# Patient Record
Sex: FEMALE | Race: WHITE | NOT HISPANIC OR LATINO | Employment: OTHER | URBAN - METROPOLITAN AREA
[De-identification: names, ages, dates, MRNs, and addresses within clinical notes are randomized per-mention and may not be internally consistent; named-entity substitution may affect disease eponyms.]

---

## 2017-07-17 ENCOUNTER — OFFICE VISIT (OUTPATIENT)
Dept: FAMILY MEDICINE CLINIC | Facility: CLINIC | Age: 82
End: 2017-07-17

## 2017-07-17 ENCOUNTER — LAB (OUTPATIENT)
Dept: LAB | Facility: HOSPITAL | Age: 82
End: 2017-07-17

## 2017-07-17 VITALS
HEIGHT: 66 IN | BODY MASS INDEX: 24.27 KG/M2 | DIASTOLIC BLOOD PRESSURE: 74 MMHG | WEIGHT: 151 LBS | HEART RATE: 84 BPM | OXYGEN SATURATION: 98 % | SYSTOLIC BLOOD PRESSURE: 130 MMHG

## 2017-07-17 DIAGNOSIS — I10 ESSENTIAL HYPERTENSION: Primary | ICD-10-CM

## 2017-07-17 DIAGNOSIS — L71.9 ROSACEA: ICD-10-CM

## 2017-07-17 DIAGNOSIS — R23.3 EASY BRUISING: ICD-10-CM

## 2017-07-17 DIAGNOSIS — N81.11 MIDLINE CYSTOCELE: ICD-10-CM

## 2017-07-17 DIAGNOSIS — K21.9 GASTROESOPHAGEAL REFLUX DISEASE WITHOUT ESOPHAGITIS: ICD-10-CM

## 2017-07-17 DIAGNOSIS — D49.9 NEOPLASM: ICD-10-CM

## 2017-07-17 DIAGNOSIS — R05.3 CHRONIC COUGH: ICD-10-CM

## 2017-07-17 DIAGNOSIS — R23.3 BRUISES EASILY: ICD-10-CM

## 2017-07-17 DIAGNOSIS — I51.7 CARDIOMEGALY: ICD-10-CM

## 2017-07-17 DIAGNOSIS — M15.9 PRIMARY OSTEOARTHRITIS INVOLVING MULTIPLE JOINTS: ICD-10-CM

## 2017-07-17 DIAGNOSIS — I10 ESSENTIAL HYPERTENSION: ICD-10-CM

## 2017-07-17 DIAGNOSIS — Z01.818 PRE-OP EVALUATION: ICD-10-CM

## 2017-07-17 DIAGNOSIS — I45.0 RIGHT FASCICULAR BLOCK: ICD-10-CM

## 2017-07-17 DIAGNOSIS — J45.20 MILD INTERMITTENT ASTHMA WITHOUT COMPLICATION: ICD-10-CM

## 2017-07-17 DIAGNOSIS — J30.9 ATOPIC RHINITIS: ICD-10-CM

## 2017-07-17 LAB
ANION GAP SERPL CALCULATED.3IONS-SCNC: 8 MMOL/L (ref 3–11)
APTT PPP: 28.2 SECONDS (ref 24–31)
BILIRUB UR QL STRIP: NEGATIVE
BUN BLD-MCNC: 14 MG/DL (ref 9–23)
BUN/CREAT SERPL: 23.3 (ref 7–25)
CALCIUM SPEC-SCNC: 10.1 MG/DL (ref 8.7–10.4)
CHLORIDE SERPL-SCNC: 103 MMOL/L (ref 99–109)
CLARITY UR: CLEAR
CO2 SERPL-SCNC: 29 MMOL/L (ref 20–31)
COLOR UR: YELLOW
CREAT BLD-MCNC: 0.6 MG/DL (ref 0.6–1.3)
DEPRECATED RDW RBC AUTO: 49.2 FL (ref 37–54)
ERYTHROCYTE [DISTWIDTH] IN BLOOD BY AUTOMATED COUNT: 13.7 % (ref 11.3–14.5)
GFR SERPL CREATININE-BSD FRML MDRD: 96 ML/MIN/1.73
GLUCOSE BLD-MCNC: 95 MG/DL (ref 70–100)
GLUCOSE UR STRIP-MCNC: NEGATIVE MG/DL
HCT VFR BLD AUTO: 43.3 % (ref 34.5–44)
HGB BLD-MCNC: 13.5 G/DL (ref 11.5–15.5)
HGB UR QL STRIP.AUTO: ABNORMAL
INR PPP: 0.92
KETONES UR QL STRIP: NEGATIVE
LEUKOCYTE ESTERASE UR QL STRIP.AUTO: ABNORMAL
MCH RBC QN AUTO: 30.5 PG (ref 27–31)
MCHC RBC AUTO-ENTMCNC: 31.2 G/DL (ref 32–36)
MCV RBC AUTO: 97.7 FL (ref 80–99)
NITRITE UR QL STRIP: NEGATIVE
PH UR STRIP.AUTO: 6 [PH] (ref 5–8)
PLATELET # BLD AUTO: 263 10*3/MM3 (ref 150–450)
PMV BLD AUTO: 8.4 FL (ref 6–12)
POTASSIUM BLD-SCNC: 4 MMOL/L (ref 3.5–5.5)
PROT UR QL STRIP: NEGATIVE
PROTHROMBIN TIME: 10 SECONDS (ref 9.6–11.5)
RBC # BLD AUTO: 4.43 10*6/MM3 (ref 3.89–5.14)
SODIUM BLD-SCNC: 140 MMOL/L (ref 132–146)
SP GR UR STRIP: 1.01 (ref 1–1.03)
UROBILINOGEN UR QL STRIP: ABNORMAL
WBC NRBC COR # BLD: 7.05 10*3/MM3 (ref 3.5–10.8)

## 2017-07-17 PROCEDURE — 85027 COMPLETE CBC AUTOMATED: CPT | Performed by: INTERNAL MEDICINE

## 2017-07-17 PROCEDURE — 36415 COLL VENOUS BLD VENIPUNCTURE: CPT

## 2017-07-17 PROCEDURE — 99214 OFFICE O/P EST MOD 30 MIN: CPT | Performed by: INTERNAL MEDICINE

## 2017-07-17 PROCEDURE — 85730 THROMBOPLASTIN TIME PARTIAL: CPT | Performed by: INTERNAL MEDICINE

## 2017-07-17 PROCEDURE — 81003 URINALYSIS AUTO W/O SCOPE: CPT | Performed by: INTERNAL MEDICINE

## 2017-07-17 PROCEDURE — 85610 PROTHROMBIN TIME: CPT | Performed by: INTERNAL MEDICINE

## 2017-07-17 PROCEDURE — 80048 BASIC METABOLIC PNL TOTAL CA: CPT | Performed by: INTERNAL MEDICINE

## 2017-07-17 RX ORDER — AZELASTINE HCL 205.5 UG/1
SPRAY NASAL
COMMUNITY
Start: 2017-07-13 | End: 2017-08-11 | Stop reason: SDUPTHER

## 2017-07-17 RX ORDER — ESTRADIOL 2 MG/1
RING VAGINAL
COMMUNITY
Start: 2017-06-15 | End: 2019-07-08

## 2017-07-17 NOTE — PROGRESS NOTES
Chief Complaint   Patient presents with   • Pre-op Exam     RIGHT HIP REPLACEMENT      Subjective   Lashanda Qureshi is a 81 y.o. female    History of Present Illness     The patient is struggled with osteoarthritis for many years.  She has been unable to walk because of pain in her right hip.  She is currently walking with a cane.  She is seeing Dr. Chakraborty and they've both determined that she needs total hip replacement.  She is same me preoperatively.  The patient has no cardiac symptoms.  She has a history of asthma with no recent decompensation.  She has had general surgery in the past with no difficulty with general anesthesia and she has no family members with problems with general anesthesia    The following portions of the patient's history were reviewed and updated as appropriate: allergies, current medications, past social history and problem list    Allergies   Allergen Reactions   • Erythromycin       Past Medical History:   Diagnosis Date   • Abnormal CT scan, lung    • Mycobacterium gordonae infection     Hypertension  History of colon cancer status post resection, radiation and chemotherapy  Radiation proctitis  Insomnia  Degenerative disc disease of lumbar spine  Vitamin D deficiency  History of shingles with ongoing postherpetic neuropathy  Large cystocele with pessary placement  Macular degeneration    Past Surgical History:   Procedure Laterality Date   • BLADDER SURGERY     • BRONCHOSCOPY  01/27/2016   • CHOLECYSTECTOMY     • COLON SURGERY     • DILATION AND CURETTAGE, DIAGNOSTIC / THERAPEUTIC     • TOTAL HIP ARTHROPLASTY      Left hip    COURTNEY/BSO and bladder tack  Social History     Social History   • Marital status:      Spouse name: N/A   • Number of children: N/A   • Years of education: N/A     Occupational History   • Not on file.     Social History Main Topics   • Smoking status: Former Smoker   • Smokeless tobacco: Never Used      Comment: Quit 41 years ago   • Alcohol use No   •  Drug use: No   • Sexual activity: Not on file      Comment:      Other Topics Concern   • Not on file     Social History Narrative      Current Outpatient Prescriptions on File Prior to Visit   Medication Sig Dispense Refill   • Cholecalciferol (VITAMIN D3) 2000 UNITS tablet Take  by mouth. Take 2 tablets daily.     • Cyanocobalamin (VITAMIN B12 PO) Take  by mouth. Take 1 tablet daily as directed.     • latanoprost (XALATAN) 0.005 % ophthalmic solution Apply  to eye. Instill 1 drop into affected eye(s) once daily as directed.     • losartan (COZAAR) 100 MG tablet Take 1 tablet by mouth daily.     • Magnesium 400 MG capsule Take  by mouth. Take 1 capsule daily.     • fluticasone (FLONASE) 50 MCG/ACT nasal spray 2 sprays into each nostril daily. Administer 2 sprays in each nostril for each dose.     • magnesium oxide (MAGOX) 400 (241.3 MG) MG tablet tablet Take 400 mg by mouth daily.     • montelukast (SINGULAIR) 10 MG tablet Take 1 tablet by mouth every night. 30 tablet 11   • [DISCONTINUED] estradiol (ESTRACE) 0.1 MG/GM vaginal cream Insert  into the vagina daily.       Current Facility-Administered Medications on File Prior to Visit   Medication Dose Route Frequency Provider Last Rate Last Dose   • albuterol (PROVENTIL) nebulizer solution 2.5 mg  2.5 mg Nebulization Q6H PRN CHARLES Perez            Review of Systems   Constitutional: Negative for appetite change and fatigue.   HENT: Negative for ear pain and sore throat.    Eyes: Negative for itching and visual disturbance.   Respiratory: Negative for cough and shortness of breath.    Cardiovascular: Negative for chest pain and palpitations.   Gastrointestinal: Negative for abdominal pain and nausea.   Endocrine: Negative for cold intolerance and heat intolerance.   Genitourinary: Negative for dysuria and hematuria.   Musculoskeletal: Negative for arthralgias and back pain.   Skin: Negative for rash and wound.   Allergic/Immunologic: Negative for  environmental allergies and food allergies.   Neurological: Negative for dizziness and headaches.   Hematological: Negative for adenopathy. Does not bruise/bleed easily.   Psychiatric/Behavioral: Negative for sleep disturbance. The patient is not nervous/anxious.        Objective     Vitals:    07/17/17 1107   BP: 130/74   Pulse: 84   SpO2: 98%       Physical Exam   Constitutional: She is oriented to person, place, and time. She appears well-developed and well-nourished.   HENT:   Head: Normocephalic and atraumatic.   Right Ear: External ear normal.   Left Ear: External ear normal.   Nose: Nose normal.   Mouth/Throat: Oropharynx is clear and moist.   Eyes: Conjunctivae and EOM are normal. Pupils are equal, round, and reactive to light.   Neck: Normal range of motion. Neck supple.   Cardiovascular: Normal rate, regular rhythm and normal heart sounds.    Pulmonary/Chest: Effort normal and breath sounds normal.   Abdominal: Soft. Bowel sounds are normal.   Musculoskeletal: Normal range of motion.   Neurological: She is alert and oriented to person, place, and time. She has normal reflexes.   Skin: Skin is warm and dry.   Psychiatric: She has a normal mood and affect. Her behavior is normal. Judgment and thought content normal.   Nursing note and vitals reviewed.      Assessment/Plan   Problem List Items Addressed This Visit        Cardiovascular and Mediastinum    Hypertension - Primary    Relevant Orders    Basic metabolic panel    ECG 12 Lead    RESOLVED: Right fascicular block       Respiratory    Chronic cough    Asthma    Atopic rhinitis       Digestive    Gastroesophageal reflux disease       Musculoskeletal and Integument    Rosacea    Osteoarthritis    Bruises easily       Genitourinary    Cystocele    Relevant Medications    ESTRING 2 MG vaginal ring      Other Visit Diagnoses     Pre-op evaluation        Relevant Orders    Basic metabolic panel    CBC No Differential    Protime-INR    Urinalysis (clean catch)     ECG 12 Lead    Easy bruising        Relevant Orders    APTT    Cardiomegaly         Relevant Orders    Protime-INR    APTT    Neoplasm         Relevant Orders    APTT        This patient is a satisfactory candidate for total hip arthroplasty.  Her preoperative lab work has been sent and is currently pending.      ECG 12 Lead  Date/Time: 7/17/2017 12:49 PM  Performed by: MIGUEL RAMOS  Authorized by: MIGUEL RAMOS   Comparison: compared with previous ECG   Similar to previous ECG  Rhythm: sinus rhythm  Rate: normal  Conduction: conduction normal  ST Segments: ST segments normal  T Waves: T waves normal  QRS axis: normal  Other: no other findings  Clinical impression: normal ECG

## 2017-07-18 ENCOUNTER — TELEPHONE (OUTPATIENT)
Dept: FAMILY MEDICINE CLINIC | Facility: CLINIC | Age: 82
End: 2017-07-18

## 2017-07-18 DIAGNOSIS — Z01.818 PRE-OP EXAM: Primary | ICD-10-CM

## 2017-07-18 NOTE — TELEPHONE ENCOUNTER
NEED ALL LABS DONE YESTERDAY TO BE FAXED [facility] PRE ADMISSION [phone]. HAS APPT SET UP AT PRE ADMISSION THE 20TH OF THIS WEEK. LET HER KNOW IF WE CAN'T FAX IN TIME THEN SHE WILL CHANGE THE APPT. [phone]

## 2017-07-18 NOTE — TELEPHONE ENCOUNTER
Pt needs a chest xray ordered for her surgery.  Please place order and call pt when she needs to go get this done ASAP.    Please check preop form to make sure she isn't missing anything else.

## 2017-07-19 ENCOUNTER — HOSPITAL ENCOUNTER (OUTPATIENT)
Dept: GENERAL RADIOLOGY | Facility: HOSPITAL | Age: 82
Discharge: HOME | End: 2017-07-19
Admitting: INTERNAL MEDICINE

## 2017-07-19 DIAGNOSIS — Z01.818 PRE-OP EXAM: ICD-10-CM

## 2017-07-19 PROCEDURE — 71020 HC CHEST PA AND LATERAL: CPT

## 2017-07-19 NOTE — TELEPHONE ENCOUNTER
YOU WILL NEED TO PLACE THE ORDER FOR THE CHEST XRAY TO [facility] AS AN EXTERNAL

## 2017-07-19 NOTE — TELEPHONE ENCOUNTER
SPOKE WITH PATIENT AND NOTIFIED HER THAT THE ORDER WAS PLACED THIS MORNING.  SHE ASKED IF SHE COULD GO TO [facility] LIKE SHE USED TO WHEN OFFICE WAS AT PRIOR LOCATION. TOLD HER YES AND THAT THE ORDER WAS IN HER CHART SO THEY COULD JUST PULL IT UP.

## 2017-07-31 ENCOUNTER — TELEPHONE (OUTPATIENT)
Dept: FAMILY MEDICINE CLINIC | Facility: CLINIC | Age: 82
End: 2017-07-31

## 2017-08-11 RX ORDER — MONTELUKAST SODIUM 10 MG/1
10 TABLET ORAL NIGHTLY
Qty: 30 TABLET | Refills: 11 | Status: SHIPPED | OUTPATIENT
Start: 2017-08-11 | End: 2019-05-14 | Stop reason: SDUPTHER

## 2017-08-11 RX ORDER — AZELASTINE HCL 205.5 UG/1
2 SPRAY NASAL 2 TIMES DAILY
Qty: 1 EACH | Refills: 3 | Status: SHIPPED | OUTPATIENT
Start: 2017-08-11 | End: 2018-11-12 | Stop reason: SDUPTHER

## 2017-08-11 NOTE — TELEPHONE ENCOUNTER
NOT SURE IF SHOULD HAVE AZELASTINE OR SINGULAIR CALLED IN. JUST HAD HIP SURGERY AND HAVING A HARD TIME BREATHING.  PLEASE CALL HER AS SHE HAS QUESTIONS. [phone]

## 2017-10-26 ENCOUNTER — LAB (OUTPATIENT)
Dept: LAB | Facility: HOSPITAL | Age: 82
End: 2017-10-26

## 2017-10-26 ENCOUNTER — OFFICE VISIT (OUTPATIENT)
Dept: FAMILY MEDICINE CLINIC | Facility: CLINIC | Age: 82
End: 2017-10-26

## 2017-10-26 VITALS
HEART RATE: 70 BPM | DIASTOLIC BLOOD PRESSURE: 90 MMHG | OXYGEN SATURATION: 93 % | HEIGHT: 66 IN | BODY MASS INDEX: 23.46 KG/M2 | SYSTOLIC BLOOD PRESSURE: 144 MMHG | WEIGHT: 146 LBS | TEMPERATURE: 97.2 F

## 2017-10-26 DIAGNOSIS — I10 ESSENTIAL HYPERTENSION: ICD-10-CM

## 2017-10-26 DIAGNOSIS — M15.9 PRIMARY OSTEOARTHRITIS INVOLVING MULTIPLE JOINTS: ICD-10-CM

## 2017-10-26 DIAGNOSIS — R23.3 BRUISES EASILY: ICD-10-CM

## 2017-10-26 DIAGNOSIS — J45.20 MILD INTERMITTENT ASTHMA WITHOUT COMPLICATION: ICD-10-CM

## 2017-10-26 DIAGNOSIS — L71.9 ROSACEA: ICD-10-CM

## 2017-10-26 DIAGNOSIS — Z00.00 ENCOUNTER FOR MEDICARE ANNUAL WELLNESS EXAM: Primary | ICD-10-CM

## 2017-10-26 DIAGNOSIS — L68.0 HIRSUTISM: ICD-10-CM

## 2017-10-26 DIAGNOSIS — R53.83 FATIGUE, UNSPECIFIED TYPE: ICD-10-CM

## 2017-10-26 DIAGNOSIS — Z00.00 ENCOUNTER FOR MEDICARE ANNUAL WELLNESS EXAM: ICD-10-CM

## 2017-10-26 DIAGNOSIS — E55.9 VITAMIN D DEFICIENCY: ICD-10-CM

## 2017-10-26 LAB
25(OH)D3 SERPL-MCNC: 35.9 NG/ML
ALBUMIN SERPL-MCNC: 4.4 G/DL (ref 3.2–4.8)
ALBUMIN/GLOB SERPL: 1.5 G/DL (ref 1.5–2.5)
ALP SERPL-CCNC: 86 U/L (ref 25–100)
ALT SERPL W P-5'-P-CCNC: 15 U/L (ref 7–40)
ANION GAP SERPL CALCULATED.3IONS-SCNC: 10 MMOL/L (ref 3–11)
ARTICHOKE IGE QN: 84 MG/DL (ref 0–130)
AST SERPL-CCNC: 23 U/L (ref 0–33)
BASOPHILS # BLD AUTO: 0.03 10*3/MM3 (ref 0–0.2)
BASOPHILS NFR BLD AUTO: 0.4 % (ref 0–1)
BILIRUB SERPL-MCNC: 0.6 MG/DL (ref 0.3–1.2)
BUN BLD-MCNC: 15 MG/DL (ref 9–23)
BUN/CREAT SERPL: 25 (ref 7–25)
CALCIUM SPEC-SCNC: 9.6 MG/DL (ref 8.7–10.4)
CHLORIDE SERPL-SCNC: 100 MMOL/L (ref 99–109)
CHOLEST SERPL-MCNC: 199 MG/DL (ref 0–200)
CO2 SERPL-SCNC: 28 MMOL/L (ref 20–31)
CREAT BLD-MCNC: 0.6 MG/DL (ref 0.6–1.3)
DEPRECATED RDW RBC AUTO: 46.7 FL (ref 37–54)
EOSINOPHIL # BLD AUTO: 0.1 10*3/MM3 (ref 0–0.3)
EOSINOPHIL NFR BLD AUTO: 1.3 % (ref 0–3)
ERYTHROCYTE [DISTWIDTH] IN BLOOD BY AUTOMATED COUNT: 13.6 % (ref 11.3–14.5)
GFR SERPL CREATININE-BSD FRML MDRD: 96 ML/MIN/1.73
GLOBULIN UR ELPH-MCNC: 2.9 GM/DL
GLUCOSE BLD-MCNC: 92 MG/DL (ref 70–100)
HCT VFR BLD AUTO: 43.1 % (ref 34.5–44)
HDLC SERPL-MCNC: 104 MG/DL (ref 40–60)
HGB BLD-MCNC: 13.6 G/DL (ref 11.5–15.5)
IMM GRANULOCYTES # BLD: 0.02 10*3/MM3 (ref 0–0.03)
IMM GRANULOCYTES NFR BLD: 0.3 % (ref 0–0.6)
LYMPHOCYTES # BLD AUTO: 1.46 10*3/MM3 (ref 0.6–4.8)
LYMPHOCYTES NFR BLD AUTO: 19.1 % (ref 24–44)
MCH RBC QN AUTO: 29.4 PG (ref 27–31)
MCHC RBC AUTO-ENTMCNC: 31.6 G/DL (ref 32–36)
MCV RBC AUTO: 93.3 FL (ref 80–99)
MONOCYTES # BLD AUTO: 0.39 10*3/MM3 (ref 0–1)
MONOCYTES NFR BLD AUTO: 5.1 % (ref 0–12)
NEUTROPHILS # BLD AUTO: 5.63 10*3/MM3 (ref 1.5–8.3)
NEUTROPHILS NFR BLD AUTO: 73.8 % (ref 41–71)
PLATELET # BLD AUTO: 274 10*3/MM3 (ref 150–450)
PMV BLD AUTO: 8.7 FL (ref 6–12)
POTASSIUM BLD-SCNC: 3.8 MMOL/L (ref 3.5–5.5)
PROLACTIN SERPL-MCNC: 6.2 NG/ML
PROT SERPL-MCNC: 7.3 G/DL (ref 5.7–8.2)
RBC # BLD AUTO: 4.62 10*6/MM3 (ref 3.89–5.14)
SODIUM BLD-SCNC: 138 MMOL/L (ref 132–146)
TRIGL SERPL-MCNC: 75 MG/DL (ref 0–150)
TSH SERPL DL<=0.05 MIU/L-ACNC: 2.27 MIU/ML (ref 0.35–5.35)
VIT B12 BLD-MCNC: 1174 PG/ML (ref 211–911)
WBC NRBC COR # BLD: 7.63 10*3/MM3 (ref 3.5–10.8)

## 2017-10-26 PROCEDURE — G0439 PPPS, SUBSEQ VISIT: HCPCS | Performed by: NURSE PRACTITIONER

## 2017-10-26 PROCEDURE — 82306 VITAMIN D 25 HYDROXY: CPT | Performed by: NURSE PRACTITIONER

## 2017-10-26 PROCEDURE — 80053 COMPREHEN METABOLIC PANEL: CPT | Performed by: NURSE PRACTITIONER

## 2017-10-26 PROCEDURE — 84443 ASSAY THYROID STIM HORMONE: CPT | Performed by: NURSE PRACTITIONER

## 2017-10-26 PROCEDURE — 84146 ASSAY OF PROLACTIN: CPT | Performed by: NURSE PRACTITIONER

## 2017-10-26 PROCEDURE — 85025 COMPLETE CBC W/AUTO DIFF WBC: CPT | Performed by: NURSE PRACTITIONER

## 2017-10-26 PROCEDURE — 36415 COLL VENOUS BLD VENIPUNCTURE: CPT

## 2017-10-26 PROCEDURE — 82626 DEHYDROEPIANDROSTERONE: CPT | Performed by: NURSE PRACTITIONER

## 2017-10-26 PROCEDURE — 80061 LIPID PANEL: CPT | Performed by: NURSE PRACTITIONER

## 2017-10-26 PROCEDURE — 82607 VITAMIN B-12: CPT | Performed by: NURSE PRACTITIONER

## 2017-10-26 RX ORDER — NYSTATIN 100000 [USP'U]/G
POWDER TOPICAL
COMMUNITY
Start: 2017-07-24 | End: 2017-11-28

## 2017-10-26 RX ORDER — ALBUTEROL SULFATE 90 UG/1
2 AEROSOL, METERED RESPIRATORY (INHALATION) EVERY 6 HOURS PRN
Refills: 1 | COMMUNITY
Start: 2017-10-12 | End: 2020-12-07 | Stop reason: SDUPTHER

## 2017-10-26 RX ORDER — METRONIDAZOLE 10 MG/G
GEL TOPICAL DAILY
Qty: 1 TUBE | Refills: 2 | Status: SHIPPED | OUTPATIENT
Start: 2017-10-26 | End: 2017-11-28

## 2017-10-26 NOTE — PROGRESS NOTES
QUICK REFERENCE INFORMATION:  The ABCs of the Annual Wellness Visit    Subsequent Medicare Wellness Visit    HEALTH RISK ASSESSMENT    1935    Recent Hospitalizations:  Recently treated at the following:  Other: [facility].        Current Medical Providers:  Patient Care Team:  Gino Hoyos MD as PCP - General (Internal Medicine)  Crispin Wheeler MD as Consulting Physician (Orthopedic Surgery)  James Gonzales MD        Smoking Status:  History   Smoking Status   • Former Smoker   Smokeless Tobacco   • Never Used     Comment: Quit 41 years ago       Alcohol Consumption:  History   Alcohol Use No       Depression Screen:   PHQ-2/PHQ-9 Depression Screening 10/26/2017   Little interest or pleasure in doing things 0   Feeling down, depressed, or hopeless 0   Total Score 0       Health Habits and Functional and Cognitive Screening:  Functional & Cognitive Status 10/26/2017   Do you have difficulty preparing food and eating? No   Do you have difficulty bathing yourself, getting dressed or grooming yourself? No   Do you have difficulty using the toilet? No   Do you have difficulty moving around from place to place? No   Do you have trouble with steps or getting out of a bed or a chair? No   In the past year have you fallen or experienced a near fall? No   Current Diet Well Balanced Diet   Dental Exam Up to date   Eye Exam Up to date   Exercise (times per week) 3 times per week   Current Exercise Activities Include Weightlifting   Do you need help using the phone?  No   Are you deaf or do you have serious difficulty hearing?  No   Do you need help with transportation? No   Do you need help shopping? No   Do you need help preparing meals?  No   Do you need help with housework?  No   Do you need help with laundry? No   Do you need help taking your medications? No   Do you need help managing money? No   Have you felt unusual stress, anger or loneliness in the last month? Yes   If you need help, do you have trouble  finding someone available to you? No   Have you been bothered in the last four weeks by sexual problems? No   Do you have difficulty concentrating, remembering or making decisions? Yes           Does the patient have evidence of cognitive impairment? No    Aspirin use counseling: Does not need ASA (and currently is not on it)      Recent Lab Results:  CMP:  Lab Results   Component Value Date    BUN 15 10/26/2017    CREATININE 0.60 10/26/2017    EGFRIFNONA 96 10/26/2017    BCR 25.0 10/26/2017     10/26/2017    K 3.8 10/26/2017    CO2 28.0 10/26/2017    CALCIUM 9.6 10/26/2017    PROTENTOTREF 6.6 02/04/2016    ALBUMIN 4.40 10/26/2017    LABGLOBREF 2.9 02/04/2016    LABIL2 1.5 10/26/2017    BILITOT 0.6 10/26/2017    ALKPHOS 86 10/26/2017    AST 23 10/26/2017    ALT 15 10/26/2017     Lipid Panel:  Lab Results   Component Value Date    CHOL 199 10/26/2017    TRIG 75 10/26/2017     (H) 10/26/2017    LDLDIRECT 84 10/26/2017     HbA1c:       Visual Acuity:  No exam data present    Age-appropriate Screening Schedule:  Refer to the list below for future screening recommendations based on patient's age, sex and/or medical conditions. Orders for these recommended tests are listed in the plan section. The patient has been provided with a written plan.    Health Maintenance   Topic Date Due   • PNEUMOCOCCAL VACCINES (65+ LOW/MEDIUM RISK) (1 of 2 - PCV13) 07/23/2000   • TDAP/TD VACCINES (2 - Td) 01/01/2024   • INFLUENZA VACCINE  Completed   • ZOSTER VACCINE  Completed        Subjective   History of Present Illness    Lashanda Qureshi is a 82 y.o. female who presents for an Subsequent Wellness Visit.    HTN- Stopped taking Losartan, due to low BP.     The following portions of the patient's history were reviewed and updated as appropriate: allergies, current medications, past family history, past medical history, past social history, past surgical history and problem list.    Outpatient Medications Prior to Visit    Medication Sig Dispense Refill   • azelastine (ASTEPRO) 0.15 % solution nasal spray 2 sprays into each nostril 2 (Two) Times a Day. 1 each 3   • bimatoprost (LUMIGAN) 0.01 % ophthalmic drops Administer 1 drop to both eyes Every Night.     • Cholecalciferol (VITAMIN D3) 2000 UNITS tablet Take  by mouth. Take 2 tablets daily.     • Cyanocobalamin (VITAMIN B12 PO) Take  by mouth. Take 1 tablet daily as directed.     • ESTRING 2 MG vaginal ring      • fluticasone (FLONASE) 50 MCG/ACT nasal spray 2 sprays into each nostril daily. Administer 2 sprays in each nostril for each dose.     • latanoprost (XALATAN) 0.005 % ophthalmic solution Apply  to eye. Instill 1 drop into affected eye(s) once daily as directed.     • Magnesium 400 MG capsule Take  by mouth. Take 1 capsule daily.     • magnesium oxide (MAGOX) 400 (241.3 MG) MG tablet tablet Take 400 mg by mouth daily.     • montelukast (SINGULAIR) 10 MG tablet Take 1 tablet by mouth Every Night. 30 tablet 11   • losartan (COZAAR) 100 MG tablet Take 1 tablet by mouth daily.       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    Review of Systems  "  Constitutional: Negative for appetite change, chills, fatigue and fever.   HENT: Positive for rhinorrhea ( use Azelastine nasal spray occasionally. Singulair occasionally) and sinus pressure. Negative for congestion, ear pain and sore throat.    Eyes: Positive for visual disturbance (glaucoma). Negative for itching.   Respiratory: Positive for wheezing (asthma as child). Negative for cough and shortness of breath.    Cardiovascular: Positive for palpitations (occasionally). Negative for chest pain and leg swelling.   Gastrointestinal: Negative for abdominal pain, constipation, diarrhea (Hx of colon cancer, has occasional loose stools), nausea and vomiting.   Endocrine: Negative for cold intolerance and heat intolerance.   Genitourinary: Positive for difficulty urinating (some leakage, wears pessary). Negative for dysuria and hematuria.   Musculoskeletal: Positive for arthralgias, back pain and neck pain. Negative for joint swelling and myalgias.   Skin: Positive for rash (dry skin, maybe Rascea). Negative for wound.   Allergic/Immunologic: Negative for environmental allergies and food allergies.   Neurological: Negative for dizziness and headaches.   Hematological: Negative for adenopathy. Bruises/bleeds easily.   Psychiatric/Behavioral: Negative for sleep disturbance. The patient is not nervous/anxious.        Compared to one year ago, the patient feels her physical health is worse.Just had surgery.  Compared to one year ago, the patient feels her mental health is worse.Some decreased memory    Objective     Physical Exam    Vitals:    10/26/17 1256   BP: 144/90   BP Location: Left arm   Patient Position: Sitting   Cuff Size: Large Adult   Pulse: 70   Temp: 97.2 °F (36.2 °C)   TempSrc: Temporal Artery    SpO2: 93%   Weight: 146 lb (66.2 kg)   Height: 66\" (167.6 cm)   PainSc: 3  Comment: Right shoulder pain   PainLoc: Shoulder       Body mass index is 23.57 kg/(m^2).  Discussed the patient's BMI with her. The BMI " is in the acceptable range.    Assessment/Plan   Patient Self-Management and Personalized Health Advice  The patient has been provided with information about: fall prevention and designing advance directives and preventive services including:   · Advance directive, Fall Risk assessment done, Fall Risk plan of care done, Influenza vaccine, Pneumococcal vaccine , TdaP vaccine, Zostavax vaccine (Herpes Zoster).    Visit Diagnoses:    ICD-10-CM ICD-9-CM   1. Encounter for Medicare annual wellness exam Z00.00 V70.0   2. Mild intermittent asthma without complication J45.20 493.90   3. Essential hypertension I10 401.9   4. Rosacea L71.9 695.3   5. Primary osteoarthritis involving multiple joints M15.0 715.09   6. Bruises easily R23.8 782.9   7. Hirsutism L68.0 704.1   8. Fatigue, unspecified type R53.83 780.79   9. Vitamin D deficiency  E55.9 268.9       Orders Placed This Encounter   Procedures   • Comprehensive Metabolic Panel     Standing Status:   Future     Number of Occurrences:   1     Standing Expiration Date:   10/26/2018   • Lipid Panel     Standing Status:   Future     Number of Occurrences:   1     Standing Expiration Date:   10/26/2018   • Vitamin D 25 Hydroxy     Standing Status:   Future     Number of Occurrences:   1     Standing Expiration Date:   10/26/2018   • TSH     Standing Status:   Future     Number of Occurrences:   1     Standing Expiration Date:   10/26/2018   • DHEA     Standing Status:   Future     Number of Occurrences:   1     Standing Expiration Date:   10/26/2018   • Prolactin     Standing Status:   Future     Number of Occurrences:   1     Standing Expiration Date:   10/26/2018   • Vitamin B12     Standing Status:   Future     Number of Occurrences:   1     Standing Expiration Date:   10/26/2018   • CBC & Differential     Standing Status:   Future     Number of Occurrences:   1     Standing Expiration Date:   10/26/2018     Order Specific Question:   Manual Differential     Answer:   No        Outpatient Encounter Prescriptions as of 10/26/2017   Medication Sig Dispense Refill   • azelastine (ASTEPRO) 0.15 % solution nasal spray 2 sprays into each nostril 2 (Two) Times a Day. 1 each 3   • bimatoprost (LUMIGAN) 0.01 % ophthalmic drops Administer 1 drop to both eyes Every Night.     • Cholecalciferol (VITAMIN D3) 2000 UNITS tablet Take  by mouth. Take 2 tablets daily.     • Cyanocobalamin (VITAMIN B12 PO) Take  by mouth. Take 1 tablet daily as directed.     • ESTRING 2 MG vaginal ring      • fluticasone (FLONASE) 50 MCG/ACT nasal spray 2 sprays into each nostril daily. Administer 2 sprays in each nostril for each dose.     • latanoprost (XALATAN) 0.005 % ophthalmic solution Apply  to eye. Instill 1 drop into affected eye(s) once daily as directed.     • Magnesium 400 MG capsule Take  by mouth. Take 1 capsule daily.     • magnesium oxide (MAGOX) 400 (241.3 MG) MG tablet tablet Take 400 mg by mouth daily.     • montelukast (SINGULAIR) 10 MG tablet Take 1 tablet by mouth Every Night. 30 tablet 11   • nystatin (MYCOSTATIN) 534417 UNIT/GM powder      • VENTOLIN  (90 Base) MCG/ACT inhaler   1   • losartan (COZAAR) 100 MG tablet Take 1 tablet by mouth daily.     • metroNIDAZOLE (METROGEL) 1 % gel Apply  topically Daily. 1 tube 2     Facility-Administered Encounter Medications as of 10/26/2017   Medication Dose Route Frequency Provider Last Rate Last Dose   • albuterol (PROVENTIL) nebulizer solution 2.5 mg  2.5 mg Nebulization Q6H PRN CHARLES Perez           Reviewed use of high risk medication in the elderly: not applicable  Reviewed for potential of harmful drug interactions in the elderly: not applicable    Follow Up:  Return in about 6 months (around 4/26/2018), or if symptoms worsen or fail to improve, for Recheck.     An After Visit Summary and PPPS with all of these plans were given to the patient.

## 2017-10-31 DIAGNOSIS — M15.9 PRIMARY OSTEOARTHRITIS INVOLVING MULTIPLE JOINTS: Primary | ICD-10-CM

## 2017-10-31 LAB — DHEA SERPL-MCNC: <20 NG/DL (ref 31–701)

## 2017-11-01 DIAGNOSIS — L68.0 HIRSUTISM: Primary | ICD-10-CM

## 2017-11-28 ENCOUNTER — OFFICE VISIT (OUTPATIENT)
Dept: FAMILY MEDICINE CLINIC | Facility: CLINIC | Age: 82
End: 2017-11-28

## 2017-11-28 VITALS
HEIGHT: 66 IN | HEART RATE: 76 BPM | DIASTOLIC BLOOD PRESSURE: 92 MMHG | OXYGEN SATURATION: 97 % | WEIGHT: 147 LBS | SYSTOLIC BLOOD PRESSURE: 152 MMHG | BODY MASS INDEX: 23.63 KG/M2 | TEMPERATURE: 97.8 F

## 2017-11-28 DIAGNOSIS — J45.40 MODERATE PERSISTENT ASTHMA, UNSPECIFIED WHETHER COMPLICATED: ICD-10-CM

## 2017-11-28 DIAGNOSIS — R05.3 CHRONIC COUGH: Primary | ICD-10-CM

## 2017-11-28 DIAGNOSIS — R60.0 LOCALIZED EDEMA: ICD-10-CM

## 2017-11-28 PROCEDURE — 99214 OFFICE O/P EST MOD 30 MIN: CPT | Performed by: NURSE PRACTITIONER

## 2017-11-28 RX ORDER — BUDESONIDE AND FORMOTEROL FUMARATE DIHYDRATE 80; 4.5 UG/1; UG/1
2 AEROSOL RESPIRATORY (INHALATION)
Qty: 1 INHALER | Refills: 12 | COMMUNITY
Start: 2017-11-28 | End: 2017-12-14 | Stop reason: DRUGHIGH

## 2017-11-28 RX ORDER — PREDNISONE 20 MG/1
20 TABLET ORAL DAILY
Qty: 7 TABLET | Refills: 0 | Status: SHIPPED | OUTPATIENT
Start: 2017-11-28 | End: 2017-12-14

## 2017-11-28 RX ORDER — IPRATROPIUM BROMIDE 42 UG/1
1 SPRAY, METERED NASAL DAILY PRN
COMMUNITY
Start: 2017-11-01 | End: 2018-11-12

## 2017-11-28 NOTE — PATIENT INSTRUCTIONS
Asthma, Adult  Asthma is a condition of the lungs in which the airways tighten and narrow. Asthma can make it hard to breathe. Asthma cannot be cured, but medicine and lifestyle changes can help control it. Asthma may be started (triggered) by:  · Animal skin flakes (dander).  · Dust.  · Cockroaches.  · Pollen.  · Mold.  · Smoke.  · Cleaning products.  · Hair sprays or aerosol sprays.  · Paint fumes or strong smells.  · Cold air, weather changes, and winds.  · Crying or laughing hard.  · Stress.  · Certain medicines or drugs.  · Foods, such as dried fruit, potato chips, and sparkling grape juice.  · Infections or conditions (colds, flu).  · Exercise.  · Certain medical conditions or diseases.  · Exercise or tiring activities.  HOME CARE   · Take medicine as told by your doctor.  · Use a peak flow meter as told by your doctor. A peak flow meter is a tool that measures how well the lungs are working.  · Record and keep track of the peak flow meter's readings.  · Understand and use the asthma action plan. An asthma action plan is a written plan for taking care of your asthma and treating your attacks.  · To help prevent asthma attacks:    Do not smoke. Stay away from secondhand smoke.    Change your heating and air conditioning filter often.    Limit your use of fireplaces and wood stoves.    Get rid of pests (such as roaches and mice) and their droppings.    Throw away plants if you see mold on them.    Clean your floors. Dust regularly. Use cleaning products that do not smell.    Have someone vacuum when you are not home. Use a vacuum  with a HEPA filter if possible.    Replace carpet with wood, tile, or vinyl diane. Carpet can trap animal skin flakes and dust.    Use allergy-proof pillows, mattress covers, and box spring covers.    Wash bed sheets and blankets every week in hot water and dry them in a dryer.    Use blankets that are made of polyester or cotton.    Clean bathrooms and loretta with bleach.  If possible, have someone repaint the walls in these rooms with mold-resistant paint. Keep out of the rooms that are being cleaned and painted.    Wash hands often.  GET HELP IF:  · You have make a whistling sound when breaking (wheeze), have shortness of breath, or have a cough even if taking medicine to prevent attacks.  · The colored mucus you cough up (sputum) is thicker than usual.  · The colored mucus you cough up changes from clear or white to yellow, green, gray, or bloody.  · You have problems from the medicine you are taking such as:    A rash.    Itching.    Swelling.    Trouble breathing.  · You need reliever medicines more than 2-3 times a week.  · Your peak flow measurement is still at 50-79% of your personal best after following the action plan for 1 hour.  · You have a fever.  GET HELP RIGHT AWAY IF:   · You seem to be worse and are not responding to medicine during an asthma attack.  · You are short of breath even at rest.  · You get short of breath when doing very little activity.  · You have trouble eating, drinking, or talking.  · You have chest pain.  · You have a fast heartbeat.  · Your lips or fingernails start to turn blue.  · You are light-headed, dizzy, or faint.  · Your peak flow is less than 50% of your personal best.     This information is not intended to replace advice given to you by your health care provider. Make sure you discuss any questions you have with your health care provider.     Document Released: 06/05/2009 Document Revised: 09/07/2016 Document Reviewed: 07/17/2014  [employer] Interactive Patient Education ©2017 [employer]

## 2017-11-28 NOTE — PROGRESS NOTES
Chief Complaint   Patient presents with   • Cough   • Foot Swelling     Feet & ankles swelling       Subjective   Lashanda Qureshi is a 82 y.o. female    Cough   This is a chronic problem. Episode onset: 2 yrs, had lung infection 2 yrs ago, occurs periodically since,  with constant cough, scratchy throat, hoarseness,  denies fevers, chills. Dry cough mostly, occ prod. Feels like post nasal drip.  The problem has been waxing and waning. The problem occurs every few minutes. The cough is non-productive. Associated symptoms include ear congestion, nasal congestion, postnasal drip, rhinorrhea and a sore throat. Pertinent negatives include no chest pain, chills, ear pain, fever, headaches, heartburn, hemoptysis, myalgias, rash, shortness of breath, sweats, weight loss or wheezing. The symptoms are aggravated by lying down. Risk factors: none. She has tried leukotriene antagonists and steroid inhaler (using Singulair, Azelastine, not taking flonase.) for the symptoms.   Has seen ENT for ear pressure, Has not seen an allergist.    Swelling-  Ankles and feet swelling since Thanksgiving. Pt reports watches sodium intake, Does have some vascular changes since had cancer.     HTN- Stopped Losartan, BP at home was low-benjamin, BP up today. PT reports BP running well at home.    Allergies   Allergen Reactions   • Antihistamines, Loratadine-Type Other (See Comments)     Drowsiness     • Erythromycin      Past Medical History:   Diagnosis Date   • Abnormal CT scan, lung    • Mycobacterium gordonae infection       Past Surgical History:   Procedure Laterality Date   • BLADDER SURGERY     • BRONCHOSCOPY  01/27/2016   • CHOLECYSTECTOMY     • COLON SURGERY     • DILATION AND CURETTAGE, DIAGNOSTIC / THERAPEUTIC     • TOTAL HIP ARTHROPLASTY      Left hip     Social History     Social History   • Marital status:      Spouse name: N/A   • Number of children: N/A   • Years of education: N/A     Occupational History   • Not on file.      Social History Main Topics   • Smoking status: Former Smoker   • Smokeless tobacco: Never Used      Comment: Quit 41 years ago   • Alcohol use No   • Drug use: No   • Sexual activity: Defer      Comment:      Other Topics Concern   • Not on file     Social History Narrative        Current Outpatient Prescriptions:   •  azelastine (ASTEPRO) 0.15 % solution nasal spray, 2 sprays into each nostril 2 (Two) Times a Day., Disp: 1 each, Rfl: 3  •  bimatoprost (LUMIGAN) 0.01 % ophthalmic drops, Administer 1 drop to both eyes Every Night., Disp: , Rfl:   •  Cholecalciferol (VITAMIN D3) 2000 UNITS tablet, Take  by mouth. Take 2 tablets daily., Disp: , Rfl:   •  Cyanocobalamin (VITAMIN B12 PO), Take  by mouth. Take 1 tablet daily as directed., Disp: , Rfl:   •  ESTRING 2 MG vaginal ring, , Disp: , Rfl:   •  fluticasone (FLONASE) 50 MCG/ACT nasal spray, 2 sprays into each nostril daily. Administer 2 sprays in each nostril for each dose., Disp: , Rfl:   •  ipratropium (ATROVENT) 0.06 % nasal spray, , Disp: , Rfl:   •  latanoprost (XALATAN) 0.005 % ophthalmic solution, Apply  to eye. Instill 1 drop into affected eye(s) once daily as directed., Disp: , Rfl:   •  magnesium oxide (MAGOX) 400 (241.3 MG) MG tablet tablet, Take 400 mg by mouth daily., Disp: , Rfl:   •  montelukast (SINGULAIR) 10 MG tablet, Take 1 tablet by mouth Every Night., Disp: 30 tablet, Rfl: 11  •  VENTOLIN  (90 Base) MCG/ACT inhaler, , Disp: , Rfl: 1  •  budesonide-formoterol (SYMBICORT) 80-4.5 MCG/ACT inhaler, Inhale 2 puffs 2 (Two) Times a Day., Disp: 1 inhaler, Rfl: 12  •  predniSONE (DELTASONE) 20 MG tablet, Take 1 tablet by mouth Daily., Disp: 7 tablet, Rfl: 0    Current Facility-Administered Medications:   •  albuterol (PROVENTIL) nebulizer solution 2.5 mg, 2.5 mg, Nebulization, Q6H PRN, Olga Mullins, APRN     Review of Systems   Constitutional: Negative for chills, fever and weight loss.   HENT: Positive for postnasal drip, rhinorrhea  and sore throat. Negative for ear pain.    Respiratory: Positive for cough. Negative for hemoptysis, shortness of breath and wheezing.    Cardiovascular: Positive for leg swelling. Negative for chest pain and palpitations.   Gastrointestinal: Positive for diarrhea (some since colon CA). Negative for constipation, heartburn, nausea and vomiting.   Genitourinary: Negative for difficulty urinating and dysuria.   Musculoskeletal: Negative for myalgias.   Skin: Negative for rash.   Neurological: Negative for headaches.       Objective     Vitals:    11/28/17 1130   BP: 152/92   Pulse: 76   Temp: 97.8 °F (36.6 °C)   SpO2: 97%       Results for orders placed or performed in visit on 10/26/17   Comprehensive Metabolic Panel   Result Value Ref Range    Glucose 92 70 - 100 mg/dL    BUN 15 9 - 23 mg/dL    Creatinine 0.60 0.60 - 1.30 mg/dL    Sodium 138 132 - 146 mmol/L    Potassium 3.8 3.5 - 5.5 mmol/L    Chloride 100 99 - 109 mmol/L    CO2 28.0 20.0 - 31.0 mmol/L    Calcium 9.6 8.7 - 10.4 mg/dL    Total Protein 7.3 5.7 - 8.2 g/dL    Albumin 4.40 3.20 - 4.80 g/dL    ALT (SGPT) 15 7 - 40 U/L    AST (SGOT) 23 0 - 33 U/L    Alkaline Phosphatase 86 25 - 100 U/L    Total Bilirubin 0.6 0.3 - 1.2 mg/dL    eGFR Non African Amer 96 >60 mL/min/1.73    Globulin 2.9 gm/dL    A/G Ratio 1.5 1.5 - 2.5 g/dL    BUN/Creatinine Ratio 25.0 7.0 - 25.0    Anion Gap 10.0 3.0 - 11.0 mmol/L   Lipid Panel   Result Value Ref Range    Total Cholesterol 199 0 - 200 mg/dL    Triglycerides 75 0 - 150 mg/dL    HDL Cholesterol 104 (H) 40 - 60 mg/dL    LDL Cholesterol  84 0 - 130 mg/dL   Vitamin D 25 Hydroxy   Result Value Ref Range    25 Hydroxy, Vitamin D 35.9 ng/ml   TSH   Result Value Ref Range    TSH 2.267 0.350 - 5.350 mIU/mL   DHEA   Result Value Ref Range    DHEA <20 (L) 31 - 701 ng/dL   Prolactin   Result Value Ref Range    Prolactin 6.20 ng/mL   Vitamin B12   Result Value Ref Range    Vitamin B-12 1174 (H) 211 - 911 pg/mL   CBC Auto Differential    Result Value Ref Range    WBC 7.63 3.50 - 10.80 10*3/mm3    RBC 4.62 3.89 - 5.14 10*6/mm3    Hemoglobin 13.6 11.5 - 15.5 g/dL    Hematocrit 43.1 34.5 - 44.0 %    MCV 93.3 80.0 - 99.0 fL    MCH 29.4 27.0 - 31.0 pg    MCHC 31.6 (L) 32.0 - 36.0 g/dL    RDW 13.6 11.3 - 14.5 %    RDW-SD 46.7 37.0 - 54.0 fl    MPV 8.7 6.0 - 12.0 fL    Platelets 274 150 - 450 10*3/mm3    Neutrophil % 73.8 (H) 41.0 - 71.0 %    Lymphocyte % 19.1 (L) 24.0 - 44.0 %    Monocyte % 5.1 0.0 - 12.0 %    Eosinophil % 1.3 0.0 - 3.0 %    Basophil % 0.4 0.0 - 1.0 %    Immature Grans % 0.3 0.0 - 0.6 %    Neutrophils, Absolute 5.63 1.50 - 8.30 10*3/mm3    Lymphocytes, Absolute 1.46 0.60 - 4.80 10*3/mm3    Monocytes, Absolute 0.39 0.00 - 1.00 10*3/mm3    Eosinophils, Absolute 0.10 0.00 - 0.30 10*3/mm3    Basophils, Absolute 0.03 0.00 - 0.20 10*3/mm3    Immature Grans, Absolute 0.02 0.00 - 0.03 10*3/mm3       Physical Exam   Constitutional: She is oriented to person, place, and time. She appears well-developed and well-nourished.   HENT:   Head: Normocephalic and atraumatic.   Right Ear: Hearing and external ear normal. A middle ear effusion is present.   Left Ear: Hearing and external ear normal. A middle ear effusion is present.   Nose: Mucosal edema and rhinorrhea present. Right sinus exhibits no maxillary sinus tenderness and no frontal sinus tenderness. Left sinus exhibits no maxillary sinus tenderness and no frontal sinus tenderness.   Mouth/Throat: Uvula is midline and mucous membranes are normal. Posterior oropharyngeal erythema present.   Cardiovascular: Normal rate, regular rhythm and normal heart sounds.    Pulmonary/Chest: Effort normal. She has wheezes (insp and exp, bilat). She has no rales. She exhibits no tenderness.   Musculoskeletal: She exhibits edema (trace).   Neurological: She is alert and oriented to person, place, and time.   Skin: Skin is warm and dry.   Psychiatric: She has a normal mood and affect. Her behavior is normal.    Vitals reviewed.      Assessment/Plan   Problem List Items Addressed This Visit        Respiratory    Chronic cough - Primary    Relevant Medications    predniSONE (DELTASONE) 20 MG tablet    Other Relevant Orders    Ambulatory Referral to Allergy    Moderate persistent asthma    Relevant Medications    budesonide-formoterol (SYMBICORT) 80-4.5 MCG/ACT inhaler    predniSONE (DELTASONE) 20 MG tablet      Other Visit Diagnoses     Localized edema            Edema- Watch sodium, keep feet up, use Support hose.     Start Symbicort fort asthma. Will give prednisone burst.    Counseling provided on Edema.    Leandro Johnson, APRN   11/28/2017   12:39 PM

## 2017-12-14 ENCOUNTER — OFFICE VISIT (OUTPATIENT)
Dept: FAMILY MEDICINE CLINIC | Facility: CLINIC | Age: 82
End: 2017-12-14

## 2017-12-14 VITALS
TEMPERATURE: 97.5 F | DIASTOLIC BLOOD PRESSURE: 90 MMHG | BODY MASS INDEX: 23.95 KG/M2 | SYSTOLIC BLOOD PRESSURE: 150 MMHG | OXYGEN SATURATION: 98 % | HEART RATE: 62 BPM | WEIGHT: 149 LBS | HEIGHT: 66 IN

## 2017-12-14 DIAGNOSIS — J30.9 CHRONIC ALLERGIC RHINITIS, UNSPECIFIED SEASONALITY, UNSPECIFIED TRIGGER: ICD-10-CM

## 2017-12-14 DIAGNOSIS — J45.40 MODERATE PERSISTENT ASTHMA, UNSPECIFIED WHETHER COMPLICATED: Primary | ICD-10-CM

## 2017-12-14 PROCEDURE — 99214 OFFICE O/P EST MOD 30 MIN: CPT | Performed by: NURSE PRACTITIONER

## 2017-12-14 RX ORDER — PREDNISONE 10 MG/1
10 TABLET ORAL DAILY
Qty: 14 TABLET | Refills: 0 | Status: SHIPPED | OUTPATIENT
Start: 2017-12-14 | End: 2018-03-26

## 2017-12-14 RX ORDER — BUDESONIDE AND FORMOTEROL FUMARATE DIHYDRATE 160; 4.5 UG/1; UG/1
2 AEROSOL RESPIRATORY (INHALATION) 2 TIMES DAILY PRN
COMMUNITY
Start: 2017-11-30 | End: 2019-07-08

## 2017-12-14 RX ORDER — IPRATROPIUM BROMIDE AND ALBUTEROL SULFATE 2.5; .5 MG/3ML; MG/3ML
3 SOLUTION RESPIRATORY (INHALATION)
Status: DISCONTINUED | OUTPATIENT
Start: 2017-12-14 | End: 2018-08-02 | Stop reason: HOSPADM

## 2017-12-14 RX ORDER — AZELASTINE HYDROCHLORIDE, FLUTICASONE PROPIONATE 137; 50 UG/1; UG/1
SPRAY, METERED NASAL
COMMUNITY
End: 2018-03-26

## 2017-12-14 NOTE — PROGRESS NOTES
Chief Complaint   Patient presents with   • Allergies     Follow-up       Subjective   Lashanda Qureshi is a 82 y.o. female    History of Present Illness  Cough   This is a chronic problem. Episode onset: 2 yrs, had lung infection 2 yrs ago, occurs periodically since,  with constant cough, scratchy throat, hoarseness,  denies fevers, chills. Dry cough mostly, occ prod. Feels like post nasal drip.  The problem has been waxing and waning. The problem occurs every few minutes. The cough is non-productive. Associated symptoms include ear congestion, nasal congestion, postnasal drip, rhinorrhea and a sore throat. Pertinent negatives include no chest pain, chills, ear pain, fever, headaches, heartburn, hemoptysis, myalgias, rash, shortness of breath, sweats, weight loss or wheezing. The symptoms are aggravated by lying down. Risk factors: none. She has tried leukotriene antagonists and steroid inhaler (using Singulair, Azelastine, not taking flonase.) for the symptoms. Has seen ENT for ear pressure, Has seen an allergist. Cough has stopped, but at night has thick drainage again, post nasal drip. Using Dymista and Symbicort. Pt has a Neb machine, but does not have liquid for machine.     Allergies   Allergen Reactions   • Antihistamines, Loratadine-Type Other (See Comments)     Drowsiness     • Erythromycin      Past Medical History:   Diagnosis Date   • Abnormal CT scan, lung    • Mycobacterium gordonae infection       Past Surgical History:   Procedure Laterality Date   • BLADDER SURGERY     • BRONCHOSCOPY  01/27/2016   • CHOLECYSTECTOMY     • COLON SURGERY     • DILATION AND CURETTAGE, DIAGNOSTIC / THERAPEUTIC     • TOTAL HIP ARTHROPLASTY      Left hip     Social History     Social History   • Marital status:      Spouse name: N/A   • Number of children: N/A   • Years of education: N/A     Occupational History   • Not on file.     Social History Main Topics   • Smoking status: Former Smoker   • Smokeless tobacco:  Never Used      Comment: Quit 41 years ago   • Alcohol use No   • Drug use: No   • Sexual activity: Defer      Comment:      Other Topics Concern   • Not on file     Social History Narrative        Current Outpatient Prescriptions:   •  azelastine (ASTEPRO) 0.15 % solution nasal spray, 2 sprays into each nostril 2 (Two) Times a Day., Disp: 1 each, Rfl: 3  •  Azelastine-Fluticasone (DYMISTA) 137-50 MCG/ACT suspension, into each nostril., Disp: , Rfl:   •  bimatoprost (LUMIGAN) 0.01 % ophthalmic drops, Administer 1 drop to both eyes Every Night., Disp: , Rfl:   •  Cholecalciferol (VITAMIN D3) 2000 UNITS tablet, Take  by mouth. Take 2 tablets daily., Disp: , Rfl:   •  Cyanocobalamin (VITAMIN B12 PO), Take  by mouth. Take 1 tablet daily as directed., Disp: , Rfl:   •  ESTRING 2 MG vaginal ring, , Disp: , Rfl:   •  fluticasone (FLONASE) 50 MCG/ACT nasal spray, 2 sprays into each nostril daily. Administer 2 sprays in each nostril for each dose., Disp: , Rfl:   •  ipratropium (ATROVENT) 0.06 % nasal spray, , Disp: , Rfl:   •  latanoprost (XALATAN) 0.005 % ophthalmic solution, Apply  to eye. Instill 1 drop into affected eye(s) once daily as directed., Disp: , Rfl:   •  magnesium oxide (MAGOX) 400 (241.3 MG) MG tablet tablet, Take 400 mg by mouth daily., Disp: , Rfl:   •  montelukast (SINGULAIR) 10 MG tablet, Take 1 tablet by mouth Every Night., Disp: 30 tablet, Rfl: 11  •  SYMBICORT 160-4.5 MCG/ACT inhaler, , Disp: , Rfl:   •  VENTOLIN  (90 Base) MCG/ACT inhaler, , Disp: , Rfl: 1  •  predniSONE (DELTASONE) 10 MG tablet, Take 1 tablet by mouth Daily., Disp: 14 tablet, Rfl: 0    Current Facility-Administered Medications:   •  albuterol (PROVENTIL) nebulizer solution 2.5 mg, 2.5 mg, Nebulization, Q6H PRN, Olga Mullins, APRN  •  ipratropium-albuterol (DUO-NEB) nebulizer solution 3 mL, 3 mL, Nebulization, 4x Daily - RT, CHARLES Chin     Review of Systems   Constitutional: Negative for chills and  fever.   HENT: Positive for postnasal drip.    Respiratory: Positive for wheezing (some at night). Negative for cough and shortness of breath.    Cardiovascular: Negative for chest pain and palpitations.   Gastrointestinal: Negative for abdominal pain, constipation, diarrhea, nausea and vomiting.   Genitourinary: Negative for difficulty urinating and dysuria.   Musculoskeletal: Positive for arthralgias.   Neurological: Negative for headaches.   Psychiatric/Behavioral: Negative for sleep disturbance.       Objective     Vitals:    12/14/17 1135   BP: 150/90   Pulse: 62   Temp: 97.5 °F (36.4 °C)   SpO2: 98%       Results for orders placed or performed in visit on 10/26/17   Comprehensive Metabolic Panel   Result Value Ref Range    Glucose 92 70 - 100 mg/dL    BUN 15 9 - 23 mg/dL    Creatinine 0.60 0.60 - 1.30 mg/dL    Sodium 138 132 - 146 mmol/L    Potassium 3.8 3.5 - 5.5 mmol/L    Chloride 100 99 - 109 mmol/L    CO2 28.0 20.0 - 31.0 mmol/L    Calcium 9.6 8.7 - 10.4 mg/dL    Total Protein 7.3 5.7 - 8.2 g/dL    Albumin 4.40 3.20 - 4.80 g/dL    ALT (SGPT) 15 7 - 40 U/L    AST (SGOT) 23 0 - 33 U/L    Alkaline Phosphatase 86 25 - 100 U/L    Total Bilirubin 0.6 0.3 - 1.2 mg/dL    eGFR Non African Amer 96 >60 mL/min/1.73    Globulin 2.9 gm/dL    A/G Ratio 1.5 1.5 - 2.5 g/dL    BUN/Creatinine Ratio 25.0 7.0 - 25.0    Anion Gap 10.0 3.0 - 11.0 mmol/L   Lipid Panel   Result Value Ref Range    Total Cholesterol 199 0 - 200 mg/dL    Triglycerides 75 0 - 150 mg/dL    HDL Cholesterol 104 (H) 40 - 60 mg/dL    LDL Cholesterol  84 0 - 130 mg/dL   Vitamin D 25 Hydroxy   Result Value Ref Range    25 Hydroxy, Vitamin D 35.9 ng/ml   TSH   Result Value Ref Range    TSH 2.267 0.350 - 5.350 mIU/mL   DHEA   Result Value Ref Range    DHEA <20 (L) 31 - 701 ng/dL   Prolactin   Result Value Ref Range    Prolactin 6.20 ng/mL   Vitamin B12   Result Value Ref Range    Vitamin B-12 1174 (H) 211 - 911 pg/mL   CBC Auto Differential   Result Value Ref  Range    WBC 7.63 3.50 - 10.80 10*3/mm3    RBC 4.62 3.89 - 5.14 10*6/mm3    Hemoglobin 13.6 11.5 - 15.5 g/dL    Hematocrit 43.1 34.5 - 44.0 %    MCV 93.3 80.0 - 99.0 fL    MCH 29.4 27.0 - 31.0 pg    MCHC 31.6 (L) 32.0 - 36.0 g/dL    RDW 13.6 11.3 - 14.5 %    RDW-SD 46.7 37.0 - 54.0 fl    MPV 8.7 6.0 - 12.0 fL    Platelets 274 150 - 450 10*3/mm3    Neutrophil % 73.8 (H) 41.0 - 71.0 %    Lymphocyte % 19.1 (L) 24.0 - 44.0 %    Monocyte % 5.1 0.0 - 12.0 %    Eosinophil % 1.3 0.0 - 3.0 %    Basophil % 0.4 0.0 - 1.0 %    Immature Grans % 0.3 0.0 - 0.6 %    Neutrophils, Absolute 5.63 1.50 - 8.30 10*3/mm3    Lymphocytes, Absolute 1.46 0.60 - 4.80 10*3/mm3    Monocytes, Absolute 0.39 0.00 - 1.00 10*3/mm3    Eosinophils, Absolute 0.10 0.00 - 0.30 10*3/mm3    Basophils, Absolute 0.03 0.00 - 0.20 10*3/mm3    Immature Grans, Absolute 0.02 0.00 - 0.03 10*3/mm3       Physical Exam   Constitutional: She is oriented to person, place, and time. She appears well-developed and well-nourished.   Cardiovascular: Normal rate, regular rhythm and normal heart sounds.    Pulses:       Dorsalis pedis pulses are 2+ on the right side, and 1+ on the left side.   Musculoskeletal: She exhibits edema (trace).   Neurological: She is alert and oriented to person, place, and time.   Skin: Skin is warm and dry.   bilat feet purplish since chemo. Good capillary refill.   Psychiatric: She has a normal mood and affect. Her behavior is normal.   Vitals reviewed.      Assessment/Plan   Problem List Items Addressed This Visit        Respiratory    Atopic rhinitis    Relevant Medications    predniSONE (DELTASONE) 10 MG tablet    Other Relevant Orders    Ambulatory Referral to Allergy    Moderate persistent asthma - Primary    Relevant Medications    SYMBICORT 160-4.5 MCG/ACT inhaler    ipratropium-albuterol (DUO-NEB) nebulizer solution 3 mL (Start on 12/14/2017  4:30 PM)    predniSONE (DELTASONE) 10 MG tablet    Other Relevant Orders    Ambulatory Referral to  Allergy      Will give Duo Nebs, Patient to take medications as directed. Make sure to take all of them. Return if no improvement or worsens in 5-7 days. Patient was encouraged to keep me informed of any acute changes, lack of improvement, or any new concerning symptoms.Plan of care discussed with pt. They verbalized understanding and agreement.     Counseling provided on asthma.    Leandro Johnson, CHARLES   12/14/2017   12:25 PM

## 2017-12-14 NOTE — PATIENT INSTRUCTIONS
Asthma, Adult  Asthma is a recurring condition in which the airways tighten and narrow. Asthma can make it difficult to breathe. It can cause coughing, wheezing, and shortness of breath. Asthma episodes, also called asthma attacks, range from minor to life-threatening. Asthma cannot be cured, but medicines and lifestyle changes can help control it.  CAUSES  Asthma is believed to be caused by inherited (genetic) and environmental factors, but its exact cause is unknown. Asthma may be triggered by allergens, lung infections, or irritants in the air. Asthma triggers are different for each person. Common triggers include:   · Animal dander.  · Dust mites.  · Cockroaches.  · Pollen from trees or grass.  · Mold.  · Smoke.  · Air pollutants such as dust, household , hair sprays, aerosol sprays, paint fumes, strong chemicals, or strong odors.  · Cold air, weather changes, and winds (which increase molds and pollens in the air).  · Strong emotional expressions such as crying or laughing hard.  · Stress.  · Certain medicines (such as aspirin) or types of drugs (such as beta-blockers).  · Sulfites in foods and drinks. Foods and drinks that may contain sulfites include dried fruit, potato chips, and sparkling grape juice.  · Infections or inflammatory conditions such as the flu, a cold, or an inflammation of the nasal membranes (rhinitis).  · Gastroesophageal reflux disease (GERD).  · Exercise or strenuous activity.  SYMPTOMS  Symptoms may occur immediately after asthma is triggered or many hours later. Symptoms include:  · Wheezing.  · Excessive nighttime or early morning coughing.  · Frequent or severe coughing with a common cold.  · Chest tightness.  · Shortness of breath.  DIAGNOSIS   The diagnosis of asthma is made by a review of your medical history and a physical exam. Tests may also be performed. These may include:  · Lung function studies. These tests show how much air you breathe in and out.  · Allergy  tests.  · Imaging tests such as X-rays.  TREATMENT   Asthma cannot be cured, but it can usually be controlled. Treatment involves identifying and avoiding your asthma triggers. It also involves medicines. There are 2 classes of medicine used for asthma treatment:   · Controller medicines. These prevent asthma symptoms from occurring. They are usually taken every day.  · Reliever or rescue medicines. These quickly relieve asthma symptoms. They are used as needed and provide short-term relief.  Your health care provider will help you create an asthma action plan. An asthma action plan is a written plan for managing and treating your asthma attacks. It includes a list of your asthma triggers and how they may be avoided. It also includes information on when medicines should be taken and when their dosage should be changed. An action plan may also involve the use of a device called a peak flow meter. A peak flow meter measures how well the lungs are working. It helps you monitor your condition.  HOME CARE INSTRUCTIONS   · Take medicines only as directed by your health care provider. Speak with your health care provider if you have questions about how or when to take the medicines.  · Use a peak flow meter as directed by your health care provider. Record and keep track of readings.  · Understand and use the action plan to help minimize or stop an asthma attack without needing to seek medical care.  · Control your home environment in the following ways to help prevent asthma attacks:    Do not smoke. Avoid being exposed to secondhand smoke.    Change your heating and air conditioning filter regularly.    Limit your use of fireplaces and wood stoves.    Get rid of pests (such as roaches and mice) and their droppings.    Throw away plants if you see mold on them.    Clean your floors and dust regularly. Use unscented cleaning products.    Try to have someone else vacuum for you regularly. Stay out of rooms while they are  being vacuumed and for a short while afterward. If you vacuum, use a dust mask from a hardware store, a double-layered or microfilter vacuum  bag, or a vacuum  with a HEPA filter.    Replace carpet with wood, tile, or vinyl diane. Carpet can trap dander and dust.    Use allergy-proof pillows, mattress covers, and box spring covers.    Wash bed sheets and blankets every week in hot water and dry them in a dryer.    Use blankets that are made of polyester or cotton.    Clean bathrooms and loretta with bleach. If possible, have someone repaint the walls in these rooms with mold-resistant paint. Keep out of the rooms that are being cleaned and painted.    Wash hands frequently.  SEEK MEDICAL CARE IF:   · You have wheezing, shortness of breath, or a cough even if taking medicine to prevent attacks.  · The colored mucus you cough up (sputum) is thicker than usual.  · Your sputum changes from clear or white to yellow, green, gray, or bloody.  · You have any problems that may be related to the medicines you are taking (such as a rash, itching, swelling, or trouble breathing).  · You are using a reliever medicine more than 2-3 times per week.  · Your peak flow is still at 50-79% of your personal best after following your action plan for 1 hour.  · You have a fever.  SEEK IMMEDIATE MEDICAL CARE IF:   · You seem to be getting worse and are unresponsive to treatment during an asthma attack.  · You are short of breath even at rest.  · You get short of breath when doing very little physical activity.  · You have difficulty eating, drinking, or talking due to asthma symptoms.  · You develop chest pain.  · You develop a fast heartbeat.  · You have a bluish color to your lips or fingernails.  · You are light-headed, dizzy, or faint.  · Your peak flow is less than 50% of your personal best.     This information is not intended to replace advice given to you by your health care provider. Make sure you discuss any  questions you have with your health care provider.     Document Released: 12/18/2006 Document Revised: 09/07/2016 Document Reviewed: 07/17/2014  [employer] Interactive Patient Education ©2017 [employer]

## 2018-01-16 ENCOUNTER — TELEPHONE (OUTPATIENT)
Dept: FAMILY MEDICINE CLINIC | Facility: CLINIC | Age: 83
End: 2018-01-16

## 2018-01-16 NOTE — TELEPHONE ENCOUNTER
ENDOCRINOLOGY NEEDS LABS ON 10-26-17, ANY IMAGING OF THYROID OR ANYTHING ELSE YOU FEEL PERTINENT.    FAX: [phone]    RENNY @  [phone]

## 2018-03-26 ENCOUNTER — OFFICE VISIT (OUTPATIENT)
Dept: FAMILY MEDICINE CLINIC | Facility: CLINIC | Age: 83
End: 2018-03-26

## 2018-03-26 VITALS
BODY MASS INDEX: 23.89 KG/M2 | WEIGHT: 148 LBS | OXYGEN SATURATION: 93 % | DIASTOLIC BLOOD PRESSURE: 98 MMHG | SYSTOLIC BLOOD PRESSURE: 136 MMHG | HEART RATE: 67 BPM

## 2018-03-26 DIAGNOSIS — N81.4 CYSTOCELE WITH UTERINE PROLAPSE: ICD-10-CM

## 2018-03-26 DIAGNOSIS — J45.20 MILD INTERMITTENT ASTHMA WITHOUT COMPLICATION: Primary | ICD-10-CM

## 2018-03-26 PROCEDURE — 99213 OFFICE O/P EST LOW 20 MIN: CPT | Performed by: NURSE PRACTITIONER
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
## 2018-07-25 ENCOUNTER — TELEPHONE (OUTPATIENT)
Dept: FAMILY MEDICINE CLINIC | Facility: CLINIC | Age: 83
End: 2018-07-25

## 2018-07-25 DIAGNOSIS — M79.672 LEFT FOOT PAIN: Primary | ICD-10-CM

## 2018-07-25 NOTE — TELEPHONE ENCOUNTER
PT IS REQUESTING ORDER FOR XRAY OF LFT FOOT. PT THINKS SHE MAY HAVE BROKEN HER FOOT ON Friday EVENING.

## 2018-07-26 ENCOUNTER — HOSPITAL ENCOUNTER (OUTPATIENT)
Dept: GENERAL RADIOLOGY | Facility: HOSPITAL | Age: 83
Discharge: HOME | End: 2018-07-26
Admitting: NURSE PRACTITIONER

## 2018-07-26 DIAGNOSIS — M79.672 LEFT FOOT PAIN: ICD-10-CM

## 2018-07-26 PROCEDURE — 73630 X-RAY EXAM OF FOOT: CPT

## 2018-07-27 ENCOUNTER — OFFICE VISIT (OUTPATIENT)
Dept: FAMILY MEDICINE CLINIC | Facility: CLINIC | Age: 83
End: 2018-07-27

## 2018-07-27 ENCOUNTER — HOSPITAL ENCOUNTER (INPATIENT)
Facility: HOSPITAL | Age: 83
LOS: 6 days | Discharge: HOME HEALTH | End: 2018-08-02
Attending: FAMILY MEDICINE | Admitting: INTERNAL MEDICINE

## 2018-07-27 VITALS
WEIGHT: 142 LBS | OXYGEN SATURATION: 98 % | HEART RATE: 71 BPM | DIASTOLIC BLOOD PRESSURE: 84 MMHG | BODY MASS INDEX: 22.82 KG/M2 | SYSTOLIC BLOOD PRESSURE: 132 MMHG | HEIGHT: 66 IN

## 2018-07-27 DIAGNOSIS — Z74.09 IMPAIRED MOBILITY AND ACTIVITIES OF DAILY LIVING: Primary | ICD-10-CM

## 2018-07-27 DIAGNOSIS — L03.116 CELLULITIS OF LEFT FOOT: ICD-10-CM

## 2018-07-27 DIAGNOSIS — Z78.9 IMPAIRED MOBILITY AND ACTIVITIES OF DAILY LIVING: Primary | ICD-10-CM

## 2018-07-27 DIAGNOSIS — G62.9 PERIPHERAL POLYNEUROPATHY: ICD-10-CM

## 2018-07-27 DIAGNOSIS — L03.116 CELLULITIS OF LEFT LOWER EXTREMITY: Primary | ICD-10-CM

## 2018-07-27 DIAGNOSIS — R05.3 CHRONIC COUGH: ICD-10-CM

## 2018-07-27 DIAGNOSIS — J18.9 PNEUMONITIS: ICD-10-CM

## 2018-07-27 DIAGNOSIS — Z85.038 HISTORY OF COLON CANCER: ICD-10-CM

## 2018-07-27 PROBLEM — I87.2 VENOUS STASIS DERMATITIS OF BOTH LOWER EXTREMITIES: Status: ACTIVE | Noted: 2018-07-27

## 2018-07-27 LAB
ANION GAP SERPL CALCULATED.3IONS-SCNC: 11 MMOL/L (ref 3–11)
BASOPHILS # BLD AUTO: 0.02 10*3/MM3 (ref 0–0.2)
BASOPHILS NFR BLD AUTO: 0.3 % (ref 0–1)
BUN BLD-MCNC: 10 MG/DL (ref 9–23)
BUN/CREAT SERPL: 15.9 (ref 7–25)
CALCIUM SPEC-SCNC: 9.4 MG/DL (ref 8.7–10.4)
CHLORIDE SERPL-SCNC: 107 MMOL/L (ref 99–109)
CO2 SERPL-SCNC: 26 MMOL/L (ref 20–31)
CREAT BLD-MCNC: 0.63 MG/DL (ref 0.6–1.3)
D-LACTATE SERPL-SCNC: 0.8 MMOL/L (ref 0.5–2)
DEPRECATED RDW RBC AUTO: 46.2 FL (ref 37–54)
EOSINOPHIL # BLD AUTO: 0.03 10*3/MM3 (ref 0–0.3)
EOSINOPHIL NFR BLD AUTO: 0.5 % (ref 0–3)
ERYTHROCYTE [DISTWIDTH] IN BLOOD BY AUTOMATED COUNT: 13.6 % (ref 11.3–14.5)
GFR SERPL CREATININE-BSD FRML MDRD: 90 ML/MIN/1.73
GLUCOSE BLD-MCNC: 89 MG/DL (ref 70–100)
HCT VFR BLD AUTO: 39.3 % (ref 34.5–44)
HGB BLD-MCNC: 12.6 G/DL (ref 11.5–15.5)
IMM GRANULOCYTES # BLD: 0.01 10*3/MM3 (ref 0–0.03)
IMM GRANULOCYTES NFR BLD: 0.2 % (ref 0–0.6)
LYMPHOCYTES # BLD AUTO: 1.5 10*3/MM3 (ref 0.6–4.8)
LYMPHOCYTES NFR BLD AUTO: 25.9 % (ref 24–44)
MCH RBC QN AUTO: 29.9 PG (ref 27–31)
MCHC RBC AUTO-ENTMCNC: 32.1 G/DL (ref 32–36)
MCV RBC AUTO: 93.1 FL (ref 80–99)
MONOCYTES # BLD AUTO: 0.42 10*3/MM3 (ref 0–1)
MONOCYTES NFR BLD AUTO: 7.3 % (ref 0–12)
NEUTROPHILS # BLD AUTO: 3.82 10*3/MM3 (ref 1.5–8.3)
NEUTROPHILS NFR BLD AUTO: 66 % (ref 41–71)
PLATELET # BLD AUTO: 226 10*3/MM3 (ref 150–450)
PMV BLD AUTO: 8.6 FL (ref 6–12)
POTASSIUM BLD-SCNC: 3.3 MMOL/L (ref 3.5–5.5)
PROCALCITONIN SERPL-MCNC: <0.05 NG/ML
RBC # BLD AUTO: 4.22 10*6/MM3 (ref 3.89–5.14)
SODIUM BLD-SCNC: 144 MMOL/L (ref 132–146)
WBC NRBC COR # BLD: 5.79 10*3/MM3 (ref 3.5–10.8)

## 2018-07-27 PROCEDURE — 80048 BASIC METABOLIC PNL TOTAL CA: CPT | Performed by: FAMILY MEDICINE

## 2018-07-27 PROCEDURE — 85025 COMPLETE CBC W/AUTO DIFF WBC: CPT | Performed by: FAMILY MEDICINE

## 2018-07-27 PROCEDURE — 25010000002 VANCOMYCIN: Performed by: FAMILY MEDICINE

## 2018-07-27 PROCEDURE — 25010000002 KETOROLAC TROMETHAMINE PER 15 MG: Performed by: NURSE PRACTITIONER

## 2018-07-27 PROCEDURE — 25010000002 ENOXAPARIN PER 10 MG: Performed by: FAMILY MEDICINE

## 2018-07-27 PROCEDURE — 94799 UNLISTED PULMONARY SVC/PX: CPT

## 2018-07-27 PROCEDURE — 87040 BLOOD CULTURE FOR BACTERIA: CPT | Performed by: FAMILY MEDICINE

## 2018-07-27 PROCEDURE — 99213 OFFICE O/P EST LOW 20 MIN: CPT | Performed by: NURSE PRACTITIONER

## 2018-07-27 PROCEDURE — 83605 ASSAY OF LACTIC ACID: CPT | Performed by: FAMILY MEDICINE

## 2018-07-27 PROCEDURE — 25010000002 PIPERACILLIN SOD-TAZOBACTAM PER 1 G: Performed by: FAMILY MEDICINE

## 2018-07-27 PROCEDURE — 84145 PROCALCITONIN (PCT): CPT | Performed by: FAMILY MEDICINE

## 2018-07-27 PROCEDURE — 99223 1ST HOSP IP/OBS HIGH 75: CPT | Performed by: FAMILY MEDICINE

## 2018-07-27 RX ORDER — POTASSIUM CHLORIDE 7.45 MG/ML
10 INJECTION INTRAVENOUS
Status: DISCONTINUED | OUTPATIENT
Start: 2018-07-27 | End: 2018-08-02 | Stop reason: HOSPADM

## 2018-07-27 RX ORDER — POTASSIUM CHLORIDE 750 MG/1
40 CAPSULE, EXTENDED RELEASE ORAL AS NEEDED
Status: DISCONTINUED | OUTPATIENT
Start: 2018-07-27 | End: 2018-08-02 | Stop reason: HOSPADM

## 2018-07-27 RX ORDER — AZELASTINE 1 MG/ML
2 SPRAY, METERED NASAL DAILY
Status: DISCONTINUED | OUTPATIENT
Start: 2018-07-27 | End: 2018-08-02 | Stop reason: HOSPADM

## 2018-07-27 RX ORDER — LATANOPROST 50 UG/ML
1 SOLUTION/ DROPS OPHTHALMIC NIGHTLY
Status: DISCONTINUED | OUTPATIENT
Start: 2018-07-27 | End: 2018-08-02 | Stop reason: HOSPADM

## 2018-07-27 RX ORDER — TRAMADOL HYDROCHLORIDE 50 MG/1
50 TABLET ORAL EVERY 6 HOURS PRN
Status: DISCONTINUED | OUTPATIENT
Start: 2018-07-27 | End: 2018-08-02 | Stop reason: HOSPADM

## 2018-07-27 RX ORDER — SODIUM CHLORIDE 0.9 % (FLUSH) 0.9 %
1-10 SYRINGE (ML) INJECTION AS NEEDED
Status: DISCONTINUED | OUTPATIENT
Start: 2018-07-27 | End: 2018-08-02 | Stop reason: HOSPADM

## 2018-07-27 RX ORDER — VANCOMYCIN HYDROCHLORIDE 1 G/200ML
15 INJECTION, SOLUTION INTRAVENOUS EVERY 24 HOURS
Status: DISCONTINUED | OUTPATIENT
Start: 2018-07-28 | End: 2018-08-02

## 2018-07-27 RX ORDER — HYDROCODONE BITARTRATE AND ACETAMINOPHEN 5; 325 MG/1; MG/1
1 TABLET ORAL EVERY 6 HOURS PRN
Status: DISCONTINUED | OUTPATIENT
Start: 2018-07-27 | End: 2018-08-02 | Stop reason: HOSPADM

## 2018-07-27 RX ORDER — POTASSIUM CHLORIDE 1.5 G/1.77G
40 POWDER, FOR SOLUTION ORAL AS NEEDED
Status: DISCONTINUED | OUTPATIENT
Start: 2018-07-27 | End: 2018-08-02 | Stop reason: HOSPADM

## 2018-07-27 RX ORDER — ACETAMINOPHEN 325 MG/1
650 TABLET ORAL EVERY 4 HOURS PRN
Status: DISCONTINUED | OUTPATIENT
Start: 2018-07-27 | End: 2018-08-02 | Stop reason: HOSPADM

## 2018-07-27 RX ORDER — ONDANSETRON 2 MG/ML
4 INJECTION INTRAMUSCULAR; INTRAVENOUS EVERY 6 HOURS PRN
Status: DISCONTINUED | OUTPATIENT
Start: 2018-07-27 | End: 2018-08-02 | Stop reason: HOSPADM

## 2018-07-27 RX ORDER — ALBUTEROL SULFATE 2.5 MG/3ML
2.5 SOLUTION RESPIRATORY (INHALATION) EVERY 6 HOURS PRN
Status: DISCONTINUED | OUTPATIENT
Start: 2018-07-27 | End: 2018-08-02 | Stop reason: HOSPADM

## 2018-07-27 RX ORDER — KETOROLAC TROMETHAMINE 30 MG/ML
15 INJECTION, SOLUTION INTRAMUSCULAR; INTRAVENOUS ONCE
Status: COMPLETED | OUTPATIENT
Start: 2018-07-27 | End: 2018-07-27

## 2018-07-27 RX ORDER — BUDESONIDE AND FORMOTEROL FUMARATE DIHYDRATE 160; 4.5 UG/1; UG/1
2 AEROSOL RESPIRATORY (INHALATION)
Status: DISCONTINUED | OUTPATIENT
Start: 2018-07-27 | End: 2018-08-02 | Stop reason: HOSPADM

## 2018-07-27 RX ORDER — CEFTRIAXONE SODIUM 1 G/50ML
1 INJECTION, SOLUTION INTRAVENOUS EVERY 24 HOURS
Status: DISCONTINUED | OUTPATIENT
Start: 2018-07-27 | End: 2018-07-27

## 2018-07-27 RX ADMIN — TAZOBACTAM SODIUM AND PIPERACILLIN SODIUM 3.38 G: 375; 3 INJECTION, SOLUTION INTRAVENOUS at 16:25

## 2018-07-27 RX ADMIN — POTASSIUM CHLORIDE 40 MEQ: 750 CAPSULE, EXTENDED RELEASE ORAL at 20:22

## 2018-07-27 RX ADMIN — ENOXAPARIN SODIUM 40 MG: 40 INJECTION, SOLUTION INTRAVENOUS; SUBCUTANEOUS at 17:24

## 2018-07-27 RX ADMIN — KETOROLAC TROMETHAMINE 15 MG: 30 INJECTION, SOLUTION INTRAMUSCULAR at 21:00

## 2018-07-27 RX ADMIN — BUDESONIDE AND FORMOTEROL FUMARATE DIHYDRATE 2 PUFF: 160; 4.5 AEROSOL RESPIRATORY (INHALATION) at 20:28

## 2018-07-27 RX ADMIN — LATANOPROST 1 DROP: 50 SOLUTION OPHTHALMIC at 20:23

## 2018-07-27 RX ADMIN — TRAMADOL HYDROCHLORIDE 50 MG: 50 TABLET, FILM COATED ORAL at 17:27

## 2018-07-27 RX ADMIN — TAZOBACTAM SODIUM AND PIPERACILLIN SODIUM 3.38 G: 375; 3 INJECTION, SOLUTION INTRAVENOUS at 22:37

## 2018-07-27 RX ADMIN — VANCOMYCIN HYDROCHLORIDE 1250 MG: 10 INJECTION, POWDER, LYOPHILIZED, FOR SOLUTION INTRAVENOUS at 17:24
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
## 2018-07-28 LAB — POTASSIUM BLD-SCNC: 4 MMOL/L (ref 3.5–5.5)

## 2018-07-28 PROCEDURE — 25010000002 ENOXAPARIN PER 10 MG: Performed by: FAMILY MEDICINE

## 2018-07-28 PROCEDURE — 94640 AIRWAY INHALATION TREATMENT: CPT

## 2018-07-28 PROCEDURE — 84132 ASSAY OF SERUM POTASSIUM: CPT | Performed by: FAMILY MEDICINE

## 2018-07-28 PROCEDURE — 99233 SBSQ HOSP IP/OBS HIGH 50: CPT | Performed by: FAMILY MEDICINE

## 2018-07-28 PROCEDURE — 25010000002 PIPERACILLIN SOD-TAZOBACTAM PER 1 G: Performed by: FAMILY MEDICINE

## 2018-07-28 PROCEDURE — 94799 UNLISTED PULMONARY SVC/PX: CPT

## 2018-07-28 PROCEDURE — 25010000002 VANCOMYCIN PER 500 MG: Performed by: FAMILY MEDICINE

## 2018-07-28 RX ORDER — SACCHAROMYCES BOULARDII 250 MG
250 CAPSULE ORAL 2 TIMES DAILY
Status: DISCONTINUED | OUTPATIENT
Start: 2018-07-28 | End: 2018-08-02 | Stop reason: HOSPADM

## 2018-07-28 RX ADMIN — HYDROCODONE BITARTRATE AND ACETAMINOPHEN 1 TABLET: 5; 325 TABLET ORAL at 23:22

## 2018-07-28 RX ADMIN — HYDROCODONE BITARTRATE AND ACETAMINOPHEN 1 TABLET: 5; 325 TABLET ORAL at 16:35

## 2018-07-28 RX ADMIN — TAZOBACTAM SODIUM AND PIPERACILLIN SODIUM 3.38 G: 375; 3 INJECTION, SOLUTION INTRAVENOUS at 15:53

## 2018-07-28 RX ADMIN — POTASSIUM CHLORIDE 40 MEQ: 750 CAPSULE, EXTENDED RELEASE ORAL at 00:57

## 2018-07-28 RX ADMIN — VANCOMYCIN HYDROCHLORIDE 1000 MG: 1 INJECTION, SOLUTION INTRAVENOUS at 20:54

## 2018-07-28 RX ADMIN — BUDESONIDE AND FORMOTEROL FUMARATE DIHYDRATE 2 PUFF: 160; 4.5 AEROSOL RESPIRATORY (INHALATION) at 08:59

## 2018-07-28 RX ADMIN — TAZOBACTAM SODIUM AND PIPERACILLIN SODIUM 3.38 G: 375; 3 INJECTION, SOLUTION INTRAVENOUS at 06:40

## 2018-07-28 RX ADMIN — BUDESONIDE AND FORMOTEROL FUMARATE DIHYDRATE 2 PUFF: 160; 4.5 AEROSOL RESPIRATORY (INHALATION) at 20:34

## 2018-07-28 RX ADMIN — LATANOPROST 1 DROP: 50 SOLUTION OPHTHALMIC at 20:54

## 2018-07-28 RX ADMIN — TAZOBACTAM SODIUM AND PIPERACILLIN SODIUM 3.38 G: 375; 3 INJECTION, SOLUTION INTRAVENOUS at 23:20

## 2018-07-28 RX ADMIN — ENOXAPARIN SODIUM 40 MG: 40 INJECTION, SOLUTION INTRAVENOUS; SUBCUTANEOUS at 15:54

## 2018-07-28 RX ADMIN — HYDROCODONE BITARTRATE AND ACETAMINOPHEN 1 TABLET: 5; 325 TABLET ORAL at 10:28

## 2018-07-28 RX ADMIN — Medication 250 MG: at 20:55

## 2018-07-29 PROBLEM — A04.72 C. DIFFICILE DIARRHEA: Status: ACTIVE | Noted: 2018-07-29

## 2018-07-29 LAB
C DIFF TOX GENS STL QL NAA+PROBE: DETECTED
VANCOMYCIN TROUGH SERPL-MCNC: 11 MCG/ML (ref 10–20)

## 2018-07-29 PROCEDURE — 94799 UNLISTED PULMONARY SVC/PX: CPT

## 2018-07-29 PROCEDURE — 25010000002 ENOXAPARIN PER 10 MG: Performed by: FAMILY MEDICINE

## 2018-07-29 PROCEDURE — 25010000002 VANCOMYCIN PER 500 MG: Performed by: FAMILY MEDICINE

## 2018-07-29 PROCEDURE — 87493 C DIFF AMPLIFIED PROBE: CPT | Performed by: NURSE PRACTITIONER

## 2018-07-29 PROCEDURE — 25010000002 PIPERACILLIN SOD-TAZOBACTAM PER 1 G: Performed by: FAMILY MEDICINE

## 2018-07-29 PROCEDURE — 80202 ASSAY OF VANCOMYCIN: CPT | Performed by: FAMILY MEDICINE

## 2018-07-29 PROCEDURE — 99233 SBSQ HOSP IP/OBS HIGH 50: CPT | Performed by: FAMILY MEDICINE

## 2018-07-29 RX ADMIN — VANCOMYCIN HYDROCHLORIDE 250 MG: KIT at 17:20

## 2018-07-29 RX ADMIN — BUDESONIDE AND FORMOTEROL FUMARATE DIHYDRATE 2 PUFF: 160; 4.5 AEROSOL RESPIRATORY (INHALATION) at 20:46

## 2018-07-29 RX ADMIN — VANCOMYCIN HYDROCHLORIDE 1000 MG: 1 INJECTION, SOLUTION INTRAVENOUS at 18:30

## 2018-07-29 RX ADMIN — Medication 250 MG: at 20:45

## 2018-07-29 RX ADMIN — LATANOPROST 1 DROP: 50 SOLUTION OPHTHALMIC at 20:45

## 2018-07-29 RX ADMIN — ENOXAPARIN SODIUM 40 MG: 40 INJECTION, SOLUTION INTRAVENOUS; SUBCUTANEOUS at 17:21

## 2018-07-29 RX ADMIN — HYDROCODONE BITARTRATE AND ACETAMINOPHEN 1 TABLET: 5; 325 TABLET ORAL at 09:38

## 2018-07-29 RX ADMIN — TAZOBACTAM SODIUM AND PIPERACILLIN SODIUM 3.38 G: 375; 3 INJECTION, SOLUTION INTRAVENOUS at 06:36

## 2018-07-29 RX ADMIN — HYDROCODONE BITARTRATE AND ACETAMINOPHEN 1 TABLET: 5; 325 TABLET ORAL at 18:30

## 2018-07-29 RX ADMIN — Medication 250 MG: at 09:38

## 2018-07-29 RX ADMIN — TAZOBACTAM SODIUM AND PIPERACILLIN SODIUM 3.38 G: 375; 3 INJECTION, SOLUTION INTRAVENOUS at 15:57

## 2018-07-29 RX ADMIN — VANCOMYCIN HYDROCHLORIDE 250 MG: KIT at 12:25

## 2018-07-29 RX ADMIN — BUDESONIDE AND FORMOTEROL FUMARATE DIHYDRATE 2 PUFF: 160; 4.5 AEROSOL RESPIRATORY (INHALATION) at 08:52

## 2018-07-29 RX ADMIN — AZELASTINE HYDROCHLORIDE 2 SPRAY: 137 SPRAY, METERED NASAL at 09:38

## 2018-07-30 ENCOUNTER — EPISODE CHANGES (OUTPATIENT)
Dept: CASE MANAGEMENT | Facility: OTHER | Age: 83
End: 2018-07-30

## 2018-07-30 PROCEDURE — 25010000002 VANCOMYCIN PER 500 MG: Performed by: FAMILY MEDICINE

## 2018-07-30 PROCEDURE — 25010000002 PIPERACILLIN SOD-TAZOBACTAM PER 1 G: Performed by: FAMILY MEDICINE

## 2018-07-30 PROCEDURE — 25010000002 ENOXAPARIN PER 10 MG: Performed by: FAMILY MEDICINE

## 2018-07-30 PROCEDURE — 94799 UNLISTED PULMONARY SVC/PX: CPT

## 2018-07-30 PROCEDURE — 99233 SBSQ HOSP IP/OBS HIGH 50: CPT | Performed by: INTERNAL MEDICINE

## 2018-07-30 PROCEDURE — 25010000002 CEFTRIAXONE PER 250 MG: Performed by: INTERNAL MEDICINE

## 2018-07-30 PROCEDURE — 94640 AIRWAY INHALATION TREATMENT: CPT

## 2018-07-30 RX ORDER — CEFTRIAXONE SODIUM 1 G/50ML
1 INJECTION, SOLUTION INTRAVENOUS EVERY 24 HOURS
Status: DISCONTINUED | OUTPATIENT
Start: 2018-07-30 | End: 2018-08-02 | Stop reason: HOSPADM

## 2018-07-30 RX ADMIN — VANCOMYCIN HYDROCHLORIDE 250 MG: KIT at 23:17

## 2018-07-30 RX ADMIN — VANCOMYCIN HYDROCHLORIDE 250 MG: KIT at 06:05

## 2018-07-30 RX ADMIN — TAZOBACTAM SODIUM AND PIPERACILLIN SODIUM 3.38 G: 375; 3 INJECTION, SOLUTION INTRAVENOUS at 00:00

## 2018-07-30 RX ADMIN — METRONIDAZOLE 500 MG: 500 INJECTION, SOLUTION INTRAVENOUS at 11:18

## 2018-07-30 RX ADMIN — VANCOMYCIN HYDROCHLORIDE 250 MG: KIT at 11:18

## 2018-07-30 RX ADMIN — HYDROCODONE BITARTRATE AND ACETAMINOPHEN 1 TABLET: 5; 325 TABLET ORAL at 17:22

## 2018-07-30 RX ADMIN — VANCOMYCIN HYDROCHLORIDE 250 MG: KIT at 00:00

## 2018-07-30 RX ADMIN — Medication 250 MG: at 20:40

## 2018-07-30 RX ADMIN — VANCOMYCIN HYDROCHLORIDE 1000 MG: 1 INJECTION, SOLUTION INTRAVENOUS at 18:30

## 2018-07-30 RX ADMIN — LATANOPROST 1 DROP: 50 SOLUTION OPHTHALMIC at 20:40

## 2018-07-30 RX ADMIN — BUDESONIDE AND FORMOTEROL FUMARATE DIHYDRATE 2 PUFF: 160; 4.5 AEROSOL RESPIRATORY (INHALATION) at 08:16

## 2018-07-30 RX ADMIN — HYDROCODONE BITARTRATE AND ACETAMINOPHEN 1 TABLET: 5; 325 TABLET ORAL at 00:16

## 2018-07-30 RX ADMIN — HYDROCODONE BITARTRATE AND ACETAMINOPHEN 1 TABLET: 5; 325 TABLET ORAL at 11:40

## 2018-07-30 RX ADMIN — CEFTRIAXONE SODIUM 1 G: 1 INJECTION, SOLUTION INTRAVENOUS at 10:18

## 2018-07-30 RX ADMIN — HYDROCODONE BITARTRATE AND ACETAMINOPHEN 1 TABLET: 5; 325 TABLET ORAL at 06:05

## 2018-07-30 RX ADMIN — ENOXAPARIN SODIUM 40 MG: 40 INJECTION, SOLUTION INTRAVENOUS; SUBCUTANEOUS at 16:38

## 2018-07-30 RX ADMIN — VANCOMYCIN HYDROCHLORIDE 250 MG: KIT at 18:30

## 2018-07-30 RX ADMIN — METRONIDAZOLE 500 MG: 500 INJECTION, SOLUTION INTRAVENOUS at 20:40

## 2018-07-30 RX ADMIN — Medication 250 MG: at 08:29

## 2018-07-30 RX ADMIN — BUDESONIDE AND FORMOTEROL FUMARATE DIHYDRATE 2 PUFF: 160; 4.5 AEROSOL RESPIRATORY (INHALATION) at 21:05

## 2018-07-30 RX ADMIN — TAZOBACTAM SODIUM AND PIPERACILLIN SODIUM 3.38 G: 375; 3 INJECTION, SOLUTION INTRAVENOUS at 06:05

## 2018-07-30 RX ADMIN — HYDROCODONE BITARTRATE AND ACETAMINOPHEN 1 TABLET: 5; 325 TABLET ORAL at 23:17

## 2018-07-31 ENCOUNTER — APPOINTMENT (OUTPATIENT)
Dept: MRI IMAGING | Facility: HOSPITAL | Age: 83
End: 2018-07-31

## 2018-07-31 LAB
ANION GAP SERPL CALCULATED.3IONS-SCNC: 6 MMOL/L (ref 3–11)
BUN BLD-MCNC: 12 MG/DL (ref 9–23)
BUN/CREAT SERPL: 19 (ref 7–25)
CALCIUM SPEC-SCNC: 8.9 MG/DL (ref 8.7–10.4)
CHLORIDE SERPL-SCNC: 109 MMOL/L (ref 99–109)
CO2 SERPL-SCNC: 28 MMOL/L (ref 20–31)
CREAT BLD-MCNC: 0.63 MG/DL (ref 0.6–1.3)
DEPRECATED RDW RBC AUTO: 47.5 FL (ref 37–54)
ERYTHROCYTE [DISTWIDTH] IN BLOOD BY AUTOMATED COUNT: 13.6 % (ref 11.3–14.5)
GFR SERPL CREATININE-BSD FRML MDRD: 90 ML/MIN/1.73
GLUCOSE BLD-MCNC: 94 MG/DL (ref 70–100)
HCT VFR BLD AUTO: 34.2 % (ref 34.5–44)
HGB BLD-MCNC: 10.9 G/DL (ref 11.5–15.5)
MCH RBC QN AUTO: 30.1 PG (ref 27–31)
MCHC RBC AUTO-ENTMCNC: 31.9 G/DL (ref 32–36)
MCV RBC AUTO: 94.5 FL (ref 80–99)
PLATELET # BLD AUTO: 199 10*3/MM3 (ref 150–450)
PMV BLD AUTO: 8.4 FL (ref 6–12)
POTASSIUM BLD-SCNC: 3.7 MMOL/L (ref 3.5–5.5)
RBC # BLD AUTO: 3.62 10*6/MM3 (ref 3.89–5.14)
SODIUM BLD-SCNC: 143 MMOL/L (ref 132–146)
WBC NRBC COR # BLD: 5.45 10*3/MM3 (ref 3.5–10.8)

## 2018-07-31 PROCEDURE — A9577 INJ MULTIHANCE: HCPCS | Performed by: INTERNAL MEDICINE

## 2018-07-31 PROCEDURE — 99233 SBSQ HOSP IP/OBS HIGH 50: CPT | Performed by: INTERNAL MEDICINE

## 2018-07-31 PROCEDURE — 94799 UNLISTED PULMONARY SVC/PX: CPT

## 2018-07-31 PROCEDURE — 80048 BASIC METABOLIC PNL TOTAL CA: CPT | Performed by: INTERNAL MEDICINE

## 2018-07-31 PROCEDURE — 73720 MRI LWR EXTREMITY W/O&W/DYE: CPT

## 2018-07-31 PROCEDURE — 25010000002 CEFTRIAXONE PER 250 MG: Performed by: INTERNAL MEDICINE

## 2018-07-31 PROCEDURE — 25010000002 VANCOMYCIN PER 500 MG: Performed by: INTERNAL MEDICINE

## 2018-07-31 PROCEDURE — 25010000002 ENOXAPARIN PER 10 MG: Performed by: FAMILY MEDICINE

## 2018-07-31 PROCEDURE — 0 GADOBENATE DIMEGLUMINE 529 MG/ML SOLUTION: Performed by: INTERNAL MEDICINE

## 2018-07-31 PROCEDURE — 94640 AIRWAY INHALATION TREATMENT: CPT

## 2018-07-31 PROCEDURE — 85027 COMPLETE CBC AUTOMATED: CPT | Performed by: INTERNAL MEDICINE

## 2018-07-31 RX ORDER — GUAIFENESIN AND DEXTROMETHORPHAN HYDROBROMIDE 600; 30 MG/1; MG/1
1 TABLET, EXTENDED RELEASE ORAL 2 TIMES DAILY PRN
Status: DISCONTINUED | OUTPATIENT
Start: 2018-07-31 | End: 2018-08-02 | Stop reason: HOSPADM

## 2018-07-31 RX ADMIN — GADOBENATE DIMEGLUMINE 13 ML: 529 INJECTION, SOLUTION INTRAVENOUS at 23:45

## 2018-07-31 RX ADMIN — VANCOMYCIN HYDROCHLORIDE 250 MG: KIT at 23:04

## 2018-07-31 RX ADMIN — METRONIDAZOLE 500 MG: 500 INJECTION, SOLUTION INTRAVENOUS at 20:37

## 2018-07-31 RX ADMIN — HYDROCODONE BITARTRATE AND ACETAMINOPHEN 1 TABLET: 5; 325 TABLET ORAL at 13:25

## 2018-07-31 RX ADMIN — BUDESONIDE AND FORMOTEROL FUMARATE DIHYDRATE 2 PUFF: 160; 4.5 AEROSOL RESPIRATORY (INHALATION) at 08:09

## 2018-07-31 RX ADMIN — Medication 10 ML: at 08:54

## 2018-07-31 RX ADMIN — VANCOMYCIN HYDROCHLORIDE 250 MG: KIT at 16:50

## 2018-07-31 RX ADMIN — Medication 250 MG: at 20:34

## 2018-07-31 RX ADMIN — VANCOMYCIN HYDROCHLORIDE 250 MG: KIT at 11:12

## 2018-07-31 RX ADMIN — BUDESONIDE AND FORMOTEROL FUMARATE DIHYDRATE 2 PUFF: 160; 4.5 AEROSOL RESPIRATORY (INHALATION) at 20:24

## 2018-07-31 RX ADMIN — Medication 250 MG: at 08:54

## 2018-07-31 RX ADMIN — CEFTRIAXONE SODIUM 1 G: 1 INJECTION, SOLUTION INTRAVENOUS at 10:15

## 2018-07-31 RX ADMIN — VANCOMYCIN HYDROCHLORIDE 1000 MG: 1 INJECTION, SOLUTION INTRAVENOUS at 16:50

## 2018-07-31 RX ADMIN — ENOXAPARIN SODIUM 40 MG: 40 INJECTION, SOLUTION INTRAVENOUS; SUBCUTANEOUS at 16:52

## 2018-07-31 RX ADMIN — HYDROCODONE BITARTRATE AND ACETAMINOPHEN 1 TABLET: 5; 325 TABLET ORAL at 20:34

## 2018-07-31 RX ADMIN — METRONIDAZOLE 500 MG: 500 INJECTION, SOLUTION INTRAVENOUS at 11:12

## 2018-07-31 RX ADMIN — LATANOPROST 1 DROP: 50 SOLUTION OPHTHALMIC at 20:34

## 2018-07-31 RX ADMIN — HYDROCODONE BITARTRATE AND ACETAMINOPHEN 1 TABLET: 5; 325 TABLET ORAL at 05:15

## 2018-07-31 RX ADMIN — METRONIDAZOLE 500 MG: 500 INJECTION, SOLUTION INTRAVENOUS at 05:15

## 2018-07-31 RX ADMIN — VANCOMYCIN HYDROCHLORIDE 250 MG: KIT at 05:15

## 2018-08-01 LAB
ANION GAP SERPL CALCULATED.3IONS-SCNC: 5 MMOL/L (ref 3–11)
BACTERIA SPEC AEROBE CULT: NORMAL
BACTERIA SPEC AEROBE CULT: NORMAL
BUN BLD-MCNC: 9 MG/DL (ref 9–23)
BUN/CREAT SERPL: 13.8 (ref 7–25)
CALCIUM SPEC-SCNC: 9 MG/DL (ref 8.7–10.4)
CHLORIDE SERPL-SCNC: 108 MMOL/L (ref 99–109)
CO2 SERPL-SCNC: 28 MMOL/L (ref 20–31)
CREAT BLD-MCNC: 0.65 MG/DL (ref 0.6–1.3)
DEPRECATED RDW RBC AUTO: 48.5 FL (ref 37–54)
ERYTHROCYTE [DISTWIDTH] IN BLOOD BY AUTOMATED COUNT: 13.8 % (ref 11.3–14.5)
GFR SERPL CREATININE-BSD FRML MDRD: 87 ML/MIN/1.73
GLUCOSE BLD-MCNC: 121 MG/DL (ref 70–100)
HCT VFR BLD AUTO: 39.3 % (ref 34.5–44)
HGB BLD-MCNC: 12.2 G/DL (ref 11.5–15.5)
MCH RBC QN AUTO: 29.8 PG (ref 27–31)
MCHC RBC AUTO-ENTMCNC: 31 G/DL (ref 32–36)
MCV RBC AUTO: 95.9 FL (ref 80–99)
PLATELET # BLD AUTO: 207 10*3/MM3 (ref 150–450)
PMV BLD AUTO: 8.7 FL (ref 6–12)
POTASSIUM BLD-SCNC: 3.5 MMOL/L (ref 3.5–5.5)
POTASSIUM BLD-SCNC: 4 MMOL/L (ref 3.5–5.5)
RBC # BLD AUTO: 4.1 10*6/MM3 (ref 3.89–5.14)
SODIUM BLD-SCNC: 141 MMOL/L (ref 132–146)
WBC NRBC COR # BLD: 4.64 10*3/MM3 (ref 3.5–10.8)

## 2018-08-01 PROCEDURE — 25010000002 CEFTRIAXONE PER 250 MG: Performed by: INTERNAL MEDICINE

## 2018-08-01 PROCEDURE — 25010000002 VANCOMYCIN PER 500 MG: Performed by: INTERNAL MEDICINE

## 2018-08-01 PROCEDURE — 25010000002 ENOXAPARIN PER 10 MG: Performed by: FAMILY MEDICINE

## 2018-08-01 PROCEDURE — 99233 SBSQ HOSP IP/OBS HIGH 50: CPT | Performed by: INTERNAL MEDICINE

## 2018-08-01 PROCEDURE — 94799 UNLISTED PULMONARY SVC/PX: CPT

## 2018-08-01 PROCEDURE — 85027 COMPLETE CBC AUTOMATED: CPT | Performed by: INTERNAL MEDICINE

## 2018-08-01 PROCEDURE — 84132 ASSAY OF SERUM POTASSIUM: CPT | Performed by: INTERNAL MEDICINE

## 2018-08-01 PROCEDURE — 94640 AIRWAY INHALATION TREATMENT: CPT

## 2018-08-01 PROCEDURE — 80048 BASIC METABOLIC PNL TOTAL CA: CPT | Performed by: INTERNAL MEDICINE

## 2018-08-01 RX ORDER — BENZONATATE 100 MG/1
100 CAPSULE ORAL 3 TIMES DAILY PRN
Status: DISCONTINUED | OUTPATIENT
Start: 2018-08-01 | End: 2018-08-02 | Stop reason: HOSPADM

## 2018-08-01 RX ADMIN — HYDROCODONE BITARTRATE AND ACETAMINOPHEN 1 TABLET: 5; 325 TABLET ORAL at 12:50

## 2018-08-01 RX ADMIN — BUDESONIDE AND FORMOTEROL FUMARATE DIHYDRATE 2 PUFF: 160; 4.5 AEROSOL RESPIRATORY (INHALATION) at 08:13

## 2018-08-01 RX ADMIN — METRONIDAZOLE 500 MG: 500 INJECTION, SOLUTION INTRAVENOUS at 21:26

## 2018-08-01 RX ADMIN — VANCOMYCIN HYDROCHLORIDE 250 MG: KIT at 11:15

## 2018-08-01 RX ADMIN — BUDESONIDE AND FORMOTEROL FUMARATE DIHYDRATE 2 PUFF: 160; 4.5 AEROSOL RESPIRATORY (INHALATION) at 20:48

## 2018-08-01 RX ADMIN — Medication 250 MG: at 07:50

## 2018-08-01 RX ADMIN — ENOXAPARIN SODIUM 40 MG: 40 INJECTION, SOLUTION INTRAVENOUS; SUBCUTANEOUS at 16:45

## 2018-08-01 RX ADMIN — METRONIDAZOLE 500 MG: 500 INJECTION, SOLUTION INTRAVENOUS at 12:47

## 2018-08-01 RX ADMIN — CEFTRIAXONE SODIUM 1 G: 1 INJECTION, SOLUTION INTRAVENOUS at 11:15

## 2018-08-01 RX ADMIN — POTASSIUM CHLORIDE 40 MEQ: 750 CAPSULE, EXTENDED RELEASE ORAL at 16:44

## 2018-08-01 RX ADMIN — VANCOMYCIN HYDROCHLORIDE 250 MG: KIT at 16:45

## 2018-08-01 RX ADMIN — LATANOPROST 1 DROP: 50 SOLUTION OPHTHALMIC at 21:26

## 2018-08-01 RX ADMIN — HYDROCODONE BITARTRATE AND ACETAMINOPHEN 1 TABLET: 5; 325 TABLET ORAL at 21:29

## 2018-08-01 RX ADMIN — HYDROCODONE BITARTRATE AND ACETAMINOPHEN 1 TABLET: 5; 325 TABLET ORAL at 05:02

## 2018-08-01 RX ADMIN — METRONIDAZOLE 500 MG: 500 INJECTION, SOLUTION INTRAVENOUS at 05:02

## 2018-08-01 RX ADMIN — VANCOMYCIN HYDROCHLORIDE 250 MG: KIT at 05:02

## 2018-08-01 RX ADMIN — Medication 250 MG: at 21:26

## 2018-08-01 RX ADMIN — POTASSIUM CHLORIDE 40 MEQ: 750 CAPSULE, EXTENDED RELEASE ORAL at 12:50

## 2018-08-01 RX ADMIN — VANCOMYCIN HYDROCHLORIDE 250 MG: KIT at 23:55

## 2018-08-01 RX ADMIN — VANCOMYCIN HYDROCHLORIDE 1000 MG: 1 INJECTION, SOLUTION INTRAVENOUS at 16:45
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
## 2018-08-01 NOTE — PLAN OF CARE
Problem: Patient Care Overview  Goal: Plan of Care Review  Outcome: Ongoing (interventions implemented as appropriate)   08/01/18 9667   Coping/Psychosocial   Plan of Care Reviewed With patient   Plan of Care Review   Progress improving   OTHER   Outcome Summary pt tolerating activity more tonight, mobility increasing, up with standby and cane, rested well throughout night, vss, no c/o n/v, pain well controlled, will continue to monitor. Pt. hopeful for discharge today.     Goal: Individualization and Mutuality  Outcome: Ongoing (interventions implemented as appropriate)    Goal: Discharge Needs Assessment  Outcome: Ongoing (interventions implemented as appropriate)      Problem: Skin and Soft Tissue Infection (Adult)  Goal: Signs and Symptoms of Listed Potential Problems Will be Absent, Minimized or Managed (Skin and Soft Tissue Infection)  Outcome: Ongoing (interventions implemented as appropriate)      Problem: Pain, Acute (Adult)  Goal: Identify Related Risk Factors and Signs and Symptoms  Outcome: Ongoing (interventions implemented as appropriate)    Goal: Acceptable Pain Control/Comfort Level  Outcome: Ongoing (interventions implemented as appropriate)

## 2018-08-01 NOTE — PROGRESS NOTES
Continued Stay Note  [facility]     Patient Name: Lashanda Qureshi  MRN: [id number]  Today's Date: 8/1/2018    Admit Date: 7/27/2018          Discharge Plan     Row Name 08/01/18 1014       Plan    Plan Comments Pt reports her plan for discharge remains to return home with [facility]. CM will continue to follow for any further discharge needs.              Discharge Codes    No documentation.           Jayne Good RN

## 2018-08-01 NOTE — PLAN OF CARE
Problem: Patient Care Overview  Goal: Plan of Care Review  Outcome: Ongoing (interventions implemented as appropriate)   08/01/18 1500   Coping/Psychosocial   Plan of Care Reviewed With patient   Plan of Care Review   Progress improving   OTHER   Outcome Summary Patient's vitals are stabl.e patient denies any nausea, vomitting, shortness of air, fevers, and only slightly complains of pain. Patient tolerating mobilty more. Patient also reported diarrhea has improved but is still loose. will continue to monitor.       Problem: Skin and Soft Tissue Infection (Adult)  Goal: Signs and Symptoms of Listed Potential Problems Will be Absent, Minimized or Managed (Skin and Soft Tissue Infection)  Outcome: Ongoing (interventions implemented as appropriate)      Problem: Pain, Acute (Adult)  Goal: Acceptable Pain Control/Comfort Level  Outcome: Ongoing (interventions implemented as appropriate)

## 2018-08-01 NOTE — PROGRESS NOTES
[facility] Progress Note        Antibiotics:  Anti-Infectives     Ordered     Dose/Rate Route Frequency Start Stop    07/30/18 0808  metroNIDAZOLE (FLAGYL) IVPB 500 mg     Ordering Provider:  Yousif Bloom MD    500 mg  100 mL/hr over 60 Minutes Intravenous Every 8 Hours 07/30/18 1200 08/06/18 1159    07/30/18 0807  cefTRIAXone (ROCEPHIN) IVPB 1 g     Ordering Provider:  Yousif Bloom MD    1 g  100 mL/hr over 30 Minutes Intravenous Every 24 Hours 07/30/18 1100 08/06/18 1059    07/29/18 1009  vancomycin oral solution reconstituted 250 mg     Ordering Provider:  Yousif Bloom MD    250 mg Oral Every 6 Hours Scheduled 07/29/18 1200 08/12/18 1159    07/27/18 1804  vancomycin (VANCOCIN) in iso-osmotic dextrose IVPB 1 g (premix) 200 mL     Yousif Bloom MD reviewed the order on 07/31/18 0759.   Ordering Provider:  Yousif Bloom MD    15 mg/kg × 65 kg  over 60 Minutes Intravenous Every 24 Hours 07/28/18 1800 08/05/18 0758    07/27/18 1526  piperacillin-tazobactam (ZOSYN) 3.375 g in iso-osmotic dextrose 50 ml (premix)     Ordering Provider:  Neela Acevedo MD    3.375 g  over 30 Minutes Intravenous Once 07/27/18 1700 07/27/18 1655    07/27/18 1531  vancomycin 1250 mg/250 mL 0.9% NS IVPB ([facility])     Ordering Provider:  Neela Acevedo MD    20 mg/kg × 65 kg  over 90 Minutes Intravenous Once 07/27/18 1630 07/27/18 1854    07/27/18 1525  Pharmacy to dose vancomycin     Ordering Provider:  Neela Acevedo MD     Does not apply Continuous PRN 07/27/18 1525 08/03/18 1524          CC: diarrhea, foot cellulitis    HPI:    Patient is a 83 y.o.  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  She reports trying to move her garbage bins during high wind on July 20 and traumatized her left foot although denies penetrating trauma.  Over one week preceding admission, she reports increased redness/swelling and discomfort with  "\"warmth\" to the skin but no open wound or active drainage.  She saw her family physician and was admitted to the hospital July 27, 2018.     7/29/18 diarrhea overnight and CDT positive    8/1/18 diarrhea generally less intense and more firm; no blood and no abd pain this am; Foot Pain is blunted by her chronic sensory neuropathy, although does have discomfort involving the left foot/lower leg which is dull, constant, nonradiating, worse with palpation, better since admission to hospital with elevation and pain meds.  Pain 3 out of 10.     No headache photophobia or neck stiffness.  No shortness of breath cough or hemoptysis.  No nausea vomiting diarrhea or abdominal pain.  No dysuria hematuria or pyuria.  No other new skin rash.     ROS:      8/1/18 No f/c/s. No n/v. No rash. No new ADR to Abx.     Constitutional-- subjective Fever less, no chills and no sweats.  Appetite good, and no malaise. No fatigue.  Heent-- No new vision, hearing or throat complaints.  No epistaxis or oral sores.  Denies odynophagia or dysphagia.  No flashers, floaters or eye pain. No odynophagia or dysphagia. No headache, photophobia or neck stiffness.  CV-- No chest pain, palpitation or syncope  Resp-- No SOB/cough/Hemoptysis  GI- No nausea, vomiting.  No hematochezia, melena, or hematemesis. Denies jaundice or chronic liver disease.  -- No dysuria, hematuria, or flank pain.  Denies hesitancy, urgency or flank pain.  Lymph- no swollen lymph nodes in neck/axilla or groin.   Heme- No active bruising or bleeding; no Hx of DVT or PE.  MS-- no acute swelling or pain in the bones or joints of arms/legs aside from above.  No new back pain.  Neuro-- No acute focal weakness or numbness in the arms or legs.  No seizures.     Full 12 point review of systems reviewed and negative otherwise for acute complaints, except for above      PE:   /69 (BP Location: Right arm, Patient Position: Lying)   Pulse 71   Temp 98.1 °F (36.7 °C) (Oral)   Resp " "14   Ht 167.6 cm (65.98\")   Wt 65.4 kg (144 lb 3.2 oz)   SpO2 96%   BMI 23.29 kg/m²     GENERAL: Awake and alert, in no acute distress.   HEENT: Normocephalic, atraumatic.  PERRL. EOMI. No conjunctival injection. No icterus. Oropharynx clear without evidence of thrush or exudate. No evidence of peridontal disease.    NECK: Supple without nuchal rigidity. No mass.  LYMPH: No cervical, axillary or inguinal lymphadenopathy.  HEART: RRR; No murmur, rubs, gallops.   LUNGS: Clear to auscultation bilaterally without wheezing, rales, rhonchi. Normal respiratory effort. Nonlabored. No dullness.  ABDOMEN: Soft, nontender, nondistended. Positive bowel sounds. No rebound or guarding. NO mass or HSM.  EXT:  No cyanosis, clubbing or edema. No cord.  : Genitalia generally unremarkable.  Without Hill catheter.  MSK: FROM without joint effusions noted arms/legs.    SKIN: Warm and dry without cutaneous eruptions on Inspection/palpation.    NEURO: Oriented to PPT. No focal deficits on motor/sensory exam at arms/legs.  PSYCHIATRIC: Normal insight and judgement. Cooperative with PE     No peripheral stigmata/phenomena of endocarditis     Left lower leg/foot with diffuse discoloration, erythema/warmth and vague tenderness.  No discrete mass bulge or fluctuance.  No crepitus or bulla. DP pulse obscured by edema with difficult exam.   swelling appears more focal overlying the dorsum of her foot now that redness has receded from the lower leg; no open wound or active drainage.       Laboratory Data      Results from last 7 days  Lab Units 07/31/18  0400 07/27/18  1534   WBC 10*3/mm3 5.45 5.79   HEMOGLOBIN g/dL 10.9* 12.6   HEMATOCRIT % 34.2* 39.3   PLATELETS 10*3/mm3 199 226       Results from last 7 days  Lab Units 07/31/18  0400   SODIUM mmol/L 143   POTASSIUM mmol/L 3.7   CHLORIDE mmol/L 109   CO2 mmol/L 28.0   BUN mg/dL 12   CREATININE mg/dL 0.63   GLUCOSE mg/dL 94   CALCIUM mg/dL 8.9                   Estimated Creatinine " Clearance: 55 mL/min (by C-G formula based on SCr of 0.63 mg/dL).      Microbiology:      Radiology:  Imaging Results (last 72 hours)     ** No results found for the last 72 hours. **            Impression:   --acute diarrhea overnight and CDT positive (new);  Oral vancomycin/IV flagyl initiated; I discussed the risk and complexity of CDiff colitis with the patient/family. They understand the importance of hand hygiene with soap and water.  They know the importance of bleach containing cleaning solutions for solid surfaces and risk to contact.  They know to avoid anti motility agents such as lomotil/immodium and similar agents. They know antibiotics are not a guarantee for cure and there will be a risk for relapse/persistence of disease. I have referred them to CDC.gov for additional information as they see fit.    --Acute left lower leg/foot cellulitis after acute trauma associated with trying to move her garbage bin and high when but no overt penetrating trauma.  Primary concern would be gram-positive organisms in this setting in general, but at risk for mixed infection.  Continue empiric vancomycin/rocephin and consider taper depending on clinical course/study results and response to therapy.   on July 31 with improvements of the edema from the lower leg, swelling appears more focal over the dorsum of her foot but no discrete fluctuance, no crepitus and no open wound.  MRI pending to help rule out more focal fluid collection or other evolving complication to infection/trauma.  If focal fluid collection, then you should give consideration to consult or throat for drainage/aspiration etc.     --acute left foot trauma associated with garbage bin as above     --History colon cancer with chronic sensory neuropathy at lower extremities associated with prior chemotherapy     --Notes indicate history M Gordonae at lung, although significance not clear and has followed with pulmonary in the past.  No new respiratory  complaints to suggest acute respiratory infection    PLAN:     --IV vancomycin/Rocephin/Flagyl;  Oral vancomycin     --Check/review labs cultures and scans     --History per nursing as well     --Discussed with microbiology     --Highly complex set of issues with high risk for further serious morbidity and other serious sequela    --Discussed with Dr. Donald Bloom MD  8/1/2018

## 2018-08-02 VITALS
TEMPERATURE: 98.1 F | OXYGEN SATURATION: 96 % | BODY MASS INDEX: 23.17 KG/M2 | HEIGHT: 66 IN | SYSTOLIC BLOOD PRESSURE: 138 MMHG | HEART RATE: 77 BPM | RESPIRATION RATE: 16 BRPM | DIASTOLIC BLOOD PRESSURE: 79 MMHG | WEIGHT: 144.18 LBS

## 2018-08-02 LAB
ANION GAP SERPL CALCULATED.3IONS-SCNC: 3 MMOL/L (ref 3–11)
BUN BLD-MCNC: 8 MG/DL (ref 9–23)
BUN/CREAT SERPL: 14 (ref 7–25)
CALCIUM SPEC-SCNC: 8.9 MG/DL (ref 8.7–10.4)
CHLORIDE SERPL-SCNC: 104 MMOL/L (ref 99–109)
CK SERPL-CCNC: 88 U/L (ref 26–174)
CO2 SERPL-SCNC: 27 MMOL/L (ref 20–31)
CREAT BLD-MCNC: 0.57 MG/DL (ref 0.6–1.3)
DEPRECATED RDW RBC AUTO: 48 FL (ref 37–54)
ERYTHROCYTE [DISTWIDTH] IN BLOOD BY AUTOMATED COUNT: 13.8 % (ref 11.3–14.5)
GFR SERPL CREATININE-BSD FRML MDRD: 101 ML/MIN/1.73
GLUCOSE BLD-MCNC: 96 MG/DL (ref 70–100)
HCT VFR BLD AUTO: 34 % (ref 34.5–44)
HGB BLD-MCNC: 10.8 G/DL (ref 11.5–15.5)
MCH RBC QN AUTO: 30 PG (ref 27–31)
MCHC RBC AUTO-ENTMCNC: 31.8 G/DL (ref 32–36)
MCV RBC AUTO: 94.4 FL (ref 80–99)
PLATELET # BLD AUTO: 211 10*3/MM3 (ref 150–450)
PMV BLD AUTO: 8.7 FL (ref 6–12)
POTASSIUM BLD-SCNC: 3.7 MMOL/L (ref 3.5–5.5)
RBC # BLD AUTO: 3.6 10*6/MM3 (ref 3.89–5.14)
SODIUM BLD-SCNC: 134 MMOL/L (ref 132–146)
WBC NRBC COR # BLD: 4.04 10*3/MM3 (ref 3.5–10.8)

## 2018-08-02 PROCEDURE — 85027 COMPLETE CBC AUTOMATED: CPT | Performed by: INTERNAL MEDICINE

## 2018-08-02 PROCEDURE — 25010000002 CEFTRIAXONE PER 250 MG: Performed by: INTERNAL MEDICINE

## 2018-08-02 PROCEDURE — 80048 BASIC METABOLIC PNL TOTAL CA: CPT | Performed by: INTERNAL MEDICINE

## 2018-08-02 PROCEDURE — 94640 AIRWAY INHALATION TREATMENT: CPT

## 2018-08-02 PROCEDURE — 99239 HOSP IP/OBS DSCHRG MGMT >30: CPT | Performed by: INTERNAL MEDICINE

## 2018-08-02 PROCEDURE — 82550 ASSAY OF CK (CPK): CPT | Performed by: INTERNAL MEDICINE

## 2018-08-02 PROCEDURE — 25010000002 DAPTOMYCIN PER 1 MG: Performed by: INTERNAL MEDICINE

## 2018-08-02 PROCEDURE — 25010000002 ONDANSETRON PER 1 MG: Performed by: FAMILY MEDICINE

## 2018-08-02 RX ORDER — BENZONATATE 100 MG/1
100 CAPSULE ORAL 3 TIMES DAILY PRN
Qty: 30 CAPSULE | Refills: 0 | Status: SHIPPED | OUTPATIENT
Start: 2018-08-02 | End: 2019-07-08

## 2018-08-02 RX ORDER — SACCHAROMYCES BOULARDII 250 MG
250 CAPSULE ORAL 2 TIMES DAILY
Qty: 60 CAPSULE | Refills: 0 | Status: SHIPPED | OUTPATIENT
Start: 2018-08-02 | End: 2018-10-10

## 2018-08-02 RX ORDER — HYDROCODONE BITARTRATE AND ACETAMINOPHEN 5; 325 MG/1; MG/1
1 TABLET ORAL EVERY 6 HOURS PRN
Qty: 12 TABLET | Refills: 0
Start: 2018-08-02 | End: 2018-08-06

## 2018-08-02 RX ORDER — CEFTRIAXONE SODIUM 1 G/50ML
1 INJECTION, SOLUTION INTRAVENOUS EVERY 24 HOURS
Qty: 150 ML | Refills: 0
Start: 2018-08-03 | End: 2018-08-06

## 2018-08-02 RX ADMIN — AZELASTINE HYDROCHLORIDE 2 SPRAY: 137 SPRAY, METERED NASAL at 09:54

## 2018-08-02 RX ADMIN — ONDANSETRON 4 MG: 2 INJECTION INTRAMUSCULAR; INTRAVENOUS at 05:37

## 2018-08-02 RX ADMIN — BUDESONIDE AND FORMOTEROL FUMARATE DIHYDRATE 2 PUFF: 160; 4.5 AEROSOL RESPIRATORY (INHALATION) at 08:30

## 2018-08-02 RX ADMIN — VANCOMYCIN HYDROCHLORIDE 250 MG: KIT at 05:24

## 2018-08-02 RX ADMIN — DAPTOMYCIN 250 MG: 500 INJECTION, POWDER, LYOPHILIZED, FOR SOLUTION INTRAVENOUS at 09:57

## 2018-08-02 RX ADMIN — CEFTRIAXONE SODIUM 1 G: 1 INJECTION, SOLUTION INTRAVENOUS at 11:06

## 2018-08-02 RX ADMIN — METRONIDAZOLE 500 MG: 500 INJECTION, SOLUTION INTRAVENOUS at 03:39

## 2018-08-02 RX ADMIN — VANCOMYCIN HYDROCHLORIDE 250 MG: KIT at 13:38

## 2018-08-02 NOTE — PROGRESS NOTES
Continued Stay Note  [facility]     Patient Name: Lashanda Qureshi  MRN: [id number]  Today's Date: 8/2/2018    Admit Date: 7/27/2018          Discharge Plan     Row Name 08/02/18 1339       Plan    Plan Comments Oral Vancomycin is being supplied by [facility] and will be brought to pts room prior to discharge              Discharge Codes    No documentation.       Expected Discharge Date and Time     Expected Discharge Date Expected Discharge Time    Aug 2, 2018             Jayne Good RN

## 2018-08-02 NOTE — PROGRESS NOTES
"[facility] Progress Note        Antibiotics:  Anti-Infectives     Ordered     Dose/Rate Route Frequency Start Stop    08/02/18 0753  DAPTOmycin (CUBICIN) 250 mg in sodium chloride 0.9 % 50 mL IVPB     Ordering Provider:  Yousif Bloom MD    4 mg/kg × 65.4 kg  100 mL/hr over 30 Minutes Intravenous Every 24 Hours 08/02/18 1000 08/09/18 0959    07/30/18 0808  metroNIDAZOLE (FLAGYL) IVPB 500 mg     Ordering Provider:  Yousif Bloom MD    500 mg  100 mL/hr over 60 Minutes Intravenous Every 8 Hours 07/30/18 1200 08/06/18 1159    07/30/18 0807  cefTRIAXone (ROCEPHIN) IVPB 1 g     Ordering Provider:  Yousif Bloom MD    1 g  100 mL/hr over 30 Minutes Intravenous Every 24 Hours 07/30/18 1100 08/06/18 1059    07/29/18 1009  vancomycin oral solution reconstituted 250 mg     Ordering Provider:  Yousif Bloom MD    250 mg Oral Every 6 Hours Scheduled 07/29/18 1200 08/12/18 1159    07/27/18 1526  piperacillin-tazobactam (ZOSYN) 3.375 g in iso-osmotic dextrose 50 ml (premix)     Ordering Provider:  Neela Acevedo MD    3.375 g  over 30 Minutes Intravenous Once 07/27/18 1700 07/27/18 1655    07/27/18 1531  vancomycin 1250 mg/250 mL 0.9% NS IVPB ([facility])     Ordering Provider:  Neela Acevedo MD    20 mg/kg × 65 kg  over 90 Minutes Intravenous Once 07/27/18 1630 07/27/18 1854          CC: diarrhea, foot cellulitis    HPI:    Patient is a 83 y.o.  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  She reports trying to move her garbage bins during high wind on July 20 and traumatized her left foot although denies penetrating trauma.  Over one week preceding admission, she reports increased redness/swelling and discomfort with \"warmth\" to the skin but no open wound or active drainage.  She saw her family physician and was admitted to the hospital July 27, 2018.     7/29/18 diarrhea overnight and CDT positive    8/2/18 diarrhea " "generally less intense and more firm; no blood and no abd pain this am; Foot Pain is blunted by her chronic sensory neuropathy, although does have discomfort involving the left foot/lower leg which is dull, constant, nonradiating, worse with palpation, better since admission to hospital with elevation and pain meds.  Pain 3 out of 10.     No headache photophobia or neck stiffness.  No shortness of breath cough or hemoptysis.  No nausea vomiting diarrhea or abdominal pain.  No dysuria hematuria or pyuria.  No other new skin rash.     ROS:      8/2/18 No f/c/s. No n/v. No rash. No new ADR to Abx.     Constitutional-- subjective Fever less, no chills and no sweats.  Appetite good, and no malaise. No fatigue.  Heent-- No new vision, hearing or throat complaints.  No epistaxis or oral sores.  Denies odynophagia or dysphagia.  No flashers, floaters or eye pain. No odynophagia or dysphagia. No headache, photophobia or neck stiffness.  CV-- No chest pain, palpitation or syncope  Resp-- No SOB/cough/Hemoptysis  GI- No nausea, vomiting.  No hematochezia, melena, or hematemesis. Denies jaundice or chronic liver disease.  -- No dysuria, hematuria, or flank pain.  Denies hesitancy, urgency or flank pain.  Lymph- no swollen lymph nodes in neck/axilla or groin.   Heme- No active bruising or bleeding; no Hx of DVT or PE.  MS-- no acute swelling or pain in the bones or joints of arms/legs aside from above.  No new back pain.  Neuro-- No acute focal weakness or numbness in the arms or legs.  No seizures.     Full 12 point review of systems reviewed and negative otherwise for acute complaints, except for above      PE:   /79 (BP Location: Right arm, Patient Position: Lying)   Pulse 77   Temp 98.1 °F (36.7 °C) (Oral)   Resp 16   Ht 167.6 cm (65.98\")   Wt 65.4 kg (144 lb 2.9 oz)   SpO2 96%   BMI 24.02 kg/m²     GENERAL: Awake and alert, in no acute distress.   HEENT: Normocephalic, atraumatic.  PERRL. EOMI. No " conjunctival injection. No icterus. Oropharynx clear without evidence of thrush or exudate. No evidence of peridontal disease.    NECK: Supple without nuchal rigidity. No mass.  LYMPH: No cervical, axillary or inguinal lymphadenopathy.  HEART: RRR; No murmur, rubs, gallops.   LUNGS: Clear to auscultation bilaterally without wheezing, rales, rhonchi. Normal respiratory effort. Nonlabored. No dullness.  ABDOMEN: Soft, nontender, nondistended. Positive bowel sounds. No rebound or guarding. NO mass or HSM.  EXT:  No cyanosis, clubbing or edema. No cord.  : Genitalia generally unremarkable.  Without Hill catheter.  MSK: FROM without joint effusions noted arms/legs.    SKIN: Warm and dry without cutaneous eruptions on Inspection/palpation.    NEURO: Oriented to PPT. No focal deficits on motor/sensory exam at arms/legs.  PSYCHIATRIC: Normal insight and judgement. Cooperative with PE     No peripheral stigmata/phenomena of endocarditis     Left lower leg/foot with diffuse discoloration,less  erythema/warmth and vague tenderness.  No discrete mass bulge or fluctuance.  No crepitus or bulla. DP pulse obscured by edema with difficult exam.   swelling appears more focal overlying the dorsum of her foot now that redness has receded from the lower leg; no open wound or active drainage.  Stable bruise from prior trauma.  No crepitus or fluctuance.       Laboratory Data      Results from last 7 days  Lab Units 08/02/18  0331 08/01/18  0915 07/31/18  0400   WBC 10*3/mm3 4.04 4.64 5.45   HEMOGLOBIN g/dL 10.8* 12.2 10.9*   HEMATOCRIT % 34.0* 39.3 34.2*   PLATELETS 10*3/mm3 211 207 199       Results from last 7 days  Lab Units 08/02/18  0331   SODIUM mmol/L 134   POTASSIUM mmol/L 3.7   CHLORIDE mmol/L 104   CO2 mmol/L 27.0   BUN mg/dL 8*   CREATININE mg/dL 0.57*   GLUCOSE mg/dL 96   CALCIUM mg/dL 8.9                   Estimated Creatinine Clearance: 55 mL/min (A) (by C-G formula based on SCr of 0.57 mg/dL  (L)).      Microbiology:      Radiology:  Imaging Results (last 72 hours)     ** No results found for the last 72 hours. **            Impression:   --acute C. difficile colitis;  Oral vancomycin/IV flagyl initiated; I discussed the risk and complexity of CDiff colitis with the patient/family. They understand the importance of hand hygiene with soap and water.  They know the importance of bleach containing cleaning solutions for solid surfaces and risk to contact.  They know to avoid anti motility agents such as lomotil/immodium and similar agents. They know antibiotics are not a guarantee for cure and there will be a risk for relapse/persistence of disease. I have referred them to CDC.gov for additional information as they see fit.    --Acute left lower leg/foot cellulitis after acute trauma associated with trying to move her garbage bin and high when but no overt penetrating trauma.  Primary concern would be gram-positive organisms in this setting in general, but at risk for mixed infection.   empiric daptomycin/rocephin and consider taper depending on clinical course/study results and response to therapy.   on July 31 with improvements of the edema from the lower leg, swelling appears more focal over the dorsum of her foot but no discrete fluctuance, no crepitus and no open wound.  MRI no abscess and no osteomyelitis.     --acute left foot trauma associated with garbage bin as above     --History colon cancer with chronic sensory neuropathy at lower extremities associated with prior chemotherapy     --Notes indicate history M Gordonae at lung, although significance not clear and has followed with pulmonary in the past.  No new respiratory complaints to suggest acute respiratory infection    PLAN:     --IV daptomycin/Rocephin/Flagyl;  Oral vancomycin     --Check/review labs cultures and scans     --History per nursing as well     --Discussed with microbiology     --Highly complex set of issues with high risk for  further serious morbidity and other serious sequela    --Discussed with Dr. Bennett    --Likely transition to IV daptomycin 250 mg IV daily and Rocephin 1 g IV daily in our office starting August 3.  Continue oral vancomycin solution, likely with lengthy taper.  Follow-up with Dr. Jeffery Altamriano in office Saturday to help insure steady improvements/tolerability         Yousif Bloom MD  8/2/2018
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
## 2018-08-02 NOTE — PROGRESS NOTES
Case Management Discharge Note    Final Note: home with [facility]    Destination     No service has been selected for the patient.      Durable Medical Equipment     No service has been selected for the patient.      Dialysis/Infusion     No service has been selected for the patient.      Home Medical Care     No service has been selected for the patient.      Social Care     No service has been selected for the patient.             Final Discharge Disposition Code: 06 - home with home health care

## 2018-08-03 ENCOUNTER — EPISODE CHANGES (OUTPATIENT)
Dept: CASE MANAGEMENT | Facility: OTHER | Age: 83
End: 2018-08-03

## 2018-08-03 ENCOUNTER — READMISSION MANAGEMENT (OUTPATIENT)
Dept: CALL CENTER | Facility: HOSPITAL | Age: 83
End: 2018-08-03

## 2018-08-03 ENCOUNTER — TRANSITIONAL CARE MANAGEMENT TELEPHONE ENCOUNTER (OUTPATIENT)
Dept: FAMILY MEDICINE CLINIC | Facility: CLINIC | Age: 83
End: 2018-08-03

## 2018-08-03 NOTE — OUTREACH NOTE
Prep Survey      Responses   Facility patient discharged from?  [facility]   Is patient eligible?  Yes   Discharge diagnosis  Cellulitis of left foot   Does the patient have one of the following disease processes/diagnoses(primary or secondary)?  Other   Does the patient have Home health ordered?  Yes   What is the Home health agency?   [facility]   Is there a DME ordered?  Yes   What DME was ordered?  Rolling walker via [place]   Prep survey completed?  Yes          Liliana Okeefe RN

## 2018-08-06 ENCOUNTER — READMISSION MANAGEMENT (OUTPATIENT)
Dept: CALL CENTER | Facility: HOSPITAL | Age: 83
End: 2018-08-06

## 2018-08-06 ENCOUNTER — TRANSCRIBE ORDERS (OUTPATIENT)
Dept: LAB | Facility: HOSPITAL | Age: 83
End: 2018-08-06

## 2018-08-06 ENCOUNTER — LAB (OUTPATIENT)
Dept: LAB | Facility: HOSPITAL | Age: 83
End: 2018-08-06

## 2018-08-06 DIAGNOSIS — Z85.038 PERSONAL HISTORY OF COLON CANCER: ICD-10-CM

## 2018-08-06 DIAGNOSIS — A04.72 INTESTINAL INFECTION DUE TO CLOSTRIDIUM DIFFICILE: ICD-10-CM

## 2018-08-06 DIAGNOSIS — L03.116 CELLULITIS OF LEFT FOOT: Primary | ICD-10-CM

## 2018-08-06 DIAGNOSIS — G60.8 HEREDITARY SENSORY NEUROPATHY: ICD-10-CM

## 2018-08-06 DIAGNOSIS — L03.116 CELLULITIS OF LEFT FOOT: ICD-10-CM

## 2018-08-06 LAB
ALBUMIN SERPL-MCNC: 3.91 G/DL (ref 3.2–4.8)
ALBUMIN/GLOB SERPL: 1.6 G/DL (ref 1.5–2.5)
ALP SERPL-CCNC: 61 U/L (ref 25–100)
ALT SERPL W P-5'-P-CCNC: 30 U/L (ref 7–40)
ANION GAP SERPL CALCULATED.3IONS-SCNC: 6 MMOL/L (ref 3–11)
AST SERPL-CCNC: 25 U/L (ref 0–33)
BASOPHILS # BLD AUTO: 0.01 10*3/MM3 (ref 0–0.2)
BASOPHILS NFR BLD AUTO: 0.2 % (ref 0–1)
BILIRUB SERPL-MCNC: 0.4 MG/DL (ref 0.3–1.2)
BUN BLD-MCNC: 14 MG/DL (ref 9–23)
BUN/CREAT SERPL: 20.3 (ref 7–25)
CALCIUM SPEC-SCNC: 8.9 MG/DL (ref 8.7–10.4)
CHLORIDE SERPL-SCNC: 107 MMOL/L (ref 99–109)
CK SERPL-CCNC: 138 U/L (ref 26–174)
CO2 SERPL-SCNC: 27 MMOL/L (ref 20–31)
CREAT BLD-MCNC: 0.69 MG/DL (ref 0.6–1.3)
CRP SERPL-MCNC: 0.22 MG/DL (ref 0–1)
DEPRECATED RDW RBC AUTO: 48.4 FL (ref 37–54)
EOSINOPHIL # BLD AUTO: 0.04 10*3/MM3 (ref 0–0.3)
EOSINOPHIL NFR BLD AUTO: 0.7 % (ref 0–3)
ERYTHROCYTE [DISTWIDTH] IN BLOOD BY AUTOMATED COUNT: 14.1 % (ref 11.3–14.5)
ERYTHROCYTE [SEDIMENTATION RATE] IN BLOOD: 26 MM/HR (ref 0–30)
GFR SERPL CREATININE-BSD FRML MDRD: 81 ML/MIN/1.73
GLOBULIN UR ELPH-MCNC: 2.4 GM/DL
GLUCOSE BLD-MCNC: 115 MG/DL (ref 70–100)
HCT VFR BLD AUTO: 37.5 % (ref 34.5–44)
HGB BLD-MCNC: 12 G/DL (ref 11.5–15.5)
IMM GRANULOCYTES # BLD: 0.02 10*3/MM3 (ref 0–0.03)
IMM GRANULOCYTES NFR BLD: 0.3 % (ref 0–0.6)
LYMPHOCYTES # BLD AUTO: 1.1 10*3/MM3 (ref 0.6–4.8)
LYMPHOCYTES NFR BLD AUTO: 18.4 % (ref 24–44)
MCH RBC QN AUTO: 30 PG (ref 27–31)
MCHC RBC AUTO-ENTMCNC: 32 G/DL (ref 32–36)
MCV RBC AUTO: 93.8 FL (ref 80–99)
MONOCYTES # BLD AUTO: 0.38 10*3/MM3 (ref 0–1)
MONOCYTES NFR BLD AUTO: 6.4 % (ref 0–12)
NEUTROPHILS # BLD AUTO: 4.45 10*3/MM3 (ref 1.5–8.3)
NEUTROPHILS NFR BLD AUTO: 74.3 % (ref 41–71)
PLATELET # BLD AUTO: 282 10*3/MM3 (ref 150–450)
PMV BLD AUTO: 8.6 FL (ref 6–12)
POTASSIUM BLD-SCNC: 4 MMOL/L (ref 3.5–5.5)
PROT SERPL-MCNC: 6.3 G/DL (ref 5.7–8.2)
RBC # BLD AUTO: 4 10*6/MM3 (ref 3.89–5.14)
SODIUM BLD-SCNC: 140 MMOL/L (ref 132–146)
WBC NRBC COR # BLD: 5.98 10*3/MM3 (ref 3.5–10.8)

## 2018-08-06 PROCEDURE — 82550 ASSAY OF CK (CPK): CPT

## 2018-08-06 PROCEDURE — 85652 RBC SED RATE AUTOMATED: CPT

## 2018-08-06 PROCEDURE — 86140 C-REACTIVE PROTEIN: CPT

## 2018-08-06 PROCEDURE — 85025 COMPLETE CBC W/AUTO DIFF WBC: CPT

## 2018-08-06 PROCEDURE — 80053 COMPREHEN METABOLIC PANEL: CPT

## 2018-08-06 PROCEDURE — 36415 COLL VENOUS BLD VENIPUNCTURE: CPT

## 2018-08-06 NOTE — OUTREACH NOTE
Medical Week 1 Survey      Responses   Facility patient discharged from?  [place]   Does the patient have one of the following disease processes/diagnoses(primary or secondary)?  Other   Is there a successful TCM telephone encounter documented?  No   Week 1 attempt successful?  No   Unsuccessful attempts  Attempt 1          Franko Ross RN

## 2018-08-07 ENCOUNTER — READMISSION MANAGEMENT (OUTPATIENT)
Dept: CALL CENTER | Facility: HOSPITAL | Age: 83
End: 2018-08-07

## 2018-08-07 NOTE — OUTREACH NOTE
Medical Week 1 Survey      Responses   Facility patient discharged from?  [place]   Does the patient have one of the following disease processes/diagnoses(primary or secondary)?  Other   Is there a successful TCM telephone encounter documented?  Yes          Franko Ross RN

## 2018-08-08 ENCOUNTER — TRANSCRIBE ORDERS (OUTPATIENT)
Dept: ADMINISTRATIVE | Facility: HOSPITAL | Age: 83
End: 2018-08-08

## 2018-08-08 DIAGNOSIS — M79.89 LEG SWELLING: Primary | ICD-10-CM

## 2018-08-09 ENCOUNTER — OFFICE VISIT (OUTPATIENT)
Dept: FAMILY MEDICINE CLINIC | Facility: CLINIC | Age: 83
End: 2018-08-09

## 2018-08-09 ENCOUNTER — HOSPITAL ENCOUNTER (OUTPATIENT)
Dept: CARDIOLOGY | Facility: HOSPITAL | Age: 83
Discharge: HOME | End: 2018-08-09
Attending: INTERNAL MEDICINE | Admitting: INTERNAL MEDICINE

## 2018-08-09 VITALS
HEART RATE: 85 BPM | HEIGHT: 66 IN | WEIGHT: 141 LBS | OXYGEN SATURATION: 98 % | BODY MASS INDEX: 22.66 KG/M2 | SYSTOLIC BLOOD PRESSURE: 136 MMHG | DIASTOLIC BLOOD PRESSURE: 84 MMHG

## 2018-08-09 DIAGNOSIS — M79.89 LEG SWELLING: ICD-10-CM

## 2018-08-09 DIAGNOSIS — I87.2 VENOUS STASIS DERMATITIS OF BOTH LOWER EXTREMITIES: ICD-10-CM

## 2018-08-09 DIAGNOSIS — L03.116 CELLULITIS OF LEFT FOOT: Primary | ICD-10-CM

## 2018-08-09 LAB
BH CV LOWER VASCULAR LEFT COMMON FEMORAL AUGMENT: NORMAL
BH CV LOWER VASCULAR LEFT COMMON FEMORAL COMPRESS: NORMAL
BH CV LOWER VASCULAR LEFT COMMON FEMORAL PHASIC: NORMAL
BH CV LOWER VASCULAR LEFT COMMON FEMORAL SPONT: NORMAL
BH CV LOWER VASCULAR LEFT DISTAL FEMORAL COMPRESS: NORMAL
BH CV LOWER VASCULAR LEFT GREATER SAPH AK COMPRESS: NORMAL
BH CV LOWER VASCULAR LEFT MID FEMORAL AUGMENT: NORMAL
BH CV LOWER VASCULAR LEFT MID FEMORAL COMPRESS: NORMAL
BH CV LOWER VASCULAR LEFT MID FEMORAL PHASIC: NORMAL
BH CV LOWER VASCULAR LEFT MID FEMORAL SPONT: NORMAL
BH CV LOWER VASCULAR LEFT PERONEAL COMPRESS: NORMAL
BH CV LOWER VASCULAR LEFT POPLITEAL AUGMENT: NORMAL
BH CV LOWER VASCULAR LEFT POPLITEAL COMPRESS: NORMAL
BH CV LOWER VASCULAR LEFT POPLITEAL PHASIC: NORMAL
BH CV LOWER VASCULAR LEFT POPLITEAL SPONT: NORMAL
BH CV LOWER VASCULAR LEFT POSTERIOR TIBIAL COMPRESS: NORMAL
BH CV LOWER VASCULAR LEFT PROXIMAL FEMORAL COMPRESS: NORMAL
BH CV LOWER VASCULAR LEFT SAPHENOFEMORAL JUNCTION AUGMENT: NORMAL
BH CV LOWER VASCULAR LEFT SAPHENOFEMORAL JUNCTION COMPRESS: NORMAL
BH CV LOWER VASCULAR LEFT SAPHENOFEMORAL JUNCTION PHASIC: NORMAL
BH CV LOWER VASCULAR LEFT SAPHENOFEMORAL JUNCTION SPONT: NORMAL
BH CV LOWER VASCULAR RIGHT COMMON FEMORAL AUGMENT: NORMAL
BH CV LOWER VASCULAR RIGHT COMMON FEMORAL COMPRESS: NORMAL
BH CV LOWER VASCULAR RIGHT COMMON FEMORAL PHASIC: NORMAL
BH CV LOWER VASCULAR RIGHT COMMON FEMORAL SPONT: NORMAL

## 2018-08-09 PROCEDURE — 93971 EXTREMITY STUDY: CPT

## 2018-08-09 PROCEDURE — 99495 TRANSJ CARE MGMT MOD F2F 14D: CPT | Performed by: NURSE PRACTITIONER

## 2018-08-09 PROCEDURE — 93971 EXTREMITY STUDY: CPT | Performed by: INTERNAL MEDICINE

## 2018-08-09 RX ORDER — VANCOMYCIN HYDROCHLORIDE 500 MG/10ML
INJECTION, POWDER, LYOPHILIZED, FOR SOLUTION INTRAVENOUS
COMMUNITY
Start: 2018-08-02 | End: 2018-10-10

## 2018-08-09 NOTE — PATIENT INSTRUCTIONS
Cellulitis, Adult  Cellulitis is a skin infection. The infected area is usually red and tender. This condition occurs most often in the arms and lower legs. The infection can travel to the muscles, blood, and underlying tissue and become serious. It is very important to get treated for this condition.  What are the causes?  Cellulitis is caused by bacteria. The bacteria enter through a break in the skin, such as a cut, burn, insect bite, open sore, or crack.  What increases the risk?  This condition is more likely to occur in people who:  · Have a weak defense system (immune system).  · Have open wounds on the skin such as cuts, burns, bites, and scrapes. Bacteria can enter the body through these open wounds.  · Are older.  · Have diabetes.  · Have a type of long-lasting (chronic) liver disease (cirrhosis) or kidney disease.  · Use IV drugs.    What are the signs or symptoms?  Symptoms of this condition include:  · Redness, streaking, or spotting on the skin.  · Swollen area of the skin.  · Tenderness or pain when an area of the skin is touched.  · Warm skin.  · Fever.  · Chills.  · Blisters.    How is this diagnosed?  This condition is diagnosed based on a medical history and physical exam. You may also have tests, including:  · Blood tests.  · Lab tests.  · Imaging tests.    How is this treated?  Treatment for this condition may include:  · Medicines, such as antibiotic medicines or antihistamines.  · Supportive care, such as rest and application of cold or warm cloths (cold or warm compresses) to the skin.  · Hospital care, if the condition is severe.    The infection usually gets better within 1-2 days of treatment.  Follow these instructions at home:  · Take over-the-counter and prescription medicines only as told by your health care provider.  · If you were prescribed an antibiotic medicine, take it as told by your health care provider. Do not stop taking the antibiotic even if you start to feel  better.  · Drink enough fluid to keep your urine clear or pale yellow.  · Do not touch or rub the infected area.  · Raise (elevate) the infected area above the level of your heart while you are sitting or lying down.  · Apply warm or cold compresses to the affected area as told by your health care provider.  · Keep all follow-up visits as told by your health care provider. This is important. These visits let your health care provider make sure a more serious infection is not developing.  Contact a health care provider if:  · You have a fever.  · Your symptoms do not improve within 1-2 days of starting treatment.  · Your bone or joint underneath the infected area becomes painful after the skin has healed.  · Your infection returns in the same area or another area.  · You notice a swollen bump in the infected area.  · You develop new symptoms.  · You have a general ill feeling (malaise) with muscle aches and pains.  Get help right away if:  · Your symptoms get worse.  · You feel very sleepy.  · You develop vomiting or diarrhea that persists.  · You notice red streaks coming from the infected area.  · Your red area gets larger or turns dark in color.  This information is not intended to replace advice given to you by your health care provider. Make sure you discuss any questions you have with your health care provider.  Document Released: 09/27/2006 Document Revised: 04/27/2017 Document Reviewed: 10/26/2016  [employer] Interactive Patient Education © 2018 [employer]
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
## 2018-08-13 ENCOUNTER — TRANSCRIBE ORDERS (OUTPATIENT)
Dept: LAB | Facility: HOSPITAL | Age: 83
End: 2018-08-13

## 2018-08-13 ENCOUNTER — LAB (OUTPATIENT)
Dept: LAB | Facility: HOSPITAL | Age: 83
End: 2018-08-13

## 2018-08-13 DIAGNOSIS — G60.8 HEREDITARY SENSORY NEUROPATHY: ICD-10-CM

## 2018-08-13 DIAGNOSIS — L03.116 CELLULITIS OF LEFT FOOT: Primary | ICD-10-CM

## 2018-08-13 DIAGNOSIS — A04.72 INTESTINAL INFECTION DUE TO CLOSTRIDIUM DIFFICILE: ICD-10-CM

## 2018-08-13 DIAGNOSIS — Z85.038 PERSONAL HISTORY OF COLON CANCER: ICD-10-CM

## 2018-08-13 DIAGNOSIS — R60.0 LOCALIZED EDEMA: ICD-10-CM

## 2018-08-13 DIAGNOSIS — L03.116 CELLULITIS OF LEFT FOOT: ICD-10-CM

## 2018-08-13 LAB
ALBUMIN SERPL-MCNC: 3.89 G/DL (ref 3.2–4.8)
ALBUMIN/GLOB SERPL: 1.7 G/DL (ref 1.5–2.5)
ALP SERPL-CCNC: 71 U/L (ref 25–100)
ALT SERPL W P-5'-P-CCNC: 17 U/L (ref 7–40)
ANION GAP SERPL CALCULATED.3IONS-SCNC: 8 MMOL/L (ref 3–11)
AST SERPL-CCNC: 21 U/L (ref 0–33)
BASOPHILS # BLD AUTO: 0.03 10*3/MM3 (ref 0–0.2)
BASOPHILS NFR BLD AUTO: 0.5 % (ref 0–1)
BILIRUB SERPL-MCNC: 0.5 MG/DL (ref 0.3–1.2)
BUN BLD-MCNC: 14 MG/DL (ref 9–23)
BUN/CREAT SERPL: 18.7 (ref 7–25)
CALCIUM SPEC-SCNC: 9.2 MG/DL (ref 8.7–10.4)
CHLORIDE SERPL-SCNC: 104 MMOL/L (ref 99–109)
CO2 SERPL-SCNC: 29 MMOL/L (ref 20–31)
CREAT BLD-MCNC: 0.75 MG/DL (ref 0.6–1.3)
CRP SERPL-MCNC: 0.11 MG/DL (ref 0–1)
DEPRECATED RDW RBC AUTO: 48.4 FL (ref 37–54)
EOSINOPHIL # BLD AUTO: 0.16 10*3/MM3 (ref 0–0.3)
EOSINOPHIL NFR BLD AUTO: 2.9 % (ref 0–3)
ERYTHROCYTE [DISTWIDTH] IN BLOOD BY AUTOMATED COUNT: 14.1 % (ref 11.3–14.5)
ERYTHROCYTE [SEDIMENTATION RATE] IN BLOOD: 30 MM/HR (ref 0–30)
GFR SERPL CREATININE-BSD FRML MDRD: 74 ML/MIN/1.73
GLOBULIN UR ELPH-MCNC: 2.3 GM/DL
GLUCOSE BLD-MCNC: 109 MG/DL (ref 70–100)
HCT VFR BLD AUTO: 40.2 % (ref 34.5–44)
HGB BLD-MCNC: 12.7 G/DL (ref 11.5–15.5)
IMM GRANULOCYTES # BLD: 0.01 10*3/MM3 (ref 0–0.03)
IMM GRANULOCYTES NFR BLD: 0.2 % (ref 0–0.6)
LYMPHOCYTES # BLD AUTO: 1.6 10*3/MM3 (ref 0.6–4.8)
LYMPHOCYTES NFR BLD AUTO: 28.8 % (ref 24–44)
MCH RBC QN AUTO: 29.9 PG (ref 27–31)
MCHC RBC AUTO-ENTMCNC: 31.6 G/DL (ref 32–36)
MCV RBC AUTO: 94.6 FL (ref 80–99)
MONOCYTES # BLD AUTO: 0.36 10*3/MM3 (ref 0–1)
MONOCYTES NFR BLD AUTO: 6.5 % (ref 0–12)
NEUTROPHILS # BLD AUTO: 3.41 10*3/MM3 (ref 1.5–8.3)
NEUTROPHILS NFR BLD AUTO: 61.3 % (ref 41–71)
PLATELET # BLD AUTO: 327 10*3/MM3 (ref 150–450)
PMV BLD AUTO: 8.5 FL (ref 6–12)
POTASSIUM BLD-SCNC: 4.3 MMOL/L (ref 3.5–5.5)
PROT SERPL-MCNC: 6.2 G/DL (ref 5.7–8.2)
RBC # BLD AUTO: 4.25 10*6/MM3 (ref 3.89–5.14)
SODIUM BLD-SCNC: 141 MMOL/L (ref 132–146)
WBC NRBC COR # BLD: 5.56 10*3/MM3 (ref 3.5–10.8)

## 2018-08-13 PROCEDURE — 80053 COMPREHEN METABOLIC PANEL: CPT

## 2018-08-13 PROCEDURE — 86140 C-REACTIVE PROTEIN: CPT

## 2018-08-13 PROCEDURE — 36415 COLL VENOUS BLD VENIPUNCTURE: CPT

## 2018-08-13 PROCEDURE — 85025 COMPLETE CBC W/AUTO DIFF WBC: CPT

## 2018-08-15 ENCOUNTER — READMISSION MANAGEMENT (OUTPATIENT)
Dept: CALL CENTER | Facility: HOSPITAL | Age: 83
End: 2018-08-15

## 2018-08-15 ENCOUNTER — EPISODE CHANGES (OUTPATIENT)
Dept: CASE MANAGEMENT | Facility: OTHER | Age: 83
End: 2018-08-15

## 2018-08-15 NOTE — OUTREACH NOTE
Medical Week 2 Survey      Responses   Facility patient discharged from?  [place]   Does the patient have one of the following disease processes/diagnoses(primary or secondary)?  Other   Week 2 attempt successful?  Yes   Call start time  1459   Discharge diagnosis  Cellulitis of left foot   Call end time  1509   Medication alerts for this patient  Going for daily antibiotic infusions at Dr Skaggs's office   Meds reviewed with patient/caregiver?  Yes   Is the patient having any side effects they believe may be caused by any medication additions or changes?  No   Does the patient have all medications ordered at discharge?  Yes   Is the patient taking all medications as directed (includes completed medication regime)?  Yes   Comments regarding appointments  Seen by Dr Skaggs today 08/15- going for daily infusions   Does the patient have an appointment with their PCP within 7 days of discharge?  Yes   Comments regarding PCP  Dr Johnson- seen in office 08/09   Has the patient kept scheduled appointments due by today?  Yes   Has home health visited the patient within 72 hours of discharge?  No   Home health comments  [facility] does not want or need HH because she goes daily for IV antibiotics   What DME was ordered?  Rolling walker via [place]   Has all DME been delivered?  Yes   Psychosocial issues?  No   Comments  Patient reports she is doing well- tired from going to ID office every day for antibiotic infusions, foot is healing well. No drainage . No fever.    Did the patient receive a copy of their discharge instructions?  Yes   Nursing interventions  Reviewed instructions with patient   What is the patient's perception of their health status since discharge?  Improving   Is the patient/caregiver able to teach back signs and symptoms related to disease process for when to call PCP?  Yes   Is the patient/caregiver able to teach back signs and symptoms related to disease process for when to call 911?  Yes   Is the  patient/caregiver able to teach back the hierarchy of who to call/visit for symptoms/problems? PCP, Specialist, Home health nurse, Urgent Care, ED, 911  Yes   Week 2 Call Completed?  Yes          Franko Ross RN

## 2018-08-20 ENCOUNTER — TRANSCRIBE ORDERS (OUTPATIENT)
Dept: LAB | Facility: HOSPITAL | Age: 83
End: 2018-08-20

## 2018-08-20 ENCOUNTER — LAB (OUTPATIENT)
Dept: LAB | Facility: HOSPITAL | Age: 83
End: 2018-08-20

## 2018-08-20 DIAGNOSIS — G60.8 HEREDITARY SENSORY NEUROPATHY: ICD-10-CM

## 2018-08-20 DIAGNOSIS — A04.72 INTESTINAL INFECTION DUE TO CLOSTRIDIUM DIFFICILE: ICD-10-CM

## 2018-08-20 DIAGNOSIS — L03.116 CELLULITIS OF LEFT FOOT: Primary | ICD-10-CM

## 2018-08-20 DIAGNOSIS — Z85.038 PERSONAL HISTORY OF COLON CANCER: ICD-10-CM

## 2018-08-20 DIAGNOSIS — L03.116 CELLULITIS OF LEFT FOOT: ICD-10-CM

## 2018-08-20 LAB
ALBUMIN SERPL-MCNC: 4.09 G/DL (ref 3.2–4.8)
ALBUMIN/GLOB SERPL: 1.6 G/DL (ref 1.5–2.5)
ALP SERPL-CCNC: 70 U/L (ref 25–100)
ALT SERPL W P-5'-P-CCNC: 13 U/L (ref 7–40)
ANION GAP SERPL CALCULATED.3IONS-SCNC: 7 MMOL/L (ref 3–11)
AST SERPL-CCNC: 22 U/L (ref 0–33)
BASOPHILS # BLD AUTO: 0.02 10*3/MM3 (ref 0–0.2)
BASOPHILS NFR BLD AUTO: 0.4 % (ref 0–1)
BILIRUB SERPL-MCNC: 0.5 MG/DL (ref 0.3–1.2)
BUN BLD-MCNC: 16 MG/DL (ref 9–23)
BUN/CREAT SERPL: 23.9 (ref 7–25)
CALCIUM SPEC-SCNC: 9.2 MG/DL (ref 8.7–10.4)
CHLORIDE SERPL-SCNC: 103 MMOL/L (ref 99–109)
CO2 SERPL-SCNC: 28 MMOL/L (ref 20–31)
CREAT BLD-MCNC: 0.67 MG/DL (ref 0.6–1.3)
CRP SERPL-MCNC: 0.09 MG/DL (ref 0–1)
DEPRECATED RDW RBC AUTO: 47.5 FL (ref 37–54)
EOSINOPHIL # BLD AUTO: 0.15 10*3/MM3 (ref 0–0.3)
EOSINOPHIL NFR BLD AUTO: 2.7 % (ref 0–3)
ERYTHROCYTE [DISTWIDTH] IN BLOOD BY AUTOMATED COUNT: 13.8 % (ref 11.3–14.5)
ERYTHROCYTE [SEDIMENTATION RATE] IN BLOOD: 30 MM/HR (ref 0–30)
GFR SERPL CREATININE-BSD FRML MDRD: 84 ML/MIN/1.73
GLOBULIN UR ELPH-MCNC: 2.6 GM/DL
GLUCOSE BLD-MCNC: 100 MG/DL (ref 70–100)
HCT VFR BLD AUTO: 41.5 % (ref 34.5–44)
HGB BLD-MCNC: 13.5 G/DL (ref 11.5–15.5)
IMM GRANULOCYTES # BLD: 0 10*3/MM3 (ref 0–0.03)
IMM GRANULOCYTES NFR BLD: 0 % (ref 0–0.6)
LYMPHOCYTES # BLD AUTO: 2.03 10*3/MM3 (ref 0.6–4.8)
LYMPHOCYTES NFR BLD AUTO: 36.4 % (ref 24–44)
MCH RBC QN AUTO: 30.5 PG (ref 27–31)
MCHC RBC AUTO-ENTMCNC: 32.5 G/DL (ref 32–36)
MCV RBC AUTO: 93.7 FL (ref 80–99)
MONOCYTES # BLD AUTO: 0.39 10*3/MM3 (ref 0–1)
MONOCYTES NFR BLD AUTO: 7 % (ref 0–12)
NEUTROPHILS # BLD AUTO: 2.99 10*3/MM3 (ref 1.5–8.3)
NEUTROPHILS NFR BLD AUTO: 53.5 % (ref 41–71)
PLATELET # BLD AUTO: 256 10*3/MM3 (ref 150–450)
PMV BLD AUTO: 8.7 FL (ref 6–12)
POTASSIUM BLD-SCNC: 4.2 MMOL/L (ref 3.5–5.5)
PROT SERPL-MCNC: 6.7 G/DL (ref 5.7–8.2)
RBC # BLD AUTO: 4.43 10*6/MM3 (ref 3.89–5.14)
SODIUM BLD-SCNC: 138 MMOL/L (ref 132–146)
WBC NRBC COR # BLD: 5.58 10*3/MM3 (ref 3.5–10.8)

## 2018-08-20 PROCEDURE — 86140 C-REACTIVE PROTEIN: CPT

## 2018-08-20 PROCEDURE — 80053 COMPREHEN METABOLIC PANEL: CPT

## 2018-08-20 PROCEDURE — 36415 COLL VENOUS BLD VENIPUNCTURE: CPT

## 2018-08-20 PROCEDURE — 85025 COMPLETE CBC W/AUTO DIFF WBC: CPT

## 2018-08-25 ENCOUNTER — READMISSION MANAGEMENT (OUTPATIENT)
Dept: CALL CENTER | Facility: HOSPITAL | Age: 83
End: 2018-08-25

## 2018-08-25 NOTE — OUTREACH NOTE
Medical Week 3 Survey      Responses   Facility patient discharged from?  [facility]   Does the patient have one of the following disease processes/diagnoses(primary or secondary)?  Other   Week 3 attempt successful?  Yes   Call start time  1004   Call end time  1010   Discharge diagnosis  Cellulitis of left foot   Medication alerts for this patient  Going for daily antibiotic infusions at Dr Skaggs's office   Meds reviewed with patient/caregiver?  Yes   Is the patient having any side effects they believe may be caused by any medication additions or changes?  Yes   Does the patient have all medications ordered at discharge?  Yes   Is the patient taking all medications as directed (includes completed medication regime)?  Yes   Comments regarding appointments  Seen by Dr Skaggs today 08/15- going for daily infusions   Does the patient have a primary care provider?   Yes   Comments regarding PCP  Dr Johnson- seen in office 08/09   Does the patient have an appointment with their PCP within 7 days of discharge?  Yes   Has the patient kept scheduled appointments due by today?  Yes   What is the Home health agency?   [facility]   Has home health visited the patient within 72 hours of discharge?  No   Home health comments  Patient does not want or need HH because she goes daily for IV antibiotics   What DME was ordered?  Rolling walker via [facility]   Has all DME been delivered?  Yes   Psychosocial issues?  No   Comments  Patient reports she is doing well- tired from going to ID office every day for antibiotic infusions, foot is healing well. No drainage . No fever.    Did the patient receive a copy of their discharge instructions?  Yes   Nursing interventions  Reviewed instructions with patient   What is the patient's perception of their health status since discharge?  Improving   Is the patient/caregiver able to teach back signs and symptoms related to disease process for when to call PCP?  Yes   Is the patient/caregiver able to  teach back signs and symptoms related to disease process for when to call 911?  Yes   Is the patient/caregiver able to teach back the hierarchy of who to call/visit for symptoms/problems? PCP, Specialist, Home health nurse, Urgent Care, ED, 911  Yes   Week 3 Call Completed?  Yes          Franko Ross RN

## 2018-09-04 ENCOUNTER — READMISSION MANAGEMENT (OUTPATIENT)
Dept: CALL CENTER | Facility: HOSPITAL | Age: 83
End: 2018-09-04

## 2018-09-04 NOTE — OUTREACH NOTE
Medical Week 4 Survey      Responses   Facility patient discharged from?  [facility]   Does the patient have one of the following disease processes/diagnoses(primary or secondary)?  Other   Week 4 attempt successful?  Yes   Call start time  1351   Call end time  1411   Discharge diagnosis  Cellulitis of left foot   Meds reviewed with patient/caregiver?  Yes   Is the patient having any side effects they believe may be caused by any medication additions or changes?  No   Is the patient taking all medications as directed (includes completed medication regime)?  Yes   Has the patient kept scheduled appointments due by today?  Yes   Is the patient still receiving Home Health Services?  N/A   Comments  cellulitis improving, still somewhat sore, and one place is to be evaluated next week at [facility]   What is the patient's perception of their health status since discharge?  Improving   Is the patient/caregiver able to teach back signs and symptoms related to disease process for when to call PCP?  Yes   Is the patient/caregiver able to teach back signs and symptoms related to disease process for when to call 911?  Yes   Is the patient/caregiver able to teach back the hierarchy of who to call/visit for symptoms/problems? PCP, Specialist, Home health nurse, Urgent Care, ED, 911  Yes   Week 4 Call Completed?  Yes   Would the patient like one additional call?  No   Graduated  Yes   Did the patient feel the follow up calls were helpful during their recovery period?  Yes   Was the number of calls appropriate?  Yes          Josefina Esquivel RN

## 2018-09-05 ENCOUNTER — EPISODE CHANGES (OUTPATIENT)
Dept: CASE MANAGEMENT | Facility: OTHER | Age: 83
End: 2018-09-05

## 2018-10-10 ENCOUNTER — OFFICE VISIT (OUTPATIENT)
Dept: FAMILY MEDICINE CLINIC | Facility: CLINIC | Age: 83
End: 2018-10-10

## 2018-10-10 ENCOUNTER — TELEPHONE (OUTPATIENT)
Dept: FAMILY MEDICINE CLINIC | Facility: CLINIC | Age: 83
End: 2018-10-10

## 2018-10-10 VITALS
HEIGHT: 66 IN | HEART RATE: 77 BPM | WEIGHT: 142.4 LBS | SYSTOLIC BLOOD PRESSURE: 158 MMHG | DIASTOLIC BLOOD PRESSURE: 86 MMHG | OXYGEN SATURATION: 94 % | BODY MASS INDEX: 22.88 KG/M2

## 2018-10-10 DIAGNOSIS — Z23 NEEDS FLU SHOT: ICD-10-CM

## 2018-10-10 DIAGNOSIS — M41.34 THORACOGENIC SCOLIOSIS OF THORACIC REGION: ICD-10-CM

## 2018-10-10 DIAGNOSIS — M54.50 CHRONIC MIDLINE LOW BACK PAIN WITHOUT SCIATICA: ICD-10-CM

## 2018-10-10 DIAGNOSIS — R60.0 EDEMA OF LEFT FOOT: Primary | ICD-10-CM

## 2018-10-10 DIAGNOSIS — Z23 NEED FOR VACCINATION AGAINST HEPATITIS A: ICD-10-CM

## 2018-10-10 DIAGNOSIS — G89.29 CHRONIC MIDLINE LOW BACK PAIN WITHOUT SCIATICA: ICD-10-CM

## 2018-10-10 DIAGNOSIS — R19.7 DIARRHEA, UNSPECIFIED TYPE: ICD-10-CM

## 2018-10-10 DIAGNOSIS — M54.2 CERVICAL PAIN: ICD-10-CM

## 2018-10-10 DIAGNOSIS — Z85.038 HISTORY OF COLON CANCER: ICD-10-CM

## 2018-10-10 PROCEDURE — 90471 IMMUNIZATION ADMIN: CPT | Performed by: NURSE PRACTITIONER

## 2018-10-10 PROCEDURE — 90632 HEPA VACCINE ADULT IM: CPT | Performed by: NURSE PRACTITIONER

## 2018-10-10 PROCEDURE — 90662 IIV NO PRSV INCREASED AG IM: CPT | Performed by: NURSE PRACTITIONER

## 2018-10-10 PROCEDURE — 99214 OFFICE O/P EST MOD 30 MIN: CPT | Performed by: NURSE PRACTITIONER

## 2018-10-10 PROCEDURE — G0008 ADMIN INFLUENZA VIRUS VAC: HCPCS | Performed by: NURSE PRACTITIONER

## 2018-10-10 RX ORDER — COMPRESSION  PANTYHOSE, SMALL
1 EACH MISCELLANEOUS DAILY
Qty: 1 EACH | Refills: 1 | Status: SHIPPED | OUTPATIENT
Start: 2018-10-10 | End: 2018-10-10 | Stop reason: SDUPTHER

## 2018-10-10 RX ORDER — COMPRESSION  PANTYHOSE, SMALL
1 EACH MISCELLANEOUS DAILY
Qty: 1 EACH | Refills: 1 | Status: SHIPPED | OUTPATIENT
Start: 2018-10-10

## 2018-10-10 NOTE — PATIENT INSTRUCTIONS
Scoliosis  Scoliosis is the name given to a spine that curves sideways. Scoliosis can cause twisting of your shoulders, hips, chest, back, and rib cage.  What are the causes?  The cause of scoliosis is not always known. It may be caused by a birth defect or by a disease that can cause muscular dysfunction and imbalance, such as cerebral palsy and muscular dystrophy.  What increases the risk?  Having a disease that causes muscle disease or dysfunction.  What are the signs or symptoms?  Scoliosis often has no signs or symptoms. If they are present, they may include:  · Unequal size of one body side compared to the other (asymmetry).  · Visible curvature of the spine.  · Pain. The pain may limit physical activity.  · Shortness of breath.  · Bowel or bladder issues.    How is this diagnosed?  A skilled health care provider will perform an evaluation. This will involve:  · Taking your history.  · Performing a physical examination.  · Performing a neurological exam to detect nerve or muscle function loss.  · Range of motion studies on the spine.  · X-rays.    An MRI may also be obtained.  How is this treated?  Treatment varies depending on the nature, extent, and severity of the disease. If the curvature is not great, you may need only observation. A brace may be used to prevent scoliosis from progressing. A brace may also be needed during growth spurts. Physical therapy may be of benefit. Surgery may be required.  Follow these instructions at home:  · Your health care provider may suggest exercises to strengthen your muscles. Perform them as directed.  · Ask your health care provider before participating in any sports.  · If you have been prescribed an orthopedic brace, wear it as instructed by your health care provider.  Contact a health care provider if:  Your brace causes the skin to become sore (chafe) or is uncomfortable.  Get help right away if:  · You have back pain that is not relieved by the medicines prescribed  by your health care provider.  · Your legs feel weak or you lose function in your legs.  · You lose some bowel or bladder control.  This information is not intended to replace advice given to you by your health care provider. Make sure you discuss any questions you have with your health care provider.  Document Released: 12/15/2001 Document Revised: 05/25/2017 Document Reviewed: 06/22/2017  [employer] Interactive Patient Education © 2018 [employer]

## 2018-10-10 NOTE — TELEPHONE ENCOUNTER
[facility] does not carry elastic bandages & supports (compression hose), suggested [facility] or any supply place.

## 2018-10-10 NOTE — PROGRESS NOTES
"Chief Complaint   Patient presents with   • Cellulitis       Subjective   Lashanda Qureshi is a 83 y.o. female    History of Present Illness  Left Foot- Pt in with more swelling in left foot, with sharp fleeting pains where garbage can hit. \"Not significant\" Has appt to see ID next week. Pt had C Diff when in Hospital and is now off all antibiotics, but still taking Probiotics. Pt had left leg duplex and was neg in August. Leg swelling has never went down. Was recommended knee high YOLIS hose. Pt wants a FLU shot and Hep A.     Pt has a HX of diarrhea-freq soft stools, since had colon CA and the C Diff has made worse, Now has had 2 episodes of BM during sleep, Pure liquid, Last one occurred last PM, BM not like when she had C Diff.     Stiff neck/Back- can hardly turn neck, \"spine is curved\", starting to \"walk with a hump\", with pain in entire back. Has to walk with a cane, has had the recent BM incont during the night. None otherwise. Multiple CXR note scoliosis on them. No x-rays of spine noted in chart.         Allergies   Allergen Reactions   • Antihistamines, Loratadine-Type Other (See Comments)     Drowsiness     • Erythromycin      Past Medical History:   Diagnosis Date   • Abnormal CT scan, lung    • Mycobacterium gordonae infection       Past Surgical History:   Procedure Laterality Date   • BLADDER SURGERY     • BRONCHOSCOPY  01/27/2016   • CHOLECYSTECTOMY     • COLON SURGERY     • DILATION AND CURETTAGE, DIAGNOSTIC / THERAPEUTIC     • TOTAL HIP ARTHROPLASTY      Left hip     Social History     Social History   • Marital status:      Spouse name: N/A   • Number of children: N/A   • Years of education: N/A     Occupational History   • Not on file.     Social History Main Topics   • Smoking status: Former Smoker   • Smokeless tobacco: Never Used      Comment: Quit 41 years ago   • Alcohol use No   • Drug use: No   • Sexual activity: Defer      Comment:      Other Topics Concern   • Not on file "     Social History Narrative   • No narrative on file        Current Outpatient Prescriptions:   •  azelastine (ASTEPRO) 0.15 % solution nasal spray, 2 sprays into each nostril 2 (Two) Times a Day. (Patient taking differently: 2 sprays into the nostril(s) as directed by provider 2 (Two) Times a Day As Needed.), Disp: 1 each, Rfl: 3  •  bimatoprost (LUMIGAN) 0.01 % ophthalmic drops, Administer 1 drop to both eyes Every Night., Disp: , Rfl:   •  Cholecalciferol (VITAMIN D3) 2000 UNITS tablet, Take  by mouth. Take 2 tablets daily., Disp: , Rfl:   •  Cyanocobalamin (VITAMIN B12 PO), Take  by mouth. Take 1 tablet daily as directed., Disp: , Rfl:   •  ESTRING 2 MG vaginal ring, Every 3 (Three) Months., Disp: , Rfl:   •  fluticasone (FLONASE) 50 MCG/ACT nasal spray, 2 sprays into each nostril daily. Administer 2 sprays in each nostril for each dose., Disp: , Rfl:   •  latanoprost (XALATAN) 0.005 % ophthalmic solution, Apply  to eye. Instill 1 drop into affected eye(s) once daily as directed., Disp: , Rfl:   •  magnesium oxide (MAGOX) 400 (241.3 MG) MG tablet tablet, Take 400 mg by mouth daily., Disp: , Rfl:   •  montelukast (SINGULAIR) 10 MG tablet, Take 1 tablet by mouth Every Night., Disp: 30 tablet, Rfl: 11  •  SYMBICORT 160-4.5 MCG/ACT inhaler, Inhale 2 puffs 2 (Two) Times a Day As Needed., Disp: , Rfl:   •  VENTOLIN  (90 Base) MCG/ACT inhaler, Inhale 2 puffs Every 6 (Six) Hours As Needed., Disp: , Rfl: 1  •  benzonatate (TESSALON) 100 MG capsule, Take 1 capsule by mouth 3 (Three) Times a Day As Needed for Cough., Disp: 30 capsule, Rfl: 0  •  Elastic Bandages & Supports (FUTURO FIRM COMPRESSION HOSE) misc, 1 package Daily., Disp: 1 each, Rfl: 1  •  ipratropium (ATROVENT) 0.06 % nasal spray, 1 spray into the nostril(s) as directed by provider Daily As Needed., Disp: , Rfl:     Current Facility-Administered Medications:   •  albuterol (PROVENTIL) nebulizer solution 2.5 mg, 2.5 mg, Nebulization, Q6H PRN, Harpreet,  CHARLES Gudino     Review of Systems   Constitutional: Negative for appetite change, chills and fever.   Respiratory: Negative for cough, shortness of breath and wheezing.    Cardiovascular: Positive for leg swelling (left). Negative for chest pain.   Gastrointestinal: Positive for diarrhea. Negative for constipation, nausea and vomiting.   Genitourinary: Negative for difficulty urinating and dysuria.   Neurological: Negative for headaches.   Psychiatric/Behavioral: Positive for sleep disturbance (diarrhea).       Objective     Vitals:    10/10/18 1106   BP: 158/86   Pulse: 77   SpO2: 94%       Results for orders placed or performed in visit on 08/20/18   Comprehensive Metabolic Panel   Result Value Ref Range    Glucose 100 70 - 100 mg/dL    BUN 16 9 - 23 mg/dL    Creatinine 0.67 0.60 - 1.30 mg/dL    Sodium 138 132 - 146 mmol/L    Potassium 4.2 3.5 - 5.5 mmol/L    Chloride 103 99 - 109 mmol/L    CO2 28.0 20.0 - 31.0 mmol/L    Calcium 9.2 8.7 - 10.4 mg/dL    Total Protein 6.7 5.7 - 8.2 g/dL    Albumin 4.09 3.20 - 4.80 g/dL    ALT (SGPT) 13 7 - 40 U/L    AST (SGOT) 22 0 - 33 U/L    Alkaline Phosphatase 70 25 - 100 U/L    Total Bilirubin 0.5 0.3 - 1.2 mg/dL    eGFR Non African Amer 84 >60 mL/min/1.73    Globulin 2.6 gm/dL    A/G Ratio 1.6 1.5 - 2.5 g/dL    BUN/Creatinine Ratio 23.9 7.0 - 25.0    Anion Gap 7.0 3.0 - 11.0 mmol/L   Sedimentation Rate   Result Value Ref Range    Sed Rate 30 0 - 30 mm/hr   C-reactive Protein   Result Value Ref Range    C-Reactive Protein 0.09 0.00 - 1.00 mg/dL   CBC Auto Differential   Result Value Ref Range    WBC 5.58 3.50 - 10.80 10*3/mm3    RBC 4.43 3.89 - 5.14 10*6/mm3    Hemoglobin 13.5 11.5 - 15.5 g/dL    Hematocrit 41.5 34.5 - 44.0 %    MCV 93.7 80.0 - 99.0 fL    MCH 30.5 27.0 - 31.0 pg    MCHC 32.5 32.0 - 36.0 g/dL    RDW 13.8 11.3 - 14.5 %    RDW-SD 47.5 37.0 - 54.0 fl    MPV 8.7 6.0 - 12.0 fL    Platelets 256 150 - 450 10*3/mm3    Neutrophil % 53.5 41.0 - 71.0 %    Lymphocyte %  36.4 24.0 - 44.0 %    Monocyte % 7.0 0.0 - 12.0 %    Eosinophil % 2.7 0.0 - 3.0 %    Basophil % 0.4 0.0 - 1.0 %    Immature Grans % 0.0 0.0 - 0.6 %    Neutrophils, Absolute 2.99 1.50 - 8.30 10*3/mm3    Lymphocytes, Absolute 2.03 0.60 - 4.80 10*3/mm3    Monocytes, Absolute 0.39 0.00 - 1.00 10*3/mm3    Eosinophils, Absolute 0.15 0.00 - 0.30 10*3/mm3    Basophils, Absolute 0.02 0.00 - 0.20 10*3/mm3    Immature Grans, Absolute 0.00 0.00 - 0.03 10*3/mm3       Physical Exam   Constitutional: She is oriented to person, place, and time. She appears well-developed and well-nourished.   HENT:   Head: Normocephalic and atraumatic.   Cardiovascular: Normal rate, regular rhythm and normal heart sounds.    Pulmonary/Chest: Effort normal and breath sounds normal.   Musculoskeletal: She exhibits edema (left lower ext, with cyanotic appearing toes , with good cap refill, toes are cool to touch).   Neurological: She is alert and oriented to person, place, and time.   Skin: Skin is warm and dry.   Psychiatric: She has a normal mood and affect. Her behavior is normal.   Vitals reviewed.      Assessment/Plan   Problem List Items Addressed This Visit        Nervous and Auditory    Cervical pain    Relevant Orders    XR Spine Cervical 3 View    Chronic midline low back pain without sciatica    Relevant Orders    XR Spine Lumbar 4+ View       Musculoskeletal and Integument    Thoracogenic scoliosis of thoracic region    Relevant Orders    XR Spine Thoracic 3 View       Other    History of colon cancer      Other Visit Diagnoses     Edema of left foot    -  Primary    Relevant Medications    Elastic Bandages & Supports (FUTURO FIRM COMPRESSION HOSE) misc    Other Relevant Orders    Duplex Venous Lower Extremity - Left CAR    Diarrhea, unspecified type        Needs flu shot        Relevant Orders    Fluzone High Dose =>65Years    Need for vaccination against hepatitis A        Relevant Orders    Hepatitis A Vaccine Adult IM      For  diarrhea/Colon CA HX will try to increase fiber     Get Duplex of left lower ext to make sure no DVT, keep appt with ID as scheduled.     Will give Flu shot and 1st Hep A vaccine.     Get xray of entire spine,     Patient was encouraged to keep me informed of any acute changes, lack of improvement, or any new concerning symptoms.Plan of care discussed with pt. They verbalized understanding and agreement.     Plan of care reviewed with patient at the conclusion of today's visit. Education was provided regarding diagnosis, management and any prescribed or recommended OTC medications. Patient verbalizes understanding of and agreement with management plan.    RV- 1 mo    EDU given on - scoliosis     Leandro Johnson, APRN   10/10/2018   12:23 PM

## 2018-10-11 ENCOUNTER — EPISODE CHANGES (OUTPATIENT)
Dept: CASE MANAGEMENT | Facility: OTHER | Age: 83
End: 2018-10-11

## 2018-10-12 ENCOUNTER — HOSPITAL ENCOUNTER (OUTPATIENT)
Dept: GENERAL RADIOLOGY | Facility: HOSPITAL | Age: 83
Discharge: HOME | End: 2018-10-12
Admitting: NURSE PRACTITIONER

## 2018-10-12 DIAGNOSIS — M54.2 CERVICAL PAIN: ICD-10-CM

## 2018-10-12 DIAGNOSIS — M54.50 CHRONIC MIDLINE LOW BACK PAIN WITHOUT SCIATICA: ICD-10-CM

## 2018-10-12 DIAGNOSIS — M41.34 THORACOGENIC SCOLIOSIS OF THORACIC REGION: ICD-10-CM

## 2018-10-12 DIAGNOSIS — G89.29 CHRONIC MIDLINE LOW BACK PAIN WITHOUT SCIATICA: ICD-10-CM

## 2018-10-12 PROCEDURE — 72110 X-RAY EXAM L-2 SPINE 4/>VWS: CPT

## 2018-10-12 PROCEDURE — 72040 X-RAY EXAM NECK SPINE 2-3 VW: CPT

## 2018-10-12 PROCEDURE — 72072 X-RAY EXAM THORAC SPINE 3VWS: CPT

## 2018-10-15 DIAGNOSIS — G89.29 CHRONIC MIDLINE LOW BACK PAIN WITHOUT SCIATICA: Primary | ICD-10-CM

## 2018-10-15 DIAGNOSIS — M54.2 CERVICAL PAIN: ICD-10-CM

## 2018-10-15 DIAGNOSIS — M41.34 THORACOGENIC SCOLIOSIS OF THORACIC REGION: ICD-10-CM

## 2018-10-15 DIAGNOSIS — M54.50 CHRONIC MIDLINE LOW BACK PAIN WITHOUT SCIATICA: Primary | ICD-10-CM

## 2018-10-18 ENCOUNTER — HOSPITAL ENCOUNTER (OUTPATIENT)
Dept: CARDIOLOGY | Facility: HOSPITAL | Age: 83
Discharge: HOME | End: 2018-10-18
Admitting: NURSE PRACTITIONER

## 2018-10-18 VITALS — HEIGHT: 66 IN | BODY MASS INDEX: 22.82 KG/M2 | WEIGHT: 142 LBS

## 2018-10-18 DIAGNOSIS — R60.0 EDEMA OF LEFT FOOT: ICD-10-CM

## 2018-10-18 LAB
BH CV LOWER VASCULAR LEFT COMMON FEMORAL AUGMENT: NORMAL
BH CV LOWER VASCULAR LEFT COMMON FEMORAL COMPETENT: NORMAL
BH CV LOWER VASCULAR LEFT COMMON FEMORAL COMPRESS: NORMAL
BH CV LOWER VASCULAR LEFT COMMON FEMORAL PHASIC: NORMAL
BH CV LOWER VASCULAR LEFT COMMON FEMORAL SPONT: NORMAL
BH CV LOWER VASCULAR LEFT DISTAL FEMORAL COMPRESS: NORMAL
BH CV LOWER VASCULAR LEFT GASTRONEMIUS COMPRESS: NORMAL
BH CV LOWER VASCULAR LEFT GREATER SAPH AK COMPRESS: NORMAL
BH CV LOWER VASCULAR LEFT GREATER SAPH BK COMPRESS: NORMAL
BH CV LOWER VASCULAR LEFT LESSER SAPH COMPRESS: NORMAL
BH CV LOWER VASCULAR LEFT MID FEMORAL AUGMENT: NORMAL
BH CV LOWER VASCULAR LEFT MID FEMORAL COMPETENT: NORMAL
BH CV LOWER VASCULAR LEFT MID FEMORAL COMPRESS: NORMAL
BH CV LOWER VASCULAR LEFT MID FEMORAL PHASIC: NORMAL
BH CV LOWER VASCULAR LEFT MID FEMORAL SPONT: NORMAL
BH CV LOWER VASCULAR LEFT PERONEAL COMPRESS: NORMAL
BH CV LOWER VASCULAR LEFT POPLITEAL AUGMENT: NORMAL
BH CV LOWER VASCULAR LEFT POPLITEAL COMPETENT: NORMAL
BH CV LOWER VASCULAR LEFT POPLITEAL COMPRESS: NORMAL
BH CV LOWER VASCULAR LEFT POPLITEAL PHASIC: NORMAL
BH CV LOWER VASCULAR LEFT POPLITEAL SPONT: NORMAL
BH CV LOWER VASCULAR LEFT POSTERIOR TIBIAL COMPRESS: NORMAL
BH CV LOWER VASCULAR LEFT PROXIMAL FEMORAL COMPRESS: NORMAL
BH CV LOWER VASCULAR LEFT SAPHENOFEMORAL JUNCTION AUGMENT: NORMAL
BH CV LOWER VASCULAR LEFT SAPHENOFEMORAL JUNCTION COMPETENT: NORMAL
BH CV LOWER VASCULAR LEFT SAPHENOFEMORAL JUNCTION COMPRESS: NORMAL
BH CV LOWER VASCULAR LEFT SAPHENOFEMORAL JUNCTION PHASIC: NORMAL
BH CV LOWER VASCULAR LEFT SAPHENOFEMORAL JUNCTION SPONT: NORMAL
BH CV LOWER VASCULAR RIGHT COMMON FEMORAL AUGMENT: NORMAL
BH CV LOWER VASCULAR RIGHT COMMON FEMORAL COMPETENT: NORMAL
BH CV LOWER VASCULAR RIGHT COMMON FEMORAL COMPRESS: NORMAL
BH CV LOWER VASCULAR RIGHT COMMON FEMORAL PHASIC: NORMAL
BH CV LOWER VASCULAR RIGHT COMMON FEMORAL SPONT: NORMAL
BH CV LOWER VASCULAR RIGHT SAPHENOFEMORAL JUNCTION AUGMENT: NORMAL
BH CV LOWER VASCULAR RIGHT SAPHENOFEMORAL JUNCTION COMPETENT: NORMAL
BH CV LOWER VASCULAR RIGHT SAPHENOFEMORAL JUNCTION COMPRESS: NORMAL
BH CV LOWER VASCULAR RIGHT SAPHENOFEMORAL JUNCTION PHASIC: NORMAL
BH CV LOWER VASCULAR RIGHT SAPHENOFEMORAL JUNCTION SPONT: NORMAL

## 2018-10-18 PROCEDURE — 93971 EXTREMITY STUDY: CPT

## 2018-10-18 PROCEDURE — 93971 EXTREMITY STUDY: CPT | Performed by: INTERNAL MEDICINE

## 2018-10-22 ENCOUNTER — HOSPITAL ENCOUNTER (OUTPATIENT)
Dept: PHYSICAL THERAPY | Facility: HOSPITAL | Age: 83
Setting detail: THERAPIES SERIES
Discharge: HOME | End: 2018-10-22

## 2018-10-22 DIAGNOSIS — G89.29 CHRONIC MIDLINE LOW BACK PAIN WITHOUT SCIATICA: ICD-10-CM

## 2018-10-22 DIAGNOSIS — M54.2 CERVICAL PAIN: Primary | ICD-10-CM

## 2018-10-22 DIAGNOSIS — M54.50 CHRONIC MIDLINE LOW BACK PAIN WITHOUT SCIATICA: ICD-10-CM

## 2018-10-22 DIAGNOSIS — M41.34 THORACOGENIC SCOLIOSIS OF THORACIC REGION: ICD-10-CM

## 2018-10-22 PROCEDURE — 97161 PT EVAL LOW COMPLEX 20 MIN: CPT | Performed by: PHYSICAL THERAPIST

## 2018-10-22 PROCEDURE — G8982 BODY POS GOAL STATUS: HCPCS | Performed by: PHYSICAL THERAPIST

## 2018-10-22 PROCEDURE — G8981 BODY POS CURRENT STATUS: HCPCS | Performed by: PHYSICAL THERAPIST

## 2018-10-22 PROCEDURE — 97110 THERAPEUTIC EXERCISES: CPT | Performed by: PHYSICAL THERAPIST

## 2018-10-22 NOTE — THERAPY EVALUATION
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 difficile diarrhea   • Cervical pain   • Chronic midline low back pain without sciatica   • Thoracogenic scoliosis of thoracic region        Past Medical History:   Diagnosis Date   • Abnormal CT scan, lung    • Mycobacterium gordonae infection         Past Surgical History:   Procedure Laterality Date   • BLADDER SURGERY     • BRONCHOSCOPY  2016   • CHOLECYSTECTOMY     • COLON SURGERY     • DILATION AND CURETTAGE, DIAGNOSTIC / THERAPEUTIC     • TOTAL HIP ARTHROPLASTY      Left hip       Visit Dx:     ICD-10-CM ICD-9-CM   1. Cervical pain M54.2 723.1   2. Chronic midline low back pain without sciatica M54.5 724.2    G89.29 338.29   3. Thoracogenic scoliosis of thoracic region M41.34 737.34             Patient History     Row Name 10/22/18 1500             History    Chief Complaint Pain;Difficulty Walking  -SAMANTA      Type of Pain Neck pain;Back pain  -SAMANTA      Date Current Problem(s) Began --    chronic  -SAMANTA      Brief Description of Current Complaint Patient states that she has a chronic history of lower back and neck pain with history of scoliosis with progressive fwd bending and neck/low back pain.  She notes that her neck hurts when she moves it, her back hurts with standing.  Her back doesn't hurt when sitting or at rest.    She notes that she has had bilateral hip replacements and notes that her left leg is shorter.  Her right hip was the most recently replaced about a year ago.  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  Hobbies: reading, used to enjoy walking  -SAMANTA      What clinical tests have you had for this problem? X-ray  -SAMANTA      Results of Clinical Tests scoliosis with DDD  -SAMANTA         Pain     Pain Location Back;Neck  -SAMANTA      Pain at Present 0  -SAMANTA      Pain at Best 0  -SAMANTA      Pain at Worst 5  -SAMANTA      Pain Frequency Intermittent  -SAMANTA      Pain Description Aching;Tightness  -SAMANTA      What Performance Factors Make the Current Problem(s) WORSE? neck: movement spec rotation, tired with reading, night time/sleeping (sidesleep).  back: pushing/pulling, walking or standing.    -SAMANTA      What Performance Factors Make the Current Problem(s) BETTER? using an AD for walking, sitting, rest, avoidance  -SAMANTA      Difficulties at work? na  -SAMANTA      Difficulties with ADL's? heavy chores, push/pull, standing  -SAMANTA      Difficulties with recreational activities? reading, standing/walking  -SAMANTA         Fall Risk Assessment    Any falls in the past year: No  -SAMANTA         Daily Activities    Primary Language English  -SAMANTA      Pt Participated in POC and Goals Yes  -SAMANTA         Safety    Are you being hurt, hit, or frightened by anyone at home or in your life? No  -SAMANTA        User Key  (r) = Recorded By, (t) = Taken By, (c) = Cosigned By    Initials Name Provider  Type    SAMANTA Regulo Weiss, PT Physical Therapist                PT Ortho     Row Name 10/22/18 1600       Posture/Observations    Posture/Observations Comments fwd head posture, flat thoracic spine. Unequal weight bearing favoring the right  -SAMANTA       Quarter Clearing    Quarter Clearing Lower Quarter Clearing;Upper Quarter Clearing  -SAMANTA       DTR- Upper Quarter Clearing    Biceps (C5/6) Bilateral:;2- Normal response  -SAMANTA    Brachioradialis (C6) Bilateral:;2- Normal response  -SAMANTA    Triceps (C7) Bilateral:;2- Normal response  -SAMANTA       Sensory Screen for Light Touch- Upper Quarter Clearing    C5 (lateral upper arm) Intact  -SAMANTA    C6 (tip of thumb) Intact  -SAMANTA    C7 (tip of 3rd finger) Intact  -SAMANTA    C8 (tip of 5th finger) Intact  -SAMANTA    T1 (medial lower arm) Intact  -SAMANTA       Myotomal Screen- Upper Quarter Clearing    Shoulder flexion (C5) Bilateral:;4 (Good)  -SAMANTA    Elbow flexion/wrist extension (C6) Bilateral:;5 (Normal)  -SAMANTA    Elbow extension/wrist flexion (C7) Bilateral:;5 (Normal)  -SAMANTA       Cervical/Shoulder ROM Screen    Cervical flexion Normal  -SAMANTA    Cervical extension Normal   painful  -SAMANAT    Cervical lateral flexion Impaired   5 each contralateral pain bilateral, mild right pain to Rt  -SAMANTA    Cervical rotation Impaired   35 Rt 43 Lt, pain on right with both  -SAMANTA    Cervical quadrant (Spurling's) Impaired  -SAMANTA    Shoulder elevation  Normal   WFL  -SAMANTA       DTR- Lower Quarter Clearing    Patellar tendon (L2-4) Bilateral:;2- Normal response  -SAMANTA    Achilles tendon (S1-2) Bilateral:;1- Minimal response  -SAMANTA       Neural Tension Signs- Lower Quarter Clearing    Slump Negative  -SAMANTA    SLR Negative  -SAMANTA       Sensory Screen for Light Touch- Lower Quarter Clearing    L3 (distal anterior thigh) Intact  -SAMANTA    L4 (medial lower leg/foot) Intact  -SAMANTA    L5 (lateral lower leg/great toe) Diminished  -SAMANTA    S1 (bottom of foot) Diminished  -SAMANTA       Myotomal Screen- Lower Quarter Clearing    Hip flexion (L2)  Bilateral:;4 (Good)  -SAMANTA    Knee extension (L3) Bilateral:;5 (Normal)  -SAMANTA    Ankle DF (L4) Bilateral:;5 (Normal)  -SAMANTA    Great toe extension (L5) Bilateral:;WNL  -SAMANTA    Knee flexion (S2) Bilateral:;5 (Normal)  -SAMANTA       Lumbar ROM Screen- Lower Quarter Clearing    Lumbar Flexion Normal  -SAMANTA    Lumbar Extension Impaired   minimal lumbar motion, pain  -SAMANTA    Lumbar Lateral Flexion Impaired  -SAMANTA    Lumbar Rotation Impaired   nonsegmental  -SAMANTA       Special Tests/Palpation    Special Tests/Palpation Lumbar/SI;Cervical/Thoracic  -SAMANTA       Cervical Palpation    Cervical Palpation- Location? Suboccipital;AC joint;Cervical facets;Levator scapula;Upper traps;Lower traps  -SAMANTA    Suboccipital Guarded/taut;Tender  -SAMANTA    AC Joint Crepitus  -SAMANTA    Cervical Facets Tender   Rt lower  -SAMANTA    Levator Scapula Guarded/taut  -SAMANTA    Upper Traps Bilateral:;Guarded/taut;Tender  -SAMANTA    Lower Traps Bilateral:;Guarded/taut;Tender  -SAMANTA       Thoracic Accessory Motions    Thoracic Accessory Motions Tested? Yes  -SAMANTA    Pa glide- Upper thoracic Hypomobile  -SAMANTA    Pa glide- Middle thoracic Hypomobile  -SAMANTA    Pa glide- Lower thoracic Hypomobile  -SAMANTA       Cervical/Thoracic Special Tests    Spurlings (Foraminal Compression) Bilateral:;Positive  -SAMANTA    Cervical Compression (Forarminal Compression vs. Facet Pain) Positive  -SAMANTA    Cervical Distraction (Foraminal Compression vs. Facet Pain) Negative   releif of axial symptoms  -SAMANTA       Lumbar/SI Special Tests    Sacral Spring Test (SI Dysfunction) Right:;Positive  -SAMANTA       MMT (Manual Muscle Testing)    General MMT Comments middle trap 3-/5, low trap 3-/5  -SAMANTA       Flexibility    Flexibility Tested? Lower Extremity  -SAMANTA       Lower Extremity Flexibility    Hamstrings Bilateral:;Mildly limited  -SAMANTA    Hip External Rotators WNL  -SAMANTA    Hip Internal Rotators WNL  -SAMANTA       Gait/Stairs Assessment/Training    Comment (Gait/Stairs) uses SC, flexed at lumbar and cervical spine. Decreased stride length   -SAMANTA      User Key  (r) = Recorded By, (t) = Taken By, (c) = Cosigned By    Initials Name Provider Type    Regulo Doyle, PT Physical Therapist                      Therapy Education  Education Details: initiated HEP per exercise flowsheet  Given: HEP  Program: New  How Provided: Verbal, Demonstration, Written  Provided to: Patient  Level of Understanding: Verbalized, Demonstrated           PT OP Goals     Row Name 10/22/18 1600          PT Short Term Goals    STG Date to Achieve 11/05/18  -SAMANTA     STG 1 Patient to be compliant with initial HEP  -SAMANTA     STG 1 Progress New  -SAMANTA     STG 2 Patient to report improved neck pain with sleeping  -SAMANTA     STG 2 Progress New  -SAMANTA        Long Term Goals    LTG Date to Achieve 11/19/18  -SAMANTA     LTG 1 Patient to be independent with final HEP   -SAMANTA     LTG 1 Progress New  -SAMANTA     LTG 2 Patient to improve mod oswestry to at least 28%  -SAMANTA     LTG 2 Progress New  -SAMANTA     LTG 3 Patient to perform 30 minutes of standing exercise with no increase in pain >/=2/10  -SAMANTA     LTG 3 Progress New  -SAMANTA     LTG 4 Patient to demonstrate bilateral cervical rotation at least 55 degrees  -SAMANTA     LTG 4 Progress New  -SAMANTA        Time Calculation    PT Goal Re-Cert Due Date 01/20/19  -SAMANTA       User Key  (r) = Recorded By, (t) = Taken By, (c) = Cosigned By    Initials Name Provider Type    Regulo Doyle, PT Physical Therapist                PT Assessment/Plan     Row Name 10/22/18 1600          PT Assessment    Functional Limitations Performance in leisure activities;Limitations in functional capacity and performance;Limitation in home management;Impaired gait  -SAMANTA     Impairments Range of motion;Posture;Poor body mechanics;Pain;Impaired flexibility;Gait  -SAMANTA     Assessment Comments Patient presents with chronic neck and back pain with XR identified scoliosis.  Patient stands with cervical flexion while ambulating and has back pain with standing and walking and trouble with maintaining upright  positioning.  She demonstrate moderate restrictions of cervical rotation and sidebending with muscular tightness and irritation.  -SAMANTA     Please refer to paper survey for additional self-reported information Yes  -SAMANTA     Rehab Potential Fair  -SAMANTA     Patient/caregiver participated in establishment of treatment plan and goals Yes  -SAMANTA     Patient would benefit from skilled therapy intervention Yes  -SAMANTA        PT Plan    PT Frequency 2x/week  -SAMANTA     Predicted Duration of Therapy Intervention (Therapy Eval) 5-6 visits prior to Thanksgiving with possible additional visits on return  -SAMANTA     Planned CPT's? PT EVAL LOW COMPLEXITY: 00685;PT THER PROC EA 15 MIN: 10017;PT MANUAL THERAPY EA 15 MIN: 71482;PT HOT OR COLD PACK TREAT MCARE;PT ELECTRICAL STIM UNATTEND: ;PT HOT/COLD PACK WC NONMCARE: 74753  -SAMANTA     PT Plan Comments postural exercises, scapular stabilization, core and hip strengthening, manual and modalities as indicated.  -SAMANTA       User Key  (r) = Recorded By, (t) = Taken By, (c) = Cosigned By    Initials Name Provider Type    Regulo Doyle, PT Physical Therapist                  Exercises     Row Name 10/22/18 1600             Total Minutes    98640 - PT Therapeutic Exercise Minutes 10  -SAMANTA         Exercise 1    Exercise Name 1 heel lift in left shoe  -SAMANTA         Exercise 2    Exercise Name 2 UT stretch  -SAMANTA      Sets 2 2  -SAMANTA      Time 2 30 seconds  -SAMANTA         Exercise 3    Exercise Name 3 supine chin tuck  -SAMANTA      Sets 3 2  -SAMANTA      Reps 3 10  -SAMANTA      Time 3 3 second hold  -SAMANTA         Exercise 4    Exercise Name 4 seated scap squeeze  -SAMANTA      Reps 4 10  -SAMANTA      Time 4 5 seconds  -SAMANTA        User Key  (r) = Recorded By, (t) = Taken By, (c) = Cosigned By    Initials Name Provider Type    Regulo Doyle, PT Physical Therapist                        Outcome Measure Options: Modifed Owestry  Modified Oswestry  Modified Oswestry Score/Comments: 38%      Time Calculation:     Therapy Suggested  Charges     Code   Minutes Charges    15265 (CPT®) Hc Pt Neuromusc Re Education Ea 15 Min      05365 (CPT®) Hc Pt Ther Proc Ea 15 Min 10 1    04909 (CPT®) Hc Gait Training Ea 15 Min      31191 (CPT®) Hc Pt Therapeutic Act Ea 15 Min      84499 (CPT®) Hc Pt Manual Therapy Ea 15 Min      35873 (CPT®) Hc Pt Ther Massage- Per 15 Min      48339 (CPT®) Hc Pt Iontophoresis Ea 15 Min      71753 (CPT®) Hc Pt Elec Stim Ea-Per 15 Min      08417 (CPT®) Hc Pt Ultrasound Ea 15 Min      20236 (CPT®) Hc Pt Self Care/Mgmt/Train Ea 15 Min      68345 (CPT®) Hc Pt Prosthetic (S) Train Initial Encounter, Each 15 Min      91829 (CPT®) Hc Orthotic(S) Mgmt/Train Initial Encounter, Each 15min      93885 (CPT®) Hc Pt Aquatic Therapy Ea 15 Min      03767 (CPT®) Hc Pt Orthotic(S)/Prosthetic(S) Encounter, Each 15 Min       (CPT®) Hc Pt Electrical Stim Unattended      Total  10 1          Start Time: 1549     Therapy Charges for Today     Code Description Service Date Service Provider Modifiers Qty    87975860880 HC PT CHNG MAIN POS CURRENT 10/22/2018 Regulo Weiss, PT GP, CK 1    39287098404 HC PT CHNG MAIN POS PROJECTED 10/22/2018 Regulo Weiss, PT GP, CI 1    70601238832 HC PT THER PROC EA 15 MIN 10/22/2018 Regulo Weiss, PT GP 1    43641549146 HC PT EVAL LOW COMPLEXITY 3 10/22/2018 Regulo Weiss, PT GP 1          PT G-Codes  PT Professional Judgement Used?: Yes  Outcome Measure Options: Mitch Warner  Modified Oswestry Score/Comments: 38%  Functional Limitation: Changing and maintaining body position  Changing and Maintaining Body Position Current Status (): At least 40 percent but less than 60 percent impaired, limited or restricted  Changing and Maintaining Body Position Goal Status (): At least 1 percent but less than 20 percent impaired, limited or restricted         Regulo Weiss, PT  10/22/2018

## 2018-10-24 ENCOUNTER — APPOINTMENT (OUTPATIENT)
Dept: PHYSICAL THERAPY | Facility: HOSPITAL | Age: 83
End: 2018-10-24

## 2018-10-25 ENCOUNTER — HOSPITAL ENCOUNTER (OUTPATIENT)
Dept: PHYSICAL THERAPY | Facility: HOSPITAL | Age: 83
Setting detail: THERAPIES SERIES
Discharge: HOME | End: 2018-10-25

## 2018-10-25 DIAGNOSIS — M41.34 THORACOGENIC SCOLIOSIS OF THORACIC REGION: ICD-10-CM

## 2018-10-25 DIAGNOSIS — M54.50 CHRONIC MIDLINE LOW BACK PAIN WITHOUT SCIATICA: ICD-10-CM

## 2018-10-25 DIAGNOSIS — G89.29 CHRONIC MIDLINE LOW BACK PAIN WITHOUT SCIATICA: ICD-10-CM

## 2018-10-25 DIAGNOSIS — M54.2 CERVICAL PAIN: Primary | ICD-10-CM

## 2018-10-25 PROCEDURE — 97140 MANUAL THERAPY 1/> REGIONS: CPT | Performed by: PHYSICAL THERAPIST

## 2018-10-25 PROCEDURE — 97110 THERAPEUTIC EXERCISES: CPT | Performed by: PHYSICAL THERAPIST

## 2018-10-25 NOTE — THERAPY TREATMENT NOTE
Outpatient Physical Therapy Ortho Treatment Note   [place]     Patient Name: Lashanda Qureshi  : 1935  MRN: [id number]  Today's Date: 10/25/2018      Visit Date: 10/25/2018    Visit Dx:    ICD-10-CM ICD-9-CM   1. Cervical pain M54.2 723.1   2. Chronic midline low back pain without sciatica M54.5 724.2    G89.29 338.29   3. 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 difficile diarrhea   • Cervical pain   • Chronic midline low back pain without sciatica   • Thoracogenic scoliosis of thoracic region        Past Medical History:   Diagnosis Date   • Abnormal CT scan, lung    • Mycobacterium gordonae infection         Past Surgical History:   Procedure Laterality Date   • BLADDER SURGERY     • BRONCHOSCOPY  2016   • CHOLECYSTECTOMY     • COLON SURGERY     • DILATION AND CURETTAGE, DIAGNOSTIC / THERAPEUTIC     • TOTAL HIP ARTHROPLASTY      Left hip                             PT Assessment/Plan     Row Name 10/25/18 1600          PT Assessment    Assessment Comments Patient tolerates all treatment well. She has made improvements in both her neck and back  since evaluation.  -SAMANTA        PT Plan    PT Plan Comments could trial DN, add isometric cerv retractions  -SAMANTA       User Key  (r) = Recorded By, (t) = Taken By, (c) = Cosigned By    Initials Name Provider Type    Regulo Doyle, PT Physical Therapist                    Exercises     Row Name 10/25/18 1400             Subjective Comments    Subjective Comments Patient states that she has been having a lot of tension in the right upper trap.  She notes that the heel lift seems to help her walk  -SAMANTA         Subjective Pain    Able to rate subjective pain? yes  -SAMANTA      Pre-Treatment Pain Level 2  -SAMANTA      Post-Treatment Pain Level 2  -SAMANTA         Total Minutes    87478 - PT Therapeutic Exercise Minutes 22  -SAMANTA      83019 - PT Manual Therapy Minutes 10  -SAMANTA         Exercise 1    Exercise Name 1 NuStep L6  -SAMANTA      Time 1 5 minutes  -SAMANTA         Exercise 2    Exercise Name 2 UT stretch  -SAMANTA      Sets 2 2  -SAMANTA      Time 2 30 seconds  -SAMANTA         Exercise 3    Exercise Name 3 supine chin tuck  -SAMANTA      Sets 3 2  -SAMANTA      Reps 3 10  -SAMANTA      Time 3 3 second hold  -SAMANTA         Exercise 4    Exercise Name 4 middle/low rows red band  -SAMANTA      Reps 4 15 ea  -SAMANTA      Time 4 --  -SAMANTA         Exercise 5    Exercise Name 5 doorway stretch  -SAMANTA      Sets 5 3  -SAMANTA      Time 5 30 seconds  -SAMANTA        User Key  (r) = Recorded By, (t) = Taken By, (c) = Cosigned By    Initials Name Provider Type    Regulo Doyle, PT Physical Therapist                        Manual Rx (last 36 hours)      Manual Treatments     Row Name 10/25/18 1400             Total Minutes    59902 - PT Manual Therapy Minutes 10  -SAMANTA         Manual Rx 1    Manual Rx 1 Location cervical spine  -SAMANTA      Manual Rx 1 Type SO distraction, ipsilateral SNAG with right rotation, SGL'  -SAMANTA        User Key  (r) = Recorded By, (t) = Taken By, (c) = Cosigned By    Initials Name Provider Type    Regulo Doyle, PT Physical Therapist              Therapy Education  Education  Details: gave band for middle rows/low rows  Given: HEP  Program: New  How Provided: Verbal, Demonstration, Written  Provided to: Patient  Level of Understanding: Verbalized, Demonstrated              Time Calculation:      Therapy Suggested Charges     Code   Minutes Charges    85569 (CPT®) Hc Pt Neuromusc Re Education Ea 15 Min      05940 (CPT®) Hc Pt Ther Proc Ea 15 Min 22 1    72284 (CPT®) Hc Gait Training Ea 15 Min      20880 (CPT®) Hc Pt Therapeutic Act Ea 15 Min      23425 (CPT®) Hc Pt Manual Therapy Ea 15 Min 10 1    24595 (CPT®) Hc Pt Ther Massage- Per 15 Min      09670 (CPT®) Hc Pt Iontophoresis Ea 15 Min      76930 (CPT®) Hc Pt Elec Stim Ea-Per 15 Min      41691 (CPT®) Hc Pt Ultrasound Ea 15 Min      14678 (CPT®) Hc Pt Self Care/Mgmt/Train Ea 15 Min      77606 (CPT®) Hc Pt Prosthetic (S) Train Initial Encounter, Each 15 Min      49766 (CPT®) Hc Orthotic(S) Mgmt/Train Initial Encounter, Each 15min      35075 (CPT®) Hc Pt Aquatic Therapy Ea 15 Min      16946 (CPT®) Hc Pt Orthotic(S)/Prosthetic(S) Encounter, Each 15 Min       (CPT®) Hc Pt Electrical Stim Unattended      Total  32 2        Therapy Charges for Today     Code Description Service Date Service Provider Modifiers Qty    46222948462 HC PT THER PROC EA 15 MIN 10/25/2018 Regulo Weiss, PT GP 1    65948521555 HC PT MANUAL THERAPY EA 15 MIN 10/25/2018 Regulo Weiss, PT GP 1                    Regulo Weiss, PT  10/25/2018

## 2018-10-31 ENCOUNTER — HOSPITAL ENCOUNTER (OUTPATIENT)
Dept: PHYSICAL THERAPY | Facility: HOSPITAL | Age: 83
Setting detail: THERAPIES SERIES
Discharge: HOME | End: 2018-10-31

## 2018-10-31 DIAGNOSIS — M54.2 CERVICAL PAIN: Primary | ICD-10-CM

## 2018-10-31 DIAGNOSIS — G89.29 CHRONIC MIDLINE LOW BACK PAIN WITHOUT SCIATICA: ICD-10-CM

## 2018-10-31 DIAGNOSIS — M54.50 CHRONIC MIDLINE LOW BACK PAIN WITHOUT SCIATICA: ICD-10-CM

## 2018-10-31 DIAGNOSIS — M41.34 THORACOGENIC SCOLIOSIS OF THORACIC REGION: ICD-10-CM

## 2018-10-31 PROCEDURE — 97110 THERAPEUTIC EXERCISES: CPT | Performed by: PHYSICAL THERAPIST

## 2018-10-31 PROCEDURE — 97140 MANUAL THERAPY 1/> REGIONS: CPT | Performed by: PHYSICAL THERAPIST

## 2018-11-02 ENCOUNTER — HOSPITAL ENCOUNTER (OUTPATIENT)
Dept: PHYSICAL THERAPY | Facility: HOSPITAL | Age: 83
Setting detail: THERAPIES SERIES
Discharge: HOME | End: 2018-11-02

## 2018-11-02 DIAGNOSIS — G89.29 CHRONIC MIDLINE LOW BACK PAIN WITHOUT SCIATICA: ICD-10-CM

## 2018-11-02 DIAGNOSIS — M54.2 CERVICAL PAIN: Primary | ICD-10-CM

## 2018-11-02 DIAGNOSIS — M54.50 CHRONIC MIDLINE LOW BACK PAIN WITHOUT SCIATICA: ICD-10-CM

## 2018-11-02 DIAGNOSIS — M41.34 THORACOGENIC SCOLIOSIS OF THORACIC REGION: ICD-10-CM

## 2018-11-02 PROCEDURE — 97140 MANUAL THERAPY 1/> REGIONS: CPT | Performed by: PHYSICAL THERAPIST

## 2018-11-02 PROCEDURE — 97110 THERAPEUTIC EXERCISES: CPT | Performed by: PHYSICAL THERAPIST

## 2018-11-02 NOTE — THERAPY TREATMENT NOTE
Outpatient Physical Therapy Ortho Treatment Note   [facility]     Patient Name: Lashanda Qureshi  : 1935  MRN: [id number]  Today's Date: 2018      Visit Date: 2018    Visit Dx:    ICD-10-CM ICD-9-CM   1. Cervical pain M54.2 723.1   2. Chronic midline low back pain without sciatica M54.5 724.2    G89.29 338.29   3. 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 difficile diarrhea   • Cervical pain   • Chronic midline low back pain without sciatica   • Thoracogenic scoliosis of thoracic region        Past Medical History:   Diagnosis Date   • Abnormal CT scan, lung    • Mycobacterium gordonae infection         Past Surgical History:   Procedure Laterality Date   • BLADDER SURGERY     • BRONCHOSCOPY  2016   • CHOLECYSTECTOMY     • COLON SURGERY     • DILATION AND CURETTAGE, DIAGNOSTIC / THERAPEUTIC     • TOTAL HIP ARTHROPLASTY      Left hip                             PT Assessment/Plan     Row Name 18 1400          PT Assessment    Assessment Comments Patient experienced minimal change in her cervical symptoms with treatment today, but she  reports an improvement overall in her lumbar symptoms after last session where there was little perceived in-session improvement.  -SAMANTA        PT Plan    PT Plan Comments check on DN symptoms.  Add wall slides thoracic/low trap.  -SAMANTA       User Key  (r) = Recorded By, (t) = Taken By, (c) = Cosigned By    Initials Name Provider Type    Regulo Doyle, PT Physical Therapist                    Exercises     Row Name 11/02/18 1300             Subjective Comments    Subjective Comments Patient states that both her neck and back are doing a little better.  She notes that her lower back felt really good until she was ironing some clothes then she had sharp lower back pain at that time.  -SAMANTA         Subjective Pain    Able to rate subjective pain? yes  -SAMANTA      Pre-Treatment Pain Level 1  -SAMANTA         Total Minutes    27144 - PT Therapeutic Exercise Minutes 25  -SAMANTA      70094 - PT Manual Therapy Minutes 15  -SAMANTA         Exercise 1    Exercise Name 1 NuStep L6  -SAMANTA      Time 1 5 minutes  -SAMANTA         Exercise 2    Exercise Name 2 UT stretch  -SAMANTA      Sets 2 3  -SAMANTA      Time 2 30 seconds  -SAMANTA         Exercise 3    Exercise Name 3 LS stretch  -SAMANTA      Sets 3 3  -SAMANTA      Time 3 30 seconds  -SAMANTA         Exercise 4    Exercise Name 4 supine CT  -SAMANTA      Sets 4 2  -SAMANTA      Reps 4 10  -SAMANTA      Time 4 3 seconds  -SAMANTA         Exercise 5    Exercise Name 5 mid/low rows red band  -SAMANTA      Reps 5 15 ea  -SAMANTA        User Key  (r) = Recorded By, (t) = Taken By, (c) = Cosigned By    Initials Name Provider Type    Regulo Doyle, PT Physical Therapist                        Manual Rx (last 36 hours)      Manual Treatments     Row Name 11/02/18 1300             Total Minutes    41805 - PT Manual Therapy Minutes 15  -SAMANTA         Manual Rx 1    Manual Rx 1 Location cervical spine  -SAMANTA      Manual Rx 1 Type SO distraction, ipsilateral SNAG with right rotation, SGL'  -SAMANTA         Manual Rx 4    Manual Rx 4 Location cervical paravertebrals, right  upper trap   -SAMANTA      Manual Rx 4 Type DN  -SAMANTA      Manual Rx 4 Grade LTR x 1 right upper trap  -SAMANTA      Manual Rx 4 Duration informed consent attained, no adverse reactions are noted  -SAMANTA        User Key  (r) = Recorded By, (t) = Taken By, (c) = Cosigned By    Initials Name Provider Type    Regulo Doyle, PT Physical Therapist              Therapy Education  Education Details: added LS stretch  Given: HEP  Program: New  How Provided: Verbal, Demonstration, Written  Provided to: Patient  Level of Understanding: Verbalized, Demonstrated              Time Calculation:   Start Time: 1300  Therapy Suggested Charges     Code   Minutes Charges    18401 (CPT®) Hc Pt Neuromusc Re Education Ea 15 Min      39114 (CPT®) Hc Pt Ther Proc Ea 15 Min 25 2    04136 (CPT®) Hc Gait Training Ea 15 Min      53319 (CPT®) Hc Pt Therapeutic Act Ea 15 Min      60911 (CPT®) Hc Pt Manual Therapy Ea 15 Min 15 1    83411 (CPT®) Hc Pt Ther Massage- Per 15 Min      06911 (CPT®) Hc Pt Iontophoresis Ea 15 Min      78056 (CPT®) Hc Pt Elec Stim Ea-Per 15 Min      18242 (CPT®) Hc Pt Ultrasound Ea 15 Min      06867 (CPT®) Hc Pt Self Care/Mgmt/Train Ea 15 Min      90718 (CPT®) Hc Pt Prosthetic (S) Train Initial Encounter, Each 15 Min      59723 (CPT®) Hc Orthotic(S) Mgmt/Train Initial Encounter, Each 15min      14603 (CPT®) Hc Pt Aquatic Therapy Ea 15 Min      83807 (CPT®) Hc Pt Orthotic(S)/Prosthetic(S) Encounter, Each 15 Min       (CPT®) Hc Pt Electrical Stim Unattended      Total  40 3        Therapy Charges for Today     Code Description Service Date Service Provider Modifiers Qty    06099584413 HC PT THER PROC EA 15 MIN 11/2/2018 Regulo Weiss, PT GP 2    98483058858 HC PT MANUAL THERAPY EA 15 MIN 11/2/2018 Regulo Weiss, PT GP 1                    Regulo Weiss, PT  11/2/2018

## 2018-11-07 ENCOUNTER — HOSPITAL ENCOUNTER (OUTPATIENT)
Dept: PHYSICAL THERAPY | Facility: HOSPITAL | Age: 83
Setting detail: THERAPIES SERIES
Discharge: HOME | End: 2018-11-07

## 2018-11-07 DIAGNOSIS — M54.2 CERVICAL PAIN: Primary | ICD-10-CM

## 2018-11-07 DIAGNOSIS — M54.50 CHRONIC MIDLINE LOW BACK PAIN WITHOUT SCIATICA: ICD-10-CM

## 2018-11-07 DIAGNOSIS — M41.34 THORACOGENIC SCOLIOSIS OF THORACIC REGION: ICD-10-CM

## 2018-11-07 DIAGNOSIS — G89.29 CHRONIC MIDLINE LOW BACK PAIN WITHOUT SCIATICA: ICD-10-CM

## 2018-11-07 PROCEDURE — G8981 BODY POS CURRENT STATUS: HCPCS | Performed by: PHYSICAL THERAPIST

## 2018-11-07 PROCEDURE — G8982 BODY POS GOAL STATUS: HCPCS | Performed by: PHYSICAL THERAPIST

## 2018-11-07 PROCEDURE — 97140 MANUAL THERAPY 1/> REGIONS: CPT | Performed by: PHYSICAL THERAPIST

## 2018-11-07 PROCEDURE — 97110 THERAPEUTIC EXERCISES: CPT | Performed by: PHYSICAL THERAPIST

## 2018-11-07 NOTE — THERAPY PROGRESS REPORT/RE-CERT
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 difficile diarrhea   • Cervical pain   • Chronic midline low back pain without sciatica   • Thoracogenic scoliosis of thoracic region        Past Medical History:   Diagnosis Date   • Abnormal CT scan, lung    • Mycobacterium gordonae infection         Past Surgical History:   Procedure Laterality Date   • BLADDER SURGERY     • BRONCHOSCOPY  2016   • CHOLECYSTECTOMY     • COLON SURGERY     • DILATION AND CURETTAGE, DIAGNOSTIC / THERAPEUTIC     • TOTAL HIP ARTHROPLASTY      Left hip       Visit Dx:     ICD-10-CM ICD-9-CM   1. Cervical pain M54.2 723.1   2. Chronic midline low back pain without sciatica M54.5 724.2    G89.29 338.29   3. Thoracogenic scoliosis of thoracic region M41.34 737.34                 PT Ortho     Row Name 18 1100       Posture/Observations    Posture/Observations Comments fwd head posture, flat thoracic spine. Unequal weight bearing favoring the right  -SAMANTA       DTR-  Upper Quarter Clearing    Biceps (C5/6) Bilateral:;2- Normal response  -SAMANTA    Brachioradialis (C6) Bilateral:;2- Normal response  -SAMANTA    Triceps (C7) Bilateral:;2- Normal response  -SAMANTA       Sensory Screen for Light Touch- Upper Quarter Clearing    C5 (lateral upper arm) Intact  -SAMANTA    C6 (tip of thumb) Intact  -SAMANTA    C7 (tip of 3rd finger) Intact  -SAMANTA    C8 (tip of 5th finger) Intact  -SAMANTA    T1 (medial lower arm) Intact  -SAMANTA       Myotomal Screen- Upper Quarter Clearing    Shoulder flexion (C5) Bilateral:;4 (Good)  -SAMANTA    Elbow flexion/wrist extension (C6) Bilateral:;5 (Normal)  -SAMANTA    Elbow extension/wrist flexion (C7) Bilateral:;5 (Normal)  -SAMANTA       Cervical/Shoulder ROM Screen    Cervical flexion Normal  -SAMANTA    Cervical extension Normal  -SAMANTA    Cervical lateral flexion Impaired   10 right 5 left, pain both  -SAMANTA    Cervical rotation Impaired   40 right 45 left  -SAMANTA    Cervical quadrant (Spurling's) Impaired  -SAMANTA    Shoulder elevation  Normal  -SAMANTA       DTR- Lower Quarter Clearing    Patellar tendon (L2-4) Bilateral:;2- Normal response  -SAMANTA    Achilles tendon (S1-2) Bilateral:;1- Minimal response  -SAMANTA       Neural Tension Signs- Lower Quarter Clearing    Slump Negative  -SAMANTA    SLR Negative  -SAMANTA       Sensory Screen for Light Touch- Lower Quarter Clearing    L3 (distal anterior thigh) Intact  -SAMANTA    L4 (medial lower leg/foot) Intact  -SAMANTA    L5 (lateral lower leg/great toe) Diminished  -SAMANTA    S1 (bottom of foot) Diminished  -SAMANTA       Myotomal Screen- Lower Quarter Clearing    Hip flexion (L2) Bilateral:;4 (Good)  -SAMANTA    Knee extension (L3) Bilateral:;5 (Normal)  -SAMANTA    Ankle DF (L4) Bilateral:;5 (Normal)  -SAMANTA    Great toe extension (L5) Bilateral:;WNL  -SAMANTA    Knee flexion (S2) Bilateral:;5 (Normal)  -SAMANTA       Lumbar ROM Screen- Lower Quarter Clearing    Lumbar Flexion Normal  -SAMANTA    Lumbar Extension Impaired   hinges at hips  -SAMANTA    Lumbar Lateral Flexion Impaired  -SAMANTA    Lumbar Rotation Impaired  -SAMANTA        Cervical Palpation    Suboccipital Guarded/taut;Tender  -SAMANTA    AC Joint Crepitus  -SAMANTA    Cervical Facets Tender  -SAMANTA    Levator Scapula Guarded/taut  -SAMANTA    Upper Traps Bilateral:;Guarded/taut;Tender  -SAMANTA    Lower Traps Bilateral:;Guarded/taut;Tender  -SAMANTA       Thoracic Accessory Motions    Pa glide- Upper thoracic Hypomobile  -SAMANTA    Pa glide- Middle thoracic Hypomobile  -SAMANTA    Pa glide- Lower thoracic Hypomobile  -SAMANTA       Cervical/Thoracic Special Tests    Spurlings (Foraminal Compression) Positive  -SAMANTA    Cervical Compression (Forarminal Compression vs. Facet Pain) Positive  -SAMANTA       Lumbar/SI Special Tests    Sacral Spring Test (SI Dysfunction) Negative  -SAMANTA       MMT (Manual Muscle Testing)    General MMT Comments middle/low trap 3-/5  -SAMANTA      User Key  (r) = Recorded By, (t) = Taken By, (c) = Cosigned By    Initials Name Provider Type    Regulo Doyle, PT Physical Therapist                      Therapy Education  Education Details: added ITB stretch and thoracic wall slides  Given: HEP  Program: New  How Provided: Verbal, Demonstration, Written  Provided to: Patient  Level of Understanding: Verbalized, Demonstrated           PT OP Goals     Row Name 11/07/18 1200          PT Short Term Goals    STG Date to Achieve 11/05/18  -SAMANTA     STG 1 Patient to be compliant with initial HEP  -SAMANTA     STG 1 Progress Met  -SAMANTA     STG 2 Patient to report improved neck pain with sleeping  -SAMANTA     STG 2 Progress Met  -SAMANTA        Long Term Goals    LTG Date to Achieve 11/19/18  -SAMANTA     LTG 1 Patient to be independent with final HEP   -SAMANTA     LTG 1 Progress Ongoing  -SAMANTA     LTG 2 Patient to improve mod oswestry to at least 24%  -SAMANTA     LTG 2 Progress Met;Goal Revised  -SAMANTA     LTG 3 Patient to perform 30 minutes of standing exercise with no increase in pain >/=2/10  -SAMANTA     LTG 3 Progress Ongoing  -SAMANTA     LTG 4 Patient to demonstrate bilateral cervical rotation at least 55 degrees  -SAMANTA     LTG 4 Progress Ongoing  -SAMANTA        User Key  (r) = Recorded By, (t) = Taken By, (c) = Cosigned By    Initials Name Provider Type    Regulo Doyle, PT Physical Therapist                PT Assessment/Plan     Row Name 11/07/18 1200          PT Assessment    Functional Limitations Performance in leisure activities;Limitations in functional capacity and performance;Limitation in home management;Impaired gait  -SAMANTA     Impairments Range of motion;Posture;Poor body mechanics;Pain;Impaired flexibility;Gait  -SAMANTA     Assessment Comments Patient demonstrates improved irritability and flexibility of the the neck and back.  She reports compliance with current program and is appropriate to return for further advancement following travel for the holiday.  -SAMANTA     Please refer to paper survey for additional self-reported information Yes  -SAMANTA     Rehab Potential Good  -SAMANTA     Patient/caregiver participated in establishment of treatment plan and goals Yes  -SAMANTA     Patient would benefit from skilled therapy intervention Yes  -SAMANTA        PT Plan    PT Frequency 1x/week;2x/week  -SAMANTA     Predicted Duration of Therapy Intervention (Therapy Eval) 4-6 additional visits after Thanksgiving  -SAMANTA     PT Plan Comments progress shoulder/scapular/hip strengthening exercises, continue manual and modalities as indicated including DN.  -SAMANTA       User Key  (r) = Recorded By, (t) = Taken By, (c) = Cosigned By    Initials Name Provider Type    Regulo Doyle, PT Physical Therapist                  Exercises     Row Name 11/07/18 1100             Subjective Comments    Subjective Comments Patient states that her right hip has been sore from standing in line to vote.  -SAMANTA         Subjective Pain    Able to rate subjective pain? yes  -SAMANTA      Pre-Treatment Pain Level 3  -SAMANTA      Subjective Pain Comment lateral right hip  -SAMANTA         Total Minutes    14317 - PT Therapeutic Exercise Minutes 30  -SAMANTA      81640 - PT Manual Therapy Minutes 15  -SAMANTA         Exercise 1    Exercise Name 1  NuStep L6  -SAMANTA      Time 1 5 minutes  -SAMANTA         Exercise 2    Exercise Name 2 reassessment  -SAMANTA         Exercise 3    Exercise Name 3 ITB stretch (R)  -SAMANTA      Sets 3 3  -SAMANTA      Time 3 30 seconds  -SAMANTA         Exercise 4    Exercise Name 4 thoracic wall slides  -SAMANTA      Sets 4 2  -SAMANTA      Reps 4 10  -SAMANTA         Exercise 5    Exercise Name 5 attempted low trap slide but held d/t shoulder pain  -SAMANTA        User Key  (r) = Recorded By, (t) = Taken By, (c) = Cosigned By    Initials Name Provider Type    Regulo Doyle, PT Physical Therapist           Manual Rx (last 36 hours)      Manual Treatments     Row Name 11/07/18 1100             Total Minutes    96774 - PT Manual Therapy Minutes 15  -SAMANTA         Manual Rx 1    Manual Rx 1 Location cervical spine  -SAMANTA      Manual Rx 1 Type SO distraction, ipsilateral SNAG with right rotation, SGL'  -SAMANTA         Manual Rx 4    Manual Rx 4 Location cervical paravertebrals, right upper trap   -SAMANTA      Manual Rx 4 Type DN  -SAMANTA      Manual Rx 4 Grade no LTRs today  -SAMANTA      Manual Rx 4 Duration informed consent attained, no adverse reactions are noted  -SAMANTA        User Key  (r) = Recorded By, (t) = Taken By, (c) = Cosigned By    Initials Name Provider Type    Regulo Doyle, PT Physical Therapist                      Outcome Measure Options: Modifed Owestry  Modified Oswestry  Modified Oswestry Score/Comments: 28%      Time Calculation:     Therapy Suggested Charges     Code   Minutes Charges    90423 (CPT®) Hc Pt Neuromusc Re Education Ea 15 Min      57743 (CPT®) Hc Pt Ther Proc Ea 15 Min 30 2    71527 (CPT®) Hc Gait Training Ea 15 Min      86401 (CPT®) Hc Pt Therapeutic Act Ea 15 Min      33782 (CPT®) Hc Pt Manual Therapy Ea 15 Min 15 1    38371 (CPT®) Hc Pt Ther Massage- Per 15 Min      97318 (CPT®) Hc Pt Iontophoresis Ea 15 Min      26197 (CPT®) Hc Pt Elec Stim Ea-Per 15 Min      16180 (CPT®) Hc Pt Ultrasound Ea 15 Min      62794 (CPT®) Hc Pt Self Care/Mgmt/Train Ea 15 Min       49068 (CPT®) Hc Pt Prosthetic (S) Train Initial Encounter, Each 15 Min      66468 (CPT®) Hc Orthotic(S) Mgmt/Train Initial Encounter, Each 15min      98036 (CPT®) Hc Pt Aquatic Therapy Ea 15 Min      90628 (CPT®) Hc Pt Orthotic(S)/Prosthetic(S) Encounter, Each 15 Min       (CPT®) Hc Pt Electrical Stim Unattended      Total  45 3          Start Time: 1117     Therapy Charges for Today     Code Description Service Date Service Provider Modifiers Qty    79774610334 HC PT CHNG MAIN POS CURRENT 11/7/2018 Regulo Weiss, PT GP, CJ 1    88404039397 HC PT CHNG MAIN POS PROJECTED 11/7/2018 Regulo Weiss, PT GP, CI 1    51343657041 HC PT THER PROC EA 15 MIN 11/7/2018 Regulo Weiss, PT GP 2    55870295379 HC PT MANUAL THERAPY EA 15 MIN 11/7/2018 Regulo Weiss, PT GP 1          PT G-Codes  PT Professional Judgement Used?: Yes  Outcome Measure Options: Mitch Warner  Modified Oswestry Score/Comments: 28%  Functional Limitation: Changing and maintaining body position  Changing and Maintaining Body Position Current Status (): At least 20 percent but less than 40 percent impaired, limited or restricted  Changing and Maintaining Body Position Goal Status (): At least 1 percent but less than 20 percent impaired, limited or restricted         Regulo Weiss, PT  11/7/2018

## 2018-11-12 ENCOUNTER — OFFICE VISIT (OUTPATIENT)
Dept: FAMILY MEDICINE CLINIC | Facility: CLINIC | Age: 83
End: 2018-11-12

## 2018-11-12 VITALS
BODY MASS INDEX: 22.92 KG/M2 | SYSTOLIC BLOOD PRESSURE: 136 MMHG | WEIGHT: 142.6 LBS | HEART RATE: 104 BPM | OXYGEN SATURATION: 96 % | HEIGHT: 66 IN | DIASTOLIC BLOOD PRESSURE: 84 MMHG

## 2018-11-12 DIAGNOSIS — M54.50 CHRONIC MIDLINE LOW BACK PAIN WITHOUT SCIATICA: ICD-10-CM

## 2018-11-12 DIAGNOSIS — G89.29 CHRONIC MIDLINE LOW BACK PAIN WITHOUT SCIATICA: ICD-10-CM

## 2018-11-12 DIAGNOSIS — J30.9 ALLERGIC RHINITIS, UNSPECIFIED SEASONALITY, UNSPECIFIED TRIGGER: ICD-10-CM

## 2018-11-12 DIAGNOSIS — M54.2 CERVICAL PAIN: ICD-10-CM

## 2018-11-12 DIAGNOSIS — I87.2 VENOUS STASIS DERMATITIS OF BOTH LOWER EXTREMITIES: Primary | ICD-10-CM

## 2018-11-12 PROBLEM — L03.116 CELLULITIS OF LEFT FOOT: Status: RESOLVED | Noted: 2018-07-27 | Resolved: 2018-11-12

## 2018-11-12 PROCEDURE — 99213 OFFICE O/P EST LOW 20 MIN: CPT | Performed by: NURSE PRACTITIONER

## 2018-11-12 RX ORDER — AZELASTINE HCL 205.5 UG/1
2 SPRAY NASAL 2 TIMES DAILY PRN
Qty: 1 EACH | Refills: 11 | Status: SHIPPED | OUTPATIENT
Start: 2018-11-12 | End: 2020-01-20

## 2018-11-12 RX ORDER — FLUTICASONE PROPIONATE 50 MCG
2 SPRAY, SUSPENSION (ML) NASAL DAILY
Qty: 1 BOTTLE | Refills: 11 | Status: SHIPPED | OUTPATIENT
Start: 2018-11-12 | End: 2020-01-20 | Stop reason: SDUPTHER

## 2018-11-12 NOTE — PATIENT INSTRUCTIONS
Chronic Venous Insufficiency  Chronic venous insufficiency, also called venous stasis, is a condition that prevents blood from being pumped effectively through the veins in your legs. Blood may no longer be pumped effectively from the legs back to the heart. This condition can range from mild to severe. With proper treatment, you should be able to continue with an active life.  What are the causes?  Chronic venous insufficiency occurs when the vein walls become stretched, weakened, or damaged, or when valves within the vein are damaged. Some common causes of this include:  · High blood pressure inside the veins (venous hypertension).  · Increased blood pressure in the leg veins from long periods of sitting or standing.  · A blood clot that blocks blood flow in a vein (deep vein thrombosis, DVT).  · Inflammation of a vein (phlebitis) that causes a blood clot to form.  · Tumors in the pelvis that cause blood to back up.    What increases the risk?  The following factors may make you more likely to develop this condition:  · Having a family history of this condition.  · Obesity.  · Pregnancy.  · Living without enough physical activity or exercise (sedentary lifestyle).  · Smoking.  · Having a job that requires long periods of standing or sitting in one place.  · Being a certain age. Women in their 40s and 50s and men in their 70s are more likely to develop this condition.    What are the signs or symptoms?  Symptoms of this condition include:  · Veins that are enlarged, bulging, or twisted (varicose veins).  · Skin breakdown or ulcers.  · Reddened or discolored skin on the front of the leg.  · Brown, smooth, tight, and painful skin just above the ankle, usually on the inside of the leg (lipodermatosclerosis).  · Swelling.    How is this diagnosed?  This condition may be diagnosed based on:  · Your medical history.  · A physical exam.  · Tests, such as:  ? A procedure that creates an image of a blood vessel and nearby  organs and provides information about blood flow through the blood vessel (duplex ultrasound).  ? A procedure that tests blood flow (plethysmography).  ? A procedure to look at the veins using X-ray and dye (venogram).    How is this treated?  The goals of treatment are to help you return to an active life and to minimize pain or disability. Treatment depends on the severity of your condition, and it may include:  · Wearing compression stockings. These can help relieve symptoms and help prevent your condition from getting worse. However, they do not cure the condition.  · Sclerotherapy. This is a procedure involving an injection of a material that “dissolves” damaged veins.  · Surgery. This may involve:  ? Removing a diseased vein (vein stripping).  ? Cutting off blood flow through the vein (laser ablation surgery).  ? Repairing a valve.    Follow these instructions at home:  · Wear compression stockings as told by your health care provider. These stockings help to prevent blood clots and reduce swelling in your legs.  · Take over-the-counter and prescription medicines only as told by your health care provider.  · Stay active by exercising, walking, or doing different activities. Ask your health care provider what activities are safe for you and how much exercise you need.  · Drink enough fluid to keep your urine clear or pale yellow.  · Do not use any products that contain nicotine or tobacco, such as cigarettes and e-cigarettes. If you need help quitting, ask your health care provider.  · Keep all follow-up visits as told by your health care provider. This is important.  Contact a health care provider if:  · You have redness, swelling, or more pain in the affected area.  · You see a red streak or line that extends up or down from the affected area.  · You have skin breakdown or a loss of skin in the affected area, even if the breakdown is small.  · You get an injury in the affected area.  Get help right away  if:  · You get an injury and an open wound in the affected area.  · You have severe pain that does not get better with medicine.  · You have sudden numbness or weakness in the foot or ankle below the affected area, or you have trouble moving your foot or ankle.  · You have a fever and you have worse or persistent symptoms.  · You have chest pain.  · You have shortness of breath.  Summary  · Chronic venous insufficiency, also called venous stasis, is a condition that prevents blood from being pumped effectively through the veins in your legs.  · Chronic venous insufficiency occurs when the vein walls become stretched, weakened, or damaged, or when valves within the vein are damaged.  · Treatment for this condition depends on how severe your condition is, and it may involve wearing compression stockings or having a procedure.  · Make sure you stay active by exercising, walking, or doing different activities. Ask your health care provider what activities are safe for you and how much exercise you need.  This information is not intended to replace advice given to you by your health care provider. Make sure you discuss any questions you have with your health care provider.  Document Released: 04/22/2008 Document Revised: 11/06/2017 Document Reviewed: 11/06/2017  [employer] Patient Education © 2017 [employer]

## 2018-11-12 NOTE — PROGRESS NOTES
Chief Complaint   Patient presents with   • Cellulitis       Subjective   Lashanda Qureshi is a 83 y.o. female    History of Present Illness  Left foot- Better, still swells at times. No edema in the right. Foot improves at night with swelling, and progresses as day goes, when up on feet. Pt is going to [facility] for neck pain. 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 Pt reports has had Hep A vaccine.     Allergies   Allergen Reactions   • Antihistamines, Loratadine-Type Other (See Comments)     Drowsiness     • Erythromycin      Past Medical History:   Diagnosis Date   • Abnormal CT scan, lung    • Mycobacterium gordonae infection       Past Surgical History:   Procedure Laterality Date   • BLADDER SURGERY     • BRONCHOSCOPY  01/27/2016   • CHOLECYSTECTOMY     • COLON SURGERY     • DILATION AND CURETTAGE, DIAGNOSTIC / THERAPEUTIC     • TOTAL HIP ARTHROPLASTY      Left hip     Social History     Socioeconomic History   • Marital status:      Spouse name: Not on file   • Number of children: Not on file   • Years of education: Not on file   • Highest education level: Not on file   Social Needs   • Financial resource strain: Not on file   • Food insecurity - worry: Not on file   • Food insecurity - inability: Not on file   • Transportation needs - medical: Not on file   • Transportation needs - non-medical: Not on file   Occupational History   • Not on file   Tobacco Use   • Smoking status: Former Smoker   • Smokeless tobacco: Never Used   • Tobacco comment: Quit 41 years ago   Substance and Sexual Activity   • Alcohol use: No   • Drug use: No   • Sexual activity: Defer     Comment:    Other Topics Concern   • Not on file   Social History Narrative   • Not on file        Current Outpatient Medications:   •  azelastine (ASTEPRO) 0.15 % solution nasal spray, 2 sprays into the nostril(s) as directed by provider 2 (Two) Times a Day As Needed for Allergies., Disp: 1 each, Rfl: 11  •  benzonatate (TESSALON) 100  MG capsule, Take 1 capsule by mouth 3 (Three) Times a Day As Needed for Cough., Disp: 30 capsule, Rfl: 0  •  bimatoprost (LUMIGAN) 0.01 % ophthalmic drops, Administer 1 drop to both eyes Every Night., Disp: , Rfl:   •  Cholecalciferol (VITAMIN D3) 2000 UNITS tablet, Take  by mouth. Take 2 tablets daily., Disp: , Rfl:   •  Cyanocobalamin (VITAMIN B12 PO), Take  by mouth. Take 1 tablet daily as directed., Disp: , Rfl:   •  Elastic Bandages & Supports (FUTURO FIRM COMPRESSION HOSE) misc, 1 package Daily., Disp: 1 each, Rfl: 1  •  ESTRING 2 MG vaginal ring, Every 3 (Three) Months., Disp: , Rfl:   •  fluticasone (FLONASE) 50 MCG/ACT nasal spray, 2 sprays into the nostril(s) as directed by provider Daily. Administer 2 sprays in each nostril for each dose., Disp: 1 bottle, Rfl: 11  •  latanoprost (XALATAN) 0.005 % ophthalmic solution, Apply  to eye. Instill 1 drop into affected eye(s) once daily as directed., Disp: , Rfl:   •  magnesium oxide (MAGOX) 400 (241.3 MG) MG tablet tablet, Take 400 mg by mouth daily., Disp: , Rfl:   •  montelukast (SINGULAIR) 10 MG tablet, Take 1 tablet by mouth Every Night., Disp: 30 tablet, Rfl: 11  •  SYMBICORT 160-4.5 MCG/ACT inhaler, Inhale 2 puffs 2 (Two) Times a Day As Needed., Disp: , Rfl:   •  VENTOLIN  (90 Base) MCG/ACT inhaler, Inhale 2 puffs Every 6 (Six) Hours As Needed., Disp: , Rfl: 1    Current Facility-Administered Medications:   •  albuterol (PROVENTIL) nebulizer solution 2.5 mg, 2.5 mg, Nebulization, Q6H PRN, Olga Mullins, APRN     Review of Systems   Constitutional: Negative for chills and fever.   Respiratory: Negative for cough, shortness of breath and wheezing.    Cardiovascular: Negative for chest pain.   Gastrointestinal: Negative.    Genitourinary: Negative.    Musculoskeletal: Positive for back pain and neck pain.   Psychiatric/Behavioral: Negative for sleep disturbance.       Objective     Vitals:    11/12/18 1132   BP: 136/84   Pulse: 104   SpO2: 96%        Results for orders placed or performed during the hospital encounter of 10/18/18   Duplex Venous Lower Extremity - Left CAR   Result Value Ref Range    Right Common Femoral Spont Y     Right Common Femoral Phasic Y     Right Common Femoral Augment Y     Right Common Femoral Competent Y     Right Common Femoral Compress C     Right Saphenofemoral Junction Spont Y     Right Saphenofemoral Junction Phasic Y     Right Saphenofemoral Junction Augment Y     Right Saphenofemoral Junction Competent Y     Right Saphenofemoral Junction Compress C     Left Common Femoral Spont Y     Left Common Femoral Phasic Y     Left Common Femoral Augment Y     Left Common Femoral Competent Y     Left Common Femoral Compress C     Left Saphenofemoral Junction Spont Y     Left Saphenofemoral Junction Phasic Y     Left Saphenofemoral Junction Augment Y     Left Saphenofemoral Junction Competent Y     Left Saphenofemoral Junction Compress C     Left Proximal Femoral Compress C     Left Mid Femoral Spont Y     Left Mid Femoral Phasic Y     Left Mid Femoral Augment Y     Left Mid Femoral Competent Y     Left Mid Femoral Compress C     Left Distal Femoral Compress C     Left Popliteal Spont Y     Left Popliteal Phasic Y     Left Popliteal Augment Y     Left Popliteal Competent Y     Left Popliteal Compress C     Left Posterior Tibial Compress C     Left Peroneal Compress C     Left GastronemiusSoleal Compress C     Left Greater Saph AK Compress C     Left Lesser Saph Compress C     Left Greater Saph BK Compress C        Physical Exam   Constitutional: She is oriented to person, place, and time. She appears well-developed and well-nourished.   HENT:   Head: Normocephalic and atraumatic.   Cardiovascular: Normal rate, regular rhythm and normal heart sounds.   Pulmonary/Chest: Effort normal and breath sounds normal.   Musculoskeletal: She exhibits edema (1+ left lower ext only).   Neurological: She is alert and oriented to person, place, and  time.   Skin: Skin is warm and dry. There is erythema (left foot).   Psychiatric: She has a normal mood and affect. Her behavior is normal.   Vitals reviewed.      Assessment/Plan   Problem List Items Addressed This Visit        Respiratory    Allergic rhinitis    Relevant Medications    azelastine (ASTEPRO) 0.15 % solution nasal spray    fluticasone (FLONASE) 50 MCG/ACT nasal spray       Nervous and Auditory    Cervical pain    Chronic midline low back pain without sciatica       Musculoskeletal and Integument    Venous stasis dermatitis of both lower extremities - Primary      For pain- cont current meds.     Lower Ext- Cont current treatment.     Allergic Rh-Patient has been given a handout on allergic rhinitis and recommended to follow these guidelines and take recommended medications as directed.     RV- 1 MO for wellness    Counseling provided on venous stasis.    Leandro Johnson, CHARLES   11/12/2018   12:13 PM

## 2018-12-03 ENCOUNTER — HOSPITAL ENCOUNTER (OUTPATIENT)
Dept: PHYSICAL THERAPY | Facility: HOSPITAL | Age: 83
Setting detail: THERAPIES SERIES
Discharge: HOME | End: 2018-12-03

## 2018-12-03 DIAGNOSIS — M54.2 CERVICAL PAIN: Primary | ICD-10-CM

## 2018-12-03 DIAGNOSIS — G89.29 CHRONIC MIDLINE LOW BACK PAIN WITHOUT SCIATICA: ICD-10-CM

## 2018-12-03 DIAGNOSIS — M54.50 CHRONIC MIDLINE LOW BACK PAIN WITHOUT SCIATICA: ICD-10-CM

## 2018-12-03 DIAGNOSIS — M41.34 THORACOGENIC SCOLIOSIS OF THORACIC REGION: ICD-10-CM

## 2018-12-03 PROCEDURE — 97110 THERAPEUTIC EXERCISES: CPT | Performed by: PHYSICAL THERAPIST

## 2018-12-03 PROCEDURE — G8982 BODY POS GOAL STATUS: HCPCS | Performed by: PHYSICAL THERAPIST

## 2018-12-03 PROCEDURE — G8981 BODY POS CURRENT STATUS: HCPCS | Performed by: PHYSICAL THERAPIST

## 2018-12-03 NOTE — THERAPY PROGRESS REPORT/RE-CERT
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 difficile diarrhea   • Cervical pain   • Chronic midline low back pain without sciatica   • Thoracogenic scoliosis of thoracic region        Past Medical History:   Diagnosis Date   • Abnormal CT scan, lung    • Mycobacterium gordonae infection         Past Surgical History:   Procedure Laterality Date   • BLADDER SURGERY     • BRONCHOSCOPY  2016   • CHOLECYSTECTOMY     • COLON SURGERY     • DILATION AND CURETTAGE, DIAGNOSTIC / THERAPEUTIC     • TOTAL HIP ARTHROPLASTY      Left hip       Visit Dx:     ICD-10-CM ICD-9-CM   1. Cervical pain M54.2 723.1   2. Chronic midline low back pain without sciatica M54.5 724.2    G89.29 338.29   3. Thoracogenic scoliosis of thoracic region M41.34 737.34           PT Ortho     Row Name 18 1000       Posture/Observations    Posture/Observations Comments  fwd head posture, flat thoracic spine. Unequal weight bearing favoring the right  -SAMANTA       DTR- Upper Quarter Clearing    Biceps  (C5/6)  Bilateral:;2- Normal response  -SAMANTA    Brachioradialis (C6)  Bilateral:;2- Normal response  -SAMANTA    Triceps (C7)  Bilateral:;2- Normal response  -SAMANTA       Sensory Screen for Light Touch- Upper Quarter Clearing    C5 (lateral upper arm)  Intact  -SAMANTA    C6 (tip of thumb)  Intact  -SAMANTA    C7 (tip of 3rd finger)  Intact  -SAMANTA    C8 (tip of 5th finger)  Intact  -SAMANTA    T1 (medial lower arm)  Intact  -SAMANTA       Myotomal Screen- Upper Quarter Clearing    Shoulder flexion (C5)  Bilateral:;4 (Good)  -SAMANTA    Elbow flexion/wrist extension (C6)  Bilateral:;5 (Normal)  -SAMANTA    Elbow extension/wrist flexion (C7)  Bilateral:;5 (Normal)  -SAMANTA       Cervical/Shoulder ROM Screen    Cervical flexion  Normal  -SAMANTA    Cervical extension  Normal  -SAMANTA    Cervical lateral flexion  Impaired minimal motion bilateral, right sided pain rt, xlateral each  -SAMANTA    Cervical rotation  Impaired 45R 58 L  -SAMANTA    Cervical quadrant (Spurling's)  Impaired  -SAMANTA    Shoulder elevation   Normal  -SAMANTA       DTR- Lower Quarter Clearing    Patellar tendon (L2-4)  Bilateral:;2- Normal response  -SAMANTA    Achilles tendon (S1-2)  Bilateral:;1- Minimal response  -SAMANTA       Neural Tension Signs- Lower Quarter Clearing    Slump  Negative  -SAMANTA    SLR  Negative  -SAMANTA       Sensory Screen for Light Touch- Lower Quarter Clearing    L3 (distal anterior thigh)  Intact  -SAMANTA    L4 (medial lower leg/foot)  Intact  -SAMANTA    L5 (lateral lower leg/great toe)  Diminished  -SAMANTA    S1 (bottom of foot)  Diminished  -SAMANTA       Myotomal Screen- Lower Quarter Clearing    Hip flexion (L2)  Bilateral:;4 (Good)  -SAMANTA    Knee extension (L3)  Bilateral:;5 (Normal)  -SAMANTA    Ankle DF (L4)  Bilateral:;5 (Normal)  -SAMANTA    Great toe extension (L5)  Bilateral:;WNL  -SAMANTA    Knee flexion (S2)  Bilateral:;5 (Normal)  -SAMANTA       Lumbar ROM Screen- Lower Quarter Clearing    Lumbar Flexion  Normal  -SAMANTA    Lumbar Extension  Impaired minimal to no lumbar motion  -SAMANTA    Lumbar Lateral Flexion  Impaired  -SAMANTA    Lumbar  Rotation  Impaired  -SAMANTA       Special Tests/Palpation    Special Tests/Palpation  Lumbar/SI;Cervical/Thoracic  -SAMANTA       Cervical Palpation    Suboccipital  Guarded/taut;Tender  -SAMANTA    AC Joint  Crepitus  -SAMANTA    Cervical Facets  Tender  -SAMANTA    Levator Scapula  Guarded/taut  -SAMANTA    Upper Traps  Bilateral:;Guarded/taut;Tender mild tenderness  -SAMANTA    Lower Traps  Bilateral:;Guarded/taut;Tender mild tenderness  -SAMANTA       Thoracic Accessory Motions    Pa glide- Upper thoracic  Hypomobile  -SAMANTA    Pa glide- Middle thoracic  Hypomobile  -SAMANTA    Pa glide- Lower thoracic  Hypomobile  -SAMANTA       Cervical/Thoracic Special Tests    Spurlings (Foraminal Compression)  Bilateral:;Positive  -SAMANTA    Cervical Compression (Forarminal Compression vs. Facet Pain)  Bilateral:;Positive  -SAMANTA       Lumbar/SI Special Tests    Sacral Spring Test (SI Dysfunction)  Right:;Positive  -SAMANTA       Lumbosacral Palpation    Lumbosacral Palpation?  Yes  -SAMANTA    SI  Bilateral:;Tender R>L  -SAMANTA    Lumbosacral Segment  Guarded/taut prominent upper lumbar SPs  -SAMANTA    Thoracolumbar Segment  Guarded/taut  -SAMANTA    Spinous Process  Tender prominent at L2/3 with most tenderness   -SAMANTA    Erector Spinae (Paraspinals)  Bilateral:;Guarded/taut  -SAMANTA       MMT (Manual Muscle Testing)    General MMT Comments  middle trap 3-/5, low trap 3-/5  -SAMANTA       Gait/Stairs Assessment/Training    Comment (Gait/Stairs)  no AD, flexed forward, decreased stride length  -SAMANTA      User Key  (r) = Recorded By, (t) = Taken By, (c) = Cosigned By    Initials Name Provider Type    Regulo Doyle, PT Physical Therapist                            PT OP Goals     Row Name 12/03/18 1000          PT Short Term Goals    STG Date to Achieve  11/05/18  -SAMANTA     STG 1  Patient to be compliant with initial HEP  -SAMANTA     STG 1 Progress  Met  -SAMANTA     STG 2  Patient to report improved neck pain with sleeping  -SAMANTA     STG 2 Progress  Met  -SAMANTA        Long Term Goals    LTG Date to Achieve  11/19/18  -SAMANTA      LTG 1  Patient to be independent with final HEP   -SAMANTA     LTG 1 Progress  Ongoing  -SAMANTA     LTG 2  Patient to improve mod oswestry to at least 24%  -SAMANTA     LTG 2 Progress  Ongoing  -SAMANTA     LTG 3  Patient to perform 30 minutes of standing exercise with no increase in pain >/=2/10  -SAMANTA     LTG 3 Progress  Ongoing  -SAMANTA     LTG 4  Patient to demonstrate bilateral cervical rotation at least 55 degrees  -SAMANTA     LTG 4 Progress  Ongoing  -SAMANTA       User Key  (r) = Recorded By, (t) = Taken By, (c) = Cosigned By    Initials Name Provider Type    Regulo Doyle, PT Physical Therapist          PT Assessment/Plan     Row Name 12/03/18 1100          PT Assessment    Functional Limitations  Performance in leisure activities;Limitations in functional capacity and performance;Limitation in home management;Impaired gait  -SAMANTA     Impairments  Range of motion;Posture;Poor body mechanics;Pain;Impaired flexibility;Gait  -SAMANTA     Assessment Comments  Patient presents with no significant exacerbation following extended PT absence d/t being out of town.  She continues to progress well and is compliant with her exercises.  She reports that her lower back hurts more than the neck currently and is purely axial in nature without referral.  She demonstrates bilateral SIJ tenderness with favorable response to AP of the right innominate.  -SAMANTA     Please refer to paper survey for additional self-reported information  Yes  -SAMANTA     Rehab Potential  Good  -SAMANTA     Patient/caregiver participated in establishment of treatment plan and goals  Yes  -SAMANTA     Patient would benefit from skilled therapy intervention  Yes  -SAMANTA        PT Plan    PT Frequency  1x/week  -SAMANTA     Predicted Duration of Therapy Intervention (Therapy Eval)  4-6 additional visits  -SAMANTA     PT Plan Comments  shift focus more to the lumbosacral region.  Will continue to DN cervical spine PRN, focus on general core and hip strengthening, postural exercises, manual and modalities as  indicated.  -SAMANTA       User Key  (r) = Recorded By, (t) = Taken By, (c) = Cosigned By    Initials Name Provider Type    Regulo Doyle, PT Physical Therapist            Exercises     Row Name 12/03/18 1100 12/03/18 1000          Subjective Comments    Subjective Comments  --  She states that her neck continues to do better but her back pain remains minimal to none at rest while sitting.  She notes pain increases in standing after short to medium durations.  She ntoes while she was in [place] for the holidays her pain was better.  Forward flexion continues to help her back pain.  She notes intermittent posterior head pain with fatigue on the cervical spine and with tension.  -SAMANTA        Subjective Pain    Able to rate subjective pain?  --  yes  -SAMANTA     Pre-Treatment Pain Level  --  0  -SAMANTA        Total Minutes    81731 - PT Therapeutic Exercise Minutes  --  40  -SAMANTA     65261 - PT Manual Therapy Minutes  5  -SAMANTA  --        Exercise 2    Exercise Name 2  --  reassessment  -SAMANTA       User Key  (r) = Recorded By, (t) = Taken By, (c) = Cosigned By    Initials Name Provider Type    Regulo Doyle, PT Physical Therapist           Manual Rx (last 36 hours)      Manual Treatments     Row Name 12/03/18 1100             Total Minutes    04424 - PT Manual Therapy Minutes  5  -SAMANTA         Manual Rx 1    Manual Rx 1 Location  lumbosacral  -SAMANTA      Manual Rx 1 Grade  right innominate AP x 15 with significant reduction in R SIJ reactivity.  -SAMANTA        User Key  (r) = Recorded By, (t) = Taken By, (c) = Cosigned By    Initials Name Provider Type    Regulo Doyle, PT Physical Therapist                      Outcome Measure Options: Mitch Warner Neck Disability Index (NDI)  Modified Oswestry  Modified Oswestry Score/Comments: 38%  Neck Disability Index  Section 1 - Pain Intensity: The pain is very mild at the moment.  Section 2 - Personal Care: I can look after myself normally, but it causes extra pain.  Section 3 - Lifting: I can  lift only very light weights.  Section 4 - Work: I can only do my usual work, but no more  Section 5 - Headaches: I have no headaches at all.  Section 6 - Concentration: I can concentrate fully without difficulty.  Section 7 - Sleeping: My sleep is mildly disturbed for up to 1-2 hours.  Section 8 - Driving: I can drive as long as I want with slight neck pain.  Section 9 - Reading: I can read as much as I want with no neck pain.  Section 10 - Recreation: I have neck pain with most recreational activities.  Neck Disability Index Score: 13  Neck Disability Index Comments: 26%      Time Calculation:     Therapy Suggested Charges     Code   Minutes Charges    50725 (CPT®) Hc Pt Neuromusc Re Education Ea 15 Min      67388 (CPT®) Hc Pt Ther Proc Ea 15 Min 40 3    26603 (CPT®) Hc Gait Training Ea 15 Min      20255 (CPT®) Hc Pt Therapeutic Act Ea 15 Min      05984 (CPT®) Hc Pt Manual Therapy Ea 15 Min 5     70830 (CPT®) Hc Pt Ther Massage- Per 15 Min      07225 (CPT®) Hc Pt Iontophoresis Ea 15 Min      67211 (CPT®) Hc Pt Elec Stim Ea-Per 15 Min      69080 (CPT®) Hc Pt Ultrasound Ea 15 Min      59252 (CPT®) Hc Pt Self Care/Mgmt/Train Ea 15 Min      49529 (CPT®) Hc Pt Prosthetic (S) Train Initial Encounter, Each 15 Min      84402 (CPT®) Hc Orthotic(S) Mgmt/Train Initial Encounter, Each 15min      62436 (CPT®) Hc Pt Aquatic Therapy Ea 15 Min      04825 (CPT®) Hc Pt Orthotic(S)/Prosthetic(S) Encounter, Each 15 Min       (CPT®) Hc Pt Electrical Stim Unattended      Total  45 3          Start Time: 1022     Therapy Charges for Today     Code Description Service Date Service Provider Modifiers Qty    11718149724 HC PT THER PROC EA 15 MIN 12/3/2018 Regulo Weiss, PT GP 3    77990290914 HC PT CHNG MAIN POS CURRENT 12/3/2018 Regulo Weiss, PT GP, CJ 1    23805258296 HC PT CHNG MAIN POS PROJECTED 12/3/2018 Regulo Weiss, PT GP, CI 1          PT G-Codes  PT Professional Judgement Used?: Yes  Outcome Measure Options: Modifed  Timmy Neck Disability Index (NDI)  Modified Oswestry Score/Comments: 38%  Neck Disability Index Score: 13  Functional Limitation: Changing and maintaining body position  Changing and Maintaining Body Position Current Status (): At least 20 percent but less than 40 percent impaired, limited or restricted  Changing and Maintaining Body Position Goal Status (): At least 1 percent but less than 20 percent impaired, limited or restricted         Regulo Weiss, PT  12/3/2018

## 2018-12-07 ENCOUNTER — HOSPITAL ENCOUNTER (OUTPATIENT)
Dept: PHYSICAL THERAPY | Facility: HOSPITAL | Age: 83
Setting detail: THERAPIES SERIES
Discharge: HOME | End: 2018-12-07

## 2018-12-07 DIAGNOSIS — M41.34 THORACOGENIC SCOLIOSIS OF THORACIC REGION: ICD-10-CM

## 2018-12-07 DIAGNOSIS — M54.50 CHRONIC MIDLINE LOW BACK PAIN WITHOUT SCIATICA: ICD-10-CM

## 2018-12-07 DIAGNOSIS — M54.2 CERVICAL PAIN: Primary | ICD-10-CM

## 2018-12-07 DIAGNOSIS — G89.29 CHRONIC MIDLINE LOW BACK PAIN WITHOUT SCIATICA: ICD-10-CM

## 2018-12-07 PROCEDURE — 97110 THERAPEUTIC EXERCISES: CPT | Performed by: PHYSICAL THERAPIST

## 2018-12-07 NOTE — THERAPY TREATMENT NOTE
Outpatient Physical Therapy Ortho Treatment Note  [facility]     Patient Name: Lashanda Qureshi  : 1935  MRN: [id number]  Today's Date: 2018      Visit Date: 2018    Visit Dx:    ICD-10-CM ICD-9-CM   1. Cervical pain M54.2 723.1   2. Chronic midline low back pain without sciatica M54.5 724.2    G89.29 338.29   3. 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 difficile diarrhea   • Cervical pain   • Chronic midline low back pain without sciatica   • Thoracogenic scoliosis of thoracic region        Past Medical History:   Diagnosis Date   • Abnormal CT scan, lung    • Mycobacterium gordonae infection         Past Surgical History:   Procedure Laterality Date   • BLADDER SURGERY     • BRONCHOSCOPY  2016   • CHOLECYSTECTOMY     • COLON SURGERY     • DILATION AND CURETTAGE, DIAGNOSTIC / THERAPEUTIC     • TOTAL HIP ARTHROPLASTY      Left hip                       PT Assessment/Plan     Row Name 18 1500          PT Assessment    Assessment Comments  Patient demonstrates mild increase in lumbar back pain with treatment.  Her hips are generally weak which likely contribute  to her symptoms  -SAMANTA        PT Plan    PT Plan Comments  add total gym squats, add anti rotation activities.  Could try PPVT series d/t location of her pain.  -SAMANTA       User Key  (r) = Recorded By, (t) = Taken By, (c) = Cosigned By    Initials Name Provider Type    Regulo Doyle, PT Physical Therapist              Exercises     Row Name 12/07/18 1500 12/07/18 1300          Subjective Comments    Subjective Comments  --  Patient states that she is having a really good day.  Her pain and mobility are both improved.  -SAMANTA        Subjective Pain    Able to rate subjective pain?  --  yes  -SAMANTA     Pre-Treatment Pain Level  --  0  -SAMANTA        Total Minutes    10389 - PT Therapeutic Exercise Minutes  --  27  -SAMANTA     87872 - PT Manual Therapy Minutes  4  -SAMANTA  --        Exercise 1    Exercise Name 1  --  deferred  -SAMANTA        Exercise 2    Exercise Name 2  --  bridges  -SAMANTA     Sets 2  --  2  -SAMANTA     Reps 2  --  10  -SAMANTA        Exercise 3    Exercise Name 3  --  hook lying BKFO  -SAMANTA     Reps 3  --  15  -SAMANTA     Additional Comments  --  green band  -SAMANTA        Exercise 4    Exercise Name 4  --  seated HS curl/LAQ green band  -SAMANTA     Reps 4  --  20  -SAMANTA        Exercise 5    Exercise Name 5  --  standing hip extension with ER  -SAMANTA     Reps 5  --  10 ea  -SAMANTA        Exercise 6    Exercise Name 6  --  sidestepping/zig-zags no resistance  -SAMANTA     Reps 6  --  25' x 4  -SAMANTA        Exercise 7    Exercise Name 7  --  SLR with red band  -SAMANTA     Reps 7  --  12 ea  -SAMANTA       User Key  (r) = Recorded By, (t) = Taken By, (c) = Cosigned By    Initials Name Provider Type    Regulo Doyle, PT Physical Therapist                        Manual Rx (last 36 hours)      Manual Treatments     Row Name 12/07/18 1500             Total Minutes    22364 - PT Manual Therapy Minutes  4  -SAMANTA         Manual Rx 1    Manual Rx 1 Location  lumbosacral  -SAMANTA      Manual Rx 1 Grade  right innominate AP x 15 with significant reduction in R SIJ reactivity.  -SAMANTA         User Key  (r) = Recorded By, (t) = Taken By, (c) = Cosigned By    Initials Name Provider Type    SAMANTA Regulo Weiss, PT Physical Therapist              Therapy Education  Education Details: added SLR with red band, added standing hip extension with ER  Given: HEP  Program: New  How Provided: Verbal, Demonstration, Written  Provided to: Patient  Level of Understanding: Verbalized, Demonstrated              Time Calculation:   Start Time: 1330  Therapy Suggested Charges     Code   Minutes Charges    26230 (CPT®) Hc Pt Neuromusc Re Education Ea 15 Min      10177 (CPT®) Hc Pt Ther Proc Ea 15 Min 27 2    85487 (CPT®) Hc Gait Training Ea 15 Min      30487 (CPT®) Hc Pt Therapeutic Act Ea 15 Min      66425 (CPT®) Hc Pt Manual Therapy Ea 15 Min 4     52021 (CPT®) Hc Pt Ther Massage- Per 15 Min      63106 (CPT®) Hc Pt Iontophoresis Ea 15 Min      57856 (CPT®) Hc Pt Elec Stim Ea-Per 15 Min      79693 (CPT®) Hc Pt Ultrasound Ea 15 Min      00153 (CPT®) Hc Pt Self Care/Mgmt/Train Ea 15 Min      65997 (CPT®) Hc Pt Prosthetic (S) Train Initial Encounter, Each 15 Min      63214 (CPT®) Hc Orthotic(S) Mgmt/Train Initial Encounter, Each 15min      09343 (CPT®) Hc Pt Aquatic Therapy Ea 15 Min      41225 (CPT®) Hc Pt Orthotic(S)/Prosthetic(S) Encounter, Each 15 Min       (CPT®) Hc Pt Electrical Stim Unattended      Total  31 2        Therapy Charges for Today     Code Description Service Date Service Provider Modifiers Qty    96995999622 HC PT THER PROC EA 15 MIN 12/7/2018 Regulo Weiss, PT GP 2                    Regulo Weiss, PT  12/7/2018

## 2019-01-03 ENCOUNTER — DOCUMENTATION (OUTPATIENT)
Dept: PHYSICAL THERAPY | Facility: HOSPITAL | Age: 84
End: 2019-01-03

## 2019-01-03 DIAGNOSIS — G89.29 CHRONIC MIDLINE LOW BACK PAIN WITHOUT SCIATICA: ICD-10-CM

## 2019-01-03 DIAGNOSIS — M54.2 CERVICAL PAIN: Primary | ICD-10-CM

## 2019-01-03 DIAGNOSIS — M54.50 CHRONIC MIDLINE LOW BACK PAIN WITHOUT SCIATICA: ICD-10-CM

## 2019-01-03 DIAGNOSIS — M41.34 THORACOGENIC SCOLIOSIS OF THORACIC REGION: ICD-10-CM

## 2019-01-03 NOTE — THERAPY DISCHARGE NOTE
Outpatient Physical Therapy Discharge Summary         Patient Name: Lashanda Qureshi  : 1935  MRN: [id number]    Today's Date: 1/3/2019    Visit Dx:    ICD-10-CM ICD-9-CM   1. Cervical pain M54.2 723.1   2. Chronic midline low back pain without sciatica M54.5 724.2    G89.29 338.29   3. Thoracogenic scoliosis of thoracic region M41.34 737.34       PT OP Goals     Row Name 19 0900          PT Short Term Goals    STG Date to Achieve  18  -SAMANTA     STG 1  Patient to be compliant with initial HEP  -SAMANTA     STG 1 Progress  Met  -SAMANTA     STG 2  Patient to report improved neck pain with sleeping  -SAMANTA     STG 2 Progress  Met  -SAMANTA        Long Term Goals    LTG Date to Achieve  18  -SAMANTA     LTG 1  Patient to be independent with final HEP   -SAMANTA     LTG 1 Progress  Ongoing  -SAMANTA     LTG 2  Patient to improve mod oswestry to at least 24%  -SAMANTA     LTG 2 Progress  Ongoing  -SAMANTA     LTG 3  Patient to perform 30 minutes of standing exercise with no increase in pain >/=2/10  -SAMANTA     LTG 3 Progress  Ongoing  -SAMANTA     LTG 4  Patient to demonstrate bilateral cervical rotation at least 55 degrees  -SAMANTA     LTG 4 Progress  Ongoing  -SAMANTA       User Key  (r) = Recorded By, (t) = Taken By, (c) = Cosigned By    Initials Name Provider Type    Regulo Doyle, PT Physical Therapist          OP PT Discharge Summary  Date of Discharge: 19  Reason for Discharge: Maximum functional potential achieved, Independent, Patient/Caregiver request  Outcomes Achieved: Patient able to partially acheive established goals, Refer to plan of care for updates on goals achieved  Discharge Destination: Home with home program  Discharge Instructions/Additional Comments: Patient continued to have lower back pain but was independent with HEP.   visits attended.        Time Calculation:        Therapy Suggested Charges     Code   Minutes Charges    None                       Regulo Weiss, PT  1/3/2019

## 2019-01-04 ENCOUNTER — APPOINTMENT (OUTPATIENT)
Dept: PHYSICAL THERAPY | Facility: HOSPITAL | Age: 84
End: 2019-01-04

## 2019-01-08 ENCOUNTER — APPOINTMENT (OUTPATIENT)
Dept: LAB | Facility: HOSPITAL | Age: 84
End: 2019-01-08

## 2019-01-08 ENCOUNTER — OFFICE VISIT (OUTPATIENT)
Dept: FAMILY MEDICINE CLINIC | Facility: CLINIC | Age: 84
End: 2019-01-08

## 2019-01-08 VITALS
WEIGHT: 142 LBS | HEART RATE: 98 BPM | SYSTOLIC BLOOD PRESSURE: 136 MMHG | DIASTOLIC BLOOD PRESSURE: 82 MMHG | HEIGHT: 66 IN | OXYGEN SATURATION: 100 % | BODY MASS INDEX: 22.82 KG/M2 | RESPIRATION RATE: 16 BRPM

## 2019-01-08 DIAGNOSIS — J45.20 MILD INTERMITTENT ASTHMA WITHOUT COMPLICATION: ICD-10-CM

## 2019-01-08 DIAGNOSIS — I10 ESSENTIAL HYPERTENSION: ICD-10-CM

## 2019-01-08 DIAGNOSIS — M15.9 PRIMARY OSTEOARTHRITIS INVOLVING MULTIPLE JOINTS: ICD-10-CM

## 2019-01-08 DIAGNOSIS — Z00.00 MEDICARE ANNUAL WELLNESS VISIT, SUBSEQUENT: Primary | ICD-10-CM

## 2019-01-08 LAB
ALBUMIN SERPL-MCNC: 4.38 G/DL (ref 3.2–4.8)
ALBUMIN/GLOB SERPL: 2.1 G/DL (ref 1.5–2.5)
ALP SERPL-CCNC: 69 U/L (ref 25–100)
ALT SERPL W P-5'-P-CCNC: 18 U/L (ref 7–40)
ANION GAP SERPL CALCULATED.3IONS-SCNC: 9 MMOL/L (ref 3–11)
ARTICHOKE IGE QN: 61 MG/DL (ref 0–130)
AST SERPL-CCNC: 24 U/L (ref 0–33)
BILIRUB SERPL-MCNC: 0.7 MG/DL (ref 0.3–1.2)
BUN BLD-MCNC: 10 MG/DL (ref 9–23)
BUN/CREAT SERPL: 15.9 (ref 7–25)
CALCIUM SPEC-SCNC: 9.3 MG/DL (ref 8.7–10.4)
CHLORIDE SERPL-SCNC: 107 MMOL/L (ref 99–109)
CHOLEST SERPL-MCNC: 181 MG/DL (ref 0–200)
CO2 SERPL-SCNC: 25 MMOL/L (ref 20–31)
CREAT BLD-MCNC: 0.63 MG/DL (ref 0.6–1.3)
CRP SERPL-MCNC: 0.61 MG/DL (ref 0–1)
ERYTHROCYTE [SEDIMENTATION RATE] IN BLOOD: 33 MM/HR (ref 0–30)
GFR SERPL CREATININE-BSD FRML MDRD: 90 ML/MIN/1.73
GLOBULIN UR ELPH-MCNC: 2.1 GM/DL
GLUCOSE BLD-MCNC: 101 MG/DL (ref 70–100)
HDLC SERPL-MCNC: 110 MG/DL (ref 40–60)
POTASSIUM BLD-SCNC: 3.5 MMOL/L (ref 3.5–5.5)
PROT SERPL-MCNC: 6.5 G/DL (ref 5.7–8.2)
SODIUM BLD-SCNC: 141 MMOL/L (ref 132–146)
TRIGL SERPL-MCNC: 66 MG/DL (ref 0–150)

## 2019-01-08 PROCEDURE — 80061 LIPID PANEL: CPT | Performed by: NURSE PRACTITIONER

## 2019-01-08 PROCEDURE — 86430 RHEUMATOID FACTOR TEST QUAL: CPT | Performed by: NURSE PRACTITIONER

## 2019-01-08 PROCEDURE — 80053 COMPREHEN METABOLIC PANEL: CPT | Performed by: NURSE PRACTITIONER

## 2019-01-08 PROCEDURE — 86140 C-REACTIVE PROTEIN: CPT | Performed by: NURSE PRACTITIONER

## 2019-01-08 PROCEDURE — G0439 PPPS, SUBSEQ VISIT: HCPCS | Performed by: NURSE PRACTITIONER

## 2019-01-08 PROCEDURE — 85652 RBC SED RATE AUTOMATED: CPT | Performed by: NURSE PRACTITIONER

## 2019-01-08 NOTE — PATIENT INSTRUCTIONS
Glaucoma  Glaucoma happens when the fluid pressure in the eyeball is too high. If the pressure stays high for too long, the eye may become damaged. This can cause a loss of vision. The most common type of glaucoma causes pressure in the eye to go up slowly. There may be no symptoms at first. Testing for this condition can help to find the condition before damage occurs. Early treatment can often stop vision loss.  Follow these instructions at home:  · Take medicines only as told by your doctor.  · Use your eye drops exactly as told. You will probably need to use these for the rest of your life.  · Exercise often. Talk with your doctor about which types of exercise are safe for you. Avoid standing on your head.  · Keep all follow-up visits as told by your doctor. This is important.  Contact a doctor if:  · Your symptoms get worse.  Get help right away if:  · You have bad pain in your eye.  · You have vision problems.  · You have a bad headache in the area around your eye.  · You feel sick to your stomach (nauseous) or you throw up (vomit).  · You start to have problems with your other eye.  This information is not intended to replace advice given to you by your health care provider. Make sure you discuss any questions you have with your health care provider.  Document Released: 09/26/2009 Document Revised: 05/25/2017 Document Reviewed: 09/29/2015  [employer]vier Interactive Patient Education © 2018 [employer]  Fall Prevention in the Home  Falls can cause injuries. They can happen to people of all ages. There are many things you can do to make your home safe and to help prevent falls.  What can I do on the outside of my home?  · Regularly fix the edges of walkways and driveways and fix any cracks.  · Remove anything that might make you trip as you walk through a door, such as a raised step or threshold.  · Trim any bushes or trees on the path to your home.  · Use bright outdoor lighting.  · Clear any walking paths of  anything that might make someone trip, such as rocks or tools.  · Regularly check to see if handrails are loose or broken. Make sure that both sides of any steps have handrails.  · Any raised decks and porches should have guardrails on the edges.  · Have any leaves, snow, or ice cleared regularly.  · Use sand or salt on walking paths during winter.  · Clean up any spills in your garage right away. This includes oil or grease spills.  What can I do in the bathroom?  · Use night lights.  · Install grab bars by the toilet and in the tub and shower. Do not use towel bars as grab bars.  · Use non-skid mats or decals in the tub or shower.  · If you need to sit down in the shower, use a plastic, non-slip stool.  · Keep the floor dry. Clean up any water that spills on the floor as soon as it happens.  · Remove soap buildup in the tub or shower regularly.  · Attach bath mats securely with double-sided non-slip rug tape.  · Do not have throw rugs and other things on the floor that can make you trip.  What can I do in the bedroom?  · Use night lights.  · Make sure that you have a light by your bed that is easy to reach.  · Do not use any sheets or blankets that are too big for your bed. They should not hang down onto the floor.  · Have a firm chair that has side arms. You can use this for support while you get dressed.  · Do not have throw rugs and other things on the floor that can make you trip.  What can I do in the kitchen?  · Clean up any spills right away.  · Avoid walking on wet floors.  · Keep items that you use a lot in easy-to-reach places.  · If you need to reach something above you, use a strong step stool that has a grab bar.  · Keep electrical cords out of the way.  · Do not use floor polish or wax that makes floors slippery. If you must use wax, use non-skid floor wax.  · Do not have throw rugs and other things on the floor that can make you trip.  What can I do with my stairs?  · Do not leave any items on the  stairs.  · Make sure that there are handrails on both sides of the stairs and use them. Fix handrails that are broken or loose. Make sure that handrails are as long as the stairways.  · Check any carpeting to make sure that it is firmly attached to the stairs. Fix any carpet that is loose or worn.  · Avoid having throw rugs at the top or bottom of the stairs. If you do have throw rugs, attach them to the floor with carpet tape.  · Make sure that you have a light switch at the top of the stairs and the bottom of the stairs. If you do not have them, ask someone to add them for you.  What else can I do to help prevent falls?  · Wear shoes that:  ? Do not have high heels.  ? Have rubber bottoms.  ? Are comfortable and fit you well.  ? Are closed at the toe. Do not wear sandals.  · If you use a stepladder:  ? Make sure that it is fully opened. Do not climb a closed stepladder.  ? Make sure that both sides of the stepladder are locked into place.  ? Ask someone to hold it for you, if possible.  · Clearly miguel a and make sure that you can see:  ? Any grab bars or handrails.  ? First and last steps.  ? Where the edge of each step is.  · Use tools that help you move around (mobility aids) if they are needed. These include:  ? Canes.  ? Walkers.  ? Scooters.  ? Crutches.  · Turn on the lights when you go into a dark area. Replace any light bulbs as soon as they burn out.  · Set up your furniture so you have a clear path. Avoid moving your furniture around.  · If any of your floors are uneven, fix them.  · If there are any pets around you, be aware of where they are.  · Review your medicines with your doctor. Some medicines can make you feel dizzy. This can increase your chance of falling.  Ask your doctor what other things that you can do to help prevent falls.  This information is not intended to replace advice given to you by your health care provider. Make sure you discuss any questions you have with your health care  provider.  Document Released: 10/14/2010 Document Revised: 05/25/2017 Document Reviewed: 01/22/2016  [employer] Interactive Patient Education © 2018 [employer]

## 2019-01-08 NOTE — PROGRESS NOTES
QUICK REFERENCE INFORMATION:  The ABCs of the Annual Wellness Visit    Subsequent Medicare Wellness Visit    HEALTH RISK ASSESSMENT    1935    Recent Hospitalizations:  Recently treated at the following:  [facility].        Current Medical Providers:  Patient Care Team:  Leandro Johnson APRN as PCP - General (Family Medicine)  Crispin Wheeler MD as Consulting Physician (Orthopedic Surgery)  James Gonzales MD        Smoking Status:  Social History     Tobacco Use   Smoking Status Former Smoker   Smokeless Tobacco Never Used   Tobacco Comment    Quit 41 years ago       Alcohol Consumption:  Social History     Substance and Sexual Activity   Alcohol Use No       Depression Screen:   PHQ-2/PHQ-9 Depression Screening 1/8/2019   Little interest or pleasure in doing things 0   Feeling down, depressed, or hopeless 0   Total Score 0       Health Habits and Functional and Cognitive Screening:  Functional & Cognitive Status 1/8/2019   Do you have difficulty preparing food and eating? No   Do you have difficulty bathing yourself, getting dressed or grooming yourself? No   Do you have difficulty using the toilet? No   Do you have difficulty moving around from place to place? No   Do you have trouble with steps or getting out of a bed or a chair? No   In the past year have you fallen or experienced a near fall? No   Current Diet Limited Junk Food   Dental Exam Up to date   Eye Exam Up to date   Exercise (times per week) 2 times per week   Current Exercise Activities Include Walking   Do you need help using the phone?  No   Are you deaf or do you have serious difficulty hearing?  No   Do you need help with transportation? No   Do you need help shopping? No   Do you need help preparing meals?  No   Do you need help with housework?  No   Do you need help with laundry? No   Do you need help taking your medications? No   Do you need help managing money? No   Do you ever drive or ride in a car  without wearing a seat belt? No   Have you felt unusual stress, anger or loneliness in the last month? Yes   Who do you live with? Alone   If you need help, do you have trouble finding someone available to you? No   Have you been bothered in the last four weeks by sexual problems? No   Do you have difficulty concentrating, remembering or making decisions? Yes           Does the patient have evidence of cognitive impairment? No    Aspirin use counseling: Does not need ASA (and currently is not on it)      Recent Lab Results:  CMP:  Lab Results   Component Value Date    BUN 16 08/20/2018    CREATININE 0.67 08/20/2018    EGFRIFNONA 84 08/20/2018    BCR 23.9 08/20/2018     08/20/2018    K 4.2 08/20/2018    CO2 28.0 08/20/2018    CALCIUM 9.2 08/20/2018    PROTENTOTREF 6.6 02/04/2016    ALBUMIN 4.09 08/20/2018    LABGLOBREF 2.9 02/04/2016    LABIL2 1.3 02/04/2016    BILITOT 0.5 08/20/2018    ALKPHOS 70 08/20/2018    AST 22 08/20/2018    ALT 13 08/20/2018     Lipid Panel:  Lab Results   Component Value Date    CHOL 199 10/26/2017    TRIG 75 10/26/2017     (H) 10/26/2017     HbA1c:       Visual Acuity:  No exam data present    Age-appropriate Screening Schedule:  Refer to the list below for future screening recommendations based on patient's age, sex and/or medical conditions. Orders for these recommended tests are listed in the plan section. The patient has been provided with a written plan.    Health Maintenance   Topic Date Due   • ZOSTER VACCINE (2 of 2) 11/26/2015   • TDAP/TD VACCINES (2 - Td) 01/01/2024   • INFLUENZA VACCINE  Completed   • PNEUMOCOCCAL VACCINES (65+ LOW/MEDIUM RISK)  Completed        Subjective   History of Present Illness    Lashanda Qureshi is a 83 y.o. female who presents for an Subsequent Wellness Visit.    The following portions of the patient's history were reviewed and updated as appropriate: allergies, current medications, past family history, past medical history, past social  history, past surgical history and problem list.    Outpatient Medications Prior to Visit   Medication Sig Dispense Refill   • azelastine (ASTEPRO) 0.15 % solution nasal spray 2 sprays into the nostril(s) as directed by provider 2 (Two) Times a Day As Needed for Allergies. 1 each 11   • benzonatate (TESSALON) 100 MG capsule Take 1 capsule by mouth 3 (Three) Times a Day As Needed for Cough. 30 capsule 0   • bimatoprost (LUMIGAN) 0.01 % ophthalmic drops Administer 1 drop to both eyes Every Night.     • Cholecalciferol (VITAMIN D3) 2000 UNITS tablet Take  by mouth. Take 2 tablets daily.     • Cyanocobalamin (VITAMIN B12 PO) Take  by mouth. Take 1 tablet daily as directed.     • Elastic Bandages & Supports (FUTURO FIRM COMPRESSION HOSE) misc 1 package Daily. 1 each 1   • ESTRING 2 MG vaginal ring Every 3 (Three) Months.     • fluticasone (FLONASE) 50 MCG/ACT nasal spray 2 sprays into the nostril(s) as directed by provider Daily. Administer 2 sprays in each nostril for each dose. 1 bottle 11   • latanoprost (XALATAN) 0.005 % ophthalmic solution Apply  to eye. Instill 1 drop into affected eye(s) once daily as directed.     • magnesium oxide (MAGOX) 400 (241.3 MG) MG tablet tablet Take 400 mg by mouth daily.     • montelukast (SINGULAIR) 10 MG tablet Take 1 tablet by mouth Every Night. 30 tablet 11   • SYMBICORT 160-4.5 MCG/ACT inhaler Inhale 2 puffs 2 (Two) Times a Day As Needed.     • VENTOLIN  (90 Base) MCG/ACT inhaler Inhale 2 puffs Every 6 (Six) Hours As Needed.  1     Facility-Administered Medications Prior to Visit   Medication Dose Route Frequency Provider Last Rate Last Dose   • albuterol (PROVENTIL) nebulizer solution 2.5 mg  2.5 mg Nebulization Q6H PRN Olga Mullins, CHARLES           Patient Active Problem List   Diagnosis   • Abnormal computed tomography scan   • Gastroesophageal reflux disease   • Pneumonitis   • Chronic cough   • Tachycardia   • Vitamin D deficiency   • Rosacea   • Peripheral  neuropathy   • Osteoarthritis   • Macular degeneration   • Hypertension   • Cystocele with uterine prolapse   • Asthma   • Allergic rhinitis   • Mycobacterium, 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 difficile diarrhea   • Cervical pain   • Chronic midline low back pain without sciatica   • Thoracogenic scoliosis of thoracic region       Advance Care Planning:  has NO advance directive - not interested in additional information    Identification of Risk Factors:  Risk factors include: increased fall risk, chronic pain, depression and financial stress.    Review of Systems   Constitutional: Negative for appetite change, chills, fatigue and fever.   HENT: Positive for congestion and rhinorrhea. Negative for ear pain, sinus pressure and sore throat.    Eyes: Positive for visual disturbance (glaucoma 1/14/2019). Negative for itching.   Respiratory: Positive for shortness of breath (CORONEL) and wheezing (witactivity). Negative for cough.    Cardiovascular: Positive for palpitations (feels like pauses at times) and leg swelling (left leg from Recent cellilitis). Negative for chest pain.   Gastrointestinal: Positive for diarrhea. Negative for abdominal pain, constipation, nausea and vomiting.   Endocrine: Negative for cold intolerance and heat intolerance.   Genitourinary: Negative for difficulty urinating, dysuria and hematuria.   Musculoskeletal: Positive for arthralgias (all over, pain has changed, wants test for Rheumatoid) and back pain (scolosis, pain with pretty much any activity). Negative for joint swelling and myalgias.    Skin: Negative for rash and wound.   Allergic/Immunologic: Negative for food allergies.   Neurological: Positive for light-headedness (occasionally, ETD). Negative for dizziness, numbness and headaches.   Hematological: Negative for adenopathy. Bruises/bleeds easily.   Psychiatric/Behavioral: Positive for dysphoric mood (winter/weather related). Negative for sleep disturbance. The patient is not nervous/anxious.        Compared to one year ago, the patient feels her physical health is worse.  Compared to one year ago, the patient feels her mental health is the same.    Objective     Physical Exam   Constitutional: She is oriented to person, place, and time. She appears well-developed and well-nourished. No distress.   HENT:   Head: Normocephalic and atraumatic.   Right Ear: External ear normal.   Left Ear: External ear normal.   Nose: Nose normal.   Mouth/Throat: Oropharynx is clear and moist.   Eyes: Conjunctivae and EOM are normal. Pupils are equal, round, and reactive to light. Right eye exhibits no discharge. Left eye exhibits no discharge. No scleral icterus.   Neck: Normal range of motion. Neck supple. No JVD (no bruits) present. No tracheal deviation present. No thyromegaly present.   Cardiovascular: Normal rate, regular rhythm, normal heart sounds and intact distal pulses. Exam reveals no gallop and no friction rub.   No murmur heard.  Pulmonary/Chest: Effort normal and breath sounds normal. No respiratory distress. She has no wheezes. She has no rales. She exhibits no tenderness. Right breast exhibits no inverted nipple, no mass, no nipple discharge, no skin change and no tenderness. Left breast exhibits no inverted nipple, no mass, no nipple discharge, no skin change and no tenderness. Breasts are symmetrical.   Abdominal: Soft. Normal appearance and bowel sounds are normal. She exhibits no distension and no mass. There is no hepatosplenomegaly. There is no tenderness. No hernia.   Genitourinary:  "  Genitourinary Comments: Deferred GYN/Breast to GYN   Musculoskeletal: Normal range of motion. She exhibits no edema, tenderness or deformity.   Lymphadenopathy:     She has no cervical adenopathy.   Neurological: She is alert and oriented to person, place, and time. She has normal reflexes. She displays normal reflexes.   Skin: Skin is warm and dry. No rash noted. She is not diaphoretic. No erythema. No pallor.   Psychiatric: She has a normal mood and affect. Her behavior is normal. Thought content normal.   Nursing note and vitals reviewed.      Vitals:    01/08/19 1053   BP: 136/82   Pulse: 98   Resp: 16   SpO2: 100%   Weight: 64.4 kg (142 lb)   Height: 167 cm (65.75\")   PainSc:   4   PainLoc: Hand  Comment: both hands/back arthritis       Patient's Body mass index is 23.09 kg/m². BMI is within normal parameters. No follow-up required.      Assessment/Plan   Patient Self-Management and Personalized Health Advice  The patient has been provided with information about: fall prevention and preventive services including:   · Advance directive, Fall Risk assessment done, Fall Risk plan of care done, Influenza vaccine, Pneumococcal vaccine , TdaP vaccine, Zostavax vaccine (Herpes Zoster).    Visit Diagnoses:    ICD-10-CM ICD-9-CM   1. Medicare annual wellness visit, subsequent Z00.00 V70.0   2. Essential hypertension I10 401.9   3. Primary osteoarthritis involving multiple joints M15.0 715.09   4. Mild intermittent asthma without complication J45.20 493.90       Orders Placed This Encounter   Procedures   • Comprehensive Metabolic Panel     Standing Status:   Future     Standing Expiration Date:   1/8/2020   • Lipid Panel     Standing Status:   Future     Standing Expiration Date:   1/8/2020   • Rheumatoid Factor     Standing Status:   Future     Standing Expiration Date:   1/8/2020   • C-reactive Protein     Standing Status:   Future     Standing Expiration Date:   1/8/2020   • Sedimentation Rate     Standing Status:  "  Future     Standing Expiration Date:   1/8/2020       Outpatient Encounter Medications as of 1/8/2019   Medication Sig Dispense Refill   • azelastine (ASTEPRO) 0.15 % solution nasal spray 2 sprays into the nostril(s) as directed by provider 2 (Two) Times a Day As Needed for Allergies. 1 each 11   • benzonatate (TESSALON) 100 MG capsule Take 1 capsule by mouth 3 (Three) Times a Day As Needed for Cough. 30 capsule 0   • bimatoprost (LUMIGAN) 0.01 % ophthalmic drops Administer 1 drop to both eyes Every Night.     • Cholecalciferol (VITAMIN D3) 2000 UNITS tablet Take  by mouth. Take 2 tablets daily.     • Cyanocobalamin (VITAMIN B12 PO) Take  by mouth. Take 1 tablet daily as directed.     • Elastic Bandages & Supports (FUTURO FIRM COMPRESSION HOSE) misc 1 package Daily. 1 each 1   • ESTRING 2 MG vaginal ring Every 3 (Three) Months.     • fluticasone (FLONASE) 50 MCG/ACT nasal spray 2 sprays into the nostril(s) as directed by provider Daily. Administer 2 sprays in each nostril for each dose. 1 bottle 11   • latanoprost (XALATAN) 0.005 % ophthalmic solution Apply  to eye. Instill 1 drop into affected eye(s) once daily as directed.     • magnesium oxide (MAGOX) 400 (241.3 MG) MG tablet tablet Take 400 mg by mouth daily.     • montelukast (SINGULAIR) 10 MG tablet Take 1 tablet by mouth Every Night. 30 tablet 11   • SYMBICORT 160-4.5 MCG/ACT inhaler Inhale 2 puffs 2 (Two) Times a Day As Needed.     • VENTOLIN  (90 Base) MCG/ACT inhaler Inhale 2 puffs Every 6 (Six) Hours As Needed.  1     Facility-Administered Encounter Medications as of 1/8/2019   Medication Dose Route Frequency Provider Last Rate Last Dose   • [DISCONTINUED] albuterol (PROVENTIL) nebulizer solution 2.5 mg  2.5 mg Nebulization Q6H PRN Olga Mullins, APRN           Reviewed use of high risk medication in the elderly: not applicable  Reviewed for potential of harmful drug interactions in the elderly: yes    Follow Up:  Return in about 6 months  (around 7/8/2019), or if symptoms worsen or fail to improve, for Recheck.     An After Visit Summary and PPPS with all of these plans were given to the patient.

## 2019-01-09 LAB — RHEUMATOID FACT SERPL-ACNC: NEGATIVE [IU]/ML

## 2019-05-14 ENCOUNTER — OFFICE VISIT (OUTPATIENT)
Dept: FAMILY MEDICINE CLINIC | Facility: CLINIC | Age: 84
End: 2019-05-14

## 2019-05-14 VITALS
OXYGEN SATURATION: 96 % | HEIGHT: 66 IN | BODY MASS INDEX: 22.98 KG/M2 | TEMPERATURE: 98.1 F | DIASTOLIC BLOOD PRESSURE: 70 MMHG | WEIGHT: 143 LBS | SYSTOLIC BLOOD PRESSURE: 120 MMHG | HEART RATE: 67 BPM

## 2019-05-14 DIAGNOSIS — I87.2 VENOUS STASIS DERMATITIS OF BOTH LOWER EXTREMITIES: ICD-10-CM

## 2019-05-14 DIAGNOSIS — J45.40 MODERATE PERSISTENT ASTHMA, UNSPECIFIED WHETHER COMPLICATED: Primary | ICD-10-CM

## 2019-05-14 DIAGNOSIS — G47.00 INSOMNIA, UNSPECIFIED TYPE: ICD-10-CM

## 2019-05-14 PROCEDURE — 99214 OFFICE O/P EST MOD 30 MIN: CPT | Performed by: NURSE PRACTITIONER

## 2019-05-14 RX ORDER — MONTELUKAST SODIUM 10 MG/1
10 TABLET ORAL NIGHTLY
Qty: 30 TABLET | Refills: 11 | Status: SHIPPED | OUTPATIENT
Start: 2019-05-14 | End: 2020-01-20

## 2019-05-14 NOTE — PATIENT INSTRUCTIONS
Insomnia  Insomnia is a sleep disorder that makes it difficult to fall asleep or to stay asleep. Insomnia can cause tiredness (fatigue), low energy, difficulty concentrating, mood swings, and poor performance at work or school.  There are three different ways to classify insomnia:  · Difficulty falling asleep.  · Difficulty staying asleep.  · Waking up too early in the morning.    Any type of insomnia can be long-term (chronic) or short-term (acute). Both are common. Short-term insomnia usually lasts for three months or less. Chronic insomnia occurs at least three times a week for longer than three months.  What are the causes?  Insomnia may be caused by another condition, situation, or substance, such as:  · Anxiety.  · Certain medicines.  · Gastroesophageal reflux disease (GERD) or other gastrointestinal conditions.  · Asthma or other breathing conditions.  · Restless legs syndrome, sleep apnea, or other sleep disorders.  · Chronic pain.  · Menopause. This may include hot flashes.  · Stroke.  · Abuse of alcohol, tobacco, or illegal drugs.  · Depression.  · Caffeine.  · Neurological disorders, such as Alzheimer disease.  · An overactive thyroid (hyperthyroidism).    The cause of insomnia may not be known.  What increases the risk?  Risk factors for insomnia include:  · Gender. Women are more commonly affected than men.  · Age. Insomnia is more common as you get older.  · Stress. This may involve your professional or personal life.  · Income. Insomnia is more common in people with lower income.  · Lack of exercise.  · Irregular work schedule or night shifts.  · Traveling between different time zones.    What are the signs or symptoms?  If you have insomnia, trouble falling asleep or trouble staying asleep is the main symptom. This may lead to other symptoms, such as:  · Feeling fatigued.  · Feeling nervous about going to sleep.  · Not feeling rested in the morning.  · Having trouble concentrating.  · Feeling  irritable, anxious, or depressed.    How is this treated?  Treatment for insomnia depends on the cause. If your insomnia is caused by an underlying condition, treatment will focus on addressing the condition. Treatment may also include:  · Medicines to help you sleep.  · Counseling or therapy.  · Lifestyle adjustments.    Follow these instructions at home:  · Take medicines only as directed by your health care provider.  · Keep regular sleeping and waking hours. Avoid naps.  · Keep a sleep diary to help you and your health care provider figure out what could be causing your insomnia. Include:  ? When you sleep.  ? When you wake up during the night.  ? How well you sleep.  ? How rested you feel the next day.  ? Any side effects of medicines you are taking.  ? What you eat and drink.  · Make your bedroom a comfortable place where it is easy to fall asleep:  ? Put up shades or special blackout curtains to block light from outside.  ? Use a white noise machine to block noise.  ? Keep the temperature cool.  · Exercise regularly as directed by your health care provider. Avoid exercising right before bedtime.  · Use relaxation techniques to manage stress. Ask your health care provider to suggest some techniques that may work well for you. These may include:  ? Breathing exercises.  ? Routines to release muscle tension.  ? Visualizing peaceful scenes.  · Cut back on alcohol, caffeinated beverages, and cigarettes, especially close to bedtime. These can disrupt your sleep.  · Do not overeat or eat spicy foods right before bedtime. This can lead to digestive discomfort that can make it hard for you to sleep.  · Limit screen use before bedtime. This includes:  ? Watching TV.  ? Using your smartphone, tablet, and computer.  · Stick to a routine. This can help you fall asleep faster. Try to do a quiet activity, brush your teeth, and go to bed at the same time each night.  · Get out of bed if you are still awake after 15 minutes  of trying to sleep. Keep the lights down, but try reading or doing a quiet activity. When you feel sleepy, go back to bed.  · Make sure that you drive carefully. Avoid driving if you feel very sleepy.  · Keep all follow-up appointments as directed by your health care provider. This is important.  Contact a health care provider if:  · You are tired throughout the day or have trouble in your daily routine due to sleepiness.  · You continue to have sleep problems or your sleep problems get worse.  Get help right away if:  · You have serious thoughts about hurting yourself or someone else.  This information is not intended to replace advice given to you by your health care provider. Make sure you discuss any questions you have with your health care provider.  Document Released: 12/15/2001 Document Revised: 05/19/2017 Document Reviewed: 09/18/2015  [employer] Patient Education © 2018 [employer]

## 2019-05-14 NOTE — PROGRESS NOTES
Chief Complaint   Patient presents with   • Cellulitis     She has had cellulitis some time ago but she is seeing a continuing swelling in her left foot and it is painful. She states that she does sit for long periods of time but she tried to elevate her feet.She describes the pain as a sharp lightening like pains in one localized spot   • Asthma     She gets very short winded when walking. She struggles to get air but it is not painful        Subjective   Lashanda Qureshi is a 83 y.o. female    History of Present Illness  Patient for follow-up for cellulitis of left lower extremity.  She does continue to have some swelling in her left foot and is painful at times.  She has also struck the area couple times while walking.  Is not warm to the touch.  Patient does have sporadic lightning like pains down close to the ankle.  And otherwise only has pain when she hits it.  Pain is 2 out of 10 at times.    Asthma, patient does have dyspnea on exertion.  She is taking azelastine nasal spray, Flonase nasal spray, Symbicort.  Patient does have an albuterol inhaler for as needed use.  She does not currently takes Singulair.  Patient does definitely get short of breath with walking to her mailbox which is a building away and up a hill.  Sometimes have some dyspnea with just walking through the house.    Allergies   Allergen Reactions   • Antihistamines, Loratadine-Type Other (See Comments)     Drowsiness     • Erythromycin      Past Medical History:   Diagnosis Date   • Abnormal CT scan, lung    • Mycobacterium gordonae infection       Past Surgical History:   Procedure Laterality Date   • BLADDER SURGERY     • BRONCHOSCOPY  01/27/2016   • CHOLECYSTECTOMY     • COLON SURGERY     • DILATION AND CURETTAGE, DIAGNOSTIC / THERAPEUTIC     • TOTAL HIP ARTHROPLASTY      Left hip     Social History     Socioeconomic History   • Marital status:      Spouse name: Not on file   • Number of children: Not on file   • Years of education:  Not on file   • Highest education level: Not on file   Tobacco Use   • Smoking status: Former Smoker   • Smokeless tobacco: Never Used   • Tobacco comment: Quit 41 years ago   Substance and Sexual Activity   • Alcohol use: No   • Drug use: No   • Sexual activity: Defer     Comment:         Current Outpatient Medications:   •  azelastine (ASTEPRO) 0.15 % solution nasal spray, 2 sprays into the nostril(s) as directed by provider 2 (Two) Times a Day As Needed for Allergies., Disp: 1 each, Rfl: 11  •  Cholecalciferol (VITAMIN D3) 2000 UNITS tablet, Take  by mouth. Take 2 tablets daily., Disp: , Rfl:   •  Cyanocobalamin (VITAMIN B12 PO), Take  by mouth. Take 1 tablet daily as directed., Disp: , Rfl:   •  Elastic Bandages & Supports (FUTURO FIRM COMPRESSION HOSE) misc, 1 package Daily., Disp: 1 each, Rfl: 1  •  ESTRING 2 MG vaginal ring, Every 3 (Three) Months., Disp: , Rfl:   •  fluticasone (FLONASE) 50 MCG/ACT nasal spray, 2 sprays into the nostril(s) as directed by provider Daily. Administer 2 sprays in each nostril for each dose., Disp: 1 bottle, Rfl: 11  •  latanoprost (XALATAN) 0.005 % ophthalmic solution, Apply  to eye. Instill 1 drop into affected eye(s) once daily as directed., Disp: , Rfl:   •  magnesium oxide (MAGOX) 400 (241.3 MG) MG tablet tablet, Take 400 mg by mouth daily., Disp: , Rfl:   •  montelukast (SINGULAIR) 10 MG tablet, Take 1 tablet by mouth Every Night., Disp: 30 tablet, Rfl: 11  •  SYMBICORT 160-4.5 MCG/ACT inhaler, Inhale 2 puffs 2 (Two) Times a Day As Needed., Disp: , Rfl:   •  VENTOLIN  (90 Base) MCG/ACT inhaler, Inhale 2 puffs Every 6 (Six) Hours As Needed., Disp: , Rfl: 1  •  benzonatate (TESSALON) 100 MG capsule, Take 1 capsule by mouth 3 (Three) Times a Day As Needed for Cough., Disp: 30 capsule, Rfl: 0     Review of Systems   Constitutional: Negative for chills, fatigue and unexpected weight change.   Respiratory: Positive for cough ( better), shortness of breath and  wheezing (some). Negative for chest tightness.    Cardiovascular: Positive for leg swelling. Negative for chest pain and palpitations.   Gastrointestinal: Negative for constipation, diarrhea, nausea and vomiting.   Genitourinary: Negative for difficulty urinating and dysuria.   Skin: Positive for wound (left lower leg). Negative for color change and rash.   Neurological: Negative for dizziness, syncope, weakness and headaches.   Psychiatric/Behavioral: Positive for sleep disturbance (sporadic, some good, some not at all.).       Objective     Vitals:    05/14/19 1102   BP: 120/70   Pulse: 67   Temp: 98.1 °F (36.7 °C)   SpO2: 96%       Results for orders placed or performed in visit on 01/08/19   Comprehensive Metabolic Panel   Result Value Ref Range    Glucose 101 (H) 70 - 100 mg/dL    BUN 10 9 - 23 mg/dL    Creatinine 0.63 0.60 - 1.30 mg/dL    Sodium 141 132 - 146 mmol/L    Potassium 3.5 3.5 - 5.5 mmol/L    Chloride 107 99 - 109 mmol/L    CO2 25.0 20.0 - 31.0 mmol/L    Calcium 9.3 8.7 - 10.4 mg/dL    Total Protein 6.5 5.7 - 8.2 g/dL    Albumin 4.38 3.20 - 4.80 g/dL    ALT (SGPT) 18 7 - 40 U/L    AST (SGOT) 24 0 - 33 U/L    Alkaline Phosphatase 69 25 - 100 U/L    Total Bilirubin 0.7 0.3 - 1.2 mg/dL    eGFR Non African Amer 90 >60 mL/min/1.73    Globulin 2.1 gm/dL    A/G Ratio 2.1 1.5 - 2.5 g/dL    BUN/Creatinine Ratio 15.9 7.0 - 25.0    Anion Gap 9.0 3.0 - 11.0 mmol/L   Lipid Panel   Result Value Ref Range    Total Cholesterol 181 0 - 200 mg/dL    Triglycerides 66 0 - 150 mg/dL    HDL Cholesterol 110 (H) 40 - 60 mg/dL    LDL Cholesterol  61 0 - 130 mg/dL   Rheumatoid Factor   Result Value Ref Range    Rheumatoid Factor Qualitative Negative Negative   C-reactive Protein   Result Value Ref Range    C-Reactive Protein 0.61 0.00 - 1.00 mg/dL   Sedimentation Rate   Result Value Ref Range    Sed Rate 33 (H) 0 - 30 mm/hr       Physical Exam   Constitutional: She is oriented to person, place, and time. She appears  well-developed and well-nourished.   HENT:   Head: Normocephalic and atraumatic.   Cardiovascular: Normal rate and normal heart sounds. A regularly irregular rhythm present.   Pulmonary/Chest: Effort normal and breath sounds normal.   Musculoskeletal: She exhibits no edema.   Neurological: She is alert and oriented to person, place, and time.   Skin: Skin is warm and dry. There is erythema.   Psychiatric: She has a normal mood and affect. Her behavior is normal.   Vitals reviewed.      Assessment/Plan   Problem List Items Addressed This Visit        Respiratory    Moderate persistent asthma - Primary    Relevant Medications    montelukast (SINGULAIR) 10 MG tablet       Musculoskeletal and Integument    Venous stasis dermatitis of both lower extremities       Other    Insomnia      To continue her current medications for asthma and work and add singular 10 mg daily.Patient was encouraged to keep me informed of any acute changes, lack of improvement, or any new concerning symptoms.Plan of care discussed with pt. They verbalized understanding and agreement.     For lower extremity dermatitis-continue to watch for worsening signs or symptoms and try to not injure further the legs.Patient was encouraged to keep me informed of any acute changes, lack of improvement, or any new concerning symptoms.Plan of care discussed with pt. They verbalized understanding and agreement.     Insomnia we will try sleep hygiene.  Given a handout on this.    [facility]    Counseling provided on insomnia.    Leandro Johnson, CHARLES   5/14/2019   11:53 AM

## 2019-07-08 ENCOUNTER — OFFICE VISIT (OUTPATIENT)
Dept: FAMILY MEDICINE CLINIC | Facility: CLINIC | Age: 84
End: 2019-07-08

## 2019-07-08 VITALS
WEIGHT: 148 LBS | HEIGHT: 66 IN | OXYGEN SATURATION: 96 % | DIASTOLIC BLOOD PRESSURE: 80 MMHG | HEART RATE: 80 BPM | TEMPERATURE: 97.4 F | SYSTOLIC BLOOD PRESSURE: 140 MMHG | BODY MASS INDEX: 23.78 KG/M2

## 2019-07-08 DIAGNOSIS — R60.0 LOCALIZED EDEMA: ICD-10-CM

## 2019-07-08 DIAGNOSIS — M15.9 PRIMARY OSTEOARTHRITIS INVOLVING MULTIPLE JOINTS: ICD-10-CM

## 2019-07-08 DIAGNOSIS — J45.20 MILD INTERMITTENT ASTHMA WITHOUT COMPLICATION: Primary | ICD-10-CM

## 2019-07-08 DIAGNOSIS — J30.9 ALLERGIC RHINITIS, UNSPECIFIED SEASONALITY, UNSPECIFIED TRIGGER: ICD-10-CM

## 2019-07-08 DIAGNOSIS — R05.3 CHRONIC COUGH: ICD-10-CM

## 2019-07-08 DIAGNOSIS — I87.2 VENOUS STASIS DERMATITIS OF BOTH LOWER EXTREMITIES: ICD-10-CM

## 2019-07-08 PROCEDURE — 99214 OFFICE O/P EST MOD 30 MIN: CPT | Performed by: NURSE PRACTITIONER

## 2019-07-08 NOTE — PATIENT INSTRUCTIONS
"Osteoarthritis  Osteoarthritis is a type of arthritis that affects tissue that covers the ends of bones in joints (cartilage). Cartilage acts as a cushion between the bones and helps them move smoothly. Osteoarthritis results when cartilage in the joints gets worn down. Osteoarthritis is sometimes called \"wear and tear\" arthritis.  Osteoarthritis is the most common form of arthritis. It often occurs in older people. It is a condition that gets worse over time (a progressive condition). Joints that are most often affected by this condition are in:  · Fingers.  · Toes.  · Hips.  · Knees.  · Spine, including neck and lower back.    What are the causes?  This condition is caused by age-related wearing down of cartilage that covers the ends of bones.  What increases the risk?  The following factors may make you more likely to develop this condition:  · Older age.  · Being overweight or obese.  · Overuse of joints, such as in athletes.  · Past injury of a joint.  · Past surgery on a joint.  · Family history of osteoarthritis.    What are the signs or symptoms?  The main symptoms of this condition are pain, swelling, and stiffness in the joint. The joint may lose its shape over time. Small pieces of bone or cartilage may break off and float inside of the joint, which may cause more pain and damage to the joint. Small deposits of bone (osteophytes) may grow on the edges of the joint. Other symptoms may include:  · A grating or scraping feeling inside the joint when you move it.  · Popping or creaking sounds when you move.    Symptoms may affect one or more joints. Osteoarthritis in a major joint, such as your knee or hip, can make it painful to walk or exercise. If you have osteoarthritis in your hands, you might not be able to  items, twist your hand, or control small movements of your hands and fingers (fine motor skills).  How is this diagnosed?  This condition may be diagnosed based on:  · Your medical history.  · A " physical exam.  · Your symptoms.  · X-rays of the affected joint(s).  · Blood tests to rule out other types of arthritis.    How is this treated?  There is no cure for this condition, but treatment can help to control pain and improve joint function. Treatment plans may include:  · A prescribed exercise program that allows for rest and joint relief. You may work with a physical therapist.  · A weight control plan.  · Pain relief techniques, such as:  ? Applying heat and cold to the joint.  ? Electric pulses delivered to nerve endings under the skin (transcutaneous electrical nerve stimulation, or TENS).  ? Massage.  ? Certain nutritional supplements.  · NSAIDs or prescription medicines to help relieve pain.  · Medicine to help relieve pain and inflammation (corticosteroids). This can be given by mouth (orally) or as an injection.  · Assistive devices, such as a brace, wrap, splint, specialized glove, or cane.  · Surgery, such as:  ? An osteotomy. This is done to reposition the bones and relieve pain or to remove loose pieces of bone and cartilage.  ? Joint replacement surgery. You may need this surgery if you have very bad (advanced) osteoarthritis.    Follow these instructions at home:  Activity  · Rest your affected joints as directed by your health care provider.  · Do not drive or use heavy machinery while taking prescription pain medicine.  · Exercise as directed. Your health care provider or physical therapist may recommend specific types of exercise, such as:  ? Strengthening exercises. These are done to strengthen the muscles that support joints that are affected by arthritis. They can be performed with weights or with exercise bands to add resistance.  ? Aerobic activities. These are exercises, such as brisk walking or water aerobics, that get your heart pumping.  ? Range-of-motion activities. These keep your joints easy to move.  ? Balance and agility exercises.  Managing pain, stiffness, and  swelling  · If directed, apply heat to the affected area as often as told by your health care provider. Use the heat source that your health care provider recommends, such as a moist heat pack or a heating pad.  ? If you have a removable assistive device, remove it as told by your health care provider.  ? Place a towel between your skin and the heat source. If your health care provider tells you to keep the assistive device on while you apply heat, place a towel between the assistive device and the heat source.  ? Leave the heat on for 20-30 minutes.  ? Remove the heat if your skin turns bright red. This is especially important if you are unable to feel pain, heat, or cold. You may have a greater risk of getting burned.  · If directed, put ice on the affected joint:  ? If you have a removable assistive device, remove it as told by your health care provider.  ? Put ice in a plastic bag.  ? Place a towel between your skin and the bag. If your health care provider tells you to keep the assistive device on during icing, place a towel between the assistive device and the bag.  ? Leave the ice on for 20 minutes, 2-3 times a day.  General instructions  · Take over-the-counter and prescription medicines only as told by your health care provider.  · Maintain a healthy weight. Follow instructions from your health care provider for weight control. These may include dietary restrictions.  · Do not use any products that contain nicotine or tobacco, such as cigarettes and e-cigarettes. These can delay bone healing. If you need help quitting, ask your health care provider.  · Use assistive devices as directed by your health care provider.  · Keep all follow-up visits as told by your health care provider. This is important.  Where to find more information  · National [place] of Arthritis and Musculoskeletal and Skin Diseases: www.niams.nih.gov  · National [place] on Aging: www.gris.nih.gov  · American College of Rheumatology:  www.rheumatology.org  Contact a health care provider if:  · Your skin turns red.  · You develop a rash.  · You have pain that gets worse.  · You have a fever along with joint or muscle aches.  Get help right away if:  · You lose a lot of weight.  · You suddenly lose your appetite.  · You have night sweats.  Summary  · Osteoarthritis is a type of arthritis that affects tissue covering the ends of bones in joints (cartilage).  · This condition is caused by age-related wearing down of cartilage that covers the ends of bones.  · The main symptom of this condition is pain, swelling, and stiffness in the joint.  · There is no cure for this condition, but treatment can help to control pain and improve joint function.  This information is not intended to replace advice given to you by your health care provider. Make sure you discuss any questions you have with your health care provider.  Document Released: 12/18/2006 Document Revised: 09/24/2018 Document Reviewed: 08/21/2017  [employer] Interactive Patient Education © 2019 [employer]  Asthma, Adult  Asthma is a long-term (chronic) condition in which the airways get tight and narrow. The airways are the breathing passages that lead from the nose and mouth down into the lungs. A person with asthma will have times when symptoms get worse. These are called asthma attacks. They can cause coughing, whistling sounds when you breathe (wheezing), shortness of breath, and chest pain. They can make it hard to breathe. There is no cure for asthma, but medicines and lifestyle changes can help control it.  There are many things that can bring on an asthma attack or make asthma symptoms worse (triggers). Common triggers include:  · Mold.  · Dust.  · Cigarette smoke.  · Cockroaches.  · Things that can cause allergy symptoms (allergens). These include animal skin flakes (dander) and pollen from trees or grass.  · Things that pollute the air. These may include household , wood  smoke, smog, or chemical odors.  · Cold air, weather changes, and wind.  · Crying or laughing hard.  · Stress.  · Certain medicines or drugs.  · Certain foods such as dried fruit, potato chips, and grape juice.  · Infections, such as a cold or the flu.  · Certain medical conditions or diseases.  · Exercise or tiring activities.    Asthma may be treated with medicines and by staying away from the things that cause asthma attacks. Types of medicines may include:  · Controller medicines. These help prevent asthma symptoms. They are usually taken every day.  · Fast-acting reliever or rescue medicines. These quickly relieve asthma symptoms. They are used as needed and provide short-term relief.  · Allergy medicines if your attacks are brought on by allergens.  · Medicines to help control the body's defense (immune) system.    Follow these instructions at home:  Avoiding triggers in your home  · Change your heating and air conditioning filter often.  · Limit your use of fireplaces and wood stoves.  · Get rid of pests (such as roaches and mice) and their droppings.  · Throw away plants if you see mold on them.  · Clean your floors. Dust regularly. Use cleaning products that do not smell.  · Have someone vacuum when you are not home. Use a vacuum  with a HEPA filter if possible.  · Replace carpet with wood, tile, or vinyl diane. Carpet can trap animal skin flakes and dust.  · Use allergy-proof pillows, mattress covers, and box spring covers.  · Wash bed sheets and blankets every week in hot water. Dry them in a dryer.  · Keep your bedroom free of any triggers.   · Avoid pets and keep windows closed when things that cause allergy symptoms are in the air.  · Use blankets that are made of polyester or cotton.  · Clean bathrooms and loretta with bleach. If possible, have someone repaint the walls in these rooms with mold-resistant paint. Keep out of the rooms that are being cleaned and painted.  · Wash your hands  often with soap and water. If soap and water are not available, use hand .  · Do not allow anyone to smoke in your home.  General instructions  · Take over-the-counter and prescription medicines only as told by your doctor.  ? Talk with your doctor if you have questions about how or when to take your medicines.  ? Make note if you need to use your medicines more often than usual.  · Do not use any products that contain nicotine or tobacco, such as cigarettes and e-cigarettes. If you need help quitting, ask your doctor.  · Stay away from secondhand smoke.  · Avoid doing things outdoors when allergen counts are high and when air quality is low.  · Wear a ski mask when doing outdoor activities in the winter. The mask should cover your nose and mouth. Exercise indoors on cold days if you can.  · Warm up before you exercise. Take time to cool down after exercise.  · Use a peak flow meter as told by your doctor. A peak flow meter is a tool that measures how well the lungs are working.  · Keep track of the peak flow meter's readings. Write them down.  · Follow your asthma action plan. This is a written plan for taking care of your asthma and treating your attacks.  · Make sure you get all the shots (vaccines) that your doctor recommends. Ask your doctor about a flu shot and a pneumonia shot.  · Keep all follow-up visits as told by your doctor. This is important.  Contact a doctor if:  · You have wheezing, shortness of breath, or a cough even while taking medicine to prevent attacks.  · The mucus you cough up (sputum) is thicker than usual.  · The mucus you cough up changes from clear or white to yellow, green, gray, or bloody.  · You have problems from the medicine you are taking, such as:  ? A rash.  ? Itching.  ? Swelling.  ? Trouble breathing.  · You need reliever medicines more than 2-3 times a week.  · Your peak flow reading is still at 50-79% of your personal best after following the action plan for 1  hour.  · You have a fever.  Get help right away if:  · You seem to be worse and are not responding to medicine during an asthma attack.  · You are short of breath even at rest.  · You get short of breath when doing very little activity.  · You have trouble eating, drinking, or talking.  · You have chest pain or tightness.  · You have a fast heartbeat.  · Your lips or fingernails start to turn blue.  · You are light-headed or dizzy, or you faint.  · Your peak flow is less than 50% of your personal best.  · You feel too tired to breathe normally.  Summary  · Asthma is a long-term (chronic) condition in which the airways get tight and narrow. An asthma attack can make it hard to breathe.  · Asthma cannot be cured, but medicines and lifestyle changes can help control it.  · Make sure you understand how to avoid triggers and how and when to use your medicines.  This information is not intended to replace advice given to you by your health care provider. Make sure you discuss any questions you have with your health care provider.  Document Released: 06/05/2009 Document Revised: 01/22/2018 Document Reviewed: 01/22/2018  [place] Interactive Patient Education © 2019 [employer]

## 2019-11-14 ENCOUNTER — TELEPHONE (OUTPATIENT)
Dept: FAMILY MEDICINE CLINIC | Facility: CLINIC | Age: 84
End: 2019-11-14

## 2019-11-14 NOTE — TELEPHONE ENCOUNTER
PER FOZIA WITH [facility] IS REQUESTING A VERBAL  ORDER FOR HH EVALUATION PER PT.    PLEASE CALL BACK TO [facility].      [phone]

## 2019-11-15 ENCOUNTER — OFFICE VISIT (OUTPATIENT)
Dept: FAMILY MEDICINE CLINIC | Facility: CLINIC | Age: 84
End: 2019-11-15

## 2019-11-15 VITALS
WEIGHT: 138.4 LBS | DIASTOLIC BLOOD PRESSURE: 80 MMHG | HEART RATE: 79 BPM | SYSTOLIC BLOOD PRESSURE: 140 MMHG | HEIGHT: 66 IN | BODY MASS INDEX: 22.24 KG/M2 | OXYGEN SATURATION: 97 %

## 2019-11-15 DIAGNOSIS — I10 ESSENTIAL HYPERTENSION: Primary | ICD-10-CM

## 2019-11-15 DIAGNOSIS — R00.2 PALPITATIONS: ICD-10-CM

## 2019-11-15 PROCEDURE — 99213 OFFICE O/P EST LOW 20 MIN: CPT | Performed by: NURSE PRACTITIONER

## 2019-11-15 PROCEDURE — 93000 ELECTROCARDIOGRAM COMPLETE: CPT | Performed by: NURSE PRACTITIONER

## 2019-11-15 NOTE — PROGRESS NOTES
"Subjective   Lashanda Qureshi is a 84 y.o. female.   Chief Complaint   Patient presents with   • Hypertension       History of Present Illness   Moved a month and a half ago, a visiting nurse checked her BP, was 170/82 on Wednesday. Has increased walking, but has been eating more salt, has hx of white coat syndrome. Was put on a medication several years ago, then BP started being too low. When she monitored BP at home, has been normal. States she has noticed palpitation at night for the past couple of years, no recent ECG. Overall feels well, denies chest pain, headache, or dizziness when BP was elevated. She does have some chronic shortness of breath, she says from asthma.   Is still adjusting to new home, had concerns this morning because she had to think about how to get to this office from there. Used her map and had no problem.   Pessary cleaned 11/5/19 by GYN, they did not have the estrogen to put in with it, has had some \"irritation\". Urine test was negative. No new symptoms.  The following portions of the patient's history were reviewed and updated as appropriate: allergies, current medications, past family history, past medical history, past social history, past surgical history and problem list.    Review of Systems   Eyes: Negative for visual disturbance.   Respiratory: Positive for shortness of breath (asthma). Negative for wheezing.    Cardiovascular: Positive for palpitations (off and on for a few years.). Negative for chest pain and leg swelling.   Genitourinary: Negative for difficulty urinating and dysuria.   Neurological: Positive for dizziness (occ, if walks with glasses on). Negative for headaches.   Psychiatric/Behavioral: Negative for sleep disturbance.       Objective   Physical Exam   Constitutional: She is oriented to person, place, and time. She appears well-developed and well-nourished. No distress.   HENT:   Head: Normocephalic and atraumatic.   Right Ear: External ear normal.   Left Ear: " External ear normal.   Cardiovascular: Normal rate, regular rhythm, normal heart sounds and intact distal pulses.   No murmur heard.  Pulmonary/Chest: Effort normal and breath sounds normal. No stridor. No respiratory distress. She has no wheezes. She has no rales.   Neurological: She is alert and oriented to person, place, and time. No cranial nerve deficit.   Skin: Skin is warm and dry. She is not diaphoretic.   Psychiatric: She has a normal mood and affect. Her behavior is normal. Thought content normal.   Vitals reviewed.      Assessment/Plan   Lashanda was seen today for hypertension.    Diagnoses and all orders for this visit:    Essential hypertension    Palpitations    Other orders  -     ECG 12 Lead            ECG 12 Lead  Date/Time: 11/15/2019 4:06 PM  Performed by: Yesenia Zayas APRN  Authorized by: Yesenia Zayas APRN   Comparison: not compared with previous ECG   Rhythm: sinus rhythm  Ectopy: infrequent PVCs  Rate: normal  BPM: 75  Conduction: incomplete right bundle branch block    Clinical impression: abnormal EKG  Comments: Marked left axis deviation        I reviewed report for stress test 08/17/15: incomplete right heart block noted, occasional PVC; resting /90 with peak pressure 146/80. Similar to results of ECG today.  Blood pressure today similar to baseline  Advised DASH diet, monitor BP at home if cuff available. Follow up with Leandro Johnson next week.  Patient was encouraged to keep me informed of any acute changes, lack of improvement, or any new concerning symptoms.

## 2019-11-15 NOTE — TELEPHONE ENCOUNTER
April from [facility] called back regarding home health order for pt, advised that Leandro was out this week; she understood and will call back on Monday

## 2019-11-15 NOTE — PATIENT INSTRUCTIONS
"DASH Eating Plan  DASH stands for \"Dietary Approaches to Stop Hypertension.\" The DASH eating plan is a healthy eating plan that has been shown to reduce high blood pressure (hypertension). It may also reduce your risk for type 2 diabetes, heart disease, and stroke. The DASH eating plan may also help with weight loss.  What are tips for following this plan?    General guidelines  · Avoid eating more than 2,300 mg (milligrams) of salt (sodium) a day. If you have hypertension, you may need to reduce your sodium intake to 1,500 mg a day.  · Limit alcohol intake to no more than 1 drink a day for nonpregnant women and 2 drinks a day for men. One drink equals 12 oz of beer, 5 oz of wine, or 1½ oz of hard liquor.  · Work with your health care provider to maintain a healthy body weight or to lose weight. Ask what an ideal weight is for you.  · Get at least 30 minutes of exercise that causes your heart to beat faster (aerobic exercise) most days of the week. Activities may include walking, swimming, or biking.  · Work with your health care provider or diet and nutrition specialist (dietitian) to adjust your eating plan to your individual calorie needs.  Reading food labels    · Check food labels for the amount of sodium per serving. Choose foods with less than 5 percent of the Daily Value of sodium. Generally, foods with less than 300 mg of sodium per serving fit into this eating plan.  · To find whole grains, look for the word \"whole\" as the first word in the ingredient list.  Shopping  · Buy products labeled as \"low-sodium\" or \"no salt added.\"  · Buy fresh foods. Avoid canned foods and premade or frozen meals.  Cooking  · Avoid adding salt when cooking. Use salt-free seasonings or herbs instead of table salt or sea salt. Check with your health care provider or pharmacist before using salt substitutes.  · Do not branham foods. Cook foods using healthy methods such as baking, boiling, grilling, and broiling instead.  · Cook with " heart-healthy oils, such as olive, canola, soybean, or sunflower oil.  Meal planning  · Eat a balanced diet that includes:  ? 5 or more servings of fruits and vegetables each day. At each meal, try to fill half of your plate with fruits and vegetables.  ? Up to 6-8 servings of whole grains each day.  ? Less than 6 oz of lean meat, poultry, or fish each day. A 3-oz serving of meat is about the same size as a deck of cards. One egg equals 1 oz.  ? 2 servings of low-fat dairy each day.  ? A serving of nuts, seeds, or beans 5 times each week.  ? Heart-healthy fats. Healthy fats called Omega-3 fatty acids are found in foods such as flaxseeds and coldwater fish, like sardines, salmon, and mackerel.  · Limit how much you eat of the following:  ? Canned or prepackaged foods.  ? Food that is high in trans fat, such as fried foods.  ? Food that is high in saturated fat, such as fatty meat.  ? Sweets, desserts, sugary drinks, and other foods with added sugar.  ? Full-fat dairy products.  · Do not salt foods before eating.  · Try to eat at least 2 vegetarian meals each week.  · Eat more home-cooked food and less restaurant, buffet, and fast food.  · When eating at a restaurant, ask that your food be prepared with less salt or no salt, if possible.  What foods are recommended?  The items listed may not be a complete list. Talk with your dietitian about what dietary choices are best for you.  Grains  Whole-grain or whole-wheat bread. Whole-grain or whole-wheat pasta. Brown rice. Oatmeal. Quinoa. Bulgur. Whole-grain and low-sodium cereals. Meg bread. Low-fat, low-sodium crackers. Whole-wheat flour tortillas.  Vegetables  Fresh or frozen vegetables (raw, steamed, roasted, or grilled). Low-sodium or reduced-sodium tomato and vegetable juice. Low-sodium or reduced-sodium tomato sauce and tomato paste. Low-sodium or reduced-sodium canned vegetables.  Fruits  All fresh, dried, or frozen fruit. Canned fruit in natural juice (without  added sugar).  Meat and other protein foods  Skinless chicken or turkey. Ground chicken or turkey. Pork with fat trimmed off. Fish and seafood. Egg whites. Dried beans, peas, or lentils. Unsalted nuts, nut butters, and seeds. Unsalted canned beans. Lean cuts of beef with fat trimmed off. Low-sodium, lean deli meat.  Dairy  Low-fat (1%) or fat-free (skim) milk. Fat-free, low-fat, or reduced-fat cheeses. Nonfat, low-sodium ricotta or cottage cheese. Low-fat or nonfat yogurt. Low-fat, low-sodium cheese.  Fats and oils  Soft margarine without trans fats. Vegetable oil. Low-fat, reduced-fat, or light mayonnaise and salad dressings (reduced-sodium). Canola, safflower, olive, soybean, and sunflower oils. Avocado.  Seasoning and other foods  Herbs. Spices. Seasoning mixes without salt. Unsalted popcorn and pretzels. Fat-free sweets.  What foods are not recommended?  The items listed may not be a complete list. Talk with your dietitian about what dietary choices are best for you.  Grains  Baked goods made with fat, such as croissants, muffins, or some breads. Dry pasta or rice meal packs.  Vegetables  Creamed or fried vegetables. Vegetables in a cheese sauce. Regular canned vegetables (not low-sodium or reduced-sodium). Regular canned tomato sauce and paste (not low-sodium or reduced-sodium). Regular tomato and vegetable juice (not low-sodium or reduced-sodium). Pickles. Olives.  Fruits  Canned fruit in a light or heavy syrup. Fried fruit. Fruit in cream or butter sauce.  Meat and other protein foods  Fatty cuts of meat. Ribs. Fried meat. Ramsay. Sausage. Bologna and other processed lunch meats. Salami. Fatback. Hotdogs. Bratwurst. Salted nuts and seeds. Canned beans with added salt. Canned or smoked fish. Whole eggs or egg yolks. Chicken or turkey with skin.  Dairy  Whole or 2% milk, cream, and half-and-half. Whole or full-fat cream cheese. Whole-fat or sweetened yogurt. Full-fat cheese. Nondairy creamers. Whipped toppings.  Processed cheese and cheese spreads.  Fats and oils  Butter. Stick margarine. Lard. Shortening. Ghee. Ramasy fat. Tropical oils, such as coconut, palm kernel, or palm oil.  Seasoning and other foods  Salted popcorn and pretzels. Onion salt, garlic salt, seasoned salt, table salt, and sea salt. Worcestershire sauce. Tartar sauce. Barbecue sauce. Teriyaki sauce. Soy sauce, including reduced-sodium. Steak sauce. Canned and packaged gravies. Fish sauce. Oyster sauce. Cocktail sauce. Horseradish that you find on the shelf. Ketchup. Mustard. Meat flavorings and tenderizers. Bouillon cubes. Hot sauce and Tabasco sauce. Premade or packaged marinades. Premade or packaged taco seasonings. Relishes. Regular salad dressings.  Where to find more information:  · National Heart, Lung, and Blood [place]: www.nhlbi.nih.gov  · American Heart Association: www.heart.org  Summary  · The DASH eating plan is a healthy eating plan that has been shown to reduce high blood pressure (hypertension). It may also reduce your risk for type 2 diabetes, heart disease, and stroke.  · With the DASH eating plan, you should limit salt (sodium) intake to 2,300 mg a day. If you have hypertension, you may need to reduce your sodium intake to 1,500 mg a day.  · When on the DASH eating plan, aim to eat more fresh fruits and vegetables, whole grains, lean proteins, low-fat dairy, and heart-healthy fats.  · Work with your health care provider or diet and nutrition specialist (dietitian) to adjust your eating plan to your individual calorie needs.  This information is not intended to replace advice given to you by your health care provider. Make sure you discuss any questions you have with your health care provider.  Document Released: 12/06/2012 Document Revised: 12/11/2017 Document Reviewed: 12/11/2017  [employer] Interactive Patient Education © 2019 [employer]

## 2019-11-18 ENCOUNTER — OFFICE VISIT (OUTPATIENT)
Dept: FAMILY MEDICINE CLINIC | Facility: CLINIC | Age: 84
End: 2019-11-18

## 2019-11-18 ENCOUNTER — TELEPHONE (OUTPATIENT)
Dept: FAMILY MEDICINE CLINIC | Facility: CLINIC | Age: 84
End: 2019-11-18

## 2019-11-18 VITALS
WEIGHT: 136 LBS | BODY MASS INDEX: 21.86 KG/M2 | TEMPERATURE: 97.4 F | DIASTOLIC BLOOD PRESSURE: 80 MMHG | HEIGHT: 66 IN | SYSTOLIC BLOOD PRESSURE: 120 MMHG

## 2019-11-18 DIAGNOSIS — R31.0 GROSS HEMATURIA: ICD-10-CM

## 2019-11-18 DIAGNOSIS — I10 ESSENTIAL HYPERTENSION: Primary | ICD-10-CM

## 2019-11-18 LAB
BILIRUB BLD-MCNC: NEGATIVE MG/DL
CLARITY, POC: CLEAR
COLOR UR: ABNORMAL
GLUCOSE UR STRIP-MCNC: NEGATIVE MG/DL
KETONES UR QL: ABNORMAL
LEUKOCYTE EST, POC: ABNORMAL
NITRITE UR-MCNC: NEGATIVE MG/ML
PH UR: 5.5 [PH] (ref 5–8)
PROT UR STRIP-MCNC: ABNORMAL MG/DL
RBC # UR STRIP: ABNORMAL /UL
SP GR UR: 1.02 (ref 1–1.03)
UROBILINOGEN UR QL: NORMAL

## 2019-11-18 PROCEDURE — 81003 URINALYSIS AUTO W/O SCOPE: CPT | Performed by: NURSE PRACTITIONER

## 2019-11-18 PROCEDURE — 99214 OFFICE O/P EST MOD 30 MIN: CPT | Performed by: NURSE PRACTITIONER

## 2019-11-18 NOTE — PROGRESS NOTES
Chief Complaint   Patient presents with   • Hypertension       Subjective   Lashanda Qureshi is a 84 y.o. female    History of Present Illness  Patient in for follow-up on blood pressure.  Seen 11/15/2019 here in our office and had elevated blood pressure at that time.  Her blood pressure was checked by nurse and was noted be 170/82 on the prior Wednesday of that visit.  She actually has even lost weight.  It was 143 in May of this year and now she is down to 136.  She has recently moved to an senior living area and has been eating more salt since there.    In the last 3 weeks she has had some episodes of hematuria.  He did see GYN thought it may be coming from a pessary.  It was not determined to be coming from the pessary at that time.  She does have a little bit of dysuria.  They did put her on an antibiotic, Macrobid, at that time though.  Within a day and a half of being on the antibiotic her hematuria improved.  She did have 2 UAs at her GYNs office and they were both negative for bacteria. Pt does have chronic back pain with standing.      Allergies   Allergen Reactions   • Antihistamines, Loratadine-Type Other (See Comments)     Drowsiness     • Erythromycin      Past Medical History:   Diagnosis Date   • Abnormal CT scan, lung    • Mycobacterium gordonae infection       Past Surgical History:   Procedure Laterality Date   • BLADDER SURGERY     • BRONCHOSCOPY  01/27/2016   • CHOLECYSTECTOMY     • COLON SURGERY     • DILATION AND CURETTAGE, DIAGNOSTIC / THERAPEUTIC     • REFRACTIVE SURGERY  04/2019   • TOTAL HIP ARTHROPLASTY      Left hip     Social History     Socioeconomic History   • Marital status:      Spouse name: Not on file   • Number of children: Not on file   • Years of education: Not on file   • Highest education level: Not on file   Tobacco Use   • Smoking status: Former Smoker   • Smokeless tobacco: Never Used   • Tobacco comment: Quit 41 years ago   Substance and Sexual Activity   • Alcohol  use: No   • Drug use: No   • Sexual activity: Defer     Comment:         Current Outpatient Medications:   •  azelastine (ASTEPRO) 0.15 % solution nasal spray, 2 sprays into the nostril(s) as directed by provider 2 (Two) Times a Day As Needed for Allergies., Disp: 1 each, Rfl: 11  •  Cholecalciferol (VITAMIN D3) 2000 UNITS tablet, Take  by mouth. Take 2 tablets daily., Disp: , Rfl:   •  Cyanocobalamin (VITAMIN B12 PO), Take  by mouth. Take 1 tablet daily as directed., Disp: , Rfl:   •  Elastic Bandages & Supports (FUTURO FIRM COMPRESSION HOSE) misc, 1 package Daily., Disp: 1 each, Rfl: 1  •  ESTROGENS CONJ SYNTHETIC B PO,         Compound Bi-Est 20-20 Topical  (Estriol 80% + Estradiol 20%) apply a  pea sized amt to the vulva 2-3 nights per week, Disp: , Rfl:   •  latanoprost (XALATAN) 0.005 % ophthalmic solution, Apply  to eye. Instill 1 drop into affected eye(s) once daily as directed., Disp: , Rfl:   •  magnesium oxide (MAGOX) 400 (241.3 MG) MG tablet tablet, Take 400 mg by mouth daily., Disp: , Rfl:   •  montelukast (SINGULAIR) 10 MG tablet, Take 1 tablet by mouth Every Night., Disp: 30 tablet, Rfl: 11  •  fluticasone (FLONASE) 50 MCG/ACT nasal spray, 2 sprays into the nostril(s) as directed by provider Daily. Administer 2 sprays in each nostril for each dose., Disp: 1 bottle, Rfl: 11  •  VENTOLIN  (90 Base) MCG/ACT inhaler, Inhale 2 puffs Every 6 (Six) Hours As Needed., Disp: , Rfl: 1     Review of Systems   Constitutional: Negative for chills, fatigue and unexpected weight change.   Respiratory: Negative for cough, chest tightness, shortness of breath and wheezing.    Cardiovascular: Negative for chest pain, palpitations and leg swelling.   Gastrointestinal: Negative for constipation, diarrhea, nausea and vomiting.   Genitourinary: Positive for dysuria and hematuria. Negative for difficulty urinating.   Skin: Negative for color change and rash.   Neurological: Negative for dizziness, syncope,  weakness and headaches.   Psychiatric/Behavioral: Negative for sleep disturbance.       Objective     Vitals:    11/18/19 0951   BP: 120/80   Temp: 97.4 °F (36.3 °C)       Results for orders placed or performed in visit on 11/18/19   POC Urinalysis Dipstick, Automated   Result Value Ref Range    Color Straw Yellow, Straw, Dark Yellow, Alejandra    Clarity, UA Clear Clear    Specific Gravity  1.025 1.005 - 1.030    pH, Urine 5.5 5.0 - 8.0    Leukocytes Large (3+) (A) Negative    Nitrite, UA Negative Negative    Protein, POC Trace (A) Negative mg/dL    Glucose, UA Negative Negative, 1000 mg/dL (3+) mg/dL    Ketones, UA 1+ (A) Negative    Urobilinogen, UA Normal Normal    Bilirubin Negative Negative    Blood, UA 3+ (A) Negative       Physical Exam   Constitutional: She is oriented to person, place, and time. She appears well-developed and well-nourished.   HENT:   Head: Normocephalic and atraumatic.   Cardiovascular: Normal rate, regular rhythm and normal heart sounds.   Pulmonary/Chest: Effort normal and breath sounds normal.   Musculoskeletal: She exhibits no edema.   Neurological: She is alert and oriented to person, place, and time.   Skin: Skin is warm and dry.   Psychiatric: She has a normal mood and affect. Her behavior is normal.   Vitals reviewed.      Assessment/Plan   Problem List Items Addressed This Visit        Cardiovascular and Mediastinum    Hypertension - Primary      Other Visit Diagnoses     Gross hematuria        Relevant Orders    Ambulatory Referral to Urology    POC Urinalysis Dipstick, Automated (Completed)      For her blood pressure I recommend she get a blood pressure cuff to check her blood pressure daily and record it. Patient instructed to check blood pressure daily, record, and bring to next visit. If the readings run 150/90 or greater, the patient is to call my office or return sooner for evaluation. Pt advised to eat a heart healthy diet and get regular aerobic exercise.    For her gross  hematuria we will recheck a urinalysis today but I am going go ahead and refer her to urology just to make certain there is nothing else going on here. Patient was encouraged to keep me informed of any acute changes, lack of improvement, or any new concerning symptoms.  If patient becomes concerned, or has any worsening symptoms, they are to report either here, urgent treatment, or emergency room. Plan of care discussed with pt. They verbalized understanding and agreement.     RV-Jan for wellness or in 2 wks if BP remains elevated at home.     Counseling provided on hypertension.    Leandro Johnson, APRN   11/18/2019   4:45 PM

## 2019-11-18 NOTE — TELEPHONE ENCOUNTER
April with [facility] requested the last office note faxed to #[phone].    April can be reached [phone] with questions.

## 2019-11-18 NOTE — PATIENT INSTRUCTIONS
"Managing Your Hypertension  Hypertension is commonly called high blood pressure. This is when the force of your blood pressing against the walls of your arteries is too strong. Arteries are blood vessels that carry blood from your heart throughout your body. Hypertension forces the heart to work harder to pump blood, and may cause the arteries to become narrow or stiff. Having untreated or uncontrolled hypertension can cause heart attack, stroke, kidney disease, and other problems.  What are blood pressure readings?  A blood pressure reading consists of a higher number over a lower number. Ideally, your blood pressure should be below 120/80. The first (\"top\") number is called the systolic pressure. It is a measure of the pressure in your arteries as your heart beats. The second (\"bottom\") number is called the diastolic pressure. It is a measure of the pressure in your arteries as the heart relaxes.  What does my blood pressure reading mean?  Blood pressure is classified into four stages. Based on your blood pressure reading, your health care provider may use the following stages to determine what type of treatment you need, if any. Systolic pressure and diastolic pressure are measured in a unit called mm Hg.  Normal  · Systolic pressure: below 120.  · Diastolic pressure: below 80.  Elevated  · Systolic pressure: 120-129.  · Diastolic pressure: below 80.  Hypertension stage 1  · Systolic pressure: 130-139.  · Diastolic pressure: 80-89.  Hypertension stage 2  · Systolic pressure: 140 or above.  · Diastolic pressure: 90 or above.  What health risks are associated with hypertension?  Managing your hypertension is an important responsibility. Uncontrolled hypertension can lead to:  · A heart attack.  · A stroke.  · A weakened blood vessel (aneurysm).  · Heart failure.  · Kidney damage.  · Eye damage.  · Metabolic syndrome.  · Memory and concentration problems.  What changes can I make to manage my " hypertension?  Hypertension can be managed by making lifestyle changes and possibly by taking medicines. Your health care provider will help you make a plan to bring your blood pressure within a normal range.  Eating and drinking    · Eat a diet that is high in fiber and potassium, and low in salt (sodium), added sugar, and fat. An example eating plan is called the DASH (Dietary Approaches to Stop Hypertension) diet. To eat this way:  ? Eat plenty of fresh fruits and vegetables. Try to fill half of your plate at each meal with fruits and vegetables.  ? Eat whole grains, such as whole wheat pasta, brown rice, or whole grain bread. Fill about one quarter of your plate with whole grains.  ? Eat low-fat diary products.  ? Avoid fatty cuts of meat, processed or cured meats, and poultry with skin. Fill about one quarter of your plate with lean proteins such as fish, chicken without skin, beans, eggs, and tofu.  ? Avoid premade and processed foods. These tend to be higher in sodium, added sugar, and fat.  · Reduce your daily sodium intake. Most people with hypertension should eat less than 1,500 mg of sodium a day.  · Limit alcohol intake to no more than 1 drink a day for nonpregnant women and 2 drinks a day for men. One drink equals 12 oz of beer, 5 oz of wine, or 1½ oz of hard liquor.  Lifestyle  · Work with your health care provider to maintain a healthy body weight, or to lose weight. Ask what an ideal weight is for you.  · Get at least 30 minutes of exercise that causes your heart to beat faster (aerobic exercise) most days of the week. Activities may include walking, swimming, or biking.  · Include exercise to strengthen your muscles (resistance exercise), such as weight lifting, as part of your weekly exercise routine. Try to do these types of exercises for 30 minutes at least 3 days a week.  · Do not use any products that contain nicotine or tobacco, such as cigarettes and e-cigarettes. If you need help quitting,  ask your health care provider.  · Control any long-term (chronic) conditions you have, such as high cholesterol or diabetes.  Monitoring  · Monitor your blood pressure at home as told by your health care provider. Your personal target blood pressure may vary depending on your medical conditions, your age, and other factors.  · Have your blood pressure checked regularly, as often as told by your health care provider.  Working with your health care provider  · Review all the medicines you take with your health care provider because there may be side effects or interactions.  · Talk with your health care provider about your diet, exercise habits, and other lifestyle factors that may be contributing to hypertension.  · Visit your health care provider regularly. Your health care provider can help you create and adjust your plan for managing hypertension.  Will I need medicine to control my blood pressure?  Your health care provider may prescribe medicine if lifestyle changes are not enough to get your blood pressure under control, and if:  · Your systolic blood pressure is 130 or higher.  · Your diastolic blood pressure is 80 or higher.  Take medicines only as told by your health care provider. Follow the directions carefully. Blood pressure medicines must be taken as prescribed. The medicine does not work as well when you skip doses. Skipping doses also puts you at risk for problems.  Contact a health care provider if:  · You think you are having a reaction to medicines you have taken.  · You have repeated (recurrent) headaches.  · You feel dizzy.  · You have swelling in your ankles.  · You have trouble with your vision.  Get help right away if:  · You develop a severe headache or confusion.  · You have unusual weakness or numbness, or you feel faint.  · You have severe pain in your chest or abdomen.  · You vomit repeatedly.  · You have trouble breathing.  Summary  · Hypertension is when the force of blood pumping  "through your arteries is too strong. If this condition is not controlled, it may put you at risk for serious complications.  · Your personal target blood pressure may vary depending on your medical conditions, your age, and other factors. For most people, a normal blood pressure is less than 120/80.  · Hypertension is managed by lifestyle changes, medicines, or both. Lifestyle changes include weight loss, eating a healthy, low-sodium diet, exercising more, and limiting alcohol.  This information is not intended to replace advice given to you by your health care provider. Make sure you discuss any questions you have with your health care provider.  Document Released: 09/11/2013 Document Revised: 11/15/2017 Document Reviewed: 11/15/2017  [employer] Interactive Patient Education © 2019 [employer]  DASH Eating Plan  DASH stands for \"Dietary Approaches to Stop Hypertension.\" The DASH eating plan is a healthy eating plan that has been shown to reduce high blood pressure (hypertension). It may also reduce your risk for type 2 diabetes, heart disease, and stroke. The DASH eating plan may also help with weight loss.  What are tips for following this plan?    General guidelines  · Avoid eating more than 2,300 mg (milligrams) of salt (sodium) a day. If you have hypertension, you may need to reduce your sodium intake to 1,500 mg a day.  · Limit alcohol intake to no more than 1 drink a day for nonpregnant women and 2 drinks a day for men. One drink equals 12 oz of beer, 5 oz of wine, or 1½ oz of hard liquor.  · Work with your health care provider to maintain a healthy body weight or to lose weight. Ask what an ideal weight is for you.  · Get at least 30 minutes of exercise that causes your heart to beat faster (aerobic exercise) most days of the week. Activities may include walking, swimming, or biking.  · Work with your health care provider or diet and nutrition specialist (dietitian) to adjust your eating plan to your " "individual calorie needs.  Reading food labels    · Check food labels for the amount of sodium per serving. Choose foods with less than 5 percent of the Daily Value of sodium. Generally, foods with less than 300 mg of sodium per serving fit into this eating plan.  · To find whole grains, look for the word \"whole\" as the first word in the ingredient list.  Shopping  · Buy products labeled as \"low-sodium\" or \"no salt added.\"  · Buy fresh foods. Avoid canned foods and premade or frozen meals.  Cooking  · Avoid adding salt when cooking. Use salt-free seasonings or herbs instead of table salt or sea salt. Check with your health care provider or pharmacist before using salt substitutes.  · Do not branham foods. Cook foods using healthy methods such as baking, boiling, grilling, and broiling instead.  · Cook with heart-healthy oils, such as olive, canola, soybean, or sunflower oil.  Meal planning  · Eat a balanced diet that includes:  ? 5 or more servings of fruits and vegetables each day. At each meal, try to fill half of your plate with fruits and vegetables.  ? Up to 6-8 servings of whole grains each day.  ? Less than 6 oz of lean meat, poultry, or fish each day. A 3-oz serving of meat is about the same size as a deck of cards. One egg equals 1 oz.  ? 2 servings of low-fat dairy each day.  ? A serving of nuts, seeds, or beans 5 times each week.  ? Heart-healthy fats. Healthy fats called Omega-3 fatty acids are found in foods such as flaxseeds and coldwater fish, like sardines, salmon, and mackerel.  · Limit how much you eat of the following:  ? Canned or prepackaged foods.  ? Food that is high in trans fat, such as fried foods.  ? Food that is high in saturated fat, such as fatty meat.  ? Sweets, desserts, sugary drinks, and other foods with added sugar.  ? Full-fat dairy products.  · Do not salt foods before eating.  · Try to eat at least 2 vegetarian meals each week.  · Eat more home-cooked food and less restaurant, " buffet, and fast food.  · When eating at a restaurant, ask that your food be prepared with less salt or no salt, if possible.  What foods are recommended?  The items listed may not be a complete list. Talk with your dietitian about what dietary choices are best for you.  Grains  Whole-grain or whole-wheat bread. Whole-grain or whole-wheat pasta. Brown rice. Oatmeal. Quinoa. Bulgur. Whole-grain and low-sodium cereals. Meg bread. Low-fat, low-sodium crackers. Whole-wheat flour tortillas.  Vegetables  Fresh or frozen vegetables (raw, steamed, roasted, or grilled). Low-sodium or reduced-sodium tomato and vegetable juice. Low-sodium or reduced-sodium tomato sauce and tomato paste. Low-sodium or reduced-sodium canned vegetables.  Fruits  All fresh, dried, or frozen fruit. Canned fruit in natural juice (without added sugar).  Meat and other protein foods  Skinless chicken or turkey. Ground chicken or turkey. Pork with fat trimmed off. Fish and seafood. Egg whites. Dried beans, peas, or lentils. Unsalted nuts, nut butters, and seeds. Unsalted canned beans. Lean cuts of beef with fat trimmed off. Low-sodium, lean deli meat.  Dairy  Low-fat (1%) or fat-free (skim) milk. Fat-free, low-fat, or reduced-fat cheeses. Nonfat, low-sodium ricotta or cottage cheese. Low-fat or nonfat yogurt. Low-fat, low-sodium cheese.  Fats and oils  Soft margarine without trans fats. Vegetable oil. Low-fat, reduced-fat, or light mayonnaise and salad dressings (reduced-sodium). Canola, safflower, olive, soybean, and sunflower oils. Avocado.  Seasoning and other foods  Herbs. Spices. Seasoning mixes without salt. Unsalted popcorn and pretzels. Fat-free sweets.  What foods are not recommended?  The items listed may not be a complete list. Talk with your dietitian about what dietary choices are best for you.  Grains  Baked goods made with fat, such as croissants, muffins, or some breads. Dry pasta or rice meal packs.  Vegetables  Creamed or fried  vegetables. Vegetables in a cheese sauce. Regular canned vegetables (not low-sodium or reduced-sodium). Regular canned tomato sauce and paste (not low-sodium or reduced-sodium). Regular tomato and vegetable juice (not low-sodium or reduced-sodium). Pickles. Olives.  Fruits  Canned fruit in a light or heavy syrup. Fried fruit. Fruit in cream or butter sauce.  Meat and other protein foods  Fatty cuts of meat. Ribs. Fried meat. Ramsay. Sausage. Bologna and other processed lunch meats. Salami. Fatback. Hotdogs. Bratwurst. Salted nuts and seeds. Canned beans with added salt. Canned or smoked fish. Whole eggs or egg yolks. Chicken or turkey with skin.  Dairy  Whole or 2% milk, cream, and half-and-half. Whole or full-fat cream cheese. Whole-fat or sweetened yogurt. Full-fat cheese. Nondairy creamers. Whipped toppings. Processed cheese and cheese spreads.  Fats and oils  Butter. Stick margarine. Lard. Shortening. Ghee. Ramsay fat. Tropical oils, such as coconut, palm kernel, or palm oil.  Seasoning and other foods  Salted popcorn and pretzels. Onion salt, garlic salt, seasoned salt, table salt, and sea salt. Worcestershire sauce. Tartar sauce. Barbecue sauce. Teriyaki sauce. Soy sauce, including reduced-sodium. Steak sauce. Canned and packaged gravies. Fish sauce. Oyster sauce. Cocktail sauce. Horseradish that you find on the shelf. Ketchup. Mustard. Meat flavorings and tenderizers. Bouillon cubes. Hot sauce and Tabasco sauce. Premade or packaged marinades. Premade or packaged taco seasonings. Relishes. Regular salad dressings.  Where to find more information:  · National Heart, [employer], [employer] [place]: www.nhlbi.nih.gov  · American Heart Association: www.heart.org  Summary  · The DASH eating plan is a healthy eating plan that has been shown to reduce high blood pressure (hypertension). It may also reduce your risk for type 2 diabetes, heart disease, and stroke.  · With the DASH eating plan, you should limit salt (sodium)  intake to 2,300 mg a day. If you have hypertension, you may need to reduce your sodium intake to 1,500 mg a day.  · When on the DASH eating plan, aim to eat more fresh fruits and vegetables, whole grains, lean proteins, low-fat dairy, and heart-healthy fats.  · Work with your health care provider or diet and nutrition specialist (dietitian) to adjust your eating plan to your individual calorie needs.  This information is not intended to replace advice given to you by your health care provider. Make sure you discuss any questions you have with your health care provider.  Document Released: 12/06/2012 Document Revised: 12/11/2017 Document Reviewed: 12/11/2017  [employer] Interactive Patient Education © 2019 [employer]

## 2019-12-03 ENCOUNTER — TELEPHONE (OUTPATIENT)
Dept: FAMILY MEDICINE CLINIC | Facility: CLINIC | Age: 84
End: 2019-12-03

## 2019-12-03 NOTE — TELEPHONE ENCOUNTER
Nurse on call   Need Verbal order to hold visits all this week and next week Monday  Deepa [phone]

## 2019-12-10 NOTE — TELEPHONE ENCOUNTER
Deepa with nurse on call needs a verbal order to discontinue home health,  Pt said she does not want to continue home health visits.

## 2019-12-11 NOTE — TELEPHONE ENCOUNTER
Contacted Deepa and provided verbal permission. She stated that it is no longer needed because the patient has declined Home Health. The patient is reportedly doing very well and is out and about and HH would be inconvenient for her    If anything changes, Deepa will resume Home Health

## 2020-01-20 ENCOUNTER — OFFICE VISIT (OUTPATIENT)
Dept: FAMILY MEDICINE CLINIC | Facility: CLINIC | Age: 85
End: 2020-01-20

## 2020-01-20 VITALS
DIASTOLIC BLOOD PRESSURE: 80 MMHG | OXYGEN SATURATION: 98 % | WEIGHT: 137 LBS | HEART RATE: 68 BPM | HEIGHT: 66 IN | BODY MASS INDEX: 22.02 KG/M2 | SYSTOLIC BLOOD PRESSURE: 140 MMHG | TEMPERATURE: 96.4 F

## 2020-01-20 DIAGNOSIS — M41.34 THORACOGENIC SCOLIOSIS OF THORACIC REGION: ICD-10-CM

## 2020-01-20 DIAGNOSIS — E55.9 VITAMIN D DEFICIENCY: ICD-10-CM

## 2020-01-20 DIAGNOSIS — J30.9 ALLERGIC RHINITIS, UNSPECIFIED SEASONALITY, UNSPECIFIED TRIGGER: ICD-10-CM

## 2020-01-20 DIAGNOSIS — Z00.00 MEDICARE ANNUAL WELLNESS VISIT, SUBSEQUENT: Primary | ICD-10-CM

## 2020-01-20 DIAGNOSIS — Z13.220 SCREENING FOR LIPID DISORDERS: ICD-10-CM

## 2020-01-20 DIAGNOSIS — I10 ESSENTIAL HYPERTENSION: ICD-10-CM

## 2020-01-20 PROBLEM — C67.9 MALIGNANT TUMOR OF URINARY BLADDER (HCC): Status: ACTIVE | Noted: 2020-01-07

## 2020-01-20 PROCEDURE — G0439 PPPS, SUBSEQ VISIT: HCPCS | Performed by: NURSE PRACTITIONER

## 2020-01-20 RX ORDER — AZELASTINE 1 MG/ML
2 SPRAY, METERED NASAL 2 TIMES DAILY
Qty: 1 EACH | Refills: 12 | Status: SHIPPED | OUTPATIENT
Start: 2020-01-20 | End: 2021-02-03

## 2020-01-20 RX ORDER — FLUTICASONE PROPIONATE 50 MCG
2 SPRAY, SUSPENSION (ML) NASAL DAILY
Qty: 1 BOTTLE | Refills: 11 | Status: SHIPPED | OUTPATIENT
Start: 2020-01-20 | End: 2020-08-31 | Stop reason: ALTCHOICE

## 2020-01-20 NOTE — PROGRESS NOTES
The ABCs of the Annual Wellness Visit  Subsequent Medicare Wellness Visit    Chief Complaint   Patient presents with   • Medicare Wellness-subsequent   • Cancer     She has recently been diagnosed with cancer of the bladder. 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 It is red and swollen, and it get numb        Subjective   History of Present Illness:  Lashanda Qureshi is a 84 y.o. female who presents for a Subsequent Medicare Wellness Visit.    Patient was recently diagnosed with bladder cancer and has had surgery to remove the tumor.  She is going to follow-up with them in about 3 months.  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  It is red swollen and numb.    HEALTH RISK ASSESSMENT    Recent Hospitalizations:  No hospitalization(s) within the last year.    Current Medical Providers:  Patient Care Team:  Leandro Johnson APRN as PCP - General (Family Medicine)  Oralia Terrazas PA as PCP - Claims Attributed  Crispin Wheeler MD as Consulting Physician (Orthopedic Surgery)  James Gonzales MD    Smoking Status:  Social History     Tobacco Use   Smoking Status Former Smoker   Smokeless Tobacco Never Used   Tobacco Comment    Quit 41 years ago       Alcohol Consumption:  Social History     Substance and Sexual Activity   Alcohol Use No       Depression Screen:   PHQ-2/PHQ-9 Depression Screening 1/20/2020   Little interest or pleasure in doing things 0   Feeling down, depressed, or hopeless 0   Total Score 0       Fall Risk Screen:  STEADI Fall Risk Assessment was completed, and patient is at LOW risk for falls.Assessment completed on:1/20/2020    Health Habits and Functional and Cognitive Screening:  Functional & Cognitive Status 1/20/2020   Do you have difficulty preparing  food and eating? No   Do you have difficulty bathing yourself, getting dressed or grooming yourself? No   Do you have difficulty using the toilet? No   Do you have difficulty moving around from place to place? No   Do you have trouble with steps or getting out of a bed or a chair? No   Current Diet Well Balanced Diet   Dental Exam Up to date   Eye Exam Up to date   Exercise (times per week) 2 times per week   Current Exercise Activities Include No Regular Exercise   Do you need help using the phone?  No   Are you deaf or do you have serious difficulty hearing?  No   Do you need help with transportation? No   Do you need help shopping? No   Do you need help preparing meals?  No   Do you need help with housework?  No   Do you need help with laundry? No   Do you need help taking your medications? No   Do you need help managing money? No   Do you ever drive or ride in a car without wearing a seat belt? No   Have you felt unusual stress, anger or loneliness in the last month? No   Who do you live with? Alone   If you need help, do you have trouble finding someone available to you? No   Have you been bothered in the last four weeks by sexual problems? No   Do you have difficulty concentrating, remembering or making decisions? No         Does the patient have evidence of cognitive impairment? No  Patient filled and Medicare wellness clock drawing test adequately and this is been scanned in the computer.  Asprin use counseling:Does not need ASA (and currently is not on it)    Age-appropriate Screening Schedule:  Refer to the list below for future screening recommendations based on patient's age, sex and/or medical conditions. Orders for these recommended tests are listed in the plan section. The patient has been provided with a written plan.    Health Maintenance   Topic Date Due   • ZOSTER VACCINE (2 of 2) 11/26/2015   • TDAP/TD VACCINES (2 - Td) 01/01/2024   • INFLUENZA VACCINE  Completed          The following portions  of the patient's history were reviewed and updated as appropriate: allergies, current medications, past family history, past medical history, past social history, past surgical history and problem list.    Outpatient Medications Prior to Visit   Medication Sig Dispense Refill   • Cholecalciferol (VITAMIN D3) 2000 UNITS tablet Take  by mouth. Take 2 tablets daily.     • Cyanocobalamin (VITAMIN B12 PO) Take  by mouth. Take 1 tablet daily as directed.     • Elastic Bandages & Supports (FUTURO FIRM COMPRESSION HOSE) misc 1 package Daily. 1 each 1   • ESTROGENS CONJ SYNTHETIC B PO         Compound Bi-Est 20-20 Topical  (Estriol 80% + Estradiol 20%) apply a  pea sized amt to the vulva 2-3 nights per week     • latanoprost (XALATAN) 0.005 % ophthalmic solution Apply  to eye. Instill 1 drop into affected eye(s) once daily as directed.     • magnesium oxide (MAGOX) 400 (241.3 MG) MG tablet tablet Take 400 mg by mouth daily.     • VENTOLIN  (90 Base) MCG/ACT inhaler Inhale 2 puffs Every 6 (Six) Hours As Needed.  1   • azelastine (ASTEPRO) 0.15 % solution nasal spray 2 sprays into the nostril(s) as directed by provider 2 (Two) Times a Day As Needed for Allergies. 1 each 11   • fluticasone (FLONASE) 50 MCG/ACT nasal spray 2 sprays into the nostril(s) as directed by provider Daily. Administer 2 sprays in each nostril for each dose. 1 bottle 11   • montelukast (SINGULAIR) 10 MG tablet Take 1 tablet by mouth Every Night. 30 tablet 11     No facility-administered medications prior to visit.        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 difficile diarrhea   • Cervical pain   • Chronic midline low back pain without sciatica   • Thoracogenic scoliosis of thoracic region   • Insomnia   • Localized edema   • Malignant tumor of urinary bladder (CMS/HCC)       Advanced Care Planning:  Patient does not have an advance directive - information provided to the patient today    Review of Systems   Constitutional: Negative for appetite change, chills, fatigue and fever.   HENT: Positive for rhinorrhea. Negative for congestion, ear pain, sinus pressure and sore throat.    Eyes: Positive for visual disturbance (glaucoma). Negative for itching.   Respiratory: Positive for shortness of breath ( CORONEL). Negative for cough and wheezing.    Cardiovascular: Positive for palpitations (some with anxiety) and leg swelling (left, chronic). Negative for chest pain.   Gastrointestinal: Negative for abdominal pain, constipation, diarrhea, nausea and vomiting.   Endocrine: Negative for cold intolerance and heat intolerance.   Genitourinary: Negative for difficulty urinating, dysuria and hematuria.   Musculoskeletal: Positive for arthralgias and back pain. Negative for joint swelling and myalgias.   Skin: Positive for color change. Negative for rash and wound.   Allergic/Immunologic: Negative for environmental allergies and food allergies.   Neurological: Positive for numbness (left, some right). Negative for dizziness and headaches.   Hematological: Negative for adenopathy. Bruises/bleeds easily.   Psychiatric/Behavioral: Negative for dysphoric mood and sleep disturbance. The patient is nervous/anxious.  "       Compared to one year ago, the patient feels her physical health is the same.  Compared to one year ago, the patient feels her mental health is the same.    Reviewed chart for potential of high risk medication in the elderly: not applicable  Reviewed chart for potential of harmful drug interactions in the elderly:yes    Objective         Vitals:    01/20/20 1031   BP: 140/80  Comment: she has not taken BP meds this morning   Pulse: 68   Temp: 96.4 °F (35.8 °C)   SpO2: 98%   Weight: 62.1 kg (137 lb)   Height: 167 cm (65.75\")   PainSc:   2       Body mass index is 22.28 kg/m².  Discussed the patient's BMI with her. The BMI is in the acceptable range.    Physical Exam   Constitutional: She is oriented to person, place, and time. She appears well-developed and well-nourished. No distress.   HENT:   Head: Normocephalic and atraumatic.   Right Ear: External ear normal.   Left Ear: External ear normal.   Nose: Nose normal.   Mouth/Throat: Oropharynx is clear and moist.   Eyes: Pupils are equal, round, and reactive to light. Conjunctivae and EOM are normal. Right eye exhibits no discharge. Left eye exhibits no discharge. No scleral icterus.   Neck: Normal range of motion. Neck supple. No JVD (no bruits) present. No tracheal deviation present. No thyromegaly present.   Cardiovascular: Normal rate, regular rhythm, normal heart sounds and intact distal pulses. Exam reveals no gallop and no friction rub.   No murmur heard.  Pulmonary/Chest: Effort normal and breath sounds normal. No respiratory distress. She has no wheezes. She has no rales. She exhibits no tenderness. Right breast exhibits no inverted nipple, no mass, no nipple discharge, no skin change and no tenderness. Left breast exhibits no inverted nipple, no mass, no nipple discharge, no skin change and no tenderness. Breasts are symmetrical.   Abdominal: Soft. Normal appearance and bowel sounds are normal. She exhibits no distension and no mass. There is no " hepatosplenomegaly. There is no tenderness. No hernia.   Genitourinary:   Genitourinary Comments: Breast and vaginal exam deferred   Musculoskeletal: She exhibits no edema.        Thoracic back: She exhibits decreased range of motion, tenderness and deformity.        Lumbar back: She exhibits decreased range of motion, tenderness and deformity.   Lymphadenopathy:     She has no cervical adenopathy.   Neurological: She is alert and oriented to person, place, and time. She has normal reflexes. She displays normal reflexes.   Skin: Skin is warm and dry. No rash noted. She is not diaphoretic. No erythema. No pallor.   Psychiatric: She has a normal mood and affect. Her behavior is normal. Thought content normal.   Nursing note and vitals reviewed.            Assessment/Plan   Medicare Risks and Personalized Health Plan  CMS Preventative Services Quick Reference  Advance Directive Discussion  Immunizations Discussed/Encouraged (specific immunizations; Shingrix )  Low sodium diet    The above risks/problems have been discussed with the patient.  Pertinent information has been shared with the patient in the After Visit Summary.  Follow up plans and orders are seen below in the Assessment/Plan Section.    Diagnoses and all orders for this visit:    1. Medicare annual wellness visit, subsequent (Primary)  -     Comprehensive Metabolic Panel; Future  -     Lipid Panel; Future  -     Vitamin D 25 Hydroxy; Future    2. Essential hypertension  -     Comprehensive Metabolic Panel; Future    3. Vitamin D deficiency  -     Vitamin D 25 Hydroxy; Future    4. Screening for lipid disorders  -     Lipid Panel; Future    5. Allergic rhinitis, unspecified seasonality, unspecified trigger  -     azelastine (ASTELIN) 0.1 % nasal spray; 2 sprays into the nostril(s) as directed by provider 2 (Two) Times a Day.  Dispense: 1 each; Refill: 12  -     fluticasone (FLONASE) 50 MCG/ACT nasal spray; 2 sprays into the nostril(s) as directed by  provider Daily. Administer 2 sprays in each nostril for each dose.  Dispense: 1 bottle; Refill: 11    6. Thoracogenic scoliosis of thoracic region    The wellness exam has been reviewed in detail.  The patient has been fully counseled on preventative guidelines for vaccines, cancer screenings, and other health maintenance needs.  Functional testing has been performed to assess capacity for independent living and need for other medical interventions.   The patient was counseled on maintaining a lifestyle to promote good health and to minimize chronic diseases.  The patient has been assisted with scheduling healthcare procedures for the coming year and given a written document outlining these recommendations.    Patient instructed to check blood pressure daily, record, and bring to next visit. If the readings run 150/90 or greater, the patient is to call my office or return sooner for evaluation. Pt advised to eat a heart healthy diet and get regular aerobic exercise.    Cont current meds/treatment for scoliosis,     Follow Up:  Return in about 6 months (around 7/20/2020), or if symptoms worsen or fail to improve, for Recheck.     An After Visit Summary and PPPS were given to the patient.

## 2020-01-20 NOTE — PATIENT INSTRUCTIONS
Advance Directive    Advance directives are legal documents that let you make choices ahead of time about your health care and medical treatment in case you become unable to communicate for yourself. Advance directives are a way for you to communicate your wishes to family, friends, and health care providers. This can help convey your decisions about end-of-life care if you become unable to communicate.  Discussing and writing advance directives should happen over time rather than all at once. Advance directives can be changed depending on your situation and what you want, even after you have signed the advance directives.  If you do not have an advance directive, some states assign family decision makers to act on your behalf based on how closely you are related to them. Each state has its own laws regarding advance directives. You may want to check with your health care provider, , or state representative about the laws in your state. There are different types of advance directives, such as:  · Medical power of .  · Living will.  · Do not resuscitate (DNR) or do not attempt resuscitation (DNAR) order.  Health care proxy and medical power of   A health care proxy, also called a health care agent, is a person who is appointed to make medical decisions for you in cases in which you are unable to make the decisions yourself. Generally, people choose someone they know well and trust to represent their preferences. Make sure to ask this person for an agreement to act as your proxy. A proxy may have to exercise judgment in the event of a medical decision for which your wishes are not known.  A medical power of  is a legal document that names your health care proxy. Depending on the laws in your state, after the document is written, it may also need to be:  · Signed.  · Notarized.  · Dated.  · Copied.  · Witnessed.  · Incorporated into your medical record.  You may also want to appoint  someone to manage your financial affairs in a situation in which you are unable to do so. This is called a durable power of  for finances. It is a separate legal document from the durable power of  for health care. You may choose the same person or someone different from your health care proxy to act as your agent in financial matters.  If you do not appoint a proxy, or if there is a concern that the proxy is not acting in your best interests, a court-appointed guardian may be designated to act on your behalf.  Living will  A living will is a set of instructions documenting your wishes about medical care when you cannot express them yourself. Health care providers should keep a copy of your living will in your medical record. You may want to give a copy to family members or friends. To alert caregivers in case of an emergency, you can place a card in your wallet to let them know that you have a living will and where they can find it. A living will is used if you become:  · Terminally ill.  · Incapacitated.  · Unable to communicate or make decisions.  Items to consider in your living will include:  · The use or non-use of life-sustaining equipment, such as dialysis machines and breathing machines (ventilators).  · A DNR or DNAR order, which is the instruction not to use cardiopulmonary resuscitation (CPR) if breathing or heartbeat stops.  · The use or non-use of tube feeding.  · Withholding of food and fluids.  · Comfort (palliative) care when the goal becomes comfort rather than a cure.  · Organ and tissue donation.  A living will does not give instructions for distributing your money and property if you should pass away. It is recommended that you seek the advice of a  when writing a will. Decisions about taxes, beneficiaries, and asset distribution will be legally binding. This process can relieve your family and friends of any concerns surrounding disputes or questions that may come up about  the distribution of your assets.  DNR or DNAR  A DNR or DNAR order is a request not to have CPR in the event that your heart stops beating or you stop breathing. If a DNR or DNAR order has not been made and shared, a health care provider will try to help any patient whose heart has stopped or who has stopped breathing. If you plan to have surgery, talk with your health care provider about how your DNR or DNAR order will be followed if problems occur.  Summary  · Advance directives are the legal documents that allow you to make choices ahead of time about your health care and medical treatment in case you become unable to communicate for yourself.  · The process of discussing and writing advance directives should happen over time. You can change the advance directives, even after you have signed them.  · Advance directives include DNR or DNAR orders, living jiménez, and designating an agent as your medical power of .  This information is not intended to replace advice given to you by your health care provider. Make sure you discuss any questions you have with your health care provider.  Document Released: 03/26/2009 Document Revised: 11/06/2017 Document Reviewed: 11/06/2017  [employer] Patient Education © 2019 [employer]

## 2020-07-20 ENCOUNTER — OFFICE VISIT (OUTPATIENT)
Dept: FAMILY MEDICINE CLINIC | Facility: CLINIC | Age: 85
End: 2020-07-20

## 2020-07-20 VITALS
OXYGEN SATURATION: 97 % | WEIGHT: 138 LBS | HEIGHT: 66 IN | DIASTOLIC BLOOD PRESSURE: 80 MMHG | BODY MASS INDEX: 22.18 KG/M2 | TEMPERATURE: 97.1 F | SYSTOLIC BLOOD PRESSURE: 140 MMHG | HEART RATE: 87 BPM

## 2020-07-20 DIAGNOSIS — I10 ESSENTIAL HYPERTENSION: Primary | ICD-10-CM

## 2020-07-20 DIAGNOSIS — R00.2 PALPITATIONS: ICD-10-CM

## 2020-07-20 DIAGNOSIS — G62.9 PERIPHERAL POLYNEUROPATHY: ICD-10-CM

## 2020-07-20 DIAGNOSIS — R06.09 DOE (DYSPNEA ON EXERTION): ICD-10-CM

## 2020-07-20 PROCEDURE — 93000 ELECTROCARDIOGRAM COMPLETE: CPT | Performed by: NURSE PRACTITIONER

## 2020-07-20 PROCEDURE — 99214 OFFICE O/P EST MOD 30 MIN: CPT | Performed by: NURSE PRACTITIONER

## 2020-07-20 RX ORDER — METRONIDAZOLE 7.5 MG/G
GEL VAGINAL
COMMUNITY
End: 2020-12-07 | Stop reason: ALTCHOICE

## 2020-07-20 RX ORDER — NYSTATIN 100000 U/G
OINTMENT TOPICAL
COMMUNITY
End: 2020-12-07 | Stop reason: ALTCHOICE

## 2020-07-27 ENCOUNTER — HOSPITAL ENCOUNTER (OUTPATIENT)
Dept: CARDIOLOGY | Facility: HOSPITAL | Age: 85
Discharge: HOME | End: 2020-07-27
Admitting: NURSE PRACTITIONER

## 2020-07-27 DIAGNOSIS — I49.3 PVC'S (PREMATURE VENTRICULAR CONTRACTIONS): ICD-10-CM

## 2020-07-27 DIAGNOSIS — R00.0 TACHYCARDIA: ICD-10-CM

## 2020-07-27 PROCEDURE — 0296T HC EXT ECG > 48HR TO 21 DAY RCRD W/CONECT INTL RCRD: CPT

## 2020-07-27 PROCEDURE — 0298T HOLTER MONITOR - 72 HOUR UP TO 21 DAY: CPT | Performed by: INTERNAL MEDICINE

## 2020-08-05 ENCOUNTER — HOSPITAL ENCOUNTER (OUTPATIENT)
Dept: CARDIOLOGY | Facility: HOSPITAL | Age: 85
Discharge: HOME | End: 2020-08-05
Admitting: NURSE PRACTITIONER

## 2020-08-05 VITALS — BODY MASS INDEX: 22.82 KG/M2 | HEIGHT: 65 IN | WEIGHT: 137 LBS

## 2020-08-05 DIAGNOSIS — I49.3 PVC'S (PREMATURE VENTRICULAR CONTRACTIONS): ICD-10-CM

## 2020-08-05 DIAGNOSIS — R06.09 DOE (DYSPNEA ON EXERTION): ICD-10-CM

## 2020-08-05 DIAGNOSIS — R00.0 TACHYCARDIA: ICD-10-CM

## 2020-08-05 LAB
BH CV ECHO MEAS - AO ROOT AREA (BSA CORRECTED): 1
BH CV ECHO MEAS - AO ROOT AREA: 2.4 CM^2
BH CV ECHO MEAS - AO ROOT DIAM: 1.7 CM
BH CV ECHO MEAS - BSA(HAYCOCK): 1.7 M^2
BH CV ECHO MEAS - BSA: 1.7 M^2
BH CV ECHO MEAS - BZI_BMI: 22.8 KILOGRAMS/M^2
BH CV ECHO MEAS - BZI_METRIC_HEIGHT: 165.1 CM
BH CV ECHO MEAS - BZI_METRIC_WEIGHT: 62.1 KG
BH CV ECHO MEAS - EDV(CUBED): 55.9 ML
BH CV ECHO MEAS - EDV(MOD-SP2): 47 ML
BH CV ECHO MEAS - EDV(MOD-SP4): 35 ML
BH CV ECHO MEAS - EDV(TEICH): 62.9 ML
BH CV ECHO MEAS - EF(CUBED): 67.9 %
BH CV ECHO MEAS - EF(MOD-BP): 54 %
BH CV ECHO MEAS - EF(MOD-SP2): 53.2 %
BH CV ECHO MEAS - EF(MOD-SP4): 51.4 %
BH CV ECHO MEAS - EF(TEICH): 60.1 %
BH CV ECHO MEAS - ESV(CUBED): 18 ML
BH CV ECHO MEAS - ESV(MOD-SP2): 22 ML
BH CV ECHO MEAS - ESV(MOD-SP4): 17 ML
BH CV ECHO MEAS - ESV(TEICH): 25.1 ML
BH CV ECHO MEAS - FS: 31.5 %
BH CV ECHO MEAS - IVS/LVPW: 0.89
BH CV ECHO MEAS - IVSD: 0.72 CM
BH CV ECHO MEAS - LA DIMENSION: 2.3 CM
BH CV ECHO MEAS - LA/AO: 1.3
BH CV ECHO MEAS - LAD MAJOR: 4.3 CM
BH CV ECHO MEAS - LAT PEAK E' VEL: 8.7 CM/SEC
BH CV ECHO MEAS - LATERAL E/E' RATIO: 7.1
BH CV ECHO MEAS - LV DIASTOLIC VOL/BSA (35-75): 20.8 ML/M^2
BH CV ECHO MEAS - LV MASS(C)D: 81.3 GRAMS
BH CV ECHO MEAS - LV MASS(C)DI: 48.3 GRAMS/M^2
BH CV ECHO MEAS - LV SYSTOLIC VOL/BSA (12-30): 10.1 ML/M^2
BH CV ECHO MEAS - LVIDD: 3.8 CM
BH CV ECHO MEAS - LVIDS: 2.6 CM
BH CV ECHO MEAS - LVLD AP2: 6.3 CM
BH CV ECHO MEAS - LVLD AP4: 6.4 CM
BH CV ECHO MEAS - LVLS AP2: 5.8 CM
BH CV ECHO MEAS - LVLS AP4: 5.7 CM
BH CV ECHO MEAS - LVOT AREA (M): 3.1 CM^2
BH CV ECHO MEAS - LVOT AREA: 3.1 CM^2
BH CV ECHO MEAS - LVOT DIAM: 2 CM
BH CV ECHO MEAS - LVPWD: 0.81 CM
BH CV ECHO MEAS - MED PEAK E' VEL: 5.5 CM/SEC
BH CV ECHO MEAS - MEDIAL E/E' RATIO: 11.2
BH CV ECHO MEAS - MV A MAX VEL: 68.6 CM/SEC
BH CV ECHO MEAS - MV DEC SLOPE: 228.4 CM/SEC^2
BH CV ECHO MEAS - MV DEC TIME: 0.26 SEC
BH CV ECHO MEAS - MV E MAX VEL: 63.2 CM/SEC
BH CV ECHO MEAS - MV E/A: 0.92
BH CV ECHO MEAS - MV P1/2T MAX VEL: 89.3 CM/SEC
BH CV ECHO MEAS - MV P1/2T: 114.5 MSEC
BH CV ECHO MEAS - MVA P1/2T LCG: 2.5 CM^2
BH CV ECHO MEAS - MVA(P1/2T): 1.9 CM^2
BH CV ECHO MEAS - PA ACC SLOPE: 538.6 CM/SEC^2
BH CV ECHO MEAS - PA ACC TIME: 0.13 SEC
BH CV ECHO MEAS - PA PR(ACCEL): 18.8 MMHG
BH CV ECHO MEAS - RAP SYSTOLE: 3 MMHG
BH CV ECHO MEAS - RVSP: 27 MMHG
BH CV ECHO MEAS - SI(CUBED): 22.5 ML/M^2
BH CV ECHO MEAS - SI(MOD-SP2): 14.8 ML/M^2
BH CV ECHO MEAS - SI(MOD-SP4): 10.7 ML/M^2
BH CV ECHO MEAS - SI(TEICH): 22.5 ML/M^2
BH CV ECHO MEAS - SV(CUBED): 38 ML
BH CV ECHO MEAS - SV(MOD-SP2): 25 ML
BH CV ECHO MEAS - SV(MOD-SP4): 18 ML
BH CV ECHO MEAS - SV(TEICH): 37.8 ML
BH CV ECHO MEAS - TAPSE (>1.6): 2.2 CM2
BH CV ECHO MEAS - TR MAX PG: 24 MMHG
BH CV ECHO MEAS - TR MAX VEL: 244.5 CM/SEC
BH CV ECHO MEASUREMENTS AVERAGE E/E' RATIO: 8.9
BH CV VAS BP RIGHT ARM: NORMAL MMHG
BH CV XLRA - RV BASE: 3.1 CM
BH CV XLRA - RV LENGTH: 6 CM
BH CV XLRA - RV MID: 2.5 CM
BH CV XLRA - TDI S': 12.6 CM/SEC
MAXIMAL PREDICTED HEART RATE: 135 BPM
STRESS TARGET HR: 115 BPM

## 2020-08-05 PROCEDURE — 93306 TTE W/DOPPLER COMPLETE: CPT | Performed by: INTERNAL MEDICINE

## 2020-08-05 PROCEDURE — 93306 TTE W/DOPPLER COMPLETE: CPT

## 2020-08-14 ENCOUNTER — APPOINTMENT (OUTPATIENT)
Dept: GENERAL RADIOLOGY | Facility: HOSPITAL | Age: 85
End: 2020-08-14

## 2020-08-14 ENCOUNTER — HOSPITAL ENCOUNTER (EMERGENCY)
Facility: HOSPITAL | Age: 85
Discharge: HOME | End: 2020-08-14
Attending: EMERGENCY MEDICINE | Admitting: EMERGENCY MEDICINE

## 2020-08-14 ENCOUNTER — APPOINTMENT (OUTPATIENT)
Dept: CT IMAGING | Facility: HOSPITAL | Age: 85
End: 2020-08-14

## 2020-08-14 ENCOUNTER — OFFICE VISIT (OUTPATIENT)
Dept: FAMILY MEDICINE CLINIC | Facility: CLINIC | Age: 85
End: 2020-08-14

## 2020-08-14 VITALS
DIASTOLIC BLOOD PRESSURE: 64 MMHG | WEIGHT: 137 LBS | SYSTOLIC BLOOD PRESSURE: 153 MMHG | HEART RATE: 68 BPM | TEMPERATURE: 98.8 F | BODY MASS INDEX: 21.5 KG/M2 | HEIGHT: 67 IN | RESPIRATION RATE: 16 BRPM | OXYGEN SATURATION: 95 %

## 2020-08-14 VITALS
WEIGHT: 137.2 LBS | HEART RATE: 96 BPM | DIASTOLIC BLOOD PRESSURE: 100 MMHG | HEIGHT: 65 IN | OXYGEN SATURATION: 63 % | SYSTOLIC BLOOD PRESSURE: 160 MMHG | BODY MASS INDEX: 22.86 KG/M2

## 2020-08-14 DIAGNOSIS — R91.8 GROUND GLASS OPACITY PRESENT ON IMAGING OF LUNG: ICD-10-CM

## 2020-08-14 DIAGNOSIS — I50.31 ACUTE DIASTOLIC CHF (CONGESTIVE HEART FAILURE) (HCC): ICD-10-CM

## 2020-08-14 DIAGNOSIS — I16.0 HYPERTENSIVE URGENCY: Primary | ICD-10-CM

## 2020-08-14 DIAGNOSIS — R06.00 DYSPNEA, UNSPECIFIED TYPE: Primary | ICD-10-CM

## 2020-08-14 DIAGNOSIS — J96.01 ACUTE HYPOXEMIC RESPIRATORY FAILURE (HCC): ICD-10-CM

## 2020-08-14 LAB
ALBUMIN SERPL-MCNC: 4.2 G/DL (ref 3.5–5.2)
ALBUMIN/GLOB SERPL: 1.4 G/DL
ALP SERPL-CCNC: 54 U/L (ref 39–117)
ALT SERPL W P-5'-P-CCNC: 10 U/L (ref 1–33)
ANION GAP SERPL CALCULATED.3IONS-SCNC: 11 MMOL/L (ref 5–15)
AST SERPL-CCNC: 18 U/L (ref 1–32)
BASOPHILS # BLD AUTO: 0.04 10*3/MM3 (ref 0–0.2)
BASOPHILS NFR BLD AUTO: 0.8 % (ref 0–1.5)
BILIRUB SERPL-MCNC: 0.5 MG/DL (ref 0–1.2)
BUN SERPL-MCNC: 11 MG/DL (ref 8–23)
BUN/CREAT SERPL: 16.9 (ref 7–25)
CALCIUM SPEC-SCNC: 9.2 MG/DL (ref 8.6–10.5)
CHLORIDE SERPL-SCNC: 104 MMOL/L (ref 98–107)
CO2 SERPL-SCNC: 28 MMOL/L (ref 22–29)
CREAT SERPL-MCNC: 0.65 MG/DL (ref 0.57–1)
DEPRECATED RDW RBC AUTO: 44.9 FL (ref 37–54)
EOSINOPHIL # BLD AUTO: 0.09 10*3/MM3 (ref 0–0.4)
EOSINOPHIL NFR BLD AUTO: 1.8 % (ref 0.3–6.2)
ERYTHROCYTE [DISTWIDTH] IN BLOOD BY AUTOMATED COUNT: 12.9 % (ref 12.3–15.4)
ERYTHROCYTE [SEDIMENTATION RATE] IN BLOOD: 23 MM/HR (ref 0–30)
GFR SERPL CREATININE-BSD FRML MDRD: 87 ML/MIN/1.73
GLOBULIN UR ELPH-MCNC: 2.9 GM/DL
GLUCOSE SERPL-MCNC: 104 MG/DL (ref 65–99)
HCT VFR BLD AUTO: 41.7 % (ref 34–46.6)
HGB BLD-MCNC: 13.6 G/DL (ref 12–15.9)
HOLD SPECIMEN: NORMAL
HOLD SPECIMEN: NORMAL
IMM GRANULOCYTES # BLD AUTO: 0.01 10*3/MM3 (ref 0–0.05)
IMM GRANULOCYTES NFR BLD AUTO: 0.2 % (ref 0–0.5)
LYMPHOCYTES # BLD AUTO: 1.19 10*3/MM3 (ref 0.7–3.1)
LYMPHOCYTES NFR BLD AUTO: 24.4 % (ref 19.6–45.3)
MCH RBC QN AUTO: 31.1 PG (ref 26.6–33)
MCHC RBC AUTO-ENTMCNC: 32.6 G/DL (ref 31.5–35.7)
MCV RBC AUTO: 95.2 FL (ref 79–97)
MONOCYTES # BLD AUTO: 0.32 10*3/MM3 (ref 0.1–0.9)
MONOCYTES NFR BLD AUTO: 6.6 % (ref 5–12)
NEUTROPHILS NFR BLD AUTO: 3.22 10*3/MM3 (ref 1.7–7)
NEUTROPHILS NFR BLD AUTO: 66.2 % (ref 42.7–76)
NRBC BLD AUTO-RTO: 0 /100 WBC (ref 0–0.2)
NT-PROBNP SERPL-MCNC: 214.6 PG/ML (ref 0–1800)
PLATELET # BLD AUTO: 231 10*3/MM3 (ref 140–450)
PMV BLD AUTO: 8.4 FL (ref 6–12)
POTASSIUM SERPL-SCNC: 3.3 MMOL/L (ref 3.5–5.2)
PROT SERPL-MCNC: 7.1 G/DL (ref 6–8.5)
RBC # BLD AUTO: 4.38 10*6/MM3 (ref 3.77–5.28)
SODIUM SERPL-SCNC: 143 MMOL/L (ref 136–145)
TROPONIN T SERPL-MCNC: <0.01 NG/ML (ref 0–0.03)
WBC # BLD AUTO: 4.87 10*3/MM3 (ref 3.4–10.8)
WHOLE BLOOD HOLD SPECIMEN: NORMAL
WHOLE BLOOD HOLD SPECIMEN: NORMAL

## 2020-08-14 PROCEDURE — 85652 RBC SED RATE AUTOMATED: CPT | Performed by: EMERGENCY MEDICINE

## 2020-08-14 PROCEDURE — 85025 COMPLETE CBC W/AUTO DIFF WBC: CPT | Performed by: EMERGENCY MEDICINE

## 2020-08-14 PROCEDURE — 86480 TB TEST CELL IMMUN MEASURE: CPT | Performed by: EMERGENCY MEDICINE

## 2020-08-14 PROCEDURE — 84484 ASSAY OF TROPONIN QUANT: CPT | Performed by: EMERGENCY MEDICINE

## 2020-08-14 PROCEDURE — 99284 EMERGENCY DEPT VISIT MOD MDM: CPT

## 2020-08-14 PROCEDURE — 80053 COMPREHEN METABOLIC PANEL: CPT | Performed by: EMERGENCY MEDICINE

## 2020-08-14 PROCEDURE — 99214 OFFICE O/P EST MOD 30 MIN: CPT | Performed by: INTERNAL MEDICINE

## 2020-08-14 PROCEDURE — 0 IOPAMIDOL PER 1 ML: Performed by: EMERGENCY MEDICINE

## 2020-08-14 PROCEDURE — 25010000002 DEXAMETHASONE PER 1 MG: Performed by: EMERGENCY MEDICINE

## 2020-08-14 PROCEDURE — 71275 CT ANGIOGRAPHY CHEST: CPT

## 2020-08-14 PROCEDURE — 83880 ASSAY OF NATRIURETIC PEPTIDE: CPT | Performed by: EMERGENCY MEDICINE

## 2020-08-14 PROCEDURE — 93000 ELECTROCARDIOGRAM COMPLETE: CPT | Performed by: INTERNAL MEDICINE

## 2020-08-14 PROCEDURE — 96374 THER/PROPH/DIAG INJ IV PUSH: CPT

## 2020-08-14 PROCEDURE — 93005 ELECTROCARDIOGRAM TRACING: CPT | Performed by: EMERGENCY MEDICINE

## 2020-08-14 RX ORDER — DEXAMETHASONE SODIUM PHOSPHATE 4 MG/ML
8 INJECTION, SOLUTION INTRA-ARTICULAR; INTRALESIONAL; INTRAMUSCULAR; INTRAVENOUS; SOFT TISSUE ONCE
Status: COMPLETED | OUTPATIENT
Start: 2020-08-14 | End: 2020-08-14

## 2020-08-14 RX ORDER — METHYLPREDNISOLONE 4 MG/1
TABLET ORAL
Qty: 21 TABLET | Refills: 0 | Status: SHIPPED | OUTPATIENT
Start: 2020-08-14 | End: 2020-08-31

## 2020-08-14 RX ORDER — DOXYCYCLINE 100 MG/1
100 CAPSULE ORAL 2 TIMES DAILY
Qty: 14 CAPSULE | Refills: 0 | Status: SHIPPED | OUTPATIENT
Start: 2020-08-14 | End: 2020-08-21

## 2020-08-14 RX ORDER — SODIUM CHLORIDE 0.9 % (FLUSH) 0.9 %
10 SYRINGE (ML) INJECTION AS NEEDED
Status: DISCONTINUED | OUTPATIENT
Start: 2020-08-14 | End: 2020-08-14 | Stop reason: HOSPADM

## 2020-08-14 RX ADMIN — SODIUM CHLORIDE 500 ML: 9 INJECTION, SOLUTION INTRAVENOUS at 13:59

## 2020-08-14 RX ADMIN — DEXAMETHASONE SODIUM PHOSPHATE 8 MG: 4 INJECTION, SOLUTION INTRA-ARTICULAR; INTRALESIONAL; INTRAMUSCULAR; INTRAVENOUS; SOFT TISSUE at 16:32

## 2020-08-14 RX ADMIN — IOPAMIDOL 95 ML: 755 INJECTION, SOLUTION INTRAVENOUS at 15:15

## 2020-08-14 NOTE — PROGRESS NOTES
Lashanda Qureshi  1935  [id number]  Patient Care Team:  Leandro Johnson APRN as PCP - General (Family Medicine)  Oralia Terrazas PA as PCP - Claims Attributed  Crispin Wheeler as Consulting Physician (Orthopedic Surgery)  James Gonzales MD    Lashanda Qureshi is a 85 y.o. female here today to establish care.   This patient is accompanied by theirself }who contributes to the history of their care.    Chief Complaint:    Chief Complaint   Patient presents with   • Palpitations     x1 month   • Shortness of Breath     x1 month        History of Present Illness:   Is a pleasant lady who is recently been evaluated for palpitations.  She just completed a Holter monitoring session however the results are unknown.  She had echocardiogram recently done that showed diastolic dysfunction mild tricuspid regurgitation EF of 54%.  She has been experiencing significant shortness of breath worse yesterday and last night.  She called in the office for appointment today.  There is been no chest pains palpitations continue.  She gets labored breathing with exertion.  Additionally she wakes up at night short of breath.  Is no change in her weight.  She has nocturia x1 which is not unusual for her.  She does have asymmetrical lower extremity swelling left being greater than the right however is a history of cellulitis in this.  No history of DVTs or PEs.  No fevers or chills or cough.  Presentation to the office after walking into the office her O2 sats were initially recorded at 60% 63% and stayed in the mid 70s for quite some time prior to achieving recovery in the mid 90s at rest.    Past Medical History:   Diagnosis Date   • Abnormal CT scan, lung    • Mycobacterium gordonae infection        Past Surgical History:   Procedure Laterality Date   • BLADDER SURGERY     • BRONCHOSCOPY  01/27/2016   • CHOLECYSTECTOMY     • COLON SURGERY     • DILATION AND CURETTAGE, DIAGNOSTIC / THERAPEUTIC     • REFRACTIVE SURGERY   "04/2019   • TOTAL HIP ARTHROPLASTY      Left hip        Family History   Problem Relation Age of Onset   • COPD Mother    • Hyperlipidemia Mother    • Hypertension Mother    • Cancer Father    • Lung cancer Father    • Heart attack Brother    • Diabetes Brother    • Multiple sclerosis Daughter    • No Known Problems Maternal Grandmother    • No Known Problems Maternal Grandfather    • Diabetes Paternal Grandmother    • Diabetes Paternal Grandfather        Social History     Socioeconomic History   • Marital status:      Spouse name: Not on file   • Number of children: Not on file   • Years of education: Not on file   • Highest education level: Not on file   Tobacco Use   • Smoking status: Former Smoker   • Smokeless tobacco: Never Used   • Tobacco comment: Quit 41 years ago   Substance and Sexual Activity   • Alcohol use: No   • Drug use: No   • Sexual activity: Defer     Comment:    Social History Narrative    Caffeine 1-2 servings coffee       Allergies   Allergen Reactions   • Antihistamines, Loratadine-Type Other (See Comments)     Drowsiness     • Erythromycin        Review of Systems:    Review of Systems   HENT: Negative.    Respiratory: Positive for shortness of breath.    Cardiovascular: Positive for palpitations and leg swelling.   Gastrointestinal: Negative.    Endocrine: Negative.    Genitourinary: Negative.    Musculoskeletal: Negative.    Hematological: Negative.        Vitals:    08/14/20 1111   BP: 160/100   Pulse: 96   SpO2: (!) 63%   Weight: 62.2 kg (137 lb 3.2 oz)   Height: 165.1 cm (65\")     Body mass index is 22.83 kg/m².      Current Outpatient Medications:   •  azelastine (ASTELIN) 0.1 % nasal spray, 2 sprays into the nostril(s) as directed by provider 2 (Two) Times a Day., Disp: 1 each, Rfl: 12  •  Cholecalciferol (VITAMIN D3) 2000 UNITS tablet, Take  by mouth. Take 2 tablets daily., Disp: , Rfl:   •  Cyanocobalamin (VITAMIN B12 PO), Take  by mouth. Take 1 tablet daily as " directed., Disp: , Rfl:   •  Elastic Bandages & Supports (FUTURO FIRM COMPRESSION HOSE) misc, 1 package Daily., Disp: 1 each, Rfl: 1  •  ESTROGENS CONJ SYNTHETIC B PO,         Compound Bi-Est 20-20 Topical  (Estriol 80% + Estradiol 20%) apply a  pea sized amt to the vulva 2-3 nights per week, Disp: , Rfl:   •  fluticasone (FLONASE) 50 MCG/ACT nasal spray, 2 sprays into the nostril(s) as directed by provider Daily. Administer 2 sprays in each nostril for each dose., Disp: 1 bottle, Rfl: 11  •  latanoprost (XALATAN) 0.005 % ophthalmic solution, Apply  to eye. Instill 1 drop into affected eye(s) once daily as directed., Disp: , Rfl:   •  magnesium oxide (MAGOX) 400 (241.3 MG) MG tablet tablet, Take 400 mg by mouth daily., Disp: , Rfl:   •  metroNIDAZOLE (METROGEL VAGINAL) 0.75 % vaginal gel, Metrogel Vaginal 0.75 %  Insert 1 applicatorful every day by vaginal route for 7 days., Disp: , Rfl:   •  nystatin (MYCOSTATIN) 004846 UNIT/GM ointment, nystatin 100,000 unit/gram topical ointment, Disp: , Rfl:   •  triamcinolone (KENALOG) 0.1 % ointment, triamcinolone acetonide 0.1 % topical ointment, Disp: , Rfl:   •  VENTOLIN  (90 Base) MCG/ACT inhaler, Inhale 2 puffs Every 6 (Six) Hours As Needed., Disp: , Rfl: 1    Physical Exam:    Physical Exam   Constitutional: She is oriented to person, place, and time. She appears well-developed and well-nourished. No distress.   HENT:   Head: Normocephalic and atraumatic.   Right Ear: External ear normal.   Left Ear: External ear normal.   Mouth/Throat: Oropharynx is clear and moist. No oropharyngeal exudate.   Eyes: Pupils are equal, round, and reactive to light. Conjunctivae and EOM are normal. Right eye exhibits no discharge. Left eye exhibits no discharge. No scleral icterus.   Neck: Normal range of motion. Neck supple. JVD present. No tracheal deviation present. No thyromegaly present.   Cardiovascular: Normal rate, regular rhythm, normal heart sounds and intact distal  pulses.   PMI nondisplaced, frequent extrasystoles noted   Pulmonary/Chest: Accessory muscle usage present. Tachypnea noted. She has no wheezes. She has rales in the right upper field, the right middle field, the right lower field, the left upper field, the left middle field and the left lower field. She exhibits no tenderness.   No dullness to percussion   Abdominal: Soft. Bowel sounds are normal. There is no tenderness. There is no rebound and no guarding.   No HDR   Musculoskeletal:   Normal gait   Lymphadenopathy:     She has no cervical adenopathy.   Neurological: She is alert and oriented to person, place, and time. She exhibits normal muscle tone. Coordination normal.   Skin: Skin is warm and dry. Capillary refill takes less than 2 seconds. No rash noted. She is not diaphoretic. No pallor.   Chronic venous stasis changes   Psychiatric: She has a normal mood and affect. Judgment normal.   Nursing note and vitals reviewed.        ECG 12 Lead  Date/Time: 8/14/2020 11:41 AM  Performed by: Mian Agosto MD  Authorized by: Mian Agosto MD   Comparison: not compared with previous ECG   Rhythm: sinus arrhythmia  Ectopy: multifocal PVCs  Conduction: incomplete right bundle branch block  Other: no other findings    Clinical impression: abnormal EKG            Results Review:    Echo reviedw    Assessment/Plan:    Problem List Items Addressed This Visit        Cardiovascular and Mediastinum    Acute diastolic CHF (congestive heart failure) (CMS/HCC)    Hypertensive urgency - Primary       Respiratory    Acute hypoxemic respiratory failure (CMS/HCC)      I suspect this is acute decompensated diastolic heart failure her heart failure with preserved ejection fraction.  She also has a hypertensive urgency with endorgan damage of heart failure.  I have spoken with the emergency room practitioner Min Watts who is expecting her.  We will minimal need blood pressure control and diuresis as well as potential ischemia  work-up.  I discussed the case with is like and she is in agreement    Plan of care reviewed with patient at the conclusion of today's visit. Education was provided regarding diagnosis and management.  Patient verbalizes understanding of and agreement with management plan.    No follow-ups on file.    Mian Agosto MD    Please note that portions of this note may have been completed with a voice recognition program. Efforts were made to edit the dictations, but occasionally words are mistranscribed.

## 2020-08-17 ENCOUNTER — TELEPHONE (OUTPATIENT)
Dept: FAMILY MEDICINE CLINIC | Facility: CLINIC | Age: 85
End: 2020-08-17

## 2020-08-17 NOTE — TELEPHONE ENCOUNTER
Contacted patient and she states that her BP has been elevated because she has been stressed lately    She reports that her BP was   156/105  160/90  She has recently been diagnosed with Macular Degeneration and she is unable to drive with her eyes dilated.     She is scheduled for an appt tommorw to discuss medicine options

## 2020-08-17 NOTE — TELEPHONE ENCOUNTER
PER PT CALLED, STATED SHE TOOK A BP MEDICATION BEFORE AND WOULD LIKE TO START TAKING IT AGAIN. BP IS RUNNING HIGH. PLEASE CALL PT BACK TO ADVISE.

## 2020-08-17 NOTE — TELEPHONE ENCOUNTER
Left Message for Pt to return Our call. Pt needs an appt.    If Pt calls back ok for HUB to relay message and Schedule Pt

## 2020-08-18 ENCOUNTER — OFFICE VISIT (OUTPATIENT)
Dept: FAMILY MEDICINE CLINIC | Facility: CLINIC | Age: 85
End: 2020-08-18

## 2020-08-18 VITALS
SYSTOLIC BLOOD PRESSURE: 124 MMHG | BODY MASS INDEX: 21.5 KG/M2 | WEIGHT: 137 LBS | HEIGHT: 67 IN | DIASTOLIC BLOOD PRESSURE: 88 MMHG | HEART RATE: 99 BPM | OXYGEN SATURATION: 97 %

## 2020-08-18 DIAGNOSIS — I10 ESSENTIAL HYPERTENSION: ICD-10-CM

## 2020-08-18 DIAGNOSIS — J96.01 ACUTE HYPOXEMIC RESPIRATORY FAILURE (HCC): Primary | ICD-10-CM

## 2020-08-18 LAB
QUANTIFERON CRITERIA: NORMAL
QUANTIFERON MITOGEN VALUE: >10 IU/ML
QUANTIFERON NIL VALUE: 0.01 IU/ML
QUANTIFERON TB1 AG VALUE: 0.04 IU/ML
QUANTIFERON TB2 AG VALUE: 0.03 IU/ML
QUANTIFERON-TB GOLD PLUS: NEGATIVE

## 2020-08-18 PROCEDURE — 99213 OFFICE O/P EST LOW 20 MIN: CPT | Performed by: NURSE PRACTITIONER

## 2020-08-18 NOTE — PATIENT INSTRUCTIONS
"Managing Your Hypertension  Hypertension is commonly called high blood pressure. This is when the force of your blood pressing against the walls of your arteries is too strong. Arteries are blood vessels that carry blood from your heart throughout your body. Hypertension forces the heart to work harder to pump blood, and may cause the arteries to become narrow or stiff. Having untreated or uncontrolled hypertension can cause heart attack, stroke, kidney disease, and other problems.  What are blood pressure readings?  A blood pressure reading consists of a higher number over a lower number. Ideally, your blood pressure should be below 120/80. The first (\"top\") number is called the systolic pressure. It is a measure of the pressure in your arteries as your heart beats. The second (\"bottom\") number is called the diastolic pressure. It is a measure of the pressure in your arteries as the heart relaxes.  What does my blood pressure reading mean?  Blood pressure is classified into four stages. Based on your blood pressure reading, your health care provider may use the following stages to determine what type of treatment you need, if any. Systolic pressure and diastolic pressure are measured in a unit called mm Hg.  Normal  · Systolic pressure: below 120.  · Diastolic pressure: below 80.  Elevated  · Systolic pressure: 120-129.  · Diastolic pressure: below 80.  Hypertension stage 1  · Systolic pressure: 130-139.  · Diastolic pressure: 80-89.  Hypertension stage 2  · Systolic pressure: 140 or above.  · Diastolic pressure: 90 or above.  What health risks are associated with hypertension?  Managing your hypertension is an important responsibility. Uncontrolled hypertension can lead to:  · A heart attack.  · A stroke.  · A weakened blood vessel (aneurysm).  · Heart failure.  · Kidney damage.  · Eye damage.  · Metabolic syndrome.  · Memory and concentration problems.  What changes can I make to manage my " hypertension?  Hypertension can be managed by making lifestyle changes and possibly by taking medicines. Your health care provider will help you make a plan to bring your blood pressure within a normal range.  Eating and drinking    · Eat a diet that is high in fiber and potassium, and low in salt (sodium), added sugar, and fat. An example eating plan is called the DASH (Dietary Approaches to Stop Hypertension) diet. To eat this way:  ? Eat plenty of fresh fruits and vegetables. Try to fill half of your plate at each meal with fruits and vegetables.  ? Eat whole grains, such as whole wheat pasta, brown rice, or whole grain bread. Fill about one quarter of your plate with whole grains.  ? Eat low-fat diary products.  ? Avoid fatty cuts of meat, processed or cured meats, and poultry with skin. Fill about one quarter of your plate with lean proteins such as fish, chicken without skin, beans, eggs, and tofu.  ? Avoid premade and processed foods. These tend to be higher in sodium, added sugar, and fat.  · Reduce your daily sodium intake. Most people with hypertension should eat less than 1,500 mg of sodium a day.  · Limit alcohol intake to no more than 1 drink a day for nonpregnant women and 2 drinks a day for men. One drink equals 12 oz of beer, 5 oz of wine, or 1½ oz of hard liquor.  Lifestyle  · Work with your health care provider to maintain a healthy body weight, or to lose weight. Ask what an ideal weight is for you.  · Get at least 30 minutes of exercise that causes your heart to beat faster (aerobic exercise) most days of the week. Activities may include walking, swimming, or biking.  · Include exercise to strengthen your muscles (resistance exercise), such as weight lifting, as part of your weekly exercise routine. Try to do these types of exercises for 30 minutes at least 3 days a week.  · Do not use any products that contain nicotine or tobacco, such as cigarettes and e-cigarettes. If you need help quitting,  ask your health care provider.  · Control any long-term (chronic) conditions you have, such as high cholesterol or diabetes.  Monitoring  · Monitor your blood pressure at home as told by your health care provider. Your personal target blood pressure may vary depending on your medical conditions, your age, and other factors.  · Have your blood pressure checked regularly, as often as told by your health care provider.  Working with your health care provider  · Review all the medicines you take with your health care provider because there may be side effects or interactions.  · Talk with your health care provider about your diet, exercise habits, and other lifestyle factors that may be contributing to hypertension.  · Visit your health care provider regularly. Your health care provider can help you create and adjust your plan for managing hypertension.  Will I need medicine to control my blood pressure?  Your health care provider may prescribe medicine if lifestyle changes are not enough to get your blood pressure under control, and if:  · Your systolic blood pressure is 130 or higher.  · Your diastolic blood pressure is 80 or higher.  Take medicines only as told by your health care provider. Follow the directions carefully. Blood pressure medicines must be taken as prescribed. The medicine does not work as well when you skip doses. Skipping doses also puts you at risk for problems.  Contact a health care provider if:  · You think you are having a reaction to medicines you have taken.  · You have repeated (recurrent) headaches.  · You feel dizzy.  · You have swelling in your ankles.  · You have trouble with your vision.  Get help right away if:  · You develop a severe headache or confusion.  · You have unusual weakness or numbness, or you feel faint.  · You have severe pain in your chest or abdomen.  · You vomit repeatedly.  · You have trouble breathing.  Summary  · Hypertension is when the force of blood pumping  "through your arteries is too strong. If this condition is not controlled, it may put you at risk for serious complications.  · Your personal target blood pressure may vary depending on your medical conditions, your age, and other factors. For most people, a normal blood pressure is less than 120/80.  · Hypertension is managed by lifestyle changes, medicines, or both. Lifestyle changes include weight loss, eating a healthy, low-sodium diet, exercising more, and limiting alcohol.  This information is not intended to replace advice given to you by your health care provider. Make sure you discuss any questions you have with your health care provider.  Document Released: 09/11/2013 Document Revised: 04/10/2020 Document Reviewed: 11/15/2017  [employer] Patient Education © 2020 [employer]      DASH Eating Plan  DASH stands for \"Dietary Approaches to Stop Hypertension.\" The DASH eating plan is a healthy eating plan that has been shown to reduce high blood pressure (hypertension). It may also reduce your risk for type 2 diabetes, heart disease, and stroke. The DASH eating plan may also help with weight loss.  What are tips for following this plan?    General guidelines  · Avoid eating more than 2,300 mg (milligrams) of salt (sodium) a day. If you have hypertension, you may need to reduce your sodium intake to 1,500 mg a day.  · Limit alcohol intake to no more than 1 drink a day for nonpregnant women and 2 drinks a day for men. One drink equals 12 oz of beer, 5 oz of wine, or 1½ oz of hard liquor.  · Work with your health care provider to maintain a healthy body weight or to lose weight. Ask what an ideal weight is for you.  · Get at least 30 minutes of exercise that causes your heart to beat faster (aerobic exercise) most days of the week. Activities may include walking, swimming, or biking.  · Work with your health care provider or diet and nutrition specialist (dietitian) to adjust your eating plan to your individual " "calorie needs.  Reading food labels    · Check food labels for the amount of sodium per serving. Choose foods with less than 5 percent of the Daily Value of sodium. Generally, foods with less than 300 mg of sodium per serving fit into this eating plan.  · To find whole grains, look for the word \"whole\" as the first word in the ingredient list.  Shopping  · Buy products labeled as \"low-sodium\" or \"no salt added.\"  · Buy fresh foods. Avoid canned foods and premade or frozen meals.  Cooking  · Avoid adding salt when cooking. Use salt-free seasonings or herbs instead of table salt or sea salt. Check with your health care provider or pharmacist before using salt substitutes.  · Do not branham foods. Cook foods using healthy methods such as baking, boiling, grilling, and broiling instead.  · Cook with heart-healthy oils, such as olive, canola, soybean, or sunflower oil.  Meal planning  · Eat a balanced diet that includes:  ? 5 or more servings of fruits and vegetables each day. At each meal, try to fill half of your plate with fruits and vegetables.  ? Up to 6-8 servings of whole grains each day.  ? Less than 6 oz of lean meat, poultry, or fish each day. A 3-oz serving of meat is about the same size as a deck of cards. One egg equals 1 oz.  ? 2 servings of low-fat dairy each day.  ? A serving of nuts, seeds, or beans 5 times each week.  ? Heart-healthy fats. Healthy fats called Omega-3 fatty acids are found in foods such as flaxseeds and coldwater fish, like sardines, salmon, and mackerel.  · Limit how much you eat of the following:  ? Canned or prepackaged foods.  ? Food that is high in trans fat, such as fried foods.  ? Food that is high in saturated fat, such as fatty meat.  ? Sweets, desserts, sugary drinks, and other foods with added sugar.  ? Full-fat dairy products.  · Do not salt foods before eating.  · Try to eat at least 2 vegetarian meals each week.  · Eat more home-cooked food and less restaurant, buffet, and fast " food.  · When eating at a restaurant, ask that your food be prepared with less salt or no salt, if possible.  What foods are recommended?  The items listed may not be a complete list. Talk with your dietitian about what dietary choices are best for you.  Grains  Whole-grain or whole-wheat bread. Whole-grain or whole-wheat pasta. Brown rice. Oatmeal. Quinoa. Bulgur. Whole-grain and low-sodium cereals. Meg bread. Low-fat, low-sodium crackers. Whole-wheat flour tortillas.  Vegetables  Fresh or frozen vegetables (raw, steamed, roasted, or grilled). Low-sodium or reduced-sodium tomato and vegetable juice. Low-sodium or reduced-sodium tomato sauce and tomato paste. Low-sodium or reduced-sodium canned vegetables.  Fruits  All fresh, dried, or frozen fruit. Canned fruit in natural juice (without added sugar).  Meat and other protein foods  Skinless chicken or turkey. Ground chicken or turkey. Pork with fat trimmed off. Fish and seafood. Egg whites. Dried beans, peas, or lentils. Unsalted nuts, nut butters, and seeds. Unsalted canned beans. Lean cuts of beef with fat trimmed off. Low-sodium, lean deli meat.  Dairy  Low-fat (1%) or fat-free (skim) milk. Fat-free, low-fat, or reduced-fat cheeses. Nonfat, low-sodium ricotta or cottage cheese. Low-fat or nonfat yogurt. Low-fat, low-sodium cheese.  Fats and oils  Soft margarine without trans fats. Vegetable oil. Low-fat, reduced-fat, or light mayonnaise and salad dressings (reduced-sodium). Canola, safflower, olive, soybean, and sunflower oils. Avocado.  Seasoning and other foods  Herbs. Spices. Seasoning mixes without salt. Unsalted popcorn and pretzels. Fat-free sweets.  What foods are not recommended?  The items listed may not be a complete list. Talk with your dietitian about what dietary choices are best for you.  Grains  Baked goods made with fat, such as croissants, muffins, or some breads. Dry pasta or rice meal packs.  Vegetables  Creamed or fried vegetables. Vegetables  in a cheese sauce. Regular canned vegetables (not low-sodium or reduced-sodium). Regular canned tomato sauce and paste (not low-sodium or reduced-sodium). Regular tomato and vegetable juice (not low-sodium or reduced-sodium). Pickles. Olives.  Fruits  Canned fruit in a light or heavy syrup. Fried fruit. Fruit in cream or butter sauce.  Meat and other protein foods  Fatty cuts of meat. Ribs. Fried meat. Ramsay. Sausage. Bologna and other processed lunch meats. Salami. Fatback. Hotdogs. Bratwurst. Salted nuts and seeds. Canned beans with added salt. Canned or smoked fish. Whole eggs or egg yolks. Chicken or turkey with skin.  Dairy  Whole or 2% milk, cream, and half-and-half. Whole or full-fat cream cheese. Whole-fat or sweetened yogurt. Full-fat cheese. Nondairy creamers. Whipped toppings. Processed cheese and cheese spreads.  Fats and oils  Butter. Stick margarine. Lard. Shortening. Ghee. Ramsay fat. Tropical oils, such as coconut, palm kernel, or palm oil.  Seasoning and other foods  Salted popcorn and pretzels. Onion salt, garlic salt, seasoned salt, table salt, and sea salt. Worcestershire sauce. Tartar sauce. Barbecue sauce. Teriyaki sauce. Soy sauce, including reduced-sodium. Steak sauce. Canned and packaged gravies. Fish sauce. Oyster sauce. Cocktail sauce. Horseradish that you find on the shelf. Ketchup. Mustard. Meat flavorings and tenderizers. Bouillon cubes. Hot sauce and Tabasco sauce. Premade or packaged marinades. Premade or packaged taco seasonings. Relishes. Regular salad dressings.  Where to find more information:  · National Heart, Lung, and [employer]: www.nhlbi.nih.gov  · American Heart Association: www.heart.org  Summary  · The DASH eating plan is a healthy eating plan that has been shown to reduce high blood pressure (hypertension). It may also reduce your risk for type 2 diabetes, heart disease, and stroke.  · With the DASH eating plan, you should limit salt (sodium) intake to 2,300 mg a  day. If you have hypertension, you may need to reduce your sodium intake to 1,500 mg a day.  · When on the DASH eating plan, aim to eat more fresh fruits and vegetables, whole grains, lean proteins, low-fat dairy, and heart-healthy fats.  · Work with your health care provider or diet and nutrition specialist (dietitian) to adjust your eating plan to your individual calorie needs.  This information is not intended to replace advice given to you by your health care provider. Make sure you discuss any questions you have with your health care provider.  Document Released: 12/06/2012 Document Revised: 11/30/2018 Document Reviewed: 12/11/2017  [employer] Patient Education © 2020 [employer]

## 2020-08-18 NOTE — PROGRESS NOTES
Chief Complaint   Patient presents with   • Hypertension       Subjective   Lashanda Qureshi is a 85 y.o. female    History of Present Illness  Patient in for follow up on hypertension, BP running 107-160/, Mostly 100s-130s systolic. Has been weaning down on prednisone, now on 3 mg qd, not sleeping but around 4 hrs per night. O2 level good today. Has called pulmonary for appt, not hear back yet.     Allergies   Allergen Reactions   • Antihistamines, Loratadine-Type Other (See Comments)     Drowsiness     • Erythromycin      Past Medical History:   Diagnosis Date   • Abnormal CT scan, lung    • Mycobacterium gordonae infection       Past Surgical History:   Procedure Laterality Date   • BLADDER SURGERY     • BRONCHOSCOPY  01/27/2016   • CHOLECYSTECTOMY     • COLON SURGERY     • DILATION AND CURETTAGE, DIAGNOSTIC / THERAPEUTIC     • REFRACTIVE SURGERY  04/2019   • TOTAL HIP ARTHROPLASTY      Left hip     Social History     Socioeconomic History   • Marital status:      Spouse name: Not on file   • Number of children: Not on file   • Years of education: Not on file   • Highest education level: Not on file   Tobacco Use   • Smoking status: Former Smoker   • Smokeless tobacco: Never Used   • Tobacco comment: Quit 41 years ago   Substance and Sexual Activity   • Alcohol use: No   • Drug use: No   • Sexual activity: Defer     Comment:    Social History Narrative    Caffeine 1-2 servings coffee        Current Outpatient Medications:   •  azelastine (ASTELIN) 0.1 % nasal spray, 2 sprays into the nostril(s) as directed by provider 2 (Two) Times a Day., Disp: 1 each, Rfl: 12  •  Cholecalciferol (VITAMIN D3) 2000 UNITS tablet, Take  by mouth. Take 2 tablets daily., Disp: , Rfl:   •  Cyanocobalamin (VITAMIN B12 PO), Take  by mouth. Take 1 tablet daily as directed., Disp: , Rfl:   •  doxycycline (MONODOX) 100 MG capsule, Take 1 capsule by mouth 2 (Two) Times a Day for 7 days., Disp: 14 capsule, Rfl: 0  •  Elastic  Bandages & Supports (FUTURO FIRM COMPRESSION HOSE) misc, 1 package Daily., Disp: 1 each, Rfl: 1  •  ESTROGENS CONJ SYNTHETIC B PO,         Compound Bi-Est 20-20 Topical  (Estriol 80% + Estradiol 20%) apply a  pea sized amt to the vulva 2-3 nights per week, Disp: , Rfl:   •  fluticasone (FLONASE) 50 MCG/ACT nasal spray, 2 sprays into the nostril(s) as directed by provider Daily. Administer 2 sprays in each nostril for each dose., Disp: 1 bottle, Rfl: 11  •  latanoprost (XALATAN) 0.005 % ophthalmic solution, Apply  to eye. Instill 1 drop into affected eye(s) once daily as directed., Disp: , Rfl:   •  methylPREDNISolone (MEDROL, ARNOLD,) 4 MG tablet, Take as directed on package instructions., Disp: 21 tablet, Rfl: 0  •  metroNIDAZOLE (METROGEL VAGINAL) 0.75 % vaginal gel, Metrogel Vaginal 0.75 %  Insert 1 applicatorful every day by vaginal route for 7 days., Disp: , Rfl:   •  nystatin (MYCOSTATIN) 942140 UNIT/GM ointment, nystatin 100,000 unit/gram topical ointment, Disp: , Rfl:   •  triamcinolone (KENALOG) 0.1 % ointment, triamcinolone acetonide 0.1 % topical ointment, Disp: , Rfl:   •  VENTOLIN  (90 Base) MCG/ACT inhaler, Inhale 2 puffs Every 6 (Six) Hours As Needed., Disp: , Rfl: 1     Review of Systems   Constitutional: Negative for chills, fatigue, fever and unexpected weight change.   Respiratory: Positive for shortness of breath. Negative for cough, chest tightness and wheezing.    Cardiovascular: Negative for chest pain, palpitations and leg swelling.   Gastrointestinal: Negative for constipation, diarrhea, nausea and vomiting.   Genitourinary: Negative for difficulty urinating and dysuria.   Skin: Negative for color change and rash.   Neurological: Negative for dizziness, syncope, weakness and headaches.   Psychiatric/Behavioral: Negative for sleep disturbance. The patient is nervous/anxious.        Objective     Vitals:    08/18/20 1106   BP: 124/88   Pulse: 99   SpO2: 97%       Results for orders placed  or performed during the hospital encounter of 08/14/20   Comprehensive Metabolic Panel   Result Value Ref Range    Glucose 104 (H) 65 - 99 mg/dL    BUN 11 8 - 23 mg/dL    Creatinine 0.65 0.57 - 1.00 mg/dL    Sodium 143 136 - 145 mmol/L    Potassium 3.3 (L) 3.5 - 5.2 mmol/L    Chloride 104 98 - 107 mmol/L    CO2 28.0 22.0 - 29.0 mmol/L    Calcium 9.2 8.6 - 10.5 mg/dL    Total Protein 7.1 6.0 - 8.5 g/dL    Albumin 4.20 3.50 - 5.20 g/dL    ALT (SGPT) 10 1 - 33 U/L    AST (SGOT) 18 1 - 32 U/L    Alkaline Phosphatase 54 39 - 117 U/L    Total Bilirubin 0.5 0.0 - 1.2 mg/dL    eGFR Non African Amer 87 >60 mL/min/1.73    Globulin 2.9 gm/dL    A/G Ratio 1.4 g/dL    BUN/Creatinine Ratio 16.9 7.0 - 25.0    Anion Gap 11.0 5.0 - 15.0 mmol/L   BNP   Result Value Ref Range    proBNP 214.6 0.0-1,800.0 pg/mL   Troponin   Result Value Ref Range    Troponin T <0.010 0.000 - 0.030 ng/mL   CBC Auto Differential   Result Value Ref Range    WBC 4.87 3.40 - 10.80 10*3/mm3    RBC 4.38 3.77 - 5.28 10*6/mm3    Hemoglobin 13.6 12.0 - 15.9 g/dL    Hematocrit 41.7 34.0 - 46.6 %    MCV 95.2 79.0 - 97.0 fL    MCH 31.1 26.6 - 33.0 pg    MCHC 32.6 31.5 - 35.7 g/dL    RDW 12.9 12.3 - 15.4 %    RDW-SD 44.9 37.0 - 54.0 fl    MPV 8.4 6.0 - 12.0 fL    Platelets 231 140 - 450 10*3/mm3    Neutrophil % 66.2 42.7 - 76.0 %    Lymphocyte % 24.4 19.6 - 45.3 %    Monocyte % 6.6 5.0 - 12.0 %    Eosinophil % 1.8 0.3 - 6.2 %    Basophil % 0.8 0.0 - 1.5 %    Immature Grans % 0.2 0.0 - 0.5 %    Neutrophils, Absolute 3.22 1.70 - 7.00 10*3/mm3    Lymphocytes, Absolute 1.19 0.70 - 3.10 10*3/mm3    Monocytes, Absolute 0.32 0.10 - 0.90 10*3/mm3    Eosinophils, Absolute 0.09 0.00 - 0.40 10*3/mm3    Basophils, Absolute 0.04 0.00 - 0.20 10*3/mm3    Immature Grans, Absolute 0.01 0.00 - 0.05 10*3/mm3    nRBC 0.0 0.0 - 0.2 /100 WBC   Sedimentation Rate   Result Value Ref Range    Sed Rate 23 0 - 30 mm/hr   QuantiFERON TB Plus Client Incubated   Result Value Ref Range     QuantiFERON Criteria Comment     QUANTIFERON TB1 AG VALUE 0.04 IU/mL    QUANTIFERON TB2 AG VALUE 0.03 IU/mL    QuantiFERON Nil Value 0.01 IU/mL    QuantiFERON Mitogen Value >10.00 IU/mL    QUANTIFERON-TB GOLD PLUS Negative Negative   Light Blue Top   Result Value Ref Range    Extra Tube hold for add-on    Green Top (Gel)   Result Value Ref Range    Extra Tube Hold for add-ons.    Lavender Top   Result Value Ref Range    Extra Tube hold for add-on    Gold Top - SST   Result Value Ref Range    Extra Tube Hold for add-ons.        Physical Exam   Constitutional: She is oriented to person, place, and time. She appears well-developed and well-nourished.   HENT:   Head: Normocephalic and atraumatic.   Cardiovascular: Normal rate, regular rhythm and normal heart sounds.   Pulmonary/Chest: Effort normal and breath sounds normal.   Musculoskeletal: She exhibits no edema.   Neurological: She is alert and oriented to person, place, and time.   Skin: Skin is warm and dry.   Psychiatric: She has a normal mood and affect. Her behavior is normal.   Vitals reviewed.      Assessment/Plan   Problem List Items Addressed This Visit        Cardiovascular and Mediastinum    Hypertension       Respiratory    Acute hypoxemic respiratory failure (CMS/HCC) - Primary      On her blood pressure and her respiratory issues I recommend she continue her current medications as is and continue to monitor her blood pressure. Patient instructed to check blood pressure daily, record, and bring to next visit. If the readings run 140/90 or greater, the patient is to call my office or return sooner for evaluation. Pt advised to eat a heart healthy diet and get regular aerobic exercise.  I do want her to keep her appointment with pulmonary when she gets this.    RV- 1 mo    Counseling provided on hypertension.    Leandro Johnson, APRN   8/18/2020   12:58

## 2020-08-20 ENCOUNTER — TELEPHONE (OUTPATIENT)
Dept: FAMILY MEDICINE CLINIC | Facility: CLINIC | Age: 85
End: 2020-08-20

## 2020-08-20 NOTE — TELEPHONE ENCOUNTER
Patient called stating that since she has started taking prednisone her breathing has improved but she experienced some heart palpitations early this morning around 4 am    This morning her BP was 140/82 , 56 pulse  She states that about 30 mins later it was 135/80 52 pulse    She has completed the prednisone rafia and just wanted to check with Leandro and make sure she is okay

## 2020-08-24 ENCOUNTER — LAB (OUTPATIENT)
Dept: PULMONOLOGY | Facility: CLINIC | Age: 85
End: 2020-08-24

## 2020-08-24 DIAGNOSIS — Z01.812 BLOOD TESTS PRIOR TO TREATMENT OR PROCEDURE: Primary | ICD-10-CM

## 2020-08-24 PROCEDURE — 99000 SPECIMEN HANDLING OFFICE-LAB: CPT | Performed by: NURSE PRACTITIONER

## 2020-08-24 PROCEDURE — U0004 COV-19 TEST NON-CDC HGH THRU: HCPCS | Performed by: NURSE PRACTITIONER

## 2020-08-24 PROCEDURE — U0002 COVID-19 LAB TEST NON-CDC: HCPCS | Performed by: NURSE PRACTITIONER

## 2020-08-25 LAB
REF LAB TEST METHOD: NORMAL
SARS-COV-2 RNA RESP QL NAA+PROBE: NOT DETECTED

## 2020-08-31 ENCOUNTER — OFFICE VISIT (OUTPATIENT)
Dept: PULMONOLOGY | Facility: CLINIC | Age: 85
End: 2020-08-31

## 2020-08-31 VITALS
OXYGEN SATURATION: 95 % | TEMPERATURE: 98.2 F | SYSTOLIC BLOOD PRESSURE: 120 MMHG | WEIGHT: 136.2 LBS | DIASTOLIC BLOOD PRESSURE: 80 MMHG | BODY MASS INDEX: 21.38 KG/M2 | HEART RATE: 75 BPM | HEIGHT: 67 IN

## 2020-08-31 DIAGNOSIS — J45.40 MODERATE PERSISTENT ASTHMA, UNSPECIFIED WHETHER COMPLICATED: Primary | ICD-10-CM

## 2020-08-31 DIAGNOSIS — J18.9 PNEUMONITIS: ICD-10-CM

## 2020-08-31 DIAGNOSIS — R93.89 ABNORMAL COMPUTED TOMOGRAPHY SCAN: ICD-10-CM

## 2020-08-31 PROCEDURE — 99214 OFFICE O/P EST MOD 30 MIN: CPT | Performed by: NURSE PRACTITIONER

## 2020-08-31 PROCEDURE — 94618 PULMONARY STRESS TESTING: CPT | Performed by: NURSE PRACTITIONER

## 2020-09-17 ENCOUNTER — TELEPHONE (OUTPATIENT)
Dept: CARDIOLOGY | Facility: HOSPITAL | Age: 85
End: 2020-09-17

## 2020-09-17 DIAGNOSIS — I10 ESSENTIAL HYPERTENSION: ICD-10-CM

## 2020-09-17 DIAGNOSIS — I49.3 PVC'S (PREMATURE VENTRICULAR CONTRACTIONS): ICD-10-CM

## 2020-09-17 DIAGNOSIS — I49.1 PREMATURE ATRIAL CONTRACTIONS: Primary | ICD-10-CM

## 2020-09-17 NOTE — TELEPHONE ENCOUNTER
Was able to reach patient after multiple attempts to review holter monitor.  Patient notes that she is feeling great.  She denies chest pain, palpitations, presyncope, syncope or dizziness.  She reports that she just did not get around to rescheduling her missed appointment.  I have reviewed her extended Holter that was read by Dr. Brown.  Average heart rate was 68 bpm, PVC burden 1.5%, PAC burden 16.4%.Patient had a 3 beat run of VT and a 5 beat run of VT.  Symptoms correlated with sinus and sinus rhythm with PACs but rarely correlated with PVCs.   Re-reviewed echo with patient.  Patient notes that she is feeling well and would like to defer medications at this time.  Ambulatory referral to cardiology.  Patient request cardiology at the [place] office secondary to transportation issues.  Patient instructed to call the office with any questions, concerns or change in symptoms prior to her appointment with cardiology.

## 2020-09-18 ENCOUNTER — OFFICE VISIT (OUTPATIENT)
Dept: FAMILY MEDICINE CLINIC | Facility: CLINIC | Age: 85
End: 2020-09-18

## 2020-09-18 VITALS
OXYGEN SATURATION: 98 % | SYSTOLIC BLOOD PRESSURE: 156 MMHG | HEART RATE: 90 BPM | DIASTOLIC BLOOD PRESSURE: 82 MMHG | HEIGHT: 67 IN | WEIGHT: 136 LBS | BODY MASS INDEX: 21.35 KG/M2

## 2020-09-18 DIAGNOSIS — Z23 NEED FOR IMMUNIZATION AGAINST INFLUENZA: ICD-10-CM

## 2020-09-18 DIAGNOSIS — H69.83 DYSFUNCTION OF BOTH EUSTACHIAN TUBES: ICD-10-CM

## 2020-09-18 DIAGNOSIS — I47.29 NSVT (NONSUSTAINED VENTRICULAR TACHYCARDIA) (HCC): Primary | ICD-10-CM

## 2020-09-18 PROCEDURE — G0008 ADMIN INFLUENZA VIRUS VAC: HCPCS | Performed by: NURSE PRACTITIONER

## 2020-09-18 PROCEDURE — 90694 VACC AIIV4 NO PRSRV 0.5ML IM: CPT | Performed by: NURSE PRACTITIONER

## 2020-09-18 PROCEDURE — 99213 OFFICE O/P EST LOW 20 MIN: CPT | Performed by: NURSE PRACTITIONER

## 2020-09-18 NOTE — PROGRESS NOTES
Chief Complaint   Patient presents with   • Hypoxemic Respiratory failure       Subjective   Lashanda Qureshi is a 85 y.o. female    History of Present Illness  Still with some dyspnea, has seen Pulmonary. Some better since Hospital ER visit  8/14/2020. Will have PFTs next month. Had a Holter monitor showed 3 and 5 beats NSVT. Has appointment with Cardiology to discuss.  She also had an echocardiogram on 8/5/2020 which showed a normal ejection fraction 54% some left ventricular diastolic dysfunction, mild TR and some calcification aortic valve without other abnormality.    Allergies   Allergen Reactions   • Antihistamines, Loratadine-Type Other (See Comments)     Drowsiness     • Erythromycin      Past Medical History:   Diagnosis Date   • Abnormal CT scan, lung    • Mycobacterium gordonae infection       Past Surgical History:   Procedure Laterality Date   • BLADDER SURGERY     • BRONCHOSCOPY  01/27/2016   • CHOLECYSTECTOMY     • COLON SURGERY     • DILATION AND CURETTAGE, DIAGNOSTIC / THERAPEUTIC     • REFRACTIVE SURGERY  04/2019   • TOTAL HIP ARTHROPLASTY      Left hip     Social History     Socioeconomic History   • Marital status:      Spouse name: Not on file   • Number of children: Not on file   • Years of education: Not on file   • Highest education level: Not on file   Tobacco Use   • Smoking status: Former Smoker   • Smokeless tobacco: Never Used   • Tobacco comment: Quit 41 years ago   Substance and Sexual Activity   • Alcohol use: No   • Drug use: No   • Sexual activity: Defer     Comment:    Social History Narrative    Caffeine 1-2 servings coffee        Current Outpatient Medications:   •  azelastine (ASTELIN) 0.1 % nasal spray, 2 sprays into the nostril(s) as directed by provider 2 (Two) Times a Day., Disp: 1 each, Rfl: 12  •  Cholecalciferol (VITAMIN D3) 2000 UNITS tablet, Take  by mouth. Take 2 tablets daily., Disp: , Rfl:   •  Cyanocobalamin (VITAMIN B12 PO), Take  by mouth. Take 1  tablet daily as directed., Disp: , Rfl:   •  Elastic Bandages & Supports (FUTURO FIRM COMPRESSION HOSE) misc, 1 package Daily., Disp: 1 each, Rfl: 1  •  ESTROGENS CONJ SYNTHETIC B PO,         Compound Bi-Est 20-20 Topical  (Estriol 80% + Estradiol 20%) apply a  pea sized amt to the vulva 2-3 nights per week, Disp: , Rfl:   •  fluticasone (Veramyst) 27.5 MCG/SPRAY nasal spray, 2 sprays into the nostril(s) as directed by provider Daily., Disp: 10 g, Rfl: 3  •  latanoprost (XALATAN) 0.005 % ophthalmic solution, Apply  to eye. Instill 1 drop into affected eye(s) once daily as directed., Disp: , Rfl:   •  metroNIDAZOLE (METROGEL VAGINAL) 0.75 % vaginal gel, Metrogel Vaginal 0.75 %  Insert 1 applicatorful every day by vaginal route for 7 days., Disp: , Rfl:   •  nystatin (MYCOSTATIN) 880438 UNIT/GM ointment, nystatin 100,000 unit/gram topical ointment, Disp: , Rfl:   •  triamcinolone (KENALOG) 0.1 % ointment, triamcinolone acetonide 0.1 % topical ointment, Disp: , Rfl:   •  VENTOLIN  (90 Base) MCG/ACT inhaler, Inhale 2 puffs Every 6 (Six) Hours As Needed., Disp: , Rfl: 1     Review of Systems   Constitutional: Negative for chills, fatigue and unexpected weight change.   Respiratory: Negative for cough, chest tightness, shortness of breath and wheezing.    Cardiovascular: Positive for leg swelling. Negative for chest pain and palpitations.   Gastrointestinal: Negative for constipation, diarrhea, nausea and vomiting.   Genitourinary: Negative for difficulty urinating and dysuria.   Skin: Negative for color change and rash.   Neurological: Negative for dizziness, syncope, weakness and headaches.   Psychiatric/Behavioral: Negative for sleep disturbance.       Objective     Vitals:    09/18/20 1034   BP: 156/82   Pulse: 90   SpO2: 98%       Results for orders placed or performed in visit on 08/24/20   COVID-19,[facility], NP SWAB IN [facility] VIRAL TRANSPORT MEDIA 24-30 HR TAT - Swab, Nasopharynx    Specimen: Nasopharynx;  Swab   Result Value Ref Range    Reference Lab Report      COVID19 Not Detected Not Detected - Ref. Range       Physical Exam  Vitals signs reviewed.   Constitutional:       Appearance: She is well-developed.   HENT:      Head: Normocephalic and atraumatic.      Right Ear: A middle ear effusion is present.      Left Ear: A middle ear effusion is present.   Cardiovascular:      Rate and Rhythm: Normal rate and regular rhythm.      Heart sounds: Normal heart sounds.   Pulmonary:      Effort: Pulmonary effort is normal.      Breath sounds: Normal breath sounds.   Genitourinary:     Comments: deferred  Skin:     General: Skin is warm and dry.   Neurological:      Mental Status: She is alert and oriented to person, place, and time.   Psychiatric:         Behavior: Behavior normal.         Assessment/Plan   Problem List Items Addressed This Visit        Cardiovascular and Mediastinum    NSVT (nonsustained ventricular tachycardia) (CMS/HCC) - Primary       Nervous and Auditory    Dysfunction of both eustachian tubes      Other Visit Diagnoses     Need for immunization against influenza        Relevant Orders    Fluad Quad >65 years (Completed)      Further nonsustained ventricular tachycardia had like her to continue to follow-up with cardiology.  We have discussed different medications and potential treatments for this.    For eustachian tube dysfunction I like her continue to take the Flonase nasal spray as well as azelastine nasal spray and over-the-counter plain antihistamine such as Claritin. Patient was encouraged to keep me informed of any acute changes, lack of improvement, or any new concerning symptoms.  If patient becomes concerned, or has any worsening symptoms, they are to report either here, urgent treatment, or emergency room. Plan of care discussed with pt. They verbalized understanding and agreement.     RV- Yves for wellness    Counseling provided on Eustachian tube dysfunction and nonsustained ventricular  tachycardia.    Leandro Johnson, APRN   9/18/2020   12:47 EDT

## 2020-09-18 NOTE — PATIENT INSTRUCTIONS
Eustachian Tube Dysfunction    Eustachian tube dysfunction refers to a condition in which a blockage develops in the narrow passage that connects the middle ear to the back of the nose (eustachian tube). The eustachian tube regulates air pressure in the middle ear by letting air move between the ear and nose. It also helps to drain fluid from the middle ear space.  Eustachian tube dysfunction can affect one or both ears. When the eustachian tube does not function properly, air pressure, fluid, or both can build up in the middle ear.  What are the causes?  This condition occurs when the eustachian tube becomes blocked or cannot open normally. Common causes of this condition include:  · Ear infections.  · Colds and other infections that affect the nose, mouth, and throat (upper respiratory tract).  · Allergies.  · Irritation from cigarette smoke.  · Irritation from stomach acid coming up into the esophagus (gastroesophageal reflux). The esophagus is the tube that carries food from the mouth to the stomach.  · Sudden changes in air pressure, such as from descending in an airplane or scuba diving.  · Abnormal growths in the nose or throat, such as:  ? Growths that line the nose (nasal polyps).  ? Abnormal growth of cells (tumors).  ? Enlarged tissue at the back of the throat (adenoids).  What increases the risk?  You are more likely to develop this condition if:  · You smoke.  · You are overweight.  · You are a child who has:  ? Certain birth defects of the mouth, such as cleft palate.  ? Large tonsils or adenoids.  What are the signs or symptoms?  Common symptoms of this condition include:  · A feeling of fullness in the ear.  · Ear pain.  · Clicking or popping noises in the ear.  · Ringing in the ear.  · Hearing loss.  · Loss of balance.  · Dizziness.  Symptoms may get worse when the air pressure around you changes, such as when you travel to an area of high elevation, fly on an airplane, or go scuba diving.  How is  "this diagnosed?  This condition may be diagnosed based on:  · Your symptoms.  · A physical exam of your ears, nose, and throat.  · Tests, such as those that measure:  ? The movement of your eardrum (tympanogram).  ? Your hearing (audiometry).  How is this treated?  Treatment depends on the cause and severity of your condition.  · In mild cases, you may relieve your symptoms by moving air into your ears. This is called \"popping the ears.\"  · In more severe cases, or if you have symptoms of fluid in your ears, treatment may include:  ? Medicines to relieve congestion (decongestants).  ? Medicines that treat allergies (antihistamines).  ? Nasal sprays or ear drops that contain medicines that reduce swelling (steroids).  ? A procedure to drain the fluid in your eardrum (myringotomy). In this procedure, a small tube is placed in the eardrum to:  § Drain the fluid.  § Restore the air in the middle ear space.  ? A procedure to insert a balloon device through the nose to inflate the opening of the eustachian tube (balloon dilation).  Follow these instructions at home:  Lifestyle  · Do not do any of the following until your health care provider approves:  ? Travel to high altitudes.  ? Fly in airplanes.  ? Work in a pressurized cabin or room.  ? Scuba dive.  · Do not use any products that contain nicotine or tobacco, such as cigarettes and e-cigarettes. If you need help quitting, ask your health care provider.  · Keep your ears dry. Wear fitted earplugs during showering and bathing. Dry your ears completely after.  General instructions  · Take over-the-counter and prescription medicines only as told by your health care provider.  · Use techniques to help pop your ears as recommended by your health care provider. These may include:  ? Chewing gum.  ? Yawning.  ? Frequent, forceful swallowing.  ? Closing your mouth, holding your nose closed, and gently blowing as if you are trying to blow air out of your nose.  · Keep all " follow-up visits as told by your health care provider. This is important.  Contact a health care provider if:  · Your symptoms do not go away after treatment.  · Your symptoms come back after treatment.  · You are unable to pop your ears.  · You have:  ? A fever.  ? Pain in your ear.  ? Pain in your head or neck.  ? Fluid draining from your ear.  · Your hearing suddenly changes.  · You become very dizzy.  · You lose your balance.  Summary  · Eustachian tube dysfunction refers to a condition in which a blockage develops in the eustachian tube.  · It can be caused by ear infections, allergies, inhaled irritants, or abnormal growths in the nose or throat.  · Symptoms include ear pain, hearing loss, or ringing in the ears.  · Mild cases are treated with maneuvers to unblock the ears, such as yawning or ear popping.  · Severe cases are treated with medicines. Surgery may also be done (rare).  This information is not intended to replace advice given to you by your health care provider. Make sure you discuss any questions you have with your health care provider.  Document Released: 01/13/2017 Document Revised: 04/09/2019 Document Reviewed: 04/09/2019  [employer] Patient Education © 2020 [employer]  Ventricular Tachycardia    Ventricular tachycardia is a fast heartbeat that begins in the lower chambers of the heart (ventricles). It is a type of abnormal heart rhythm (arrhythmia). A normal heartbeat usually starts when an area in the heart called the sinoatrial (SA) node releases an electrical signal. With ventricular tachycardia, electrical signals in the lower part of the heart fire abnormally and interfere with the electrical signals sent out by the SA node.  A normal heart rate is  beats per minute. During an episode of ventricular tachycardia, the heart reaches 100 beats per minute or higher. This condition can be life-threatening and should be treated immediately.  What are the causes?  This condition is caused  by abnormal electrical activity in the lower part of the heart. This may result from:  · Medicines.  · Diseases of the heart muscle (cardiomyopathy).  · The heart not getting enough oxygen. This may be caused by blood flow problems in the arteries.  · An inflammatory disease that affects multiple areas of the body (sarcoidosis).  · Drug use, such as cocaine, amphetamine, or anabolic steroid use.  What increases the risk?  You are more likely to develop this condition if:  · You have had a heart attack.  · You have:  ? Heart failure or cardiomyopathy.  ? Heart defects that you were born with (congenital heart defects).  ? Abnormal heart tissue.  ? Heart valves that leak or are narrow.  ? Diabetes.  ? An infection that affects the heart.  ? High blood pressure.  ? An overactive or underactive thyroid.  ? Sleep apnea.  ? A family history of stopped heartbeat (cardiac arrest) or coronary artery disease.  ? High cholesterol.  · You smoke.  · You drink alcohol heavily.  · You use drugs, such as cocaine.  What are the signs or symptoms?  Symptoms of this condition include:  · A pounding heartbeat.  · Feeling as if your heart is skipping beats or fluttering (palpitations).  · Shortness of breath.  · Anxiety.  · Dizziness.  · Light-headedness.  · Fainting.  · Chest pain.  · Cardiac arrest caused by an irregular heartbeat (arrhythmia).  How is this diagnosed?  This condition may be diagnosed based on:  · Your symptoms and medical history.  · A physical exam.  · Electrocardiogram (ECG). This test is done to check for problems with electrical activity in the heart.  · Holter monitor or event monitor test. This test involves wearing a portable device that monitors your heart rate over time.  You may also have other tests, including:  · Blood tests.  · Chest X-ray.  · Echocardiogram. This test involves using sound waves to create images of the heart.  · Angiogram. During this test, dye is injected into your bloodstream, and then  X-rays are taken. The dye lets your health care provider see how blood flows through your arteries.  · Exercise stress test. During this test, an ECG is done while you exercise on a treadmill.  · Cardiac CT scan or cardiac MRI.  How is this treated?  Treatment for this condition depends on the cause. Treatment may include:  · Medicines that slow the heart rate and return it to a normal rhythm (anti-arrhythmics).  · An electric shock (cardioversion) that makes the heart go back to a normal rhythm.  · An electrophysiology study. This procedure can help locate areas of heart tissue that are causing rapid heartbeats.  ? In this procedure, a thin, flexible tube (catheter) is inserted into one of your veins and moved to your heart to evaluate your heart's electrical activity.  ? In some cases, the heart tissue that is causing problems may be killed with radiofrequency energy delivered through the catheter (radiofrequency ablation). This may help your heart keep a normal rhythm.  · An implantable cardioverter defibrillator (ICD). This is a small device that monitors your heartbeat. When it senses an irregular heartbeat, it sends a shock to bring the heartbeat back to normal. The ICD is implanted under the skin in the chest.  · Surgery to improve blood flow to the heart.  · Genetic counseling to check whether your family members are at risk for ventricular tachycardia.  Follow these instructions at home:  Lifestyle  · Do not use any products that contain nicotine or tobacco, such as cigarettes and e-cigarettes. If you need help quitting, ask your health care provider.  · Do not use stimulant drugs, such as cocaine or methamphetamines.  · Maintain a healthy weight.  · Manage stress. Try to do this with relaxation exercises, yoga, quiet time, or meditation.  Eating and drinking  · Eat a healthy diet. This includes plenty of fruits and vegetables, whole grains, lean meats, and low-fat or fat-free dairy products.  · Avoid  eating foods that are high in saturated fat, trans fat, sugar, or salt (sodium).  · Ask your health care provider if you may drink alcohol.  ? If alcohol triggers episodes of ventricular tachycardia, do not drink alcohol.  ? If alcohol does not trigger episodes, limit alcohol intake to no more than 1 drink a day for nonpregnant women and 2 drinks a day for men. One drink equals 12 oz of beer, 5 oz of wine, or 1½ oz of hard liquor.  General instructions  · Take over-the-counter and prescription medicines only as told by your health care provider.  · Exercise regularly. Aim for 150 minutes of moderate exercise or 75 minutes of vigorous exercise per week. Ask your health care provider what exercises are safe for you.  · Keep all follow-up visits as told by your health care provider. This is important.  Contact a health care provider if:  · Your symptoms get worse.  · You develop new symptoms, such as new palpitations.  · You feel depressed.  Get help right away if:  · You have an episode of ventricular tachycardia that lasts 30 seconds or more.  · You have chest pain.  · You have trouble breathing.  These symptoms may represent a serious problem that is an emergency. Do not wait to see if the symptoms will go away. Get medical help right away. Call your local emergency services (911 in the [place] Do not drive yourself to the hospital.  Summary  · Ventricular tachycardia is a fast heartbeat that begins in the lower chambers of the heart. This condition can be life-threatening and should be treated immediately.  · This condition may be treated with medicines, electric shock, radiofrequency energy, surgery, or insertion of an implantable cardioverter defibrillator (ICD).  · Get help right away if you have chest pain, trouble breathing, or ventricular tachycardia symptoms that last more than 30 seconds.  This information is not intended to replace advice given to you by your health care provider. Make sure you discuss any  questions you have with your health care provider.  Document Released: 02/08/2018 Document Revised: 11/30/2018 Document Reviewed: 02/08/2018  [employer] Patient Education © 2020 [employer]

## 2020-10-12 DIAGNOSIS — Z01.812 BLOOD TESTS PRIOR TO TREATMENT OR PROCEDURE: Primary | ICD-10-CM

## 2020-10-13 ENCOUNTER — LAB (OUTPATIENT)
Dept: PULMONOLOGY | Facility: CLINIC | Age: 85
End: 2020-10-13

## 2020-10-13 DIAGNOSIS — Z01.812 BLOOD TESTS PRIOR TO TREATMENT OR PROCEDURE: ICD-10-CM

## 2020-10-13 PROCEDURE — 99000 SPECIMEN HANDLING OFFICE-LAB: CPT | Performed by: NURSE PRACTITIONER

## 2020-10-13 PROCEDURE — U0004 COV-19 TEST NON-CDC HGH THRU: HCPCS | Performed by: NURSE PRACTITIONER

## 2020-10-14 LAB — SARS-COV-2 RNA RESP QL NAA+PROBE: NOT DETECTED

## 2020-10-16 ENCOUNTER — OFFICE VISIT (OUTPATIENT)
Dept: PULMONOLOGY | Facility: CLINIC | Age: 85
End: 2020-10-16

## 2020-10-16 VITALS
OXYGEN SATURATION: 93 % | HEIGHT: 67 IN | WEIGHT: 140 LBS | SYSTOLIC BLOOD PRESSURE: 100 MMHG | DIASTOLIC BLOOD PRESSURE: 78 MMHG | HEART RATE: 35 BPM | TEMPERATURE: 97.1 F | BODY MASS INDEX: 21.97 KG/M2

## 2020-10-16 VITALS
WEIGHT: 140 LBS | HEIGHT: 67 IN | TEMPERATURE: 97.1 F | BODY MASS INDEX: 21.97 KG/M2 | HEART RATE: 35 BPM | OXYGEN SATURATION: 96 %

## 2020-10-16 DIAGNOSIS — J18.9 PNEUMONITIS: Primary | ICD-10-CM

## 2020-10-16 DIAGNOSIS — J45.20 MILD INTERMITTENT ASTHMA WITHOUT COMPLICATION: ICD-10-CM

## 2020-10-16 DIAGNOSIS — J45.20 MILD INTERMITTENT ASTHMA WITHOUT COMPLICATION: Primary | ICD-10-CM

## 2020-10-16 PROCEDURE — 99214 OFFICE O/P EST MOD 30 MIN: CPT | Performed by: NURSE PRACTITIONER

## 2020-10-16 PROCEDURE — 94060 EVALUATION OF WHEEZING: CPT | Performed by: NURSE PRACTITIONER

## 2020-10-16 PROCEDURE — 94726 PLETHYSMOGRAPHY LUNG VOLUMES: CPT | Performed by: NURSE PRACTITIONER

## 2020-10-16 PROCEDURE — 94729 DIFFUSING CAPACITY: CPT | Performed by: NURSE PRACTITIONER

## 2020-10-16 RX ORDER — ALBUTEROL SULFATE 90 UG/1
4 AEROSOL, METERED RESPIRATORY (INHALATION) ONCE
Status: COMPLETED | OUTPATIENT
Start: 2020-10-16 | End: 2020-10-16

## 2020-10-16 RX ADMIN — ALBUTEROL SULFATE 4 PUFF: 90 AEROSOL, METERED RESPIRATORY (INHALATION) at 12:42

## 2020-10-16 NOTE — PROGRESS NOTES
[facility] Pulmonary Follow up    CHIEF COMPLAINT    Shortness of breath.      Subjective   HISTORY OF PRESENT ILLNESS    Lashanda Qureshi is a 85 y.o.female here today for follow-up after her initial visit in August for some hypoxemia and shortness of air.  She also had an abnormal CT scan done in the ER in August with stable groundglass changes.  She has had a bronchoscopy done in the past in 2016 that showed some interstitial pneumonitis.  She was noted to be hypoxemic in the ER but her 6-minute walk test done here in the office showed no evidence of exertional hypoxemia.    She overall is feeling much better and back to baseline.  Walking up two flights of steps without difficulty.  No cough or wheezing.          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 difficile diarrhea   • Cervical pain   • Chronic midline low back pain without sciatica   • Thoracogenic scoliosis of thoracic region   • Insomnia   • Localized edema   • Malignant tumor of urinary bladder (CMS/HCC)   • Acute hypoxemic respiratory failure (CMS/HCC)   • Acute diastolic CHF (congestive heart failure) (CMS/HCC)   • Hypertensive urgency   • NSVT (nonsustained ventricular tachycardia) (CMS/HCC)   • Dysfunction of  both eustachian tubes       Allergies   Allergen Reactions   • Antihistamines, Loratadine-Type Other (See Comments)     Drowsiness     • Erythromycin        Current Outpatient Medications:   •  azelastine (ASTELIN) 0.1 % nasal spray, 2 sprays into the nostril(s) as directed by provider 2 (Two) Times a Day., Disp: 1 each, Rfl: 12  •  Cholecalciferol (VITAMIN D3) 2000 UNITS tablet, Take  by mouth. Take 2 tablets daily., Disp: , Rfl:   •  Cyanocobalamin (VITAMIN B12 PO), Take  by mouth. Take 1 tablet daily as directed., Disp: , Rfl:   •  Elastic Bandages & Supports (FUTURO FIRM COMPRESSION HOSE) misc, 1 package Daily., Disp: 1 each, Rfl: 1  •  ESTROGENS CONJ SYNTHETIC B PO,         Compound Bi-Est 20-20 Topical  (Estriol 80% + Estradiol 20%) apply a  pea sized amt to the vulva 2-3 nights per week, Disp: , Rfl:   •  fluticasone (Veramyst) 27.5 MCG/SPRAY nasal spray, 2 sprays into the nostril(s) as directed by provider Daily., Disp: 10 g, Rfl: 3  •  latanoprost (XALATAN) 0.005 % ophthalmic solution, Apply  to eye. Instill 1 drop into affected eye(s) once daily as directed., Disp: , Rfl:   •  metroNIDAZOLE (METROGEL VAGINAL) 0.75 % vaginal gel, Metrogel Vaginal 0.75 %  Insert 1 applicatorful every day by vaginal route for 7 days., Disp: , Rfl:   •  nystatin (MYCOSTATIN) 591769 UNIT/GM ointment, nystatin 100,000 unit/gram topical ointment, Disp: , Rfl:   •  triamcinolone (KENALOG) 0.1 % ointment, triamcinolone acetonide 0.1 % topical ointment, Disp: , Rfl:   •  VENTOLIN  (90 Base) MCG/ACT inhaler, Inhale 2 puffs Every 6 (Six) Hours As Needed., Disp: , Rfl: 1  MEDICATION LIST AND ALLERGIES REVIEWED.    Social History     Tobacco Use   • Smoking status: Former Smoker   • Smokeless tobacco: Never Used   • Tobacco comment: Quit 41 years ago   Substance Use Topics   • Alcohol use: No   • Drug use: No       FAMILY AND SOCIAL HISTORY REVIEWED.    Review of Systems   Constitutional: Negative for chills, fatigue, fever and  "unexpected weight change.   HENT: Negative for congestion, nosebleeds, postnasal drip, rhinorrhea, sinus pressure and trouble swallowing.    Respiratory: Negative for cough, chest tightness, shortness of breath and wheezing.    Cardiovascular: Negative for chest pain and leg swelling.   Gastrointestinal: Negative for abdominal pain, constipation, diarrhea, nausea and vomiting.   Genitourinary: Negative for dysuria, frequency, hematuria and urgency.   Musculoskeletal: Negative for myalgias.   Neurological: Negative for dizziness, weakness, numbness and headaches.   All other systems reviewed and are negative.  .  Objective   /78   Pulse (!) 35   Temp 97.1 °F (36.2 °C)   Ht 170.2 cm (67\")   Wt 63.5 kg (140 lb)   LMP  (LMP Unknown)   SpO2 93% Comment: resting at room air  BMI 21.93 kg/m²   HR up to 60s with palpation    Immunization History   Administered Date(s) Administered   • Fluad Quad 65+ 09/18/2020   • Fluzone High Dose =>65 Years ([employer] ONLY) 10/13/2016, 10/11/2017, 10/10/2018, 10/29/2019   • Hepatitis A 10/10/2018   • Influenza, Unspecified 10/13/2016   • Pneumococcal Conjugate 13-Valent (PCV13) 01/01/2013   • Pneumococcal Polysaccharide (PPSV23) 05/01/2006   • Tdap 01/01/2014       Physical Exam  Vitals signs reviewed.   Constitutional:       Appearance: She is well-developed.   HENT:      Head: Normocephalic and atraumatic.   Eyes:      Pupils: Pupils are equal, round, and reactive to light.   Neck:      Musculoskeletal: Normal range of motion and neck supple.   Cardiovascular:      Rate and Rhythm: Normal rate and regular rhythm.      Heart sounds: No murmur.   Pulmonary:      Effort: Pulmonary effort is normal. No respiratory distress.      Breath sounds: Normal breath sounds. No wheezing or rales.   Abdominal:      General: Bowel sounds are normal. There is no distension.      Palpations: Abdomen is soft.   Musculoskeletal: Normal range of motion.   Skin:     General: Skin is warm and dry. "      Findings: No erythema.   Neurological:      Mental Status: She is alert and oriented to person, place, and time.   Psychiatric:         Behavior: Behavior normal.           RESULTS    PFTS in the office today, read by me:  Some obstruction on her PFTs but minimal restriction.      We went over her overnight oximetry that did not show any evidence of hypoxemia average of 92%        Assessment/Plan     PROBLEM LIST         ICD-10-CM ICD-9-CM   1. Pneumonitis  J18.9 486   2. Mild intermittent asthma without complication  J45.20 493.90       Problem List Items Addressed This Visit        Respiratory    Pneumonitis - Primary    Overview     Description: A.  Status post bronchoscopy 01/27/2016 with pathology consistent with changes of organizing pneumonitis.         Asthma            DISCUSSION      St this time she is doing very well.  Her shortness of breath has improved, her PFTs are fairly normal with some mild obstruction like related to age.  Her CT scan was stable.  Her overnight oximetry and her 6-minute walk test showed no evidence of hypoxemia.   We did discuss again that she thinks it is likely related to same anxiety and social isolation issues.    She does have a follow-up with cardiology due to some arrhythmias.    She can follow-up here in 6 months or as needed over the winter.      I spent 25 minutes face to face with the patient. I spent > 50% percent of this time counseling and discussing diagnostic testing, evaluation, current status, treatment options and management.    CHARLES Jones  10/16/[id number]:25 EDT  Electronically signed     Please note that portions of this note were completed with a voice recognition program. Efforts were made to edit the dictations, but occasionally words are mistranscribed.      CC: Leandro Johnson APRN

## 2020-10-20 NOTE — PROGRESS NOTES
[facility]  [address], [address]  [place], [place], [address]    [phone]  [email or web address]           OUTPATIENT CLINIC CONSULTATION NOTE    Patient care team:  Patient Care Team:  Leandro Johnson APRN as PCP - General (Family Medicine)  Oralia Terrazas PA as PCP - Claims Attributed  Crispin Wheeler as Consulting Physician (Orthopedic Surgery)  James Gonzales MD    Requesting Provider and Reason for consultation: The patient is being seen today at the request of CHARLES Gunn for PACs.     Subjective:   Chief complaint:   Chief Complaint   Patient presents with   • Shortness of Breath   • Irregular Heart Beat       HPI:    Lashanda Qureshi is a 85 y.o. female.  Partial problem list, including cardiac problems:  1. Frequent PACs  2. Hypertension  3. GERD  4. Pneumonitis  5. Anemia  6. Osteopenia  7. Glaucoma  8. Scarlet fever  9. History of colon cancer  10. History of bladder cancer    She presents today for consultation.    She had been seen in the heart valve clinic after an ED visit in August for dyspnea.  She underwent transthoracic echocardiogram and 14-day Holter monitoring.  She reports that she has ongoing dyspnea with exertion.  She has daily palpitations, often occurring multiple times per day.  Her palpitations are typically brief in nature.  She has chronic stable left lower extremity edema and rare lightheadedness.  She denies any chest pain, syncope, orthopnea, or PND.    She is a former smoker quitting approximately 51 years ago.  She denies alcohol or drug use.      She reports her brother had his first MI at age 45.  Both of her parents had cardiac issues, however she does not know if they had CAD.    Review of Systems:  Positive for dyspnea with exertion, palpitations, lower extremity edema, lightheadedness  Negative for exertional chest pain, orthopnea, PND,  syncope.   All other systems reviewed and are  negative.    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 difficile diarrhea   • Cervical pain   • Chronic midline low back pain without sciatica   • Thoracogenic scoliosis of thoracic region   • Insomnia   • Localized edema   • Malignant tumor of urinary bladder (CMS/HCC)   • Acute hypoxemic respiratory failure (CMS/HCC)   • Acute diastolic CHF (congestive heart failure) (CMS/HCC)   • Hypertensive urgency   • NSVT (nonsustained ventricular tachycardia) (CMS/HCC)   • Dysfunction of both eustachian tubes         Current Outpatient Medications:   •  Budesonide-Formoterol Fumarate (SYMBICORT IN), Inhale., Disp: , Rfl:   •  Cholecalciferol (VITAMIN D3) 2000 UNITS tablet, Take  by mouth. Take 2 tablets daily., Disp: , Rfl:   •  fluticasone (Veramyst) 27.5 MCG/SPRAY nasal spray, 2 sprays into the nostril(s) as directed by provider Daily., Disp: 10 g, Rfl: 3  •  latanoprost (XALATAN) 0.005 % ophthalmic solution, Apply  to eye. Instill 1 drop into affected eye(s) once daily as directed., Disp: , Rfl:   •  Multiple Vitamins-Minerals (CENTRUM SILVER PO), Take  by mouth., Disp: , Rfl:   •  Multiple Vitamins-Minerals (ICAPS AREDS 2 PO), Take  by mouth., Disp: , Rfl:   •   triamcinolone (KENALOG) 0.1 % ointment, triamcinolone acetonide 0.1 % topical ointment, Disp: , Rfl:   •  azelastine (ASTELIN) 0.1 % nasal spray, 2 sprays into the nostril(s) as directed by provider 2 (Two) Times a Day., Disp: 1 each, Rfl: 12  •  Cyanocobalamin (VITAMIN B12 PO), Take  by mouth. Take 1 tablet daily as directed., Disp: , Rfl:   •  Elastic Bandages & Supports (FUTURO FIRM COMPRESSION HOSE) misc, 1 package Daily., Disp: 1 each, Rfl: 1  •  ESTROGENS CONJ SYNTHETIC B PO,         Compound Bi-Est 20-20 Topical  (Estriol 80% + Estradiol 20%) apply a  pea sized amt to the vulva 2-3 nights per week, Disp: , Rfl:   •  metoprolol succinate XL (TOPROL-XL) 25 MG 24 hr tablet, Take 1 tablet by mouth Daily., Disp: 30 tablet, Rfl: 11  •  metroNIDAZOLE (METROGEL VAGINAL) 0.75 % vaginal gel, Metrogel Vaginal 0.75 %  Insert 1 applicatorful every day by vaginal route for 7 days., Disp: , Rfl:   •  nystatin (MYCOSTATIN) 242092 UNIT/GM ointment, nystatin 100,000 unit/gram topical ointment, Disp: , Rfl:   •  VENTOLIN  (90 Base) MCG/ACT inhaler, Inhale 2 puffs Every 6 (Six) Hours As Needed., Disp: , Rfl: 1    Allergies   Allergen Reactions   • Antihistamines, Loratadine-Type Other (See Comments)     Drowsiness     • Erythromycin        Social History     Socioeconomic History   • Marital status:      Spouse name: Not on file   • Number of children: Not on file   • Years of education: Not on file   • Highest education level: Not on file   Tobacco Use   • Smoking status: Former Smoker   • Smokeless tobacco: Never Used   • Tobacco comment: Quit 41 years ago   Substance and Sexual Activity   • Alcohol use: No   • Drug use: No   • Sexual activity: Defer     Comment:    Social History Narrative    Caffeine 1-2 servings coffee     Family History   Problem Relation Age of Onset   • COPD Mother    • Hyperlipidemia Mother    • Hypertension Mother    • Cancer Father    • Lung cancer Father    • Heart attack Brother  "   • Diabetes Brother    • Multiple sclerosis Daughter    • No Known Problems Maternal Grandmother    • No Known Problems Maternal Grandfather    • Diabetes Paternal Grandmother    • Diabetes Paternal Grandfather          Objective:   Physical Exam:  /100 (BP Location: Right arm, Patient Position: Sitting)   Pulse 74   Temp 97.5 °F (36.4 °C)   Ht 170.2 cm (67\")   Wt 63.5 kg (140 lb)   LMP  (LMP Unknown)   BMI 21.93 kg/m²   CONSTITUTIONAL: Well-nourished. In no acute distress.   SKIN: Warm and dry. No rashes noted  HEENT: Head is normocephalic and atraumatic. Pupils are equal and reactive to light bilaterally. Mucous membranes are pink and moist.   NECK: Supple without masses or thyromegaly. There is no jugular venous distention   LUNGS: Normal effort. Clear to auscultation bilaterally without wheezing, rhonchi, or rales noted.   CARDIOVASCULAR: Regular rate with a normal S1 and S2. There is no murmur, gallop, rub, or click appreciated. Carotid upstrokes are 2+ and symmetrical without bruits. There is left lower extremity edema associated with stasis dermatitis.  ABDOMEN: Normal bowel sounds.  Soft and nondistended. No tenderness with palpitation.   MUSCULOSKELETAL:  No digital cyanosis  NEUROLOGICAL: Nonfocal.  PSYCHIATRIC: Alert, orientated x 3, appropriate affect     10/2020 exam: 2+ left PT pulse, difficult to palpate left DP pulse due to swelling but possibly 1+, 2+ right pedal pulses    Labs:  BUN   Date Value Ref Range Status   08/14/2020 11 8 - 23 mg/dL Final     Creatinine   Date Value Ref Range Status   08/14/2020 0.65 0.57 - 1.00 mg/dL Final     Potassium   Date Value Ref Range Status   08/14/2020 3.3 (L) 3.5 - 5.2 mmol/L Final     Comment:     Specimen hemolyzed.  Results may be affected.     ALT (SGPT)   Date Value Ref Range Status   08/14/2020 10 1 - 33 U/L Final     AST (SGOT)   Date Value Ref Range Status   08/14/2020 18 1 - 32 U/L Final     WBC   Date Value Ref Range Status   08/14/2020 " 4.87 3.40 - 10.80 10*3/mm3 Final     Hemoglobin   Date Value Ref Range Status   08/14/2020 13.6 12.0 - 15.9 g/dL Final     Hematocrit   Date Value Ref Range Status   08/14/2020 41.7 34.0 - 46.6 % Final     Platelets   Date Value Ref Range Status   08/14/2020 231 140 - 450 10*3/mm3 Final       Lab Results   Component Value Date    CHOL 181 01/08/2019    CHOL 199 10/26/2017     Lab Results   Component Value Date    TRIG 66 01/08/2019    TRIG 75 10/26/2017     Lab Results   Component Value Date     (H) 01/08/2019     (H) 10/26/2017     Lab Results   Component Value Date    LDL 61 01/08/2019     No components found for: LDLDIRECTC    Diagnostic Data:      ECG 12 Lead    Date/Time: 10/21/2020 9:53 AM  Performed by: Yousif Clifford MD  Authorized by: Yousif Clifford MD   Comparison: compared with previous ECG from 8/14/2020  Similar to previous ECG  Rhythm: sinus rhythm  Ectopy: atrial premature contractions  Rate: normal  BPM: 74  Conduction: incomplete right bundle branch block  Comments: QRS 92 ms    ms            Results for orders placed during the hospital encounter of 08/05/20   Adult Transthoracic Echo Complete W/ Cont if Necessary Per Protocol    Narrative · Calculated EF = 54.0%  · Left ventricular systolic function is normal.  · Left ventricular diastolic dysfunction.  · Mild tricuspid valve regurgitation is present.  · There is calcification of the aortic valve.        14-day monitor 7/2020  -Frequent PACs 16.4%  -PVC burden 1.5%  -Symptoms correlate with sinus rhythm and sinus rhythm with PACs, rarely correlate with PVCs.  -3 beat run of VT, 5 beat run of VT.    Assessment and Plan:   Diagnoses and all orders for this visit:    1. PAC (premature atrial contraction) (Primary)  2. Heart palpitations   -16.4% PAC burden on recent 14-day monitor.  -We will check TSH with reflexive T4, CMP, mag level  -Begin Toprol-XL 25 mg p.o. daily.    3. Dyspnea on exertion  -Ongoing significant dyspnea with  exertion.  -Nuclear stress testing to evaluate for ischemia    4. Essential hypertension  -Elevated here and has been elevated at home.  -Begin Toprol-XL as above.      - Return in about 3 months (around 1/21/2021).    Scribed for Yousif Clifford MD by Tati Luna, CHARLES   10/21/2020  12:39 EDT     I, Yousif Clifford MD, personally performed the services as scribed by the above named individual. I have made any necessary edits and it is both accurate and complete.     Yousif Clifford MD, MSc, [facility]  Interventional Cardiology  [facility]

## 2020-10-21 ENCOUNTER — CONSULT (OUTPATIENT)
Dept: CARDIOLOGY | Facility: CLINIC | Age: 85
End: 2020-10-21

## 2020-10-21 ENCOUNTER — LAB (OUTPATIENT)
Dept: LAB | Facility: HOSPITAL | Age: 85
End: 2020-10-21

## 2020-10-21 VITALS
TEMPERATURE: 97.5 F | WEIGHT: 140 LBS | HEIGHT: 67 IN | HEART RATE: 74 BPM | SYSTOLIC BLOOD PRESSURE: 160 MMHG | DIASTOLIC BLOOD PRESSURE: 100 MMHG | BODY MASS INDEX: 21.97 KG/M2

## 2020-10-21 DIAGNOSIS — R00.2 HEART PALPITATIONS: ICD-10-CM

## 2020-10-21 DIAGNOSIS — I49.1 PAC (PREMATURE ATRIAL CONTRACTION): Primary | ICD-10-CM

## 2020-10-21 DIAGNOSIS — I10 ESSENTIAL HYPERTENSION: ICD-10-CM

## 2020-10-21 DIAGNOSIS — I49.1 PAC (PREMATURE ATRIAL CONTRACTION): ICD-10-CM

## 2020-10-21 DIAGNOSIS — I20.8 ANGINAL EQUIVALENT (HCC): ICD-10-CM

## 2020-10-21 DIAGNOSIS — R06.09 DYSPNEA ON EXERTION: ICD-10-CM

## 2020-10-21 PROCEDURE — 84443 ASSAY THYROID STIM HORMONE: CPT

## 2020-10-21 PROCEDURE — 83735 ASSAY OF MAGNESIUM: CPT

## 2020-10-21 PROCEDURE — 93000 ELECTROCARDIOGRAM COMPLETE: CPT | Performed by: INTERNAL MEDICINE

## 2020-10-21 PROCEDURE — 80053 COMPREHEN METABOLIC PANEL: CPT

## 2020-10-21 PROCEDURE — 99204 OFFICE O/P NEW MOD 45 MIN: CPT | Performed by: INTERNAL MEDICINE

## 2020-10-21 PROCEDURE — 36415 COLL VENOUS BLD VENIPUNCTURE: CPT

## 2020-10-21 RX ORDER — METOPROLOL SUCCINATE 25 MG/1
25 TABLET, EXTENDED RELEASE ORAL DAILY
Qty: 30 TABLET | Refills: 11 | Status: SHIPPED | OUTPATIENT
Start: 2020-10-21 | End: 2020-12-07

## 2020-10-22 ENCOUNTER — TELEPHONE (OUTPATIENT)
Dept: CARDIOLOGY | Facility: CLINIC | Age: 85
End: 2020-10-22

## 2020-10-22 LAB
ALBUMIN SERPL-MCNC: 4 G/DL (ref 3.5–5.2)
ALBUMIN/GLOB SERPL: 1.1 G/DL
ALP SERPL-CCNC: 63 U/L (ref 39–117)
ALT SERPL W P-5'-P-CCNC: 16 U/L (ref 1–33)
ANION GAP SERPL CALCULATED.3IONS-SCNC: 10.9 MMOL/L (ref 5–15)
AST SERPL-CCNC: 27 U/L (ref 1–32)
BILIRUB SERPL-MCNC: 0.4 MG/DL (ref 0–1.2)
BUN SERPL-MCNC: 11 MG/DL (ref 8–23)
BUN/CREAT SERPL: 14.9 (ref 7–25)
CALCIUM SPEC-SCNC: 10 MG/DL (ref 8.6–10.5)
CHLORIDE SERPL-SCNC: 102 MMOL/L (ref 98–107)
CO2 SERPL-SCNC: 27.1 MMOL/L (ref 22–29)
CREAT SERPL-MCNC: 0.74 MG/DL (ref 0.57–1)
GFR SERPL CREATININE-BSD FRML MDRD: 75 ML/MIN/1.73
GLOBULIN UR ELPH-MCNC: 3.5 GM/DL
GLUCOSE SERPL-MCNC: 94 MG/DL (ref 65–99)
MAGNESIUM SERPL-MCNC: 2.1 MG/DL (ref 1.6–2.4)
POTASSIUM SERPL-SCNC: 4.3 MMOL/L (ref 3.5–5.2)
PROT SERPL-MCNC: 7.5 G/DL (ref 6–8.5)
SODIUM SERPL-SCNC: 140 MMOL/L (ref 136–145)
TSH SERPL DL<=0.05 MIU/L-ACNC: 3.9 UIU/ML (ref 0.27–4.2)

## 2020-10-22 NOTE — TELEPHONE ENCOUNTER
----- Message from CHARLES Cisneros sent at 10/22/2020 11:05 AM EDT -----  Can you let her know that her labs looked good. No new changes at this time.

## 2020-10-22 NOTE — TELEPHONE ENCOUNTER
Patient contacted to review recent lab results. All questions answered, pt verbalizes understanding.

## 2020-11-16 ENCOUNTER — APPOINTMENT (OUTPATIENT)
Dept: PREADMISSION TESTING | Facility: HOSPITAL | Age: 85
End: 2020-11-16

## 2020-11-16 PROCEDURE — U0004 COV-19 TEST NON-CDC HGH THRU: HCPCS

## 2020-11-16 PROCEDURE — C9803 HOPD COVID-19 SPEC COLLECT: HCPCS

## 2020-11-17 LAB — SARS-COV-2 RNA RESP QL NAA+PROBE: NOT DETECTED

## 2020-11-19 ENCOUNTER — HOSPITAL ENCOUNTER (OUTPATIENT)
Dept: CARDIOLOGY | Facility: HOSPITAL | Age: 85
Discharge: HOME | End: 2020-11-19
Admitting: NURSE PRACTITIONER

## 2020-11-19 VITALS
SYSTOLIC BLOOD PRESSURE: 140 MMHG | OXYGEN SATURATION: 95 % | BODY MASS INDEX: 21.97 KG/M2 | DIASTOLIC BLOOD PRESSURE: 98 MMHG | HEART RATE: 65 BPM | WEIGHT: 140 LBS | HEIGHT: 67 IN

## 2020-11-19 DIAGNOSIS — R06.09 DYSPNEA ON EXERTION: ICD-10-CM

## 2020-11-19 DIAGNOSIS — I20.8 ANGINAL EQUIVALENT (HCC): ICD-10-CM

## 2020-11-19 DIAGNOSIS — I49.1 PAC (PREMATURE ATRIAL CONTRACTION): ICD-10-CM

## 2020-11-19 LAB
BH CV STRESS BP STAGE 2: NORMAL
BH CV STRESS BP STAGE 3: NORMAL
BH CV STRESS COMMENTS STAGE 1: NORMAL
BH CV STRESS DOSE REGADENOSON STAGE 1: 0.4
BH CV STRESS DURATION MIN STAGE 1: 1
BH CV STRESS DURATION MIN STAGE 2: 1
BH CV STRESS DURATION MIN STAGE 3: 1
BH CV STRESS DURATION MIN STAGE 4: 1
BH CV STRESS DURATION SEC STAGE 2: 0
BH CV STRESS HR STAGE 1: 89
BH CV STRESS HR STAGE 2: 90
BH CV STRESS HR STAGE 3: 78
BH CV STRESS HR STAGE 4: 75
BH CV STRESS O2 STAGE 1: 95
BH CV STRESS O2 STAGE 2: 97
BH CV STRESS O2 STAGE 3: 97
BH CV STRESS O2 STAGE 4: 97
BH CV STRESS PROTOCOL 1: NORMAL
BH CV STRESS RECOVERY BP: NORMAL MMHG
BH CV STRESS RECOVERY HR: 71 BPM
BH CV STRESS RECOVERY O2: 97 %
BH CV STRESS STAGE 1: 1
BH CV STRESS STAGE 2: 2
BH CV STRESS STAGE 3: 3
BH CV STRESS STAGE 4: 4
LV EF NUC BP: 65 %
MAXIMAL PREDICTED HEART RATE: 135 BPM
PERCENT MAX PREDICTED HR: 69.63 %
STRESS BASELINE BP: NORMAL MMHG
STRESS BASELINE HR: 72 BPM
STRESS O2 SAT REST: 96 %
STRESS PERCENT HR: 82 %
STRESS POST EXERCISE DUR MIN: 4 MIN
STRESS POST EXERCISE DUR SEC: 0 SEC
STRESS POST O2 SAT PEAK: 97 %
STRESS POST PEAK BP: NORMAL MMHG
STRESS POST PEAK HR: 94 BPM
STRESS TARGET HR: 115 BPM

## 2020-11-19 PROCEDURE — 25010000002 REGADENOSON 0.4 MG/5ML SOLUTION: Performed by: NURSE PRACTITIONER

## 2020-11-19 PROCEDURE — 78452 HT MUSCLE IMAGE SPECT MULT: CPT

## 2020-11-19 PROCEDURE — A9500 TC99M SESTAMIBI: HCPCS | Performed by: NURSE PRACTITIONER

## 2020-11-19 PROCEDURE — 0 TECHNETIUM SESTAMIBI: Performed by: NURSE PRACTITIONER

## 2020-11-19 PROCEDURE — 78452 HT MUSCLE IMAGE SPECT MULT: CPT | Performed by: INTERNAL MEDICINE

## 2020-11-19 PROCEDURE — 93018 CV STRESS TEST I&R ONLY: CPT | Performed by: INTERNAL MEDICINE

## 2020-11-19 PROCEDURE — 93017 CV STRESS TEST TRACING ONLY: CPT

## 2020-11-19 RX ADMIN — TECHNETIUM TC 99M SESTAMIBI 1 DOSE: 1 INJECTION INTRAVENOUS at 13:30

## 2020-11-19 RX ADMIN — REGADENOSON 0.4 MG: 0.08 INJECTION, SOLUTION INTRAVENOUS at 15:25

## 2020-11-19 RX ADMIN — TECHNETIUM TC 99M SESTAMIBI 1 DOSE: 1 INJECTION INTRAVENOUS at 15:25

## 2020-11-23 ENCOUNTER — TELEPHONE (OUTPATIENT)
Dept: CARDIOLOGY | Facility: CLINIC | Age: 85
End: 2020-11-23

## 2020-11-23 NOTE — TELEPHONE ENCOUNTER
----- Message from CHARLES Cisneros sent at 11/20/2020  1:16 PM EST -----  Can you let her know that her stress test did not show evidence of ischemia. No changes at this time.

## 2020-12-07 ENCOUNTER — OFFICE VISIT (OUTPATIENT)
Dept: FAMILY MEDICINE CLINIC | Facility: CLINIC | Age: 85
End: 2020-12-07

## 2020-12-07 VITALS
SYSTOLIC BLOOD PRESSURE: 130 MMHG | TEMPERATURE: 97.1 F | BODY MASS INDEX: 22.44 KG/M2 | WEIGHT: 143 LBS | HEIGHT: 67 IN | DIASTOLIC BLOOD PRESSURE: 60 MMHG

## 2020-12-07 DIAGNOSIS — J45.20 MILD INTERMITTENT ASTHMA WITHOUT COMPLICATION: ICD-10-CM

## 2020-12-07 DIAGNOSIS — I10 ESSENTIAL HYPERTENSION: Primary | ICD-10-CM

## 2020-12-07 DIAGNOSIS — C67.9 MALIGNANT NEOPLASM OF URINARY BLADDER, UNSPECIFIED SITE (HCC): ICD-10-CM

## 2020-12-07 DIAGNOSIS — L71.9 ROSACEA: ICD-10-CM

## 2020-12-07 DIAGNOSIS — K13.79 MOUTH SORES: ICD-10-CM

## 2020-12-07 PROCEDURE — 99214 OFFICE O/P EST MOD 30 MIN: CPT | Performed by: NURSE PRACTITIONER

## 2020-12-07 RX ORDER — BUDESONIDE AND FORMOTEROL FUMARATE DIHYDRATE 80; 4.5 UG/1; UG/1
2 AEROSOL RESPIRATORY (INHALATION)
Qty: 1 INHALER | Refills: 12 | Status: ON HOLD | OUTPATIENT
Start: 2020-12-07 | End: 2022-12-13 | Stop reason: SDUPTHER

## 2020-12-07 RX ORDER — DILTIAZEM HYDROCHLORIDE 120 MG/1
120 TABLET, EXTENDED RELEASE ORAL DAILY
Qty: 30 TABLET | Refills: 11 | Status: SHIPPED | OUTPATIENT
Start: 2020-12-07 | End: 2020-12-09

## 2020-12-07 RX ORDER — ALBUTEROL SULFATE 90 UG/1
2 AEROSOL, METERED RESPIRATORY (INHALATION) EVERY 6 HOURS PRN
Qty: 18 G | Refills: 11 | Status: ON HOLD | OUTPATIENT
Start: 2020-12-07 | End: 2021-11-30

## 2020-12-07 NOTE — PROGRESS NOTES
Chief Complaint   Patient presents with   • Dry Mouth     She reports that she has been experiencing soreness around her mouth and tongue. She is concerned that it may be an allergic reaction to metropolol that she was prescribed by cardiolgist. She stopped taking the medicine on Saturday and her sx began to resolve by Sunday        Subjective   Lashanda Qureshi is a 85 y.o. female    History of Present Illness  Patient today with complaints of some soreness of mouth and tongue and concerned may be a reaction to metoprolol.  She did start out taking half of the 25 mg tablet for about a week and then went to a full 25 mg tablet thereafter.  This was started around October 21.  The mouth soreness then gradually came on after that and then this past Saturday she stopped taking the medication and the symptoms began to resolve some by Sunday.  This morning she reports her blood pressure was 115/84.  She also does have a history of episodes of shortness of breath.  She has received in the past a Symbicort inhaler but only uses it as needed.  We discussed that this is a daily use inhaler and not good for just as needed.  She does not currently use her albuterol inhaler.    Allergies   Allergen Reactions   • Antihistamines, Loratadine-Type Other (See Comments)     Drowsiness     • Erythromycin      Past Medical History:   Diagnosis Date   • Abnormal CT scan, lung    • Mycobacterium gordonae infection       Past Surgical History:   Procedure Laterality Date   • BLADDER SURGERY     • BRONCHOSCOPY  01/27/2016   • CHOLECYSTECTOMY     • COLON SURGERY     • DILATION AND CURETTAGE, DIAGNOSTIC / THERAPEUTIC     • REFRACTIVE SURGERY  04/2019   • TOTAL HIP ARTHROPLASTY      Left hip     Social History     Socioeconomic History   • Marital status:      Spouse name: Not on file   • Number of children: Not on file   • Years of education: Not on file   • Highest education level: Not on file   Tobacco Use   • Smoking status: Former  Smoker   • Smokeless tobacco: Never Used   • Tobacco comment: Quit 41 years ago   Substance and Sexual Activity   • Alcohol use: No   • Drug use: No   • Sexual activity: Defer     Comment:    Social History Narrative    Caffeine 1-2 servings coffee        Current Outpatient Medications:   •  azelastine (ASTELIN) 0.1 % nasal spray, 2 sprays into the nostril(s) as directed by provider 2 (Two) Times a Day., Disp: 1 each, Rfl: 12  •  Cholecalciferol (VITAMIN D3) 2000 UNITS tablet, Take  by mouth. Take 2 tablets daily., Disp: , Rfl:   •  Cyanocobalamin (VITAMIN B12 PO), Take  by mouth. Take 1 tablet daily as directed., Disp: , Rfl:   •  Elastic Bandages & Supports (FUTURO FIRM COMPRESSION HOSE) misc, 1 package Daily., Disp: 1 each, Rfl: 1  •  fluticasone (Veramyst) 27.5 MCG/SPRAY nasal spray, 2 sprays into the nostril(s) as directed by provider Daily., Disp: 10 g, Rfl: 3  •  latanoprost (XALATAN) 0.005 % ophthalmic solution, Apply  to eye. Instill 1 drop into affected eye(s) once daily as directed., Disp: , Rfl:   •  Multiple Vitamins-Minerals (CENTRUM SILVER PO), Take  by mouth., Disp: , Rfl:   •  Multiple Vitamins-Minerals (ICAPS AREDS 2 PO), Take  by mouth., Disp: , Rfl:   •  triamcinolone (KENALOG) 0.1 % ointment, Apply  topically to the appropriate area as directed 2 (Two) Times a Day., Disp: 15 g, Rfl: 3  •  Ventolin  (90 Base) MCG/ACT inhaler, Inhale 2 puffs Every 6 (Six) Hours As Needed for Wheezing or Shortness of Air., Disp: 18 g, Rfl: 11  •  budesonide-formoterol (Symbicort) 80-4.5 MCG/ACT inhaler, Inhale 2 puffs 2 (Two) Times a Day., Disp: 1 inhaler, Rfl: 12  •  dilTIAZem LA (Cardizem LA) 120 MG 24 hr tablet, Take 1 tablet by mouth Daily., Disp: 30 tablet, Rfl: 11  •  ESTROGENS CONJ SYNTHETIC B PO,         Compound Bi-Est 20-20 Topical  (Estriol 80% + Estradiol 20%) apply a  pea sized amt to the vulva 2-3 nights per week, Disp: , Rfl:      Review of Systems   Constitutional: Negative for chills,  fatigue, fever and unexpected weight change.   HENT: Positive for mouth sores.    Respiratory: Negative for cough, chest tightness, shortness of breath and wheezing.    Cardiovascular: Negative for chest pain, palpitations and leg swelling.   Gastrointestinal: Negative for constipation, diarrhea, nausea and vomiting.   Genitourinary: Negative for difficulty urinating and dysuria.   Skin: Negative for color change and rash.   Neurological: Negative for dizziness, syncope, weakness and headaches.   Psychiatric/Behavioral: Negative for sleep disturbance.       Objective     Vitals:    12/07/20 0953   BP: 130/60   Temp: 97.1 °F (36.2 °C)         12/07/20  0953   Weight: 64.9 kg (143 lb)     Body mass index is 22.4 kg/m².  Results for orders placed or performed during the hospital encounter of 11/19/20   Stress Test With Myocardial Perfusion (1 Day)   Result Value Ref Range    BH CV STRESS PROTOCOL 1 Pharmacologic     Stage 1 1     Duration Min Stage 1 1     Stress Dose Regadenoson Stage 1 0.4     Stress Comments Stage 1 10 sec bolus injection     Stage 2 2     Duration Min Stage 2 1     Duration Sec Stage 2 0     Stage 3 3     Duration Min Stage 3 1     Stage 4 4     Duration Min Stage 4 1     Baseline HR 72 bpm    Baseline /98 mmHg    O2 sat rest 96 %    Target HR (85%) 115 bpm    Max. Pred. HR (100%) 135 bpm    HR Stage 1 89     O2 Stage 1 95     HR Stage 2 90     BP Stage 2 144/100     O2 Stage 2 97     HR Stage 3 78     BP Stage 3 142/108     O2 Stage 3 97     HR Stage 4 75     O2 Stage 4 97     Peak /108 mmHg    O2 sat peak 97 %    Recovery O2 97 %    Exercise duration (min) 4 min    Exercise duration (sec) 0 sec    Peak HR 94 bpm    Percent Max Pred HR 69.63 %    Percent Target HR 82 %    Recovery HR 71 bpm    Recovery /110 mmHg    Nuc Stress EF 65 %       Physical Exam  Vitals signs reviewed.   Constitutional:       Appearance: She is well-developed.   HENT:      Head: Normocephalic and  atraumatic.      Mouth/Throat:      Tongue: No lesions.      Pharynx: Oropharynx is clear.   Cardiovascular:      Rate and Rhythm: Normal rate and regular rhythm.      Heart sounds: Normal heart sounds.   Pulmonary:      Effort: Pulmonary effort is normal.      Breath sounds: Normal breath sounds.   Genitourinary:     Comments: deferred  Skin:     General: Skin is warm and dry.   Neurological:      Mental Status: She is alert and oriented to person, place, and time.   Psychiatric:         Behavior: Behavior normal.         Assessment/Plan   Problems Addressed this Visit        Cardiovascular and Mediastinum    Hypertension - Primary    Relevant Medications    dilTIAZem LA (Cardizem LA) 120 MG 24 hr tablet    Other Relevant Orders    Basic Metabolic Panel       Respiratory    Asthma    Relevant Medications    fluticasone (Veramyst) 27.5 MCG/SPRAY nasal spray    budesonide-formoterol (Symbicort) 80-4.5 MCG/ACT inhaler    Ventolin  (90 Base) MCG/ACT inhaler       Musculoskeletal and Integument    Rosacea    Relevant Medications    triamcinolone (KENALOG) 0.1 % ointment       Genitourinary    Malignant tumor of urinary bladder (CMS/[facility])      Other Visit Diagnoses     Mouth sores          Diagnoses       Codes Comments    Essential hypertension    -  Primary ICD-10-CM: I10  ICD-9-CM: 401.9     Mouth sores     ICD-10-CM: K13.79  ICD-9-CM: 528.9     Mild intermittent asthma without complication     ICD-10-CM: J45.20  ICD-9-CM: 493.90     Malignant neoplasm of urinary bladder, unspecified site (CMS/[facility])     ICD-10-CM: C67.9  ICD-9-CM: 188.9     Rosacea     ICD-10-CM: L71.9  ICD-9-CM: 695.3       For blood pressure and pulse were going to put her on diltiazem  mg daily. Patient instructed to check blood pressure daily, record, and bring to next visit. If the readings run 140/90 or greater, the patient is to call my office or return sooner for evaluation. Pt advised to eat a heart healthy diet and get regular  aerobic exercise.  We will recheck kidney function test either Friday or Monday.    For asthma we recommend she use albuterol inhaler for wheezing and shortness of breath. Patient was encouraged to keep me informed of any acute changes, lack of improvement, or any new concerning symptoms.  If patient becomes concerned, or has any worsening symptoms, they are to report either here, urgent treatment, or emergency room. Plan of care discussed with pt. They verbalized understanding and agreement.  I have also suggested she use the Symbicort inhaler daily.    Cont to see Urology for Hx of bladder cancer.     RV- 1 mo    Counseling provided on hypertension and asthma.    Leandro Johnson, APRN   12/7/2020   10:25 EST

## 2020-12-09 ENCOUNTER — PRIOR AUTHORIZATION (OUTPATIENT)
Dept: FAMILY MEDICINE CLINIC | Facility: CLINIC | Age: 85
End: 2020-12-09

## 2020-12-09 RX ORDER — DILTIAZEM HYDROCHLORIDE 120 MG/1
120 CAPSULE, COATED, EXTENDED RELEASE ORAL DAILY
Qty: 30 CAPSULE | Refills: 3 | Status: SHIPPED | OUTPATIENT
Start: 2020-12-09 | End: 2021-01-20 | Stop reason: SDUPTHER

## 2020-12-09 NOTE — TELEPHONE ENCOUNTER
Diltiazem LA is not covered    Preferred are  Diltiazem HCI 360MG Cs24  Diltiazem HCI 30MG Tab  cartia  MG Cp24  Dilt-XR 240mg CDER  Diltiazem HCI 120MG Cp 12    Do you want to change and if not what is the reasoning why she needs this?

## 2020-12-14 ENCOUNTER — TELEPHONE (OUTPATIENT)
Dept: FAMILY MEDICINE CLINIC | Facility: CLINIC | Age: 85
End: 2020-12-14

## 2020-12-14 NOTE — TELEPHONE ENCOUNTER
PATIENT WAS SUPPOSED TO GO GET BLOODWORK DONE TODAY BUT DUE TO THE WEATHER SHE DID NOT MAKE IT. SAID SHE WOULD GO TOMORROW TO GET IT DONE. SHE IS NOT SURE WHERE SHE NEEDS TO GO TO HAVE THIS DONE.    CONTACT: [phone]

## 2020-12-15 ENCOUNTER — LAB (OUTPATIENT)
Dept: LAB | Facility: HOSPITAL | Age: 85
End: 2020-12-15

## 2020-12-15 DIAGNOSIS — Z13.220 SCREENING FOR LIPID DISORDERS: ICD-10-CM

## 2020-12-15 DIAGNOSIS — Z00.00 MEDICARE ANNUAL WELLNESS VISIT, SUBSEQUENT: ICD-10-CM

## 2020-12-15 DIAGNOSIS — I10 ESSENTIAL HYPERTENSION: ICD-10-CM

## 2020-12-15 DIAGNOSIS — E55.9 VITAMIN D DEFICIENCY: ICD-10-CM

## 2020-12-15 LAB
25(OH)D3 SERPL-MCNC: 29.6 NG/ML (ref 30–100)
ALBUMIN SERPL-MCNC: 4.4 G/DL (ref 3.5–5.2)
ALBUMIN/GLOB SERPL: 1.4 G/DL
ALP SERPL-CCNC: 64 U/L (ref 39–117)
ALT SERPL W P-5'-P-CCNC: 12 U/L (ref 1–33)
ANION GAP SERPL CALCULATED.3IONS-SCNC: 12.2 MMOL/L (ref 5–15)
AST SERPL-CCNC: 24 U/L (ref 1–32)
BILIRUB SERPL-MCNC: 0.4 MG/DL (ref 0–1.2)
BUN SERPL-MCNC: 15 MG/DL (ref 8–23)
BUN/CREAT SERPL: 20.8 (ref 7–25)
CALCIUM SPEC-SCNC: 9.4 MG/DL (ref 8.6–10.5)
CHLORIDE SERPL-SCNC: 100 MMOL/L (ref 98–107)
CHOLEST SERPL-MCNC: 180 MG/DL (ref 0–200)
CO2 SERPL-SCNC: 27.8 MMOL/L (ref 22–29)
CREAT SERPL-MCNC: 0.72 MG/DL (ref 0.57–1)
GFR SERPL CREATININE-BSD FRML MDRD: 77 ML/MIN/1.73
GLOBULIN UR ELPH-MCNC: 3.1 GM/DL
GLUCOSE SERPL-MCNC: 103 MG/DL (ref 65–99)
HDLC SERPL-MCNC: 126 MG/DL (ref 40–60)
LDLC SERPL CALC-MCNC: 41 MG/DL (ref 0–100)
LDLC/HDLC SERPL: 0.32 {RATIO}
POTASSIUM SERPL-SCNC: 4.3 MMOL/L (ref 3.5–5.2)
PROT SERPL-MCNC: 7.5 G/DL (ref 6–8.5)
SODIUM SERPL-SCNC: 140 MMOL/L (ref 136–145)
TRIGL SERPL-MCNC: 70 MG/DL (ref 0–150)
VLDLC SERPL-MCNC: 13 MG/DL (ref 5–40)

## 2020-12-15 PROCEDURE — 80061 LIPID PANEL: CPT

## 2020-12-15 PROCEDURE — 80053 COMPREHEN METABOLIC PANEL: CPT

## 2020-12-15 PROCEDURE — 82306 VITAMIN D 25 HYDROXY: CPT

## 2020-12-15 NOTE — TELEPHONE ENCOUNTER
Left detailed voicemail explaining the lab is located on [address] across [address] from our building. Office # given incase the patient had additional questions.

## 2021-01-20 ENCOUNTER — OFFICE VISIT (OUTPATIENT)
Dept: FAMILY MEDICINE CLINIC | Facility: CLINIC | Age: 86
End: 2021-01-20

## 2021-01-20 VITALS
TEMPERATURE: 97.1 F | OXYGEN SATURATION: 98 % | BODY MASS INDEX: 22.44 KG/M2 | WEIGHT: 143 LBS | HEART RATE: 64 BPM | DIASTOLIC BLOOD PRESSURE: 80 MMHG | HEIGHT: 67 IN | SYSTOLIC BLOOD PRESSURE: 138 MMHG

## 2021-01-20 DIAGNOSIS — E78.5 DYSLIPIDEMIA: ICD-10-CM

## 2021-01-20 DIAGNOSIS — J30.9 ALLERGIC RHINITIS, UNSPECIFIED SEASONALITY, UNSPECIFIED TRIGGER: ICD-10-CM

## 2021-01-20 DIAGNOSIS — H35.3221 EXUDATIVE AGE-RELATED MACULAR DEGENERATION, LEFT EYE, WITH ACTIVE CHOROIDAL NEOVASCULARIZATION (HCC): ICD-10-CM

## 2021-01-20 DIAGNOSIS — I50.31 ACUTE DIASTOLIC CHF (CONGESTIVE HEART FAILURE) (HCC): ICD-10-CM

## 2021-01-20 DIAGNOSIS — I20.8 ANGINAL EQUIVALENT (HCC): ICD-10-CM

## 2021-01-20 DIAGNOSIS — J96.01 ACUTE HYPOXEMIC RESPIRATORY FAILURE (HCC): ICD-10-CM

## 2021-01-20 DIAGNOSIS — C67.9 MALIGNANT NEOPLASM OF URINARY BLADDER, UNSPECIFIED SITE (HCC): ICD-10-CM

## 2021-01-20 DIAGNOSIS — Z00.00 ENCOUNTER FOR SUBSEQUENT ANNUAL WELLNESS VISIT IN MEDICARE PATIENT: Primary | ICD-10-CM

## 2021-01-20 DIAGNOSIS — I10 ESSENTIAL HYPERTENSION: ICD-10-CM

## 2021-01-20 DIAGNOSIS — I47.29 NSVT (NONSUSTAINED VENTRICULAR TACHYCARDIA) (HCC): ICD-10-CM

## 2021-01-20 PROBLEM — I16.0 HYPERTENSIVE URGENCY: Status: RESOLVED | Noted: 2020-08-14 | Resolved: 2021-01-20

## 2021-01-20 PROCEDURE — G0439 PPPS, SUBSEQ VISIT: HCPCS | Performed by: NURSE PRACTITIONER

## 2021-01-20 RX ORDER — DILTIAZEM HYDROCHLORIDE 120 MG/1
120 CAPSULE, COATED, EXTENDED RELEASE ORAL DAILY
Qty: 90 CAPSULE | Refills: 3 | Status: SHIPPED | OUTPATIENT
Start: 2021-01-20 | End: 2021-05-18

## 2021-01-20 NOTE — PATIENT INSTRUCTIONS
"Managing Your Hypertension  Hypertension is commonly called high blood pressure. This is when the force of your blood pressing against the walls of your arteries is too strong. Arteries are blood vessels that carry blood from your heart throughout your body. Hypertension forces the heart to work harder to pump blood, and may cause the arteries to become narrow or stiff. Having untreated or uncontrolled hypertension can cause heart attack, stroke, kidney disease, and other problems.  What are blood pressure readings?  A blood pressure reading consists of a higher number over a lower number. Ideally, your blood pressure should be below 120/80. The first (\"top\") number is called the systolic pressure. It is a measure of the pressure in your arteries as your heart beats. The second (\"bottom\") number is called the diastolic pressure. It is a measure of the pressure in your arteries as the heart relaxes.  What does my blood pressure reading mean?  Blood pressure is classified into four stages. Based on your blood pressure reading, your health care provider may use the following stages to determine what type of treatment you need, if any. Systolic pressure and diastolic pressure are measured in a unit called mm Hg.  Normal  · Systolic pressure: below 120.  · Diastolic pressure: below 80.  Elevated  · Systolic pressure: 120-129.  · Diastolic pressure: below 80.  Hypertension stage 1  · Systolic pressure: 130-139.  · Diastolic pressure: 80-89.  Hypertension stage 2  · Systolic pressure: 140 or above.  · Diastolic pressure: 90 or above.  What health risks are associated with hypertension?  Managing your hypertension is an important responsibility. Uncontrolled hypertension can lead to:  · A heart attack.  · A stroke.  · A weakened blood vessel (aneurysm).  · Heart failure.  · Kidney damage.  · Eye damage.  · Metabolic syndrome.  · Memory and concentration problems.  What changes can I make to manage my " hypertension?  Hypertension can be managed by making lifestyle changes and possibly by taking medicines. Your health care provider will help you make a plan to bring your blood pressure within a normal range.  Eating and drinking    · Eat a diet that is high in fiber and potassium, and low in salt (sodium), added sugar, and fat. An example eating plan is called the DASH (Dietary Approaches to Stop Hypertension) diet. To eat this way:  ? Eat plenty of fresh fruits and vegetables. Try to fill half of your plate at each meal with fruits and vegetables.  ? Eat whole grains, such as whole wheat pasta, brown rice, or whole grain bread. Fill about one quarter of your plate with whole grains.  ? Eat low-fat diary products.  ? Avoid fatty cuts of meat, processed or cured meats, and poultry with skin. Fill about one quarter of your plate with lean proteins such as fish, chicken without skin, beans, eggs, and tofu.  ? Avoid premade and processed foods. These tend to be higher in sodium, added sugar, and fat.  · Reduce your daily sodium intake. Most people with hypertension should eat less than 1,500 mg of sodium a day.  · Limit alcohol intake to no more than 1 drink a day for nonpregnant women and 2 drinks a day for men. One drink equals 12 oz of beer, 5 oz of wine, or 1½ oz of hard liquor.  Lifestyle  · Work with your health care provider to maintain a healthy body weight, or to lose weight. Ask what an ideal weight is for you.  · Get at least 30 minutes of exercise that causes your heart to beat faster (aerobic exercise) most days of the week. Activities may include walking, swimming, or biking.  · Include exercise to strengthen your muscles (resistance exercise), such as weight lifting, as part of your weekly exercise routine. Try to do these types of exercises for 30 minutes at least 3 days a week.  · Do not use any products that contain nicotine or tobacco, such as cigarettes and e-cigarettes. If you need help quitting,  ask your health care provider.  · Control any long-term (chronic) conditions you have, such as high cholesterol or diabetes.  Monitoring  · Monitor your blood pressure at home as told by your health care provider. Your personal target blood pressure may vary depending on your medical conditions, your age, and other factors.  · Have your blood pressure checked regularly, as often as told by your health care provider.  Working with your health care provider  · Review all the medicines you take with your health care provider because there may be side effects or interactions.  · Talk with your health care provider about your diet, exercise habits, and other lifestyle factors that may be contributing to hypertension.  · Visit your health care provider regularly. Your health care provider can help you create and adjust your plan for managing hypertension.  Will I need medicine to control my blood pressure?  Your health care provider may prescribe medicine if lifestyle changes are not enough to get your blood pressure under control, and if:  · Your systolic blood pressure is 130 or higher.  · Your diastolic blood pressure is 80 or higher.  Take medicines only as told by your health care provider. Follow the directions carefully. Blood pressure medicines must be taken as prescribed. The medicine does not work as well when you skip doses. Skipping doses also puts you at risk for problems.  Contact a health care provider if:  · You think you are having a reaction to medicines you have taken.  · You have repeated (recurrent) headaches.  · You feel dizzy.  · You have swelling in your ankles.  · You have trouble with your vision.  Get help right away if:  · You develop a severe headache or confusion.  · You have unusual weakness or numbness, or you feel faint.  · You have severe pain in your chest or abdomen.  · You vomit repeatedly.  · You have trouble breathing.  Summary  · Hypertension is when the force of blood pumping  "through your arteries is too strong. If this condition is not controlled, it may put you at risk for serious complications.  · Your personal target blood pressure may vary depending on your medical conditions, your age, and other factors. For most people, a normal blood pressure is less than 120/80.  · Hypertension is managed by lifestyle changes, medicines, or both. Lifestyle changes include weight loss, eating a healthy, low-sodium diet, exercising more, and limiting alcohol.  This information is not intended to replace advice given to you by your health care provider. Make sure you discuss any questions you have with your health care provider.  Document Revised: 04/10/2020 Document Reviewed: 11/15/2017  [employer] Patient Education © 2020 [employer]      DASH Eating Plan  DASH stands for \"Dietary Approaches to Stop Hypertension.\" The DASH eating plan is a healthy eating plan that has been shown to reduce high blood pressure (hypertension). It may also reduce your risk for type 2 diabetes, heart disease, and stroke. The DASH eating plan may also help with weight loss.  What are tips for following this plan?    General guidelines  · Avoid eating more than 2,300 mg (milligrams) of salt (sodium) a day. If you have hypertension, you may need to reduce your sodium intake to 1,500 mg a day.  · Limit alcohol intake to no more than 1 drink a day for nonpregnant women and 2 drinks a day for men. One drink equals 12 oz of beer, 5 oz of wine, or 1½ oz of hard liquor.  · Work with your health care provider to maintain a healthy body weight or to lose weight. Ask what an ideal weight is for you.  · Get at least 30 minutes of exercise that causes your heart to beat faster (aerobic exercise) most days of the week. Activities may include walking, swimming, or biking.  · Work with your health care provider or diet and nutrition specialist (dietitian) to adjust your eating plan to your individual calorie needs.  Reading food " "labels    · Check food labels for the amount of sodium per serving. Choose foods with less than 5 percent of the Daily Value of sodium. Generally, foods with less than 300 mg of sodium per serving fit into this eating plan.  · To find whole grains, look for the word \"whole\" as the first word in the ingredient list.  Shopping  · Buy products labeled as \"low-sodium\" or \"no salt added.\"  · Buy fresh foods. Avoid canned foods and premade or frozen meals.  Cooking  · Avoid adding salt when cooking. Use salt-free seasonings or herbs instead of table salt or sea salt. Check with your health care provider or pharmacist before using salt substitutes.  · Do not branham foods. Cook foods using healthy methods such as baking, boiling, grilling, and broiling instead.  · Cook with heart-healthy oils, such as olive, canola, soybean, or sunflower oil.  Meal planning  · Eat a balanced diet that includes:  ? 5 or more servings of fruits and vegetables each day. At each meal, try to fill half of your plate with fruits and vegetables.  ? Up to 6-8 servings of whole grains each day.  ? Less than 6 oz of lean meat, poultry, or fish each day. A 3-oz serving of meat is about the same size as a deck of cards. One egg equals 1 oz.  ? 2 servings of low-fat dairy each day.  ? A serving of nuts, seeds, or beans 5 times each week.  ? Heart-healthy fats. Healthy fats called Omega-3 fatty acids are found in foods such as flaxseeds and coldwater fish, like sardines, salmon, and mackerel.  · Limit how much you eat of the following:  ? Canned or prepackaged foods.  ? Food that is high in trans fat, such as fried foods.  ? Food that is high in saturated fat, such as fatty meat.  ? Sweets, desserts, sugary drinks, and other foods with added sugar.  ? Full-fat dairy products.  · Do not salt foods before eating.  · Try to eat at least 2 vegetarian meals each week.  · Eat more home-cooked food and less restaurant, buffet, and fast food.  · When eating at a " restaurant, ask that your food be prepared with less salt or no salt, if possible.  What foods are recommended?  The items listed may not be a complete list. Talk with your dietitian about what dietary choices are best for you.  Grains  Whole-grain or whole-wheat bread. Whole-grain or whole-wheat pasta. Brown rice. Oatmeal. Quinoa. Bulgur. Whole-grain and low-sodium cereals. Meg bread. Low-fat, low-sodium crackers. Whole-wheat flour tortillas.  Vegetables  Fresh or frozen vegetables (raw, steamed, roasted, or grilled). Low-sodium or reduced-sodium tomato and vegetable juice. Low-sodium or reduced-sodium tomato sauce and tomato paste. Low-sodium or reduced-sodium canned vegetables.  Fruits  All fresh, dried, or frozen fruit. Canned fruit in natural juice (without added sugar).  Meat and other protein foods  Skinless chicken or turkey. Ground chicken or turkey. Pork with fat trimmed off. Fish and seafood. Egg whites. Dried beans, peas, or lentils. Unsalted nuts, nut butters, and seeds. Unsalted canned beans. Lean cuts of beef with fat trimmed off. Low-sodium, lean deli meat.  Dairy  Low-fat (1%) or fat-free (skim) milk. Fat-free, low-fat, or reduced-fat cheeses. Nonfat, low-sodium ricotta or cottage cheese. Low-fat or nonfat yogurt. Low-fat, low-sodium cheese.  Fats and oils  Soft margarine without trans fats. Vegetable oil. Low-fat, reduced-fat, or light mayonnaise and salad dressings (reduced-sodium). Canola, safflower, olive, soybean, and sunflower oils. Avocado.  Seasoning and other foods  Herbs. Spices. Seasoning mixes without salt. Unsalted popcorn and pretzels. Fat-free sweets.  What foods are not recommended?  The items listed may not be a complete list. Talk with your dietitian about what dietary choices are best for you.  Grains  Baked goods made with fat, such as croissants, muffins, or some breads. Dry pasta or rice meal packs.  Vegetables  Creamed or fried vegetables. Vegetables in a cheese sauce.  Regular canned vegetables (not low-sodium or reduced-sodium). Regular canned tomato sauce and paste (not low-sodium or reduced-sodium). Regular tomato and vegetable juice (not low-sodium or reduced-sodium). Pickles. Olives.  Fruits  Canned fruit in a light or heavy syrup. Fried fruit. Fruit in cream or butter sauce.  Meat and other protein foods  Fatty cuts of meat. Ribs. Fried meat. Ramsay. Sausage. Bologna and other processed lunch meats. Salami. Fatback. Hotdogs. Bratwurst. Salted nuts and seeds. Canned beans with added salt. Canned or smoked fish. Whole eggs or egg yolks. Chicken or turkey with skin.  Dairy  Whole or 2% milk, cream, and half-and-half. Whole or full-fat cream cheese. Whole-fat or sweetened yogurt. Full-fat cheese. Nondairy creamers. Whipped toppings. Processed cheese and cheese spreads.  Fats and oils  Butter. Stick margarine. Lard. Shortening. Ghee. Ramsay fat. Tropical oils, such as coconut, palm kernel, or palm oil.  Seasoning and other foods  Salted popcorn and pretzels. Onion salt, garlic salt, seasoned salt, table salt, and sea salt. Worcestershire sauce. Tartar sauce. Barbecue sauce. Teriyaki sauce. Soy sauce, including reduced-sodium. Steak sauce. Canned and packaged gravies. Fish sauce. Oyster sauce. Cocktail sauce. Horseradish that you find on the shelf. Ketchup. Mustard. Meat flavorings and tenderizers. Bouillon cubes. Hot sauce and Tabasco sauce. Premade or packaged marinades. Premade or packaged taco seasonings. Relishes. Regular salad dressings.  Where to find more information:  · National Heart, Lung, and Blood [place]: www.nhlbi.nih.gov  · American Heart Association: www.heart.org  Summary  · The DASH eating plan is a healthy eating plan that has been shown to reduce high blood pressure (hypertension). It may also reduce your risk for type 2 diabetes, heart disease, and stroke.  · With the DASH eating plan, you should limit salt (sodium) intake to 2,300 mg a day. If you have  hypertension, you may need to reduce your sodium intake to 1,500 mg a day.  · When on the DASH eating plan, aim to eat more fresh fruits and vegetables, whole grains, lean proteins, low-fat dairy, and heart-healthy fats.  · Work with your health care provider or diet and nutrition specialist (dietitian) to adjust your eating plan to your individual calorie needs.  This information is not intended to replace advice given to you by your health care provider. Make sure you discuss any questions you have with your health care provider.  Document Revised: 11/30/2018 Document Reviewed: 12/11/2017  [employer] Patient Education © 2020 [employer]      Advance Directive    Advance directives are legal documents that let you make choices ahead of time about your health care and medical treatment in case you become unable to communicate for yourself. Advance directives are a way for you to make known your wishes to family, friends, and health care providers. This can let others know about your end-of-life care if you become unable to communicate.  Discussing and writing advance directives should happen over time rather than all at once. Advance directives can be changed depending on your situation and what you want, even after you have signed the advance directives.  There are different types of advance directives, such as:  · Medical power of .  · Living will.  · Do not resuscitate (DNR) or do not attempt resuscitation (DNAR) order.  Health care proxy and medical power of   A health care proxy is also called a health care agent. This is a person who is appointed to make medical decisions for you in cases where you are unable to make the decisions yourself. Generally, people choose someone they know well and trust to represent their preferences. Make sure to ask this person for an agreement to act as your proxy. A proxy may have to exercise judgment in the event of a medical decision for which your wishes are  not known.  A medical power of  is a legal document that names your health care proxy. Depending on the laws in your state, after the document is written, it may also need to be:  · Signed.  · Notarized.  · Dated.  · Copied.  · Witnessed.  · Incorporated into your medical record.  You may also want to appoint someone to manage your money in a situation in which you are unable to do so. This is called a durable power of  for finances. It is a separate legal document from the durable power of  for health care. You may choose the same person or someone different from your health care proxy to act as your agent in money matters.  If you do not appoint a proxy, or if there is a concern that the proxy is not acting in your best interests, a court may appoint a guardian to act on your behalf.  Living will  A living will is a set of instructions that state your wishes about medical care when you cannot express them yourself. Health care providers should keep a copy of your living will in your medical record. You may want to give a copy to family members or friends. To alert caregivers in case of an emergency, you can place a card in your wallet to let them know that you have a living will and where they can find it. A living will is used if you become:  · Terminally ill.  · Disabled.  · Unable to communicate or make decisions.  Items to consider in your living will include:  · To use or not to use life-support equipment, such as dialysis machines and breathing machines (ventilators).  · A DNR or DNAR order. This tells health care providers not to use cardiopulmonary resuscitation (CPR) if breathing or heartbeat stops.  · To use or not to use tube feeding.  · To be given or not to be given food and fluids.  · Comfort (palliative) care when the goal becomes comfort rather than a cure.  · Donation of organs and tissues.  A living will does not give instructions for distributing your money and property  if you should pass away.  DNR or DNAR  A DNR or DNAR order is a request not to have CPR in the event that your heart stops beating or you stop breathing. If a DNR or DNAR order has not been made and shared, a health care provider will try to help any patient whose heart has stopped or who has stopped breathing. If you plan to have surgery, talk with your health care provider about how your DNR or DNAR order will be followed if problems occur.  What if I do not have an advance directive?  If you do not have an advance directive, some states assign family decision makers to act on your behalf based on how closely you are related to them. Each state has its own laws about advance directives. You may want to check with your health care provider, , or state representative about the laws in your state.  Summary  · Advance directives are the legal documents that allow you to make choices ahead of time about your health care and medical treatment in case you become unable to tell others about your care.  · The process of discussing and writing advance directives should happen over time. You can change the advance directives, even after you have signed them.  · Advance directives include DNR or DNAR orders, living jiménez, and designating an agent as your medical power of .  This information is not intended to replace advice given to you by your health care provider. Make sure you discuss any questions you have with your health care provider.  Document Revised: 07/16/2020 Document Reviewed: 07/16/2020  [employer] Patient Education © 2020 [employer]      Managing Your Hypertension  Hypertension is commonly called high blood pressure. This is when the force of your blood pressing against the walls of your arteries is too strong. Arteries are blood vessels that carry blood from your heart throughout your body. Hypertension forces the heart to work harder to pump blood, and may cause the arteries to become narrow  "or stiff. Having untreated or uncontrolled hypertension can cause heart attack, stroke, kidney disease, and other problems.  What are blood pressure readings?  A blood pressure reading consists of a higher number over a lower number. Ideally, your blood pressure should be below 120/80. The first (\"top\") number is called the systolic pressure. It is a measure of the pressure in your arteries as your heart beats. The second (\"bottom\") number is called the diastolic pressure. It is a measure of the pressure in your arteries as the heart relaxes.  What does my blood pressure reading mean?  Blood pressure is classified into four stages. Based on your blood pressure reading, your health care provider may use the following stages to determine what type of treatment you need, if any. Systolic pressure and diastolic pressure are measured in a unit called mm Hg.  Normal  · Systolic pressure: below 120.  · Diastolic pressure: below 80.  Elevated  · Systolic pressure: 120-129.  · Diastolic pressure: below 80.  Hypertension stage 1  · Systolic pressure: 130-139.  · Diastolic pressure: 80-89.  Hypertension stage 2  · Systolic pressure: 140 or above.  · Diastolic pressure: 90 or above.  What health risks are associated with hypertension?  Managing your hypertension is an important responsibility. Uncontrolled hypertension can lead to:  · A heart attack.  · A stroke.  · A weakened blood vessel (aneurysm).  · Heart failure.  · Kidney damage.  · Eye damage.  · Metabolic syndrome.  · Memory and concentration problems.  What changes can I make to manage my hypertension?  Hypertension can be managed by making lifestyle changes and possibly by taking medicines. Your health care provider will help you make a plan to bring your blood pressure within a normal range.  Eating and drinking    · Eat a diet that is high in fiber and potassium, and low in salt (sodium), added sugar, and fat. An example eating plan is called the DASH (Dietary " Approaches to Stop Hypertension) diet. To eat this way:  ? Eat plenty of fresh fruits and vegetables. Try to fill half of your plate at each meal with fruits and vegetables.  ? Eat whole grains, such as whole wheat pasta, brown rice, or whole grain bread. Fill about one quarter of your plate with whole grains.  ? Eat low-fat diary products.  ? Avoid fatty cuts of meat, processed or cured meats, and poultry with skin. Fill about one quarter of your plate with lean proteins such as fish, chicken without skin, beans, eggs, and tofu.  ? Avoid premade and processed foods. These tend to be higher in sodium, added sugar, and fat.  · Reduce your daily sodium intake. Most people with hypertension should eat less than 1,500 mg of sodium a day.  · Limit alcohol intake to no more than 1 drink a day for nonpregnant women and 2 drinks a day for men. One drink equals 12 oz of beer, 5 oz of wine, or 1½ oz of hard liquor.  Lifestyle  · Work with your health care provider to maintain a healthy body weight, or to lose weight. Ask what an ideal weight is for you.  · Get at least 30 minutes of exercise that causes your heart to beat faster (aerobic exercise) most days of the week. Activities may include walking, swimming, or biking.  · Include exercise to strengthen your muscles (resistance exercise), such as weight lifting, as part of your weekly exercise routine. Try to do these types of exercises for 30 minutes at least 3 days a week.  · Do not use any products that contain nicotine or tobacco, such as cigarettes and e-cigarettes. If you need help quitting, ask your health care provider.  · Control any long-term (chronic) conditions you have, such as high cholesterol or diabetes.  Monitoring  · Monitor your blood pressure at home as told by your health care provider. Your personal target blood pressure may vary depending on your medical conditions, your age, and other factors.  · Have your blood pressure checked regularly, as often  as told by your health care provider.  Working with your health care provider  · Review all the medicines you take with your health care provider because there may be side effects or interactions.  · Talk with your health care provider about your diet, exercise habits, and other lifestyle factors that may be contributing to hypertension.  · Visit your health care provider regularly. Your health care provider can help you create and adjust your plan for managing hypertension.  Will I need medicine to control my blood pressure?  Your health care provider may prescribe medicine if lifestyle changes are not enough to get your blood pressure under control, and if:  · Your systolic blood pressure is 130 or higher.  · Your diastolic blood pressure is 80 or higher.  Take medicines only as told by your health care provider. Follow the directions carefully. Blood pressure medicines must be taken as prescribed. The medicine does not work as well when you skip doses. Skipping doses also puts you at risk for problems.  Contact a health care provider if:  · You think you are having a reaction to medicines you have taken.  · You have repeated (recurrent) headaches.  · You feel dizzy.  · You have swelling in your ankles.  · You have trouble with your vision.  Get help right away if:  · You develop a severe headache or confusion.  · You have unusual weakness or numbness, or you feel faint.  · You have severe pain in your chest or abdomen.  · You vomit repeatedly.  · You have trouble breathing.  Summary  · Hypertension is when the force of blood pumping through your arteries is too strong. If this condition is not controlled, it may put you at risk for serious complications.  · Your personal target blood pressure may vary depending on your medical conditions, your age, and other factors. For most people, a normal blood pressure is less than 120/80.  · Hypertension is managed by lifestyle changes, medicines, or both. Lifestyle changes  "include weight loss, eating a healthy, low-sodium diet, exercising more, and limiting alcohol.  This information is not intended to replace advice given to you by your health care provider. Make sure you discuss any questions you have with your health care provider.  Document Revised: 04/10/2020 Document Reviewed: 11/15/2017  Elsevier Patient Education © 2020 [employer]      DASH Eating Plan  DASH stands for \"Dietary Approaches to Stop Hypertension.\" The DASH eating plan is a healthy eating plan that has been shown to reduce high blood pressure (hypertension). It may also reduce your risk for type 2 diabetes, heart disease, and stroke. The DASH eating plan may also help with weight loss.  What are tips for following this plan?    General guidelines  · Avoid eating more than 2,300 mg (milligrams) of salt (sodium) a day. If you have hypertension, you may need to reduce your sodium intake to 1,500 mg a day.  · Limit alcohol intake to no more than 1 drink a day for nonpregnant women and 2 drinks a day for men. One drink equals 12 oz of beer, 5 oz of wine, or 1½ oz of hard liquor.  · Work with your health care provider to maintain a healthy body weight or to lose weight. Ask what an ideal weight is for you.  · Get at least 30 minutes of exercise that causes your heart to beat faster (aerobic exercise) most days of the week. Activities may include walking, swimming, or biking.  · Work with your health care provider or diet and nutrition specialist (dietitian) to adjust your eating plan to your individual calorie needs.  Reading food labels    · Check food labels for the amount of sodium per serving. Choose foods with less than 5 percent of the Daily Value of sodium. Generally, foods with less than 300 mg of sodium per serving fit into this eating plan.  · To find whole grains, look for the word \"whole\" as the first word in the ingredient list.  Shopping  · Buy products labeled as \"low-sodium\" or \"no salt added.\"  · Buy " fresh foods. Avoid canned foods and premade or frozen meals.  Cooking  · Avoid adding salt when cooking. Use salt-free seasonings or herbs instead of table salt or sea salt. Check with your health care provider or pharmacist before using salt substitutes.  · Do not branham foods. Cook foods using healthy methods such as baking, boiling, grilling, and broiling instead.  · Cook with heart-healthy oils, such as olive, canola, soybean, or sunflower oil.  Meal planning  · Eat a balanced diet that includes:  ? 5 or more servings of fruits and vegetables each day. At each meal, try to fill half of your plate with fruits and vegetables.  ? Up to 6-8 servings of whole grains each day.  ? Less than 6 oz of lean meat, poultry, or fish each day. A 3-oz serving of meat is about the same size as a deck of cards. One egg equals 1 oz.  ? 2 servings of low-fat dairy each day.  ? A serving of nuts, seeds, or beans 5 times each week.  ? Heart-healthy fats. Healthy fats called Omega-3 fatty acids are found in foods such as flaxseeds and coldwater fish, like sardines, salmon, and mackerel.  · Limit how much you eat of the following:  ? Canned or prepackaged foods.  ? Food that is high in trans fat, such as fried foods.  ? Food that is high in saturated fat, such as fatty meat.  ? Sweets, desserts, sugary drinks, and other foods with added sugar.  ? Full-fat dairy products.  · Do not salt foods before eating.  · Try to eat at least 2 vegetarian meals each week.  · Eat more home-cooked food and less restaurant, buffet, and fast food.  · When eating at a restaurant, ask that your food be prepared with less salt or no salt, if possible.  What foods are recommended?  The items listed may not be a complete list. Talk with your dietitian about what dietary choices are best for you.  Grains  Whole-grain or whole-wheat bread. Whole-grain or whole-wheat pasta. Brown rice. Oatmeal. Quinoa. Bulgur. Whole-grain and low-sodium cereals. Meg bread.  Low-fat, low-sodium crackers. Whole-wheat flour tortillas.  Vegetables  Fresh or frozen vegetables (raw, steamed, roasted, or grilled). Low-sodium or reduced-sodium tomato and vegetable juice. Low-sodium or reduced-sodium tomato sauce and tomato paste. Low-sodium or reduced-sodium canned vegetables.  Fruits  All fresh, dried, or frozen fruit. Canned fruit in natural juice (without added sugar).  Meat and other protein foods  Skinless chicken or turkey. Ground chicken or turkey. Pork with fat trimmed off. Fish and seafood. Egg whites. Dried beans, peas, or lentils. Unsalted nuts, nut butters, and seeds. Unsalted canned beans. Lean cuts of beef with fat trimmed off. Low-sodium, lean deli meat.  Dairy  Low-fat (1%) or fat-free (skim) milk. Fat-free, low-fat, or reduced-fat cheeses. Nonfat, low-sodium ricotta or cottage cheese. Low-fat or nonfat yogurt. Low-fat, low-sodium cheese.  Fats and oils  Soft margarine without trans fats. Vegetable oil. Low-fat, reduced-fat, or light mayonnaise and salad dressings (reduced-sodium). Canola, safflower, olive, soybean, and sunflower oils. Avocado.  Seasoning and other foods  Herbs. Spices. Seasoning mixes without salt. Unsalted popcorn and pretzels. Fat-free sweets.  What foods are not recommended?  The items listed may not be a complete list. Talk with your dietitian about what dietary choices are best for you.  Grains  Baked goods made with fat, such as croissants, muffins, or some breads. Dry pasta or rice meal packs.  Vegetables  Creamed or fried vegetables. Vegetables in a cheese sauce. Regular canned vegetables (not low-sodium or reduced-sodium). Regular canned tomato sauce and paste (not low-sodium or reduced-sodium). Regular tomato and vegetable juice (not low-sodium or reduced-sodium). Pickles. Olives.  Fruits  Canned fruit in a light or heavy syrup. Fried fruit. Fruit in cream or butter sauce.  Meat and other protein foods  Fatty cuts of meat. Ribs. Fried meat. Ramsay.  Sausage. Bologna and other processed lunch meats. Salami. Fatback. Hotdogs. Bratwurst. Salted nuts and seeds. Canned beans with added salt. Canned or smoked fish. Whole eggs or egg yolks. Chicken or turkey with skin.  Dairy  Whole or 2% milk, cream, and half-and-half. Whole or full-fat cream cheese. Whole-fat or sweetened yogurt. Full-fat cheese. Nondairy creamers. Whipped toppings. Processed cheese and cheese spreads.  Fats and oils  Butter. Stick margarine. Lard. Shortening. Ghee. Ramsay fat. Tropical oils, such as coconut, palm kernel, or palm oil.  Seasoning and other foods  Salted popcorn and pretzels. Onion salt, garlic salt, seasoned salt, table salt, and sea salt. Worcestershire sauce. Tartar sauce. Barbecue sauce. Teriyaki sauce. Soy sauce, including reduced-sodium. Steak sauce. Canned and packaged gravies. Fish sauce. Oyster sauce. Cocktail sauce. Horseradish that you find on the shelf. Ketchup. Mustard. Meat flavorings and tenderizers. Bouillon cubes. Hot sauce and Tabasco sauce. Premade or packaged marinades. Premade or packaged taco seasonings. Relishes. Regular salad dressings.  Where to find more information:  · National Heart, Lung, and Blood [place]: www.nhlbi.nih.gov  · American Heart Association: www.heart.org  Summary  · The DASH eating plan is a healthy eating plan that has been shown to reduce high blood pressure (hypertension). It may also reduce your risk for type 2 diabetes, heart disease, and stroke.  · With the DASH eating plan, you should limit salt (sodium) intake to 2,300 mg a day. If you have hypertension, you may need to reduce your sodium intake to 1,500 mg a day.  · When on the DASH eating plan, aim to eat more fresh fruits and vegetables, whole grains, lean proteins, low-fat dairy, and heart-healthy fats.  · Work with your health care provider or diet and nutrition specialist (dietitian) to adjust your eating plan to your individual calorie needs.  This information is not intended  to replace advice given to you by your health care provider. Make sure you discuss any questions you have with your health care provider.  Document Revised: 11/30/2018 Document Reviewed: 12/11/2017  Elsevier Patient Education © 2020 [employer]      Fall Prevention in the Home, Adult  Falls can cause injuries. They can happen to people of all ages. There are many things you can do to make your home safe and to help prevent falls. Ask for help when making these changes, if needed.  What actions can I take to prevent falls?  General Instructions  · Use good lighting in all rooms. Replace any light bulbs that burn out.  · Turn on the lights when you go into a dark area. Use night-lights.  · Keep items that you use often in easy-to-reach places. Lower the shelves around your home if necessary.  · Set up your furniture so you have a clear path. Avoid moving your furniture around.  · Do not have throw rugs and other things on the floor that can make you trip.  · Avoid walking on wet floors.  · If any of your floors are uneven, fix them.  · Add color or contrast paint or tape to clearly miguel a and help you see:  ? Any grab bars or handrails.  ? First and last steps of stairways.  ? Where the edge of each step is.  · If you use a stepladder:  ? Make sure that it is fully opened. Do not climb a closed stepladder.  ? Make sure that both sides of the stepladder are locked into place.  ? Ask someone to hold the stepladder for you while you use it.  · If there are any pets around you, be aware of where they are.  What can I do in the bathroom?         · Keep the floor dry. Clean up any water that spills onto the floor as soon as it happens.  · Remove soap buildup in the tub or shower regularly.  · Use non-skid mats or decals on the floor of the tub or shower.  · Attach bath mats securely with double-sided, non-slip rug tape.  · If you need to sit down in the shower, use a plastic, non-slip stool.  · Install grab bars by the  toilet and in the tub and shower. Do not use towel bars as grab bars.  What can I do in the bedroom?  · Make sure that you have a light by your bed that is easy to reach.  · Do not use any sheets or blankets that are too big for your bed. They should not hang down onto the floor.  · Have a firm chair that has side arms. You can use this for support while you get dressed.  What can I do in the kitchen?  · Clean up any spills right away.  · If you need to reach something above you, use a strong step stool that has a grab bar.  · Keep electrical cords out of the way.  · Do not use floor polish or wax that makes floors slippery. If you must use wax, use non-skid floor wax.  What can I do with my stairs?  · Do not leave any items on the stairs.  · Make sure that you have a light switch at the top of the stairs and the bottom of the stairs. If you do not have them, ask someone to add them for you.  · Make sure that there are handrails on both sides of the stairs, and use them. Fix handrails that are broken or loose. Make sure that handrails are as long as the stairways.  · Install non-slip stair treads on all stairs in your home.  · Avoid having throw rugs at the top or bottom of the stairs. If you do have throw rugs, attach them to the floor with carpet tape.  · Choose a carpet that does not hide the edge of the steps on the stairway.  · Check any carpeting to make sure that it is firmly attached to the stairs. Fix any carpet that is loose or worn.  What can I do on the outside of my home?  · Use bright outdoor lighting.  · Regularly fix the edges of walkways and driveways and fix any cracks.  · Remove anything that might make you trip as you walk through a door, such as a raised step or threshold.  · Trim any bushes or trees on the path to your home.  · Regularly check to see if handrails are loose or broken. Make sure that both sides of any steps have handrails.  · Install guardrails along the edges of any raised decks  and porches.  · Clear walking paths of anything that might make someone trip, such as tools or rocks.  · Have any leaves, snow, or ice cleared regularly.  · Use sand or salt on walking paths during winter.  · Clean up any spills in your garage right away. This includes grease or oil spills.  What other actions can I take?  · Wear shoes that:  ? Have a low heel. Do not wear high heels.  ? Have rubber bottoms.  ? Are comfortable and fit you well.  ? Are closed at the toe. Do not wear open-toe sandals.  · Use tools that help you move around (mobility aids) if they are needed. These include:  ? Canes.  ? Walkers.  ? Scooters.  ? Crutches.  · Review your medicines with your doctor. Some medicines can make you feel dizzy. This can increase your chance of falling.  Ask your doctor what other things you can do to help prevent falls.  Where to find more information  · Centers for Disease Control and Prevention, [place]: https://cdc.gov  · National [place] on Aging: https://va0scbp.gris.nih.gov  Contact a doctor if:  · You are afraid of falling at home.  · You feel weak, drowsy, or dizzy at home.  · You fall at home.  Summary  · There are many simple things that you can do to make your home safe and to help prevent falls.  · Ways to make your home safe include removing tripping hazards and installing grab bars in the bathroom.  · Ask for help when making these changes in your home.  This information is not intended to replace advice given to you by your health care provider. Make sure you discuss any questions you have with your health care provider.  Document Revised: 04/09/2020 Document Reviewed: 08/02/2018  Elsevier Patient Education © 2020 [employer]

## 2021-01-20 NOTE — PROGRESS NOTES
The ABCs of the Annual Wellness Visit  Subsequent Medicare Wellness Visit    Chief Complaint   Patient presents with   • Medicare Wellness-subsequent       Subjective   History of Present Illness:  Lashanda Qureshi is a 85 y.o. female who presents for a Subsequent Medicare Wellness Visit.    Patient does complain of some significant mostly right neck stiffness that bothers her frequently.  She is done no routine exercise for neck pain nor used heat.  She did have labs drawn on December 15 of last year your glucose was 103 otherwise everything normal on the CMP.  On the lipid panel the HDL was 126 the rest was totally normal limits.  Her vitamin D was slightly low at 29.6.    HEALTH RISK ASSESSMENT    Recent Hospitalizations:  No hospitalization(s) within the last year.    Current Medical Providers:  Patient Care Team:  Leandro Johnson APRN as PCP - General (Family Medicine)  Crispin Wheeler as Consulting Physician (Orthopedic Surgery)  James Gonzales MD    Smoking Status:  Social History     Tobacco Use   Smoking Status Former Smoker   Smokeless Tobacco Never Used   Tobacco Comment    Quit 41 years ago       Alcohol Consumption:  Social History     Substance and Sexual Activity   Alcohol Use No       Depression Screen:   PHQ-2/PHQ-9 Depression Screening 1/20/2021   Little interest or pleasure in doing things 0   Feeling down, depressed, or hopeless 0   Total Score 0       Fall Risk Screen:  STEADI Fall Risk Assessment was completed, and patient is at LOW risk for falls.Assessment completed on:1/20/2021    Health Habits and Functional and Cognitive Screening:  Functional & Cognitive Status 1/20/2021   Do you have difficulty preparing food and eating? No   Do you have difficulty bathing yourself, getting dressed or grooming yourself? No   Do you have difficulty using the toilet? No   Do you have difficulty moving around from place to place? No   Do you have trouble with steps or getting out of a bed or  a chair? No   Current Diet Well Balanced Diet   Dental Exam Up to date   Eye Exam Up to date   Exercise (times per week) 5 times per week   Current Exercise Activities Include No Regular Exercise   Do you need help using the phone?  No   Are you deaf or do you have serious difficulty hearing?  No   Do you need help with transportation? No   Do you need help shopping? No   Do you need help preparing meals?  No   Do you need help with housework?  No   Do you need help with laundry? No   Do you need help taking your medications? No   Do you need help managing money? No   Do you ever drive or ride in a car without wearing a seat belt? No   Have you felt unusual stress, anger or loneliness in the last month? Yes   Who do you live with? Alone   If you need help, do you have trouble finding someone available to you? No   Have you been bothered in the last four weeks by sexual problems? No   Do you have difficulty concentrating, remembering or making decisions? No         Does the patient have evidence of cognitive impairment? No    Asprin use counseling:Does not need ASA (and currently is not on it)    Age-appropriate Screening Schedule:  Refer to the list below for future screening recommendations based on patient's age, sex and/or medical conditions. Orders for these recommended tests are listed in the plan section. The patient has been provided with a written plan.    Health Maintenance   Topic Date Due   • ZOSTER VACCINE (2 of 2) 11/26/2015   • TDAP/TD VACCINES (2 - Td) 01/01/2024   • INFLUENZA VACCINE  Completed          The following portions of the patient's history were reviewed and updated as appropriate: allergies, current medications, past family history, past medical history, past social history, past surgical history and problem list.    Outpatient Medications Prior to Visit   Medication Sig Dispense Refill   • azelastine (ASTELIN) 0.1 % nasal spray 2 sprays into the nostril(s) as directed by provider 2 (Two)  Times a Day. 1 each 12   • budesonide-formoterol (Symbicort) 80-4.5 MCG/ACT inhaler Inhale 2 puffs 2 (Two) Times a Day. 1 inhaler 12   • Cholecalciferol (VITAMIN D3) 2000 UNITS tablet Take  by mouth. Take 2 tablets daily.     • Cyanocobalamin (VITAMIN B12 PO) Take  by mouth. Take 1 tablet daily as directed.     • Elastic Bandages & Supports (FUTURO FIRM COMPRESSION HOSE) misc 1 package Daily. 1 each 1   • fluticasone (Veramyst) 27.5 MCG/SPRAY nasal spray 2 sprays into the nostril(s) as directed by provider Daily. 10 g 3   • latanoprost (XALATAN) 0.005 % ophthalmic solution Apply  to eye. Instill 1 drop into affected eye(s) once daily as directed.     • Multiple Vitamins-Minerals (CENTRUM SILVER PO) Take  by mouth.     • Multiple Vitamins-Minerals (ICAPS AREDS 2 PO) Take  by mouth.     • triamcinolone (KENALOG) 0.1 % ointment Apply  topically to the appropriate area as directed 2 (Two) Times a Day. 15 g 3   • Ventolin  (90 Base) MCG/ACT inhaler Inhale 2 puffs Every 6 (Six) Hours As Needed for Wheezing or Shortness of Air. 18 g 11   • dilTIAZem CD (CARDIZEM CD) 120 MG 24 hr capsule Take 1 capsule by mouth Daily. 30 capsule 3   • ESTROGENS CONJ SYNTHETIC B PO         Compound Bi-Est 20-20 Topical  (Estriol 80% + Estradiol 20%) apply a  pea sized amt to the vulva 2-3 nights per week       No facility-administered medications prior to visit.        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 difficile diarrhea   • Cervical pain   • Chronic midline low back pain without sciatica   • Thoracogenic scoliosis of thoracic region   • Insomnia   • Localized edema   • Malignant tumor of urinary bladder (CMS/HCC)   • Acute hypoxemic respiratory failure (CMS/HCC)   • Acute diastolic CHF (congestive heart failure) (CMS/HCC)   • NSVT (nonsustained ventricular tachycardia) (CMS/HCC)   • Dysfunction of both eustachian tubes   • Dyslipidemia   • Exudative age-related macular degeneration, left eye, with active choroidal neovascularization (CMS/HCC)   • Anginal equivalent (CMS/HCC)       Advanced Care Planning:  ACP discussion was held with the patient during this visit. Patient does not have an advance directive, information provided.    Review of Systems   Constitutional: Negative for appetite change, chills, fatigue and fever.   HENT: Positive for congestion. Negative for ear pain, hearing loss, rhinorrhea, sinus pressure and sore throat.    Eyes: Negative for itching and visual disturbance (glasses, macular degeneration, seeing ophthalmology).   Respiratory: Positive for shortness of breath (CORONEL). Negative for cough and wheezing.    Cardiovascular: Positive for palpitations and leg swelling (chronic left leg). Negative for chest pain.   Gastrointestinal: Negative for abdominal pain, blood in stool, constipation, diarrhea (loose BMs), nausea and vomiting.   Endocrine: Negative for cold intolerance, heat intolerance, polydipsia, polyphagia and polyuria.   Genitourinary: Positive for frequency (has prolapse). Negative for difficulty urinating, dysuria and hematuria.        Some leakage, seeing urology   Musculoskeletal: Positive for arthralgias, back pain, gait problem (using cane to walk) and  "neck pain. Negative for joint swelling and myalgias.   Skin: Negative for rash and wound.   Allergic/Immunologic: Positive for environmental allergies. Negative for food allergies.   Neurological: Negative for dizziness, light-headedness, numbness and headaches.   Hematological: Negative for adenopathy. Bruises/bleeds easily.   Psychiatric/Behavioral: Positive for dysphoric mood. Negative for sleep disturbance (off and on). The patient is not nervous/anxious.        Compared to one year ago, the patient feels her physical health is the same.  Compared to one year ago, the patient feels her mental health is the same.    Reviewed chart for potential of high risk medication in the elderly: not applicable  Reviewed chart for potential of harmful drug interactions in the elderly:yes    Objective         Vitals:    01/20/21 1251   BP: 138/80   Pulse: 64   Temp: 97.1 °F (36.2 °C)   SpO2: 98%   Weight: 64.9 kg (143 lb)   Height: 170.2 cm (67\")   PainSc: 0-No pain       Body mass index is 22.4 kg/m².  Discussed the patient's BMI with her. The BMI is in the acceptable range.    Physical Exam  Vitals signs reviewed.   Constitutional:       Appearance: She is well-developed.   HENT:      Head: Normocephalic and atraumatic.      Right Ear: External ear normal.      Left Ear: External ear normal.   Eyes:      Pupils: Pupils are equal, round, and reactive to light.   Neck:      Musculoskeletal: Normal range of motion and neck supple.      Thyroid: No thyromegaly.      Vascular: No JVD.   Cardiovascular:      Rate and Rhythm: Normal rate and regular rhythm.      Heart sounds: Normal heart sounds.   Pulmonary:      Effort: Pulmonary effort is normal. No retractions.      Breath sounds: Normal breath sounds.   Chest:      Chest wall: No mass, lacerations, deformity, swelling, tenderness, crepitus or edema.      Breasts: Breasts are symmetrical.     Abdominal:      General: Bowel sounds are normal. There is no distension.      " Palpations: Abdomen is soft.      Tenderness: There is no abdominal tenderness. There is no guarding.   Genitourinary:     Comments: deferred  Musculoskeletal: Normal range of motion.   Lymphadenopathy:      Cervical: No cervical adenopathy.   Skin:     General: Skin is warm and dry.      Findings: No erythema or rash.   Neurological:      Mental Status: She is alert and oriented to person, place, and time.   Psychiatric:         Behavior: Behavior normal.         Lab Results   Component Value Date    TRIG 70 12/15/2020     (H) 12/15/2020    LDL 41 12/15/2020    VLDL 13 12/15/2020        Assessment/Plan   Medicare Risks and Personalized Health Plan  CMS Preventative Services Quick Reference  Advance Directive Discussion  Immunizations Discussed/Encouraged (specific immunizations; Shingrix )    The above risks/problems have been discussed with the patient.  Pertinent information has been shared with the patient in the After Visit Summary.  Follow up plans and orders are seen below in the Assessment/Plan Section.    Diagnoses and all orders for this visit:    1. Encounter for subsequent annual wellness visit in Medicare patient (Primary)    2. Essential hypertension  -     dilTIAZem CD (CARDIZEM CD) 120 MG 24 hr capsule; Take 1 capsule by mouth Daily.  Dispense: 90 capsule; Refill: 3    3. Dyslipidemia    4. Allergic rhinitis, unspecified seasonality, unspecified trigger    5. Exudative age-related macular degeneration, left eye, with active choroidal neovascularization (CMS/HCC)    6. Acute diastolic CHF (congestive heart failure) (CMS/HCC)    7. Malignant neoplasm of urinary bladder, unspecified site (CMS/HCC)    8. Acute hypoxemic respiratory failure (CMS/HCC)    9. NSVT (nonsustained ventricular tachycardia) (CMS/HCC)    10. Anginal equivalent (CMS/HCC)    The wellness exam has been reviewed in detail.  The patient has been fully counseled on preventative guidelines for vaccines, cancer screenings, and  other health maintenance needs.  Functional testing has been performed to assess capacity for independent living and need for other medical interventions.   The patient was counseled on maintaining a lifestyle to promote good health and to minimize chronic diseases.  The patient has been assisted with scheduling healthcare procedures for the coming year and given a written document outlining these recommendations.    Patient instructed to check blood pressure daily, record, and bring to next visit. If the readings run 140/90 or greater, the patient is to call my office or return sooner for evaluation. Pt advised to eat a heart healthy diet and get regular aerobic exercise.    Patient has had a colonoscopy at least we know of in 2013 and possibly later.  We will go to get records from where she gets her colonoscopies done.    For all of her chronic conditions above I recommend she continue her current medications as well as treatments from her specific specialist.      Follow Up:  Return in about 1 year (around 1/20/2022), or if symptoms worsen or fail to improve, for Medicare Wellness, Annual, hypertention.     An After Visit Summary and PPPS were given to the patient.

## 2021-02-03 ENCOUNTER — OFFICE VISIT (OUTPATIENT)
Dept: CARDIOLOGY | Facility: CLINIC | Age: 86
End: 2021-02-03

## 2021-02-03 VITALS
WEIGHT: 140.8 LBS | DIASTOLIC BLOOD PRESSURE: 92 MMHG | HEIGHT: 67 IN | BODY MASS INDEX: 22.1 KG/M2 | SYSTOLIC BLOOD PRESSURE: 152 MMHG | HEART RATE: 64 BPM

## 2021-02-03 DIAGNOSIS — I49.1 PAC (PREMATURE ATRIAL CONTRACTION): Primary | ICD-10-CM

## 2021-02-03 PROCEDURE — 99213 OFFICE O/P EST LOW 20 MIN: CPT | Performed by: INTERNAL MEDICINE

## 2021-02-03 RX ORDER — LATANOPROST 50 UG/ML
1 SOLUTION/ DROPS OPHTHALMIC NIGHTLY
COMMUNITY

## 2021-02-03 NOTE — PROGRESS NOTES
[facility]  [address], [address]  [place], [place], [address]    [phone]  [email or web address]           OUTPATIENT CLINIC PROGRESS NOTE    Patient care team:  Patient Care Team:  Leandro Johnson APRN as PCP - General (Family Medicine)  Crispin Wheeler as Consulting Physician (Orthopedic Surgery)  James Gonzales MD      Subjective:   Chief complaint:   Chief Complaint   Patient presents with   • Premature atrial contraction   • Shortness of Breath   • Hypertension       HPI:    Lashanda Qureshi is a 85 y.o. female.  Partial problem list, including cardiac problems:  1. Frequent PACs  a. Soreness around her mouth and tongue with metoprolol, switched to diltiazem  2. Hypertension  3. GERD  4. Pneumonitis  5. Anemia  6. Osteopenia  7. Glaucoma  8. Scarlet fever  9. History of colon cancer  10. History of bladder cancer    She presents today for follow-up.    Palpitations have improved on diltiazem.  Still with mild symptoms occasionally at night that are not disruptive to her sleep or wellbeing.  Denies chest pain otherwise.  Chronic dyspnea with exertion, unchanged.  Still active but does not get out a lot due to the cold weather and Covid    Review of Systems:  Positive for dyspnea with exertion, mild palpitations  Negative for exertional chest pain, orthopnea, PND, syncope.       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 difficile diarrhea   • Cervical pain   • Chronic midline low back pain without sciatica   • Thoracogenic scoliosis of thoracic region   • Insomnia   • Localized edema   • Malignant tumor of urinary bladder (CMS/HCC)   • Acute hypoxemic respiratory failure (CMS/HCC)   • Acute diastolic CHF (congestive heart failure) (CMS/HCC)   • NSVT (nonsustained ventricular tachycardia) (CMS/HCC)   • Dysfunction of both eustachian tubes   • Dyslipidemia   • Exudative age-related macular degeneration, left eye, with active choroidal neovascularization (CMS/HCC)   • Anginal equivalent (CMS/HCC)         Current Outpatient Medications:   •  budesonide-formoterol (Symbicort) 80-4.5 MCG/ACT inhaler, Inhale 2 puffs 2 (Two) Times a Day., Disp: 1 inhaler, Rfl: 12  •  Cholecalciferol (VITAMIN D3) 2000 UNITS tablet, Take 2,000 Units by mouth. Take 2 tablets daily.  , Disp: , Rfl:   •  conjugated estrogens (PREMARIN) 0.625 MG/GM vaginal cream, Insert  into the vagina As Needed., Disp: , Rfl:   •  Cyanocobalamin (VITAMIN B12 PO), Take 1 tablet by mouth Daily. Take 1 tablet daily as directed.  , Disp: , Rfl:   •  dilTIAZem CD (CARDIZEM CD) 120 MG 24 hr capsule, Take 1 capsule by mouth Daily., Disp: 90 capsule, Rfl: 3  •  Elastic Bandages & Supports (FUTURO FIRM COMPRESSION HOSE) misc, 1 package Daily., Disp: 1 each, Rfl: 1  •  latanoprost (XALATAN) 0.005 % ophthalmic solution, Administer 1 drop to both eyes Every Night., Disp: , Rfl:   •  Multiple Vitamins-Minerals (CENTRUM SILVER PO), Take 1 tablet by mouth Daily., Disp: , Rfl:   •  Multiple Vitamins-Minerals (ICAPS AREDS 2 PO), Take 1 tablet by mouth Daily., Disp: , Rfl:   •  triamcinolone (KENALOG) 0.1 % ointment, Apply  topically to the appropriate area as directed 2  "(Two) Times a Day., Disp: 15 g, Rfl: 3  •  Ventolin  (90 Base) MCG/ACT inhaler, Inhale 2 puffs Every 6 (Six) Hours As Needed for Wheezing or Shortness of Air., Disp: 18 g, Rfl: 11    Allergies   Allergen Reactions   • Antihistamines, Loratadine-Type Other (See Comments)     Drowsiness     • Erythromycin        Social History     Socioeconomic History   • Marital status:      Spouse name: Not on file   • Number of children: Not on file   • Years of education: Not on file   • Highest education level: Not on file   Tobacco Use   • Smoking status: Former Smoker     Packs/day: 1.00     Types: Cigarettes   • Smokeless tobacco: Never Used   • Tobacco comment: Quit 41 years ago   Substance and Sexual Activity   • Alcohol use: No   • Drug use: No   • Sexual activity: Defer     Comment:    Social History Narrative    Caffeine 1-2 servings coffee     Family History   Problem Relation Age of Onset   • COPD Mother    • Hyperlipidemia Mother    • Hypertension Mother    • Cancer Father    • Lung cancer Father    • Heart attack Brother    • Diabetes Brother    • Multiple sclerosis Daughter    • No Known Problems Maternal Grandmother    • No Known Problems Maternal Grandfather    • Diabetes Paternal Grandmother    • Diabetes Paternal Grandfather      Brother had his first MI at age 45.  Both of her parents had cardiac issues, however she does not know if they had CAD.      Objective:   Physical Exam:  /92 (BP Location: Right arm, Patient Position: Sitting)   Pulse 64   Ht 170.2 cm (67\")   Wt 63.9 kg (140 lb 12.8 oz)   LMP  (LMP Unknown)   BMI 22.05 kg/m²   CONSTITUTIONAL: Well-nourished. In no acute distress.   LUNGS: Normal effort. Clear to auscultation bilaterally without wheezing, rhonchi, or rales noted.   CARDIOVASCULAR: Regular rate with a normal S1 and S2. There is no murmur, gallop, rub, or click appreciated. History of left lower extremity edema associated with stasis dermatitis.      10/2020 " exam: 2+ left PT pulse, difficult to palpate left DP pulse due to swelling but possibly 1+, 2+ right pedal pulses    Labs:  BUN   Date Value Ref Range Status   12/15/2020 15 8 - 23 mg/dL Final     Creatinine   Date Value Ref Range Status   12/15/2020 0.72 0.57 - 1.00 mg/dL Final     Potassium   Date Value Ref Range Status   12/15/2020 4.3 3.5 - 5.2 mmol/L Final     ALT (SGPT)   Date Value Ref Range Status   12/15/2020 12 1 - 33 U/L Final     AST (SGOT)   Date Value Ref Range Status   12/15/2020 24 1 - 32 U/L Final     WBC   Date Value Ref Range Status   08/14/2020 4.87 3.40 - 10.80 10*3/mm3 Final     Hemoglobin   Date Value Ref Range Status   08/14/2020 13.6 12.0 - 15.9 g/dL Final     Hematocrit   Date Value Ref Range Status   08/14/2020 41.7 34.0 - 46.6 % Final     Platelets   Date Value Ref Range Status   08/14/2020 231 140 - 450 10*3/mm3 Final       Lab Results   Component Value Date    CHOL 180 12/15/2020    CHOL 181 01/08/2019    CHOL 199 10/26/2017     Lab Results   Component Value Date    TRIG 70 12/15/2020    TRIG 66 01/08/2019    TRIG 75 10/26/2017     Lab Results   Component Value Date     (H) 12/15/2020     (H) 01/08/2019     (H) 10/26/2017     Lab Results   Component Value Date    LDL 41 12/15/2020     No components found for: LDLDIRECTC    Diagnostic Data:    Procedures    Results for orders placed during the hospital encounter of 08/05/20   Adult Transthoracic Echo Complete W/ Cont if Necessary Per Protocol    Narrative · Calculated EF = 54.0%  · Left ventricular systolic function is normal.  · Left ventricular diastolic dysfunction.  · Mild tricuspid valve regurgitation is present.  · There is calcification of the aortic valve.        Stress test 11/2020  · Left ventricular ejection fraction is normal. (Calculated EF = 65%).  · Myocardial perfusion imaging indicates a normal myocardial perfusion study with no evidence of ischemia.  · Impressions are consistent with a low risk  study.    14-day monitor 7/2020  -Frequent PACs 16.4%  -PVC burden 1.5%  -Symptoms correlate with sinus rhythm and sinus rhythm with PACs, rarely correlate with PVCs.  -3 beat run of VT, 5 beat run of VT.    Assessment and Plan:   Diagnoses and all orders for this visit:    PAC (premature atrial contraction)   Heart palpitations   Hypertension  -16.4% PAC burden on heart monitor  -Unremarkable labs, echo, stress test  -Symptoms overall improved on diltiazem; continue at current dosing  -Discussed that if the patient has worsening palpitations, to give us a call and we will place another heart monitor.        - Return in about 6 months (around 8/3/2021) for Next scheduled follow-up with an ECG.    Yousif Clifford MD, MSc, [facility]  Interventional Cardiology  [facility]

## 2021-02-15 ENCOUNTER — IMMUNIZATION (OUTPATIENT)
Dept: VACCINE CLINIC | Facility: HOSPITAL | Age: 86
End: 2021-02-15

## 2021-02-15 PROCEDURE — 91300 HC SARSCOV02 VAC 30MCG/0.3ML IM: CPT | Performed by: INTERNAL MEDICINE

## 2021-02-15 PROCEDURE — 0001A: CPT | Performed by: INTERNAL MEDICINE

## 2021-03-08 ENCOUNTER — IMMUNIZATION (OUTPATIENT)
Dept: VACCINE CLINIC | Facility: HOSPITAL | Age: 86
End: 2021-03-08

## 2021-03-08 PROCEDURE — 91300 HC SARSCOV02 VAC 30MCG/0.3ML IM: CPT | Performed by: INTERNAL MEDICINE

## 2021-03-08 PROCEDURE — 0002A: CPT | Performed by: INTERNAL MEDICINE

## 2021-03-18 NOTE — TELEPHONE ENCOUNTER
[facility] CALLED REQUESTING ITEMS FOR A PROCEDURE FOR A RIGHT TOTAL HIP. PROCEDURE ON 07/24/2017.    PLEASE FAX MOST RECENT LABS, LAST OFFICE NOTE, EKG AND URINE.      FAX: [phone] ATTN: DAYLIN   Subjective   Patient ID: Raina is a 52 year old female.    Chief Complaint   Patient presents with   • Surgical Followup     post open appendectomy       HPI  This patient returns in follow-up from her appendectomy performed little over 2 weeks ago.  She states she has no complaints now but the first week postoperatively was painful.  Since then she is increasing her activity appropriately.    Patient's medications, allergies, past medical, surgical, social and family histories were reviewed and updated as appropriate.    Objective   Physical Exam  Her wound is healing well without signs of infection.  Abdomen is otherwise benign.    Assessment   Her pathology was benign.  She is doing well postoperatively.  We discussed ongoing wound healing and expectations.  She will resume all normal activity and return here on a as needed basis.

## 2021-05-18 ENCOUNTER — OFFICE VISIT (OUTPATIENT)
Dept: FAMILY MEDICINE CLINIC | Facility: CLINIC | Age: 86
End: 2021-05-18

## 2021-05-18 VITALS
TEMPERATURE: 96.9 F | WEIGHT: 139 LBS | BODY MASS INDEX: 21.82 KG/M2 | HEIGHT: 67 IN | DIASTOLIC BLOOD PRESSURE: 80 MMHG | SYSTOLIC BLOOD PRESSURE: 124 MMHG | HEART RATE: 75 BPM | OXYGEN SATURATION: 97 %

## 2021-05-18 DIAGNOSIS — B37.89 CANDIDIASIS OF BREAST: Primary | ICD-10-CM

## 2021-05-18 DIAGNOSIS — L85.3 DRY SKIN: ICD-10-CM

## 2021-05-18 DIAGNOSIS — G62.9 PERIPHERAL POLYNEUROPATHY: ICD-10-CM

## 2021-05-18 DIAGNOSIS — I73.9 PVD (PERIPHERAL VASCULAR DISEASE) (HCC): ICD-10-CM

## 2021-05-18 PROCEDURE — 99214 OFFICE O/P EST MOD 30 MIN: CPT | Performed by: NURSE PRACTITIONER

## 2021-05-18 RX ORDER — PRENATAL VIT 91/IRON/FOLIC/DHA 28-975-200
COMBINATION PACKAGE (EA) ORAL NIGHTLY
Qty: 36 G | Refills: 1 | Status: SHIPPED | OUTPATIENT
Start: 2021-05-18 | End: 2021-12-08

## 2021-05-18 RX ORDER — METOPROLOL SUCCINATE 25 MG/1
25 TABLET, EXTENDED RELEASE ORAL DAILY
COMMUNITY
Start: 2021-03-16 | End: 2021-12-09 | Stop reason: SDUPTHER

## 2021-05-18 RX ORDER — AZELASTINE HCL 205.5 UG/1
SPRAY NASAL
Status: ON HOLD | COMMUNITY
End: 2021-11-30

## 2021-05-18 NOTE — PROGRESS NOTES
Chief Complaint   Patient presents with   • Eczema     She reports that she has been struggling with dry skin, chapped lips and bumps and rash all over her body. She reports that she has been using lotion and has not followed up with dermatology is several years.    • Peripheral Neuropathy     She is having swelling, discoloration and numbness in her lower legs into her feet. SHe reports that she will sit with her feet elevated and the swelling will alleviate.        Subjective   Lashanda Qureshi is a 85 y.o. female    History of Present Illness      She has multiple skin issues, dry skin-using OTC lotion. Helps some. Dry lips-has used Vaseline. Helps some. Has sporadic bumps and rash on body, was small red bumps, then went to like eczema, has seen derm in the past. She did go to GYN and was treated for a vaginal yeast infection. Now with fungal rash in skin folds and under right breast.   Feet still discolored, with slowed capillary refill, Patient denies claudication symptoms.     Allergies   Allergen Reactions   • Antihistamines, Loratadine-Type Other (See Comments)     Drowsiness     • Erythromycin      Past Medical History:   Diagnosis Date   • Abnormal CT scan, lung    • Mycobacterium gordonae infection       Past Surgical History:   Procedure Laterality Date   • BLADDER SURGERY     • BRONCHOSCOPY  01/27/2016   • CHOLECYSTECTOMY     • COLON SURGERY     • DILATION AND CURETTAGE, DIAGNOSTIC / THERAPEUTIC     • REFRACTIVE SURGERY  04/2019   • TOTAL HIP ARTHROPLASTY      Left hip     Social History     Socioeconomic History   • Marital status:      Spouse name: Not on file   • Number of children: Not on file   • Years of education: Not on file   • Highest education level: Not on file   Tobacco Use   • Smoking status: Former Smoker     Packs/day: 1.00     Types: Cigarettes   • Smokeless tobacco: Never Used   • Tobacco comment: quit 52 years ago    Substance and Sexual Activity   • Alcohol use: No   • Drug use:  No   • Sexual activity: Defer     Comment:         Current Outpatient Medications:   •  azelastine (ASTEPRO) 0.15 % solution nasal spray, azelastine 205.5 mcg (0.15 %) nasal spray, Disp: , Rfl:   •  budesonide-formoterol (Symbicort) 80-4.5 MCG/ACT inhaler, Inhale 2 puffs 2 (Two) Times a Day., Disp: 1 inhaler, Rfl: 12  •  Cholecalciferol (VITAMIN D3) 2000 UNITS tablet, Take 2,000 Units by mouth. Take 2 tablets daily.  , Disp: , Rfl:   •  conjugated estrogens (PREMARIN) 0.625 MG/GM vaginal cream, Insert  into the vagina As Needed., Disp: , Rfl:   •  Cyanocobalamin (VITAMIN B12 PO), Take 1 tablet by mouth Daily. Take 1 tablet daily as directed.  , Disp: , Rfl:   •  Elastic Bandages & Supports (FUTURO FIRM COMPRESSION HOSE) misc, 1 package Daily., Disp: 1 each, Rfl: 1  •  latanoprost (XALATAN) 0.005 % ophthalmic solution, Administer 1 drop to both eyes Every Night., Disp: , Rfl:   •  metoprolol succinate XL (TOPROL-XL) 25 MG 24 hr tablet, , Disp: , Rfl:   •  Multiple Vitamins-Minerals (CENTRUM SILVER PO), Take 1 tablet by mouth Daily., Disp: , Rfl:   •  Multiple Vitamins-Minerals (ICAPS AREDS 2 PO), Take 1 tablet by mouth Daily., Disp: , Rfl:   •  triamcinolone (KENALOG) 0.1 % ointment, Apply  topically to the appropriate area as directed 2 (Two) Times a Day., Disp: 15 g, Rfl: 3  •  Ventolin  (90 Base) MCG/ACT inhaler, Inhale 2 puffs Every 6 (Six) Hours As Needed for Wheezing or Shortness of Air., Disp: 18 g, Rfl: 11  •  terbinafine (lamISIL) 1 % cream, Apply  topically to the appropriate area as directed Every Night., Disp: 36 g, Rfl: 1     Review of Systems   Constitutional: Negative for chills, fatigue, fever and unexpected weight change.   Respiratory: Negative for cough, chest tightness, shortness of breath and wheezing.    Cardiovascular: Negative for chest pain, palpitations and leg swelling.   Gastrointestinal: Negative for constipation, diarrhea, nausea and vomiting.   Genitourinary: Negative for  difficulty urinating and dysuria.   Skin: Positive for color change and rash.   Neurological: Negative for dizziness, syncope, weakness and headaches.   Psychiatric/Behavioral: Negative for sleep disturbance.       Objective     Vitals:    05/18/21 0858   BP: 124/80   Pulse: 75   Temp: 96.9 °F (36.1 °C)   SpO2: 97%         05/18/21  0858   Weight: 63 kg (139 lb)     Body mass index is 21.77 kg/m².  Results for orders placed or performed in visit on 12/15/20   Comprehensive Metabolic Panel    Specimen: Blood   Result Value Ref Range    Glucose 103 (H) 65 - 99 mg/dL    BUN 15 8 - 23 mg/dL    Creatinine 0.72 0.57 - 1.00 mg/dL    Sodium 140 136 - 145 mmol/L    Potassium 4.3 3.5 - 5.2 mmol/L    Chloride 100 98 - 107 mmol/L    CO2 27.8 22.0 - 29.0 mmol/L    Calcium 9.4 8.6 - 10.5 mg/dL    Total Protein 7.5 6.0 - 8.5 g/dL    Albumin 4.40 3.50 - 5.20 g/dL    ALT (SGPT) 12 1 - 33 U/L    AST (SGOT) 24 1 - 32 U/L    Alkaline Phosphatase 64 39 - 117 U/L    Total Bilirubin 0.4 0.0 - 1.2 mg/dL    eGFR Non African Amer 77 >60 mL/min/1.73    Globulin 3.1 gm/dL    A/G Ratio 1.4 g/dL    BUN/Creatinine Ratio 20.8 7.0 - 25.0    Anion Gap 12.2 5.0 - 15.0 mmol/L   Lipid Panel    Specimen: Blood   Result Value Ref Range    Total Cholesterol 180 0 - 200 mg/dL    Triglycerides 70 0 - 150 mg/dL    HDL Cholesterol 126 (H) 40 - 60 mg/dL    LDL Cholesterol  41 0 - 100 mg/dL    VLDL Cholesterol 13 5 - 40 mg/dL    LDL/HDL Ratio 0.32    Vitamin D 25 Hydroxy    Specimen: Blood   Result Value Ref Range    25 Hydroxy, Vitamin D 29.6 (L) 30.0 - 100.0 ng/ml       Physical Exam  Vitals reviewed.   Constitutional:       Appearance: She is well-developed.   HENT:      Head: Normocephalic and atraumatic.   Cardiovascular:      Rate and Rhythm: Normal rate and regular rhythm.      Heart sounds: Normal heart sounds.   Pulmonary:      Effort: Pulmonary effort is normal.      Breath sounds: Normal breath sounds.   Genitourinary:     Comments: deferred  Skin:      General: Skin is warm and dry.      Coloration: Skin is mottled (toes, improved with touching toes, slow cap refill).      Findings: Erythema (face) present.   Neurological:      Mental Status: She is alert and oriented to person, place, and time.   Psychiatric:         Behavior: Behavior normal.         Assessment/Plan   Problems Addressed this Visit        Cardiac and Vasculature    PVD (peripheral vascular disease) (CMS/[facility])       Neuro    Peripheral neuropathy    Relevant Orders    Vitamin B12      Other Visit Diagnoses     Candidiasis of breast    -  Primary    Relevant Medications    terbinafine (lamISIL) 1 % cream    Other Relevant Orders    Ambulatory Referral to Dermatology    Dry skin        Relevant Orders    Ambulatory Referral to Dermatology      Diagnoses       Codes Comments    Candidiasis of breast    -  Primary ICD-10-CM: B37.89  ICD-9-CM: 112.89     Dry skin     ICD-10-CM: L85.3  ICD-9-CM: 701.1     Peripheral polyneuropathy     ICD-10-CM: G62.9  ICD-9-CM: 356.9     PVD (peripheral vascular disease) (CMS/[facility])     ICD-10-CM: I73.9  ICD-9-CM: 443.9       For peripheral vascular disease I recommend she continue doing her current activity.  Right now this is not restricting her activities of daily living order to watch it for now.    For neuropathy she does take vitamins but we will check a B12 level.  I will make further recommendations pending the outcome of these tests.    For candidiasis and other dry skin issues as well as the reddish lesion she has, I am referring her to dermatology.    Time spent on visit, including counseling, education, reviewing the chart, and any recent test results, was 35       Minutes    Return visit in 1-2 mo    Counseling provided on neuropathy.    Leandro Johnson, APRN   5/18/2021   09:34 EDT     Please note that portions of this document were completed with a voice recognition program.

## 2021-05-18 NOTE — PATIENT INSTRUCTIONS
Peripheral Vascular Disease    Peripheral vascular disease (PVD) is a disease of the blood vessels. PVD may also be called peripheral artery disease (PAD) or poor circulation. In most cases, PVD narrows the blood vessels that carry blood from the heart to the rest of the body. This can reduce the supply of blood to the arms, legs, and internal organs, such as the stomach or kidneys. However, PVD most often affects a person's lower legs and feet. Without treatment, PVD tends to get worse.  PVD can lead to acute limb ischemia. This happens when an arm or leg cannot get enough blood all of a sudden. This is a medical emergency.  What are the causes?  Each type of PVD has different causes. The most common cause is a buildup of a fatty substance (plaque) inside your arteries. Small amounts of plaque can break off from the walls of the arteries. The plaque may get stuck in a smaller artery. This blocks blood flow and can cause acute limb ischemia.  Other common causes of PVD include:  · Blood clots that form inside the blood vessels.  · Injuries to blood vessels.  · Diseases that cause irritation and swelling (inflammation) of blood vessels.  · Diseases that cause blood vessel to tighten (spasms).  What increases the risk?  The following factors may make you more likely to develop this condition:  · A family history of PVD.  · Common medical conditions, including:  ? High cholesterol.  ? Diabetes.  ? High blood pressure (hypertension).  ? Heart disease.  ? Past problems with blood clots.  ? Past injury, such as burns or a broken bone.  · Other medical conditions, such as:  ? Buerger's disease. This is caused by swollen or irritated blood vessels in your hands and feet.  ? Some forms of arthritis.  ? Birth defects that affect the arteries in your legs.  ? Kidney disease.  · Using tobacco or nicotine products.  · Not getting enough exercise.  · Being very overweight (obese).  · Being 50 years old or older.  What are the  signs or symptoms?  This condition may cause different symptoms. Your symptoms depend on what part of your body is not getting enough blood. Some common signs and symptoms include:  · Cramps in your lower legs.  · Pain and weakness in your legs when you are active that goes away when you rest.  · Leg pain when at rest.  · Leg numbness, tingling, or weakness.  · Coldness in a leg or foot, especially when compared with the other leg or foot.  · Skin or hair changes. These can include:  ? Hair loss.  ? Shiny skin.  ? Pale or bluish skin.  ? Thick toenails.  · Being unable to get or keep an erection.  · Tiredness (fatigue).  People with PVD are more likely to get open wounds and sores on their toes, feet, or legs. The wounds or sores may take longer than normal to heal.  How is this treated?  Treatment for PVD depends on what caused it, how bad your symptoms are, and your age. Underlying causes need to be treated and controlled. These include long-term (chronic) conditions, such as diabetes, high cholesterol, and high blood pressure. Treatment may include:  · Lifestyle changes, such as:  ? Quitting tobacco use.  ? Getting regular exercise.  ? Having a diet low in fat and cholesterol.  ? Not drinking alcohol.  ? Taking medicines, such as:  § Blood thinners to prevent blood clots.  § Medicines to improve blood flow.  § Medicines to improve your blood cholesterol levels.  · Procedures, such as:  ? Angioplasty. This uses a filled balloon to open a blocked artery and improve blood flow.  ? Stent implant. This inserts a small mesh tube to keep a blocked artery open.  ? Peripheral bypass surgery. This reroutes blood flow around a blocked artery.  ? Surgery to take dead tissue off an infected wound on the affected limb.  ? Amputation. This is taking away the affected limb through surgery. It may be needed if acute limb ischemia occurs and other treatments have not helped.  Follow these instructions at home:  Medicines  · Take  "over-the-counter and prescription medicines only as told by your doctor.  · If you are taking blood thinners:  ? Talk with your doctor before you take any medicines that have aspirin, or NSAIDs, such as ibuprofen, in them.  ? Take your medicine exactly as told by your doctor. Take it at the same time each day.  ? Avoid activities that could hurt or bruise you. Follow instructions about how to prevent falls.  ? Wear a bracelet that says you are taking blood thinners. Or, carry a card that lists what medicines you take.  Lifestyle         · Do not use any products that contain nicotine or tobacco, such as cigarettes, e-cigarettes, and chewing tobacco. If you need help quitting, ask your doctor.  · Talk with your doctor about keeping a healthy weight. If needed, ask about losing weight.  · Eat a diet that is low in fat and cholesterol. If you need help, talk with your doctor.  · Do not drink alcohol.  · Get regular exercise. Ask your doctor about how to stay active.  General instructions  · Take good care of your feet. To do this:  ? Wear shoes that fit well and feel good.  ? Check your feet often for any cuts or sores.  · Keep all follow-up visits as told by your doctor. This is important.  Where to find more information  · Society for Vascular Surgery: vascular.org  · American Heart Association: heart.org  · National Heart, Lung, and Blood [place]: nhlbi.nih.gov  Contact a doctor if:  · You have cramps in your legs when you walk.  · You have leg pain when you rest.  · Your leg or foot feels cold.  · Your skin changes.  · You cannot get or keep an erection.  · You have cuts or sores on your feet that do not heal.  Get help right away if:  · You have sudden changes in the color and feeling of your arms or legs, such as:  ? Your arm or leg turns cold, numb, and blue.  ? Your arm or leg become red, warm, swollen, painful, or numb.  · You have any signs of a stroke. \"BE FAST\" is an easy way to remember the main warning " signs:  ? B - Balance. Signs are dizziness, sudden trouble walking, or loss of balance.  ? E - Eyes. Signs are trouble seeing or a change in how you see.  ? F - Face. Signs are sudden weakness or loss of feeling of the face, or the face or eyelid drooping on one side.  ? A - Arms. Signs are weakness or loss of feeling in an arm. This happens suddenly and usually on one side of the body.  ? S - Speech. Signs are sudden trouble speaking, slurred speech, or trouble understanding what people say.  ? T - Time. Time to call emergency services. Write down what time symptoms started.  · You have other signs of a stroke, such as:  ? A sudden, very bad headache with no known cause.  ? Feeling like you may vomit (nausea).  ? Vomiting.  ? A seizure.  · You have chest pain or trouble breathing.  These symptoms may be an emergency. Do not wait to see if the symptoms will go away. Get medical help right away. Call your local emergency services (911 in the [place] Do not drive yourself to the hospital.  Summary  · Peripheral vascular disease (PVD) is a disease of the blood vessels.  · In most cases, PVD narrows the blood vessels that carry blood from your heart to the rest of your body.  · PVD may cause different symptoms. Your symptoms depend on what part of your body is not getting enough blood.  · Treatment depends on what caused it, how bad your symptoms are, and your age.  This information is not intended to replace advice given to you by your health care provider. Make sure you discuss any questions you have with your health care provider.  Document Revised: 09/29/2020 Document Reviewed: 09/29/2020  Elsevier Patient Education © 2021 "[employer]  Peripheral Neuropathy  Peripheral neuropathy is a type of nerve damage. It affects nerves that carry signals between the spinal cord and the arms, legs, and the rest of the body (peripheral nerves). It does not affect nerves in the spinal cord or brain. In peripheral neuropathy, one  nerve or a group of nerves may be damaged. Peripheral neuropathy is a broad category that includes many specific nerve disorders, like diabetic neuropathy, hereditary neuropathy, and carpal tunnel syndrome.  What are the causes?  This condition may be caused by:  · Diabetes. This is the most common cause of peripheral neuropathy.  · Nerve injury.  · Pressure or stress on a nerve that lasts a long time.  · Lack (deficiency) of B vitamins. This can result from alcoholism, poor diet, or a restricted diet.  · Infections.  · Autoimmune diseases, such as rheumatoid arthritis and systemic lupus erythematosus.  · Nerve diseases that are passed from parent to child (inherited).  · Some medicines, such as cancer medicines (chemotherapy).  · Poisonous (toxic) substances, such as lead and mercury.  · Too little blood flowing to the legs.  · Kidney disease.  · Thyroid disease.  In some cases, the cause of this condition is not known.  What are the signs or symptoms?  Symptoms of this condition depend on which of your nerves is damaged. Common symptoms include:  · Loss of feeling (numbness) in the feet, hands, or both.  · Tingling in the feet, hands, or both.  · Burning pain.  · Very sensitive skin.  · Weakness.  · Not being able to move a part of the body (paralysis).  · Muscle twitching.  · Clumsiness or poor coordination.  · Loss of balance.  · Not being able to control your bladder.  · Feeling dizzy.  · Sexual problems.  How is this diagnosed?  Diagnosing and finding the cause of peripheral neuropathy can be difficult. Your health care provider will take your medical history and do a physical exam. A neurological exam will also be done. This involves checking things that are affected by your brain, spinal cord, and nerves (nervous system). For example, your health care provider will check your reflexes, how you move, and what you can feel.  You may have other tests, such as:  · Blood tests.  · Electromyogram (EMG) and nerve  conduction tests. These tests check nerve function and how well the nerves are controlling the muscles.  · Imaging tests, such as CT scans or MRI to rule out other causes of your symptoms.  · Removing a small piece of nerve to be examined in a lab (nerve biopsy). This is rare.  · Removing and examining a small amount of the fluid that surrounds the brain and spinal cord (lumbar puncture). This is rare.  How is this treated?  Treatment for this condition may involve:  · Treating the underlying cause of the neuropathy, such as diabetes, kidney disease, or vitamin deficiencies.  · Stopping medicines that can cause neuropathy, such as chemotherapy.  · Medicine to relieve pain. Medicines may include:  ? Prescription or over-the-counter pain medicine.  ? Antiseizure medicine.  ? Antidepressants.  ? Pain-relieving patches that are applied to painful areas of skin.  · Surgery to relieve pressure on a nerve or to destroy a nerve that is causing pain.  · Physical therapy to help improve movement and balance.  · Devices to help you move around (assistive devices).  Follow these instructions at home:  Medicines  · Take over-the-counter and prescription medicines only as told by your health care provider. Do not take any other medicines without first asking your health care provider.  · Do not drive or use heavy machinery while taking prescription pain medicine.  Lifestyle    · Do not use any products that contain nicotine or tobacco, such as cigarettes and e-cigarettes. Smoking keeps blood from reaching damaged nerves. If you need help quitting, ask your health care provider.  · Avoid or limit alcohol. Too much alcohol can cause a vitamin B deficiency, and vitamin B is needed for healthy nerves.  · Eat a healthy diet. This includes:  ? Eating foods that are high in fiber, such as fresh fruits and vegetables, whole grains, and beans.  ? Limiting foods that are high in fat and processed sugars, such as fried or sweet  foods.  General instructions    · If you have diabetes, work closely with your health care provider to keep your blood sugar under control.  · If you have numbness in your feet:  ? Check every day for signs of injury or infection. Watch for redness, warmth, and swelling.  ? Wear padded socks and comfortable shoes. These help protect your feet.  · Develop a good support system. Living with peripheral neuropathy can be stressful. Consider talking with a mental health specialist or joining a support group.  · Use assistive devices and attend physical therapy as told by your health care provider. This may include using a walker or a cane.  · Keep all follow-up visits as told by your health care provider. This is important.  Contact a health care provider if:  · You have new signs or symptoms of peripheral neuropathy.  · You are struggling emotionally from dealing with peripheral neuropathy.  · Your pain is not well-controlled.  Get help right away if:  · You have an injury or infection that is not healing normally.  · You develop new weakness in an arm or leg.  · You fall frequently.  Summary  · Peripheral neuropathy is when the nerves in the arms, or legs are damaged, resulting in numbness, weakness, or pain.  · There are many causes of peripheral neuropathy, including diabetes, pinched nerves, vitamin deficiencies, autoimmune disease, and hereditary conditions.  · Diagnosing and finding the cause of peripheral neuropathy can be difficult. Your health care provider will take your medical history, do a physical exam, and do tests, including blood tests and nerve function tests.  · Treatment involves treating the underlying cause of the neuropathy and taking medicines to help control pain. Physical therapy and assistive devices may also help.  This information is not intended to replace advice given to you by your health care provider. Make sure you discuss any questions you have with your health care provider.  Document  Revised: 11/30/2018 Document Reviewed: 02/26/2018  Elsevier Patient Education © 2021 [employer] Inc.

## 2021-06-21 NOTE — PLAN OF CARE
"Problem: Patient Care Overview  Goal: Plan of Care Review  Outcome: Ongoing (interventions implemented as appropriate)   08/02/18 0323   Coping/Psychosocial   Plan of Care Reviewed With patient   Plan of Care Review   Progress improving   OTHER   Outcome Summary VSS. Pt states \"soft\" but not loose stools. C/o pain; medicated with PRNs. Slept well. No other complaints.        Problem: Skin and Soft Tissue Infection (Adult)  Goal: Signs and Symptoms of Listed Potential Problems Will be Absent, Minimized or Managed (Skin and Soft Tissue Infection)  Outcome: Ongoing (interventions implemented as appropriate)      Problem: Pain, Acute (Adult)  Goal: Identify Related Risk Factors and Signs and Symptoms  Outcome: Ongoing (interventions implemented as appropriate)    Goal: Acceptable Pain Control/Comfort Level  Outcome: Ongoing (interventions implemented as appropriate)        " Detail Level: Simple Initiate Treatment: CeraVe foaming face wash and moisturizer daily

## 2021-07-16 ENCOUNTER — OFFICE VISIT (OUTPATIENT)
Dept: FAMILY MEDICINE CLINIC | Facility: CLINIC | Age: 86
End: 2021-07-16

## 2021-07-16 VITALS
WEIGHT: 139.2 LBS | HEART RATE: 63 BPM | SYSTOLIC BLOOD PRESSURE: 134 MMHG | HEIGHT: 67 IN | DIASTOLIC BLOOD PRESSURE: 84 MMHG | BODY MASS INDEX: 21.85 KG/M2 | OXYGEN SATURATION: 96 %

## 2021-07-16 DIAGNOSIS — K43.9 HERNIA OF ABDOMINAL WALL: ICD-10-CM

## 2021-07-16 DIAGNOSIS — I87.2 VENOUS STASIS DERMATITIS OF BOTH LOWER EXTREMITIES: ICD-10-CM

## 2021-07-16 DIAGNOSIS — I10 ESSENTIAL HYPERTENSION: Primary | ICD-10-CM

## 2021-07-16 PROCEDURE — 99214 OFFICE O/P EST MOD 30 MIN: CPT | Performed by: NURSE PRACTITIONER

## 2021-07-16 RX ORDER — KETOCONAZOLE 20 MG/G
1 CREAM TOPICAL DAILY
COMMUNITY
Start: 2021-05-23 | End: 2022-12-13 | Stop reason: HOSPADM

## 2021-07-16 RX ORDER — HYDROCHLOROTHIAZIDE 12.5 MG/1
12.5 TABLET ORAL DAILY
Qty: 30 TABLET | Refills: 5 | Status: SHIPPED | OUTPATIENT
Start: 2021-07-16 | End: 2021-12-08 | Stop reason: SDUPTHER

## 2021-07-16 NOTE — PATIENT INSTRUCTIONS
Managing Your Hypertension  Hypertension, also called high blood pressure, is when the force of the blood pressing against the walls of the arteries is too strong. Arteries are blood vessels that carry blood from your heart throughout your body. Hypertension forces the heart to work harder to pump blood and may cause the arteries to become narrow or stiff.  Understanding blood pressure readings  Your personal target blood pressure may vary depending on your medical conditions, your age, and other factors. A blood pressure reading includes a higher number over a lower number. Ideally, your blood pressure should be below 120/80. You should know that:  · The first, or top, number is called the systolic pressure. It is a measure of the pressure in your arteries as your heart beats.  · The second, or bottom number, is called the diastolic pressure. It is a measure of the pressure in your arteries as the heart relaxes.  Blood pressure is classified into four stages. Based on your blood pressure reading, your health care provider may use the following stages to determine what type of treatment you need, if any. Systolic pressure and diastolic pressure are measured in a unit called mmHg.  Normal  · Systolic pressure: below 120.  · Diastolic pressure: below 80.  Elevated  · Systolic pressure: 120-129.  · Diastolic pressure: below 80.  Hypertension stage 1  · Systolic pressure: 130-139.  · Diastolic pressure: 80-89.  Hypertension stage 2  · Systolic pressure: 140 or above.  · Diastolic pressure: 90 or above.  How can this condition affect me?  Managing your hypertension is an important responsibility. Over time, hypertension can damage the arteries and decrease blood flow to important parts of the body, including the brain, heart, and kidneys. Having untreated or uncontrolled hypertension can lead to:  · A heart attack.  · A stroke.  · A weakened blood vessel (aneurysm).  · Heart failure.  · Kidney damage.  · Eye  damage.  · Metabolic syndrome.  · Memory and concentration problems.  · Vascular dementia.  What actions can I take to manage this condition?  Hypertension can be managed by making lifestyle changes and possibly by taking medicines. Your health care provider will help you make a plan to bring your blood pressure within a normal range.  Nutrition    · Eat a diet that is high in fiber and potassium, and low in salt (sodium), added sugar, and fat. An example eating plan is called the Dietary Approaches to Stop Hypertension (DASH) diet. To eat this way:  ? Eat plenty of fresh fruits and vegetables. Try to fill one-half of your plate at each meal with fruits and vegetables.  ? Eat whole grains, such as whole-wheat pasta, brown rice, or whole-grain bread. Fill about one-fourth of your plate with whole grains.  ? Eat low-fat dairy products.  ? Avoid fatty cuts of meat, processed or cured meats, and poultry with skin. Fill about one-fourth of your plate with lean proteins such as fish, chicken without skin, beans, eggs, and tofu.  ? Avoid pre-made and processed foods. These tend to be higher in sodium, added sugar, and fat.  · Reduce your daily sodium intake. Most people with hypertension should eat less than 1,500 mg of sodium a day.  Lifestyle    · Work with your health care provider to maintain a healthy body weight or to lose weight. Ask what an ideal weight is for you.  · Get at least 30 minutes of exercise that causes your heart to beat faster (aerobic exercise) most days of the week. Activities may include walking, swimming, or biking.  · Include exercise to strengthen your muscles (resistance exercise), such as weight lifting, as part of your weekly exercise routine. Try to do these types of exercises for 30 minutes at least 3 days a week.  · Do not use any products that contain nicotine or tobacco, such as cigarettes, e-cigarettes, and chewing tobacco. If you need help quitting, ask your health care  provider.  · Control any long-term (chronic) conditions you have, such as high cholesterol or diabetes.  · Identify your sources of stress and find ways to manage stress. This may include meditation, deep breathing, or making time for fun activities.  Alcohol use  · Do not drink alcohol if:  ? Your health care provider tells you not to drink.  ? You are pregnant, may be pregnant, or are planning to become pregnant.  · If you drink alcohol:  ? Limit how much you use to:  § 0-1 drink a day for women.  § 0-2 drinks a day for men.  ? Be aware of how much alcohol is in your drink. In the [place] one drink equals one 12 oz bottle of beer (355 mL), one 5 oz glass of wine (148 mL), or one 1½ oz glass of hard liquor (44 mL).  Medicines  Your health care provider may prescribe medicine if lifestyle changes are not enough to get your blood pressure under control and if:  · Your systolic blood pressure is 130 or higher.  · Your diastolic blood pressure is 80 or higher.  Take medicines only as told by your health care provider. Follow the directions carefully. Blood pressure medicines must be taken as told by your health care provider. The medicine does not work as well when you skip doses. Skipping doses also puts you at risk for problems.  Monitoring  Before you monitor your blood pressure:  · Do not smoke, drink caffeinated beverages, or exercise within 30 minutes before taking a measurement.  · Use the bathroom and empty your bladder (urinate).  · Sit quietly for at least 5 minutes before taking measurements.  Monitor your blood pressure at home as told by your health care provider. To do this:  · Sit with your back straight and supported.  · Place your feet flat on the floor. Do not cross your legs.  · Support your arm on a flat surface, such as a table. Make sure your upper arm is at heart level.  · Each time you measure, take two or three readings one minute apart and record the results.  You may also need to have your  blood pressure checked regularly by your health care provider.  General information  · Talk with your health care provider about your diet, exercise habits, and other lifestyle factors that may be contributing to hypertension.  · Review all the medicines you take with your health care provider because there may be side effects or interactions.  · Keep all visits as told by your health care provider. Your health care provider can help you create and adjust your plan for managing your high blood pressure.  Where to find more information  · National Heart, Lung, and Blood [place]: www.nhlbi.nih.gov  · American Heart Association: www.heart.org  Contact a health care provider if:  · You think you are having a reaction to medicines you have taken.  · You have repeated (recurrent) headaches.  · You feel dizzy.  · You have swelling in your ankles.  · You have trouble with your vision.  Get help right away if:  · You develop a severe headache or confusion.  · You have unusual weakness or numbness, or you feel faint.  · You have severe pain in your chest or abdomen.  · You vomit repeatedly.  · You have trouble breathing.  These symptoms may represent a serious problem that is an emergency. Do not wait to see if the symptoms will go away. Get medical help right away. Call your local emergency services (911 in the [place] Do not drive yourself to the hospital.  Summary  · Hypertension is when the force of blood pumping through your arteries is too strong. If this condition is not controlled, it may put you at risk for serious complications.  · Your personal target blood pressure may vary depending on your medical conditions, your age, and other factors. For most people, a normal blood pressure is less than 120/80.  · Hypertension is managed by lifestyle changes, medicines, or both.  · Lifestyle changes to help manage hypertension include losing weight, eating a healthy, low-sodium diet, exercising more, stopping smoking, and  "limiting alcohol.  This information is not intended to replace advice given to you by your health care provider. Make sure you discuss any questions you have with your health care provider.  Document Revised: 01/22/2021 Document Reviewed: 11/17/2020  Elsevier Patient Education © 2021 [employer]      https://www.nhlbi.nih.gov/files/docs/public/heart/dash_brief.pdf\">   DASH Eating Plan  DASH stands for Dietary Approaches to Stop Hypertension. The DASH eating plan is a healthy eating plan that has been shown to:  · Reduce high blood pressure (hypertension).  · Reduce your risk for type 2 diabetes, heart disease, and stroke.  · Help with weight loss.  What are tips for following this plan?  Reading food labels  · Check food labels for the amount of salt (sodium) per serving. Choose foods with less than 5 percent of the Daily Value of sodium. Generally, foods with less than 300 milligrams (mg) of sodium per serving fit into this eating plan.  · To find whole grains, look for the word \"whole\" as the first word in the ingredient list.  Shopping  · Buy products labeled as \"low-sodium\" or \"no salt added.\"  · Buy fresh foods. Avoid canned foods and pre-made or frozen meals.  Cooking  · Avoid adding salt when cooking. Use salt-free seasonings or herbs instead of table salt or sea salt. Check with your health care provider or pharmacist before using salt substitutes.  · Do not branham foods. Cook foods using healthy methods such as baking, boiling, grilling, roasting, and broiling instead.  · Cook with heart-healthy oils, such as olive, canola, avocado, soybean, or sunflower oil.  Meal planning    · Eat a balanced diet that includes:  ? 4 or more servings of fruits and 4 or more servings of vegetables each day. Try to fill one-half of your plate with fruits and vegetables.  ? 6-8 servings of whole grains each day.  ? Less than 6 oz (170 g) of lean meat, poultry, or fish each day. A 3-oz (85-g) serving of meat is about the same " size as a deck of cards. One egg equals 1 oz (28 g).  ? 2-3 servings of low-fat dairy each day. One serving is 1 cup (237 mL).  ? 1 serving of nuts, seeds, or beans 5 times each week.  ? 2-3 servings of heart-healthy fats. Healthy fats called omega-3 fatty acids are found in foods such as walnuts, flaxseeds, fortified milks, and eggs. These fats are also found in cold-water fish, such as sardines, salmon, and mackerel.  · Limit how much you eat of:  ? Canned or prepackaged foods.  ? Food that is high in trans fat, such as some fried foods.  ? Food that is high in saturated fat, such as fatty meat.  ? Desserts and other sweets, sugary drinks, and other foods with added sugar.  ? Full-fat dairy products.  · Do not salt foods before eating.  · Do not eat more than 4 egg yolks a week.  · Try to eat at least 2 vegetarian meals a week.  · Eat more home-cooked food and less restaurant, buffet, and fast food.  Lifestyle  · When eating at a restaurant, ask that your food be prepared with less salt or no salt, if possible.  · If you drink alcohol:  ? Limit how much you use to:  § 0-1 drink a day for women who are not pregnant.  § 0-2 drinks a day for men.  ? Be aware of how much alcohol is in your drink. In the [place] one drink equals one 12 oz bottle of beer (355 mL), one 5 oz glass of wine (148 mL), or one 1½ oz glass of hard liquor (44 mL).  General information  · Avoid eating more than 2,300 mg of salt a day. If you have hypertension, you may need to reduce your sodium intake to 1,500 mg a day.  · Work with your health care provider to maintain a healthy body weight or to lose weight. Ask what an ideal weight is for you.  · Get at least 30 minutes of exercise that causes your heart to beat faster (aerobic exercise) most days of the week. Activities may include walking, swimming, or biking.  · Work with your health care provider or dietitian to adjust your eating plan to your individual calorie needs.  What foods should I  eat?  Fruits  All fresh, dried, or frozen fruit. Canned fruit in natural juice (without added sugar).  Vegetables  Fresh or frozen vegetables (raw, steamed, roasted, or grilled). Low-sodium or reduced-sodium tomato and vegetable juice. Low-sodium or reduced-sodium tomato sauce and tomato paste. Low-sodium or reduced-sodium canned vegetables.  Grains  Whole-grain or whole-wheat bread. Whole-grain or whole-wheat pasta. Brown rice. Oatmeal. Quinoa. Bulgur. Whole-grain and low-sodium cereals. Meg bread. Low-fat, low-sodium crackers. Whole-wheat flour tortillas.  Meats and other proteins  Skinless chicken or turkey. Ground chicken or turkey. Pork with fat trimmed off. Fish and seafood. Egg whites. Dried beans, peas, or lentils. Unsalted nuts, nut butters, and seeds. Unsalted canned beans. Lean cuts of beef with fat trimmed off. Low-sodium, lean precooked or cured meat, such as sausages or meat loaves.  Dairy  Low-fat (1%) or fat-free (skim) milk. Reduced-fat, low-fat, or fat-free cheeses. Nonfat, low-sodium ricotta or cottage cheese. Low-fat or nonfat yogurt. Low-fat, low-sodium cheese.  Fats and oils  Soft margarine without trans fats. Vegetable oil. Reduced-fat, low-fat, or light mayonnaise and salad dressings (reduced-sodium). Canola, safflower, olive, avocado, soybean, and sunflower oils. Avocado.  Seasonings and condiments  Herbs. Spices. Seasoning mixes without salt.  Other foods  Unsalted popcorn and pretzels. Fat-free sweets.  The items listed above may not be a complete list of foods and beverages you can eat. Contact a dietitian for more information.  What foods should I avoid?  Fruits  Canned fruit in a light or heavy syrup. Fried fruit. Fruit in cream or butter sauce.  Vegetables  Creamed or fried vegetables. Vegetables in a cheese sauce. Regular canned vegetables (not low-sodium or reduced-sodium). Regular canned tomato sauce and paste (not low-sodium or reduced-sodium). Regular tomato and vegetable juice  (not low-sodium or reduced-sodium). Pickles. Olives.  Grains  Baked goods made with fat, such as croissants, muffins, or some breads. Dry pasta or rice meal packs.  Meats and other proteins  Fatty cuts of meat. Ribs. Fried meat. Ramsay. Bologna, salami, and other precooked or cured meats, such as sausages or meat loaves. Fat from the back of a pig (fatback). Bratwurst. Salted nuts and seeds. Canned beans with added salt. Canned or smoked fish. Whole eggs or egg yolks. Chicken or turkey with skin.  Dairy  Whole or 2% milk, cream, and half-and-half. Whole or full-fat cream cheese. Whole-fat or sweetened yogurt. Full-fat cheese. Nondairy creamers. Whipped toppings. Processed cheese and cheese spreads.  Fats and oils  Butter. Stick margarine. Lard. Shortening. Ghee. Ramsay fat. Tropical oils, such as coconut, palm kernel, or palm oil.  Seasonings and condiments  Onion salt, garlic salt, seasoned salt, table salt, and sea salt. Worcestershire sauce. Tartar sauce. Barbecue sauce. Teriyaki sauce. Soy sauce, including reduced-sodium. Steak sauce. Canned and packaged gravies. Fish sauce. Oyster sauce. Cocktail sauce. Store-bought horseradish. Ketchup. Mustard. Meat flavorings and tenderizers. Bouillon cubes. Hot sauces. Pre-made or packaged marinades. Pre-made or packaged taco seasonings. Relishes. Regular salad dressings.  Other foods  Salted popcorn and pretzels.  The items listed above may not be a complete list of foods and beverages you should avoid. Contact a dietitian for more information.  Where to find more information  · National Heart, Lung, and Blood [place]: www.nhlbi.nih.gov  · American Heart Association: www.heart.org  · Academy of Nutrition and Dietetics: www.eatright.org  · National Kidney Foundation: www.kidney.org  Summary  · The DASH eating plan is a healthy eating plan that has been shown to reduce high blood pressure (hypertension). It may also reduce your risk for type 2 diabetes, heart disease, and  stroke.  · When on the DASH eating plan, aim to eat more fresh fruits and vegetables, whole grains, lean proteins, low-fat dairy, and heart-healthy fats.  · With the DASH eating plan, you should limit salt (sodium) intake to 2,300 mg a day. If you have hypertension, you may need to reduce your sodium intake to 1,500 mg a day.  · Work with your health care provider or dietitian to adjust your eating plan to your individual calorie needs.  This information is not intended to replace advice given to you by your health care provider. Make sure you discuss any questions you have with your health care provider.  Document Revised: 11/20/2020 Document Reviewed: 11/20/2020  Elsevier Patient Education © 2021 [employer]

## 2021-07-16 NOTE — PROGRESS NOTES
Chief Complaint   Patient presents with   • Cellulitis     Left foot. Pt states started hurting her about a week ago and just wanted to make sure that everything was okay.    • Mass     Lower abdomin. Pt states has noticed it for a while but didnt think anything of it until it started getting bigger. Pt states can place her hand on it and can feel it. Pt states is tender to touch   • Hypertension     Pt states bp has been high for the last 5-6 days. Pt states took BP several times on the 12th. 140/77 @ 11:30am, 136/79 @ 12:30pm, 146/86 @ 1:00pm. Alsdo took this morning and was 135/83       Subjective   Lashanda Qureshi is a 85 y.o. female    History of Present Illness  Patient today with complaints of left foot pain.  She does have a history of cellulitis in that foot and would like to have it rechecked.  Patient in with left lower ABD slight tenderness and firm, has been growing in size, not really bothersome.   She also does have a history of high blood pressure reports she is taken her blood pressure numerous times over the last few days and its been 144/77, 136/79, 146/86, and this morning was 135/83.  She is currently taking metoprolol XL 25 mg daily.  She does try not to go over 1200 mg of sodium a day.      Allergies   Allergen Reactions   • Antihistamines, Loratadine-Type Other (See Comments)     Drowsiness     • Erythromycin      Past Medical History:   Diagnosis Date   • Abnormal CT scan, lung    • Mycobacterium gordonae infection       Past Surgical History:   Procedure Laterality Date   • BLADDER SURGERY     • BRONCHOSCOPY  01/27/2016   • CHOLECYSTECTOMY     • COLON SURGERY     • DILATION AND CURETTAGE, DIAGNOSTIC / THERAPEUTIC     • REFRACTIVE SURGERY  04/2019   • TOTAL HIP ARTHROPLASTY      Left hip     Social History     Socioeconomic History   • Marital status:      Spouse name: Not on file   • Number of children: Not on file   • Years of education: Not on file   • Highest education level: Not on  file   Tobacco Use   • Smoking status: Former Smoker     Packs/day: 1.00     Types: Cigarettes   • Smokeless tobacco: Never Used   • Tobacco comment: quit 52 years ago    Substance and Sexual Activity   • Alcohol use: No   • Drug use: No   • Sexual activity: Defer     Comment:         Current Outpatient Medications:   •  azelastine (ASTEPRO) 0.15 % solution nasal spray, azelastine 205.5 mcg (0.15 %) nasal spray, Disp: , Rfl:   •  budesonide-formoterol (Symbicort) 80-4.5 MCG/ACT inhaler, Inhale 2 puffs 2 (Two) Times a Day., Disp: 1 inhaler, Rfl: 12  •  Cholecalciferol (VITAMIN D3) 2000 UNITS tablet, Take 2,000 Units by mouth. Take 2 tablets daily.  , Disp: , Rfl:   •  conjugated estrogens (PREMARIN) 0.625 MG/GM vaginal cream, Insert  into the vagina As Needed., Disp: , Rfl:   •  Cyanocobalamin (VITAMIN B12 PO), Take 1 tablet by mouth Daily. Take 1 tablet daily as directed.  , Disp: , Rfl:   •  Elastic Bandages & Supports (FUTURO FIRM COMPRESSION HOSE) misc, 1 package Daily., Disp: 1 each, Rfl: 1  •  ketoconazole (NIZORAL) 2 % cream, , Disp: , Rfl:   •  latanoprost (XALATAN) 0.005 % ophthalmic solution, Administer 1 drop to both eyes Every Night., Disp: , Rfl:   •  metoprolol succinate XL (TOPROL-XL) 25 MG 24 hr tablet, , Disp: , Rfl:   •  Multiple Vitamins-Minerals (CENTRUM SILVER PO), Take 1 tablet by mouth Daily., Disp: , Rfl:   •  Multiple Vitamins-Minerals (ICAPS AREDS 2 PO), Take 1 tablet by mouth Daily., Disp: , Rfl:   •  terbinafine (lamISIL) 1 % cream, Apply  topically to the appropriate area as directed Every Night., Disp: 36 g, Rfl: 1  •  triamcinolone (KENALOG) 0.1 % ointment, Apply  topically to the appropriate area as directed 2 (Two) Times a Day., Disp: 15 g, Rfl: 3  •  Ventolin  (90 Base) MCG/ACT inhaler, Inhale 2 puffs Every 6 (Six) Hours As Needed for Wheezing or Shortness of Air., Disp: 18 g, Rfl: 11  •  hydroCHLOROthiazide (HYDRODIURIL) 12.5 MG tablet, Take 1 tablet by mouth Daily.,  Disp: 30 tablet, Rfl: 5     Review of Systems   Constitutional: Negative for chills, fatigue, fever and unexpected weight change.   Respiratory: Negative for cough, chest tightness, shortness of breath and wheezing.    Cardiovascular: Positive for leg swelling. Negative for chest pain and palpitations.   Gastrointestinal: Positive for abdominal pain (slight). Negative for constipation, diarrhea, nausea and vomiting.   Genitourinary: Negative for difficulty urinating and dysuria.   Skin: Negative for color change and rash.   Neurological: Negative for dizziness, syncope, weakness and headaches.   Psychiatric/Behavioral: Negative for sleep disturbance.       Objective     Vitals:    07/16/21 1414   BP: 134/84   Pulse: 63   SpO2: 96%         07/16/21  1414   Weight: 63.1 kg (139 lb 3.2 oz)     Body mass index is 21.8 kg/m².  Results for orders placed or performed in visit on 12/15/20   Comprehensive Metabolic Panel    Specimen: Blood   Result Value Ref Range    Glucose 103 (H) 65 - 99 mg/dL    BUN 15 8 - 23 mg/dL    Creatinine 0.72 0.57 - 1.00 mg/dL    Sodium 140 136 - 145 mmol/L    Potassium 4.3 3.5 - 5.2 mmol/L    Chloride 100 98 - 107 mmol/L    CO2 27.8 22.0 - 29.0 mmol/L    Calcium 9.4 8.6 - 10.5 mg/dL    Total Protein 7.5 6.0 - 8.5 g/dL    Albumin 4.40 3.50 - 5.20 g/dL    ALT (SGPT) 12 1 - 33 U/L    AST (SGOT) 24 1 - 32 U/L    Alkaline Phosphatase 64 39 - 117 U/L    Total Bilirubin 0.4 0.0 - 1.2 mg/dL    eGFR Non African Amer 77 >60 mL/min/1.73    Globulin 3.1 gm/dL    A/G Ratio 1.4 g/dL    BUN/Creatinine Ratio 20.8 7.0 - 25.0    Anion Gap 12.2 5.0 - 15.0 mmol/L   Lipid Panel    Specimen: Blood   Result Value Ref Range    Total Cholesterol 180 0 - 200 mg/dL    Triglycerides 70 0 - 150 mg/dL    HDL Cholesterol 126 (H) 40 - 60 mg/dL    LDL Cholesterol  41 0 - 100 mg/dL    VLDL Cholesterol 13 5 - 40 mg/dL    LDL/HDL Ratio 0.32    Vitamin D 25 Hydroxy    Specimen: Blood   Result Value Ref Range    25 Hydroxy, Vitamin  D 29.6 (L) 30.0 - 100.0 ng/ml       Physical Exam  Vitals reviewed.   Constitutional:       Appearance: She is well-developed.   HENT:      Head: Normocephalic and atraumatic.   Cardiovascular:      Rate and Rhythm: Normal rate and regular rhythm.      Heart sounds: Normal heart sounds.   Pulmonary:      Effort: Pulmonary effort is normal.      Breath sounds: Normal breath sounds.   Abdominal:      General: Abdomen is flat. Bowel sounds are normal.      Palpations: Abdomen is soft.      Tenderness: There is abdominal tenderness (left lower).      Hernia: A hernia (left lower) is present.   Genitourinary:     Comments: deferred  Skin:     General: Skin is warm and dry.   Neurological:      Mental Status: She is alert and oriented to person, place, and time.   Psychiatric:         Behavior: Behavior normal.         Assessment/Plan   Problems Addressed this Visit        Cardiac and Vasculature    Hypertension - Primary    Relevant Medications    hydroCHLOROthiazide (HYDRODIURIL) 12.5 MG tablet    Other Relevant Orders    Basic Metabolic Panel    Venous stasis dermatitis of both lower extremities    Relevant Medications    ketoconazole (NIZORAL) 2 % cream      Other Visit Diagnoses     Hernia of abdominal wall          Diagnoses       Codes Comments    Essential hypertension    -  Primary ICD-10-CM: I10  ICD-9-CM: 401.9     Hernia of abdominal wall     ICD-10-CM: K43.9  ICD-9-CM: 553.20     Venous stasis dermatitis of both lower extremities     ICD-10-CM: I87.2  ICD-9-CM: 454.1       For blood pressure and swelling we will going to start her on hydrochlorothiazide 12.5 mg daily and recheck a BMP on Monday.    For her venous stasis I recommend no change at this time I do recommend she continue to keep her feet elevated.    For the hernia of her left lower abdominal wall she reports is not causing her significant issues at this time and she would like to watch it.    Time spent on visit, including counseling, education,  reviewing the chart, and any recent test results, was    33    Minutes    Return visit in 1 month    Counseling provided on hypertension.    Leandro Johnson, APRN   7/16/2021   15:12 EDT     Please note that portions of this document were completed with a voice recognition program.

## 2021-07-19 ENCOUNTER — LAB (OUTPATIENT)
Dept: LAB | Facility: HOSPITAL | Age: 86
End: 2021-07-19

## 2021-07-19 DIAGNOSIS — I10 ESSENTIAL HYPERTENSION: ICD-10-CM

## 2021-07-19 DIAGNOSIS — G62.9 PERIPHERAL POLYNEUROPATHY: ICD-10-CM

## 2021-07-19 LAB
ANION GAP SERPL CALCULATED.3IONS-SCNC: 7.4 MMOL/L (ref 5–15)
BUN SERPL-MCNC: 9 MG/DL (ref 8–23)
BUN/CREAT SERPL: 13.6 (ref 7–25)
CALCIUM SPEC-SCNC: 9 MG/DL (ref 8.6–10.5)
CHLORIDE SERPL-SCNC: 105 MMOL/L (ref 98–107)
CO2 SERPL-SCNC: 29.6 MMOL/L (ref 22–29)
CREAT SERPL-MCNC: 0.66 MG/DL (ref 0.57–1)
GFR SERPL CREATININE-BSD FRML MDRD: 85 ML/MIN/1.73
GLUCOSE SERPL-MCNC: 96 MG/DL (ref 65–99)
POTASSIUM SERPL-SCNC: 3.3 MMOL/L (ref 3.5–5.2)
SODIUM SERPL-SCNC: 142 MMOL/L (ref 136–145)

## 2021-07-19 PROCEDURE — 80048 BASIC METABOLIC PNL TOTAL CA: CPT

## 2021-07-19 PROCEDURE — 82607 VITAMIN B-12: CPT

## 2021-07-20 DIAGNOSIS — E87.6 HYPOKALEMIA: Primary | ICD-10-CM

## 2021-07-20 LAB — VIT B12 BLD-MCNC: 416 PG/ML (ref 211–946)

## 2021-07-20 RX ORDER — POTASSIUM CHLORIDE 20 MEQ/1
20 TABLET, EXTENDED RELEASE ORAL DAILY
Qty: 30 TABLET | Refills: 2 | Status: SHIPPED | OUTPATIENT
Start: 2021-07-20 | End: 2021-12-08 | Stop reason: SDUPTHER

## 2021-07-20 NOTE — PROGRESS NOTES
Her potassium is low at 3.3.  The hydrochlorothiazide can take the potassium out of her body.  I am going to give her potassium supplementation.  We will then recheck the level in about 5 days.  I will order all this in the computer.

## 2021-08-03 ENCOUNTER — LAB (OUTPATIENT)
Dept: LAB | Facility: HOSPITAL | Age: 86
End: 2021-08-03

## 2021-08-03 ENCOUNTER — OFFICE VISIT (OUTPATIENT)
Dept: FAMILY MEDICINE CLINIC | Facility: CLINIC | Age: 86
End: 2021-08-03

## 2021-08-03 VITALS
OXYGEN SATURATION: 98 % | DIASTOLIC BLOOD PRESSURE: 84 MMHG | TEMPERATURE: 96.9 F | WEIGHT: 138 LBS | HEIGHT: 67 IN | BODY MASS INDEX: 21.66 KG/M2 | HEART RATE: 78 BPM | SYSTOLIC BLOOD PRESSURE: 150 MMHG

## 2021-08-03 DIAGNOSIS — K43.9 HERNIA OF ABDOMINAL WALL: ICD-10-CM

## 2021-08-03 DIAGNOSIS — E87.6 HYPOKALEMIA: ICD-10-CM

## 2021-08-03 DIAGNOSIS — F32.9 REACTIVE DEPRESSION: Primary | ICD-10-CM

## 2021-08-03 LAB
ANION GAP SERPL CALCULATED.3IONS-SCNC: 8.7 MMOL/L (ref 5–15)
BUN SERPL-MCNC: 18 MG/DL (ref 8–23)
BUN/CREAT SERPL: 24 (ref 7–25)
CALCIUM SPEC-SCNC: 9.2 MG/DL (ref 8.6–10.5)
CHLORIDE SERPL-SCNC: 101 MMOL/L (ref 98–107)
CO2 SERPL-SCNC: 26.3 MMOL/L (ref 22–29)
CREAT SERPL-MCNC: 0.75 MG/DL (ref 0.57–1)
GFR SERPL CREATININE-BSD FRML MDRD: 73 ML/MIN/1.73
GLUCOSE SERPL-MCNC: 103 MG/DL (ref 65–99)
POTASSIUM SERPL-SCNC: 4.1 MMOL/L (ref 3.5–5.2)
SODIUM SERPL-SCNC: 136 MMOL/L (ref 136–145)

## 2021-08-03 PROCEDURE — 80048 BASIC METABOLIC PNL TOTAL CA: CPT

## 2021-08-03 PROCEDURE — 99214 OFFICE O/P EST MOD 30 MIN: CPT | Performed by: NURSE PRACTITIONER

## 2021-08-03 NOTE — PATIENT INSTRUCTIONS
Major Depressive Disorder, Adult  Major depressive disorder is a mental health condition. This disorder affects feelings. It can also affect the body. Symptoms of this condition last most of the day, almost every day, for 2 weeks. This disorder can affect:  · Relationships.  · Daily activities, such as work and school.  · Activities that you normally like to do.  What are the causes?  The cause of this condition is not known. The disorder is likely caused by a mix of things, including:  · Your personality, such as being a shy person.  · Your behavior, or how you act toward others.  · Your thoughts and feelings.  · Too much alcohol or drugs.  · How you react to stress.  · Health and mental problems that you have had for a long time.  · Things that hurt you in the past (trauma).  · Big changes in your life, such as divorce.  What increases the risk?  The following factors may make you more likely to develop this condition:  · Having family members with depression.  · Being a woman.  · Problems in the family.  · Low levels of some brain chemicals.  · Things that caused you pain as a child, especially if you lost a parent or were abused.  · A lot of stress in your life, such as from:  ? Living without basic needs of life, such as food and shelter.  ? Being treated poorly because of race, sex, or Jew (discrimination).  · Health and mental problems that you have had for a long time.  What are the signs or symptoms?  The main symptoms of this condition are:  · Being sad all the time.  · Being grouchy all the time.  · Loss of interest in things and activities.  Other symptoms include:  · Sleeping too much or too little.  · Eating too much or too little.  · Gaining or losing weight, without knowing why.  · Feeling tired or having low energy.  · Being restless and weak.  · Feeling hopeless, worthless, or guilty.  · Trouble thinking clearly or making decisions.  · Thoughts of hurting yourself or others, or thoughts of  ending your life.  · Spending a lot of time alone.  · Inability to complete common tasks of daily life.  If you have very bad MDD, you may:  · Believe things that are not true.  · Hear, see, taste, or feel things that are not there.  · Have mild depression that lasts for at least 2 years.  · Feel very sad and hopeless.  · Have trouble speaking or moving.  How is this treated?  This condition may be treated with:  · Talk therapy. This teaches you to know bad thoughts, feelings, and actions and how to change them.  ? This can also help you to communicate with others.  ? This can be done with members of your family.  · Medicines. These can be used to treat worry (anxiety), depression, or low levels of chemicals in the brain.  · Lifestyle changes. You may need to:  ? Limit alcohol use.  ? Limit drug use.  ? Get regular exercise.  ? Get plenty of sleep.  ? Make healthy eating choices.  ? Spend more time outdoors.  · Brain stimulation. This treatment excites the brain. This is done when symptoms are very bad or have not gotten better with other treatments.  Follow these instructions at home:  Activity  · Get regular exercise as told.  · Spend time outdoors as told.  · Make time to do the things you enjoy.  · Find ways to deal with stress. Try to:  ? Meditate.  ? Do deep breathing.  ? Spend time in nature.  ? Keep a journal.  · Return to your normal activities as told by your doctor. Ask your doctor what activities are safe for you.  Alcohol and drug use  · If you drink alcohol:  ? Limit how much you use to:  § 0-1 drink a day for women.  § 0-2 drinks a day for men.  ? Be aware of how much alcohol is in your drink. In the [place] one drink equals one 12 oz bottle of beer (355 mL), one 5 oz glass of wine (148 mL), or one 1½ oz glass of hard liquor (44 mL).  · Talk to your doctor about:  ? Alcohol use. Alcohol can affect some medicines.  ? Any drug use.  General instructions    · Take over-the-counter and prescription  medicines and herbal preparations only as told by your doctor.  · Eat a healthy diet.  · Get a lot of sleep.  · Think about joining a support group. Your doctor may be able to suggest one.  · Keep all follow-up visits as told by your doctor. This is important.  Where to find more information:  · National [place] on Mental Illness: www.carmen.org  · [place] of Mental Health: www.nimh.nih.gov  · American Psychiatric Association: www.psychiatry.org/patients-families/  Contact a doctor if:  · Your symptoms get worse.  · You get new symptoms.  Get help right away if:  · You hurt yourself.  · You have serious thoughts about hurting yourself or others.  · You see, hear, taste, smell, or feel things that are not there.  If you ever feel like you may hurt yourself or others, or have thoughts about taking your own life, get help right away. Go to your nearest emergency department or:  · Call your local emergency services (911 in the [place]  · Call a suicide crisis helpline, such as the National Suicide Prevention Lifeline at 1-772.930.7058. This is open 24 hours a day in the [place]  · Text the Crisis Text Line at 901056 (in the [place]  Summary  · Major depressive disorder is a mental health condition. This disorder affects feelings. Symptoms of this condition last most of the day, almost every day, for 2 weeks.  · The symptoms of this disorder can cause problems with relationships and with daily activities.  · There are treatments and support for people who get this disorder. You may need more than one type of treatment.  · Get help right away if you have serious thoughts about hurting yourself or others.  This information is not intended to replace advice given to you by your health care provider. Make sure you discuss any questions you have with your health care provider.  Document Revised: 11/28/2020 Document Reviewed: 11/28/2020  Elsevier Patient Education © 2021 [employer]  Hernia, Adult         A hernia  is the bulging of an organ or tissue through a weak spot in the muscles of the abdomen (abdominal wall). Hernias develop most often near the belly button (navel) or the area where the leg meets the lower abdomen (groin).  Common types of hernias include:  · Incisional hernia. This type bulges through a scar from an abdominal surgery.  · Umbilical hernia. This type develops near the navel.  · Inguinal hernia. This type develops in the groin or scrotum.  · Femoral hernia. This type develops under the groin, in the upper thigh area.  · Hiatal hernia. This type occurs when part of the stomach slides above the muscle that separates the abdomen from the chest (diaphragm).  What are the causes?  This condition may be caused by:  · Heavy lifting.  · Coughing over a long period of time.  · Straining to have a bowel movement. Constipation can lead to straining.  · An incision made during an abdominal surgery.  · A physical problem that is present at birth (congenital defect).  · Being overweight or obese.  · Smoking.  · Excess fluid in the abdomen.  · Undescended testicles in males.  What are the signs or symptoms?  The main symptom is a skin-colored, rounded bulge in the area of the hernia. However, a bulge may not always be present. It may grow bigger or be more visible when you cough or strain (such as when lifting something heavy).  A hernia that can be pushed back into the area (is reducible) rarely causes pain. A hernia that cannot be pushed back into the area (is incarcerated) may lose its blood supply (become strangulated). A hernia that is incarcerated may cause:  · Pain.  · Fever.  · Nausea and vomiting.  · Swelling.  · Constipation.  How is this diagnosed?  A hernia may be diagnosed based on:  · Your symptoms and medical history.  · A physical exam. Your health care provider may ask you to cough or move in certain ways to see if the hernia becomes visible.  · Imaging tests, such as:  ? X-rays.  ? Ultrasound.  ? CT  scan.  How is this treated?  A hernia that is small and painless may not need to be treated. A hernia that is large or painful may be treated with surgery. Inguinal hernias may be treated with surgery to prevent incarceration or strangulation. Strangulated hernias are always treated with surgery because a lack of blood supply to the trapped organ or tissue can cause it to die.  Surgery to treat a hernia involves pushing the bulge back into place and repairing the weak area of the muscle or abdominal wall.  Follow these instructions at home:  Activity  · Avoid straining.  · Do not lift anything that is heavier than 10 lb (4.5 kg), or the limit that you are told, until your health care provider says that it is safe.  · When lifting heavy objects, lift with your leg muscles, not your back muscles.  Preventing constipation  · Take actions to prevent constipation. Constipation leads to straining with bowel movements, which can make a hernia worse or cause a hernia repair to break down. Your health care provider may recommend that you:  ? Drink enough fluid to keep your urine pale yellow.  ? Eat foods that are high in fiber, such as fresh fruits and vegetables, whole grains, and beans.  ? Limit foods that are high in fat and processed sugars, such as fried or sweet foods.  ? Take an over-the-counter or prescription medicine for constipation.  General instructions  · When coughing, try to cough gently.  · You may try to push the hernia back in place by very gently pressing on it while lying down. Do not try to force the bulge back in if it will not push in easily.  · If you are overweight, work with your health care provider to lose weight safely.  · Do not use any products that contain nicotine or tobacco, such as cigarettes and e-cigarettes. If you need help quitting, ask your health care provider.  · If you are scheduled for hernia repair, watch your hernia for any changes in shape, size, or color. Tell your health care  provider about any changes or new symptoms.  · Take over-the-counter and prescription medicines only as told by your health care provider.  · Keep all follow-up visits as told by your health care provider. This is important.  Contact a health care provider if:  · You develop new pain, swelling, or redness around your hernia.  · You have signs of constipation, such as:  ? Fewer bowel movements in a week than normal.  ? Difficulty having a bowel movement.  ? Stools that are dry, hard, or larger than normal.  Get help right away if:  · You have a fever.  · You have abdomen pain that gets worse.  · You feel nauseous or you vomit.  · You cannot push the hernia back in place by very gently pressing on it while lying down. Do not try to force the bulge back in if it will not push in easily.  · The hernia:  ? Changes in shape, size, or color.  ? Feels hard or tender.  These symptoms may represent a serious problem that is an emergency. Do not wait to see if the symptoms will go away. Get medical help right away. Call your local emergency services (911 in the [place]  Summary  · A hernia is the bulging of an organ or tissue through a weak spot in the muscles of the abdomen (abdominal wall).  · The main symptom is a skin-colored, rounded lump (bulge) in the hernia area. However, a bulge may not always be present. It may grow bigger or more visible when you cough or strain (such as when having a bowel movement).  · A hernia that is small and painless may not need to be treated. A hernia that is large or painful may be treated with surgery.  · Surgery to treat a hernia involves pushing the bulge back into place and repairing the weak part of the abdomen.  This information is not intended to replace advice given to you by your health care provider. Make sure you discuss any questions you have with your health care provider.  Document Revised: 04/09/2020 Document Reviewed: 09/19/2018  Elsevier Patient Education © 2021 [employer]

## 2021-08-03 NOTE — PROGRESS NOTES
Chief Complaint   Patient presents with   • Hernia     She has been following up with OBGYN who completed more imaging of the hernia and she has been advised to have surgery. She would like to discuss further with PCP. She denies any pain but has lots of tenderness around her abdomen       Subjective   Lashanda Qureshi is a 86 y.o. female    History of Present Illness  Patient in for issues with depression since Covid. Does not even want to get up and out.   Has a hernia, had an US and shows is partially reducible. Interested in surgery repair. Now is tender to touch.     Allergies   Allergen Reactions   • Antihistamines, Loratadine-Type Other (See Comments)     Drowsiness     • Erythromycin      Past Medical History:   Diagnosis Date   • Abnormal CT scan, lung    • Mycobacterium gordonae infection       Past Surgical History:   Procedure Laterality Date   • BLADDER SURGERY     • BRONCHOSCOPY  2016   • CHOLECYSTECTOMY     • COLON SURGERY     • DILATION AND CURETTAGE, DIAGNOSTIC / THERAPEUTIC     • REFRACTIVE SURGERY  2019   • TOTAL HIP ARTHROPLASTY      Left hip     Social History     Socioeconomic History   • Marital status:      Spouse name: Not on file   • Number of children: Not on file   • Years of education: Not on file   • Highest education level: Not on file   Tobacco Use   • Smoking status: Former Smoker     Packs/day: 1.00     Types: Cigarettes     Quit date: 8/3/1969     Years since quittin.0   • Smokeless tobacco: Never Used   • Tobacco comment: quit 52 years ago    Substance and Sexual Activity   • Alcohol use: No   • Drug use: No   • Sexual activity: Defer     Comment:         Current Outpatient Medications:   •  azelastine (ASTEPRO) 0.15 % solution nasal spray, azelastine 205.5 mcg (0.15 %) nasal spray, Disp: , Rfl:   •  budesonide-formoterol (Symbicort) 80-4.5 MCG/ACT inhaler, Inhale 2 puffs 2 (Two) Times a Day., Disp: 1 inhaler, Rfl: 12  •  Cholecalciferol (VITAMIN D3)   UNITS tablet, Take 2,000 Units by mouth. Take 2 tablets daily.  , Disp: , Rfl:   •  conjugated estrogens (PREMARIN) 0.625 MG/GM vaginal cream, Insert  into the vagina As Needed., Disp: , Rfl:   •  Cyanocobalamin (VITAMIN B12 PO), Take 1 tablet by mouth Daily. Take 1 tablet daily as directed.  , Disp: , Rfl:   •  Elastic Bandages & Supports (FUTURO FIRM COMPRESSION HOSE) misc, 1 package Daily., Disp: 1 each, Rfl: 1  •  hydroCHLOROthiazide (HYDRODIURIL) 12.5 MG tablet, Take 1 tablet by mouth Daily., Disp: 30 tablet, Rfl: 5  •  ketoconazole (NIZORAL) 2 % cream, , Disp: , Rfl:   •  latanoprost (XALATAN) 0.005 % ophthalmic solution, Administer 1 drop to both eyes Every Night., Disp: , Rfl:   •  metoprolol succinate XL (TOPROL-XL) 25 MG 24 hr tablet, , Disp: , Rfl:   •  Multiple Vitamins-Minerals (CENTRUM SILVER PO), Take 1 tablet by mouth Daily., Disp: , Rfl:   •  Multiple Vitamins-Minerals (ICAPS AREDS 2 PO), Take 1 tablet by mouth Daily., Disp: , Rfl:   •  potassium chloride (K-DUR,KLOR-CON) 20 MEQ CR tablet, Take 1 tablet by mouth Daily., Disp: 30 tablet, Rfl: 2  •  terbinafine (lamISIL) 1 % cream, Apply  topically to the appropriate area as directed Every Night., Disp: 36 g, Rfl: 1  •  triamcinolone (KENALOG) 0.1 % ointment, Apply  topically to the appropriate area as directed 2 (Two) Times a Day., Disp: 15 g, Rfl: 3  •  Ventolin  (90 Base) MCG/ACT inhaler, Inhale 2 puffs Every 6 (Six) Hours As Needed for Wheezing or Shortness of Air., Disp: 18 g, Rfl: 11     Review of Systems   Constitutional: Negative for chills, fatigue, fever and unexpected weight change.   Respiratory: Negative for cough, chest tightness, shortness of breath and wheezing.    Cardiovascular: Negative for chest pain, palpitations and leg swelling.   Gastrointestinal: Positive for abdominal pain (hernia, tender). Negative for constipation, diarrhea, nausea and vomiting.   Genitourinary: Negative for difficulty urinating and dysuria.   Skin:  Negative for color change and rash.   Neurological: Negative for dizziness, syncope, weakness and headaches.   Psychiatric/Behavioral: Negative for sleep disturbance.       Objective     Vitals:    08/03/21 0956   BP: 150/84   Pulse: 78   Temp: 96.9 °F (36.1 °C)   SpO2: 98%         08/03/21  0956   Weight: 62.6 kg (138 lb)     Body mass index is 21.61 kg/m².  Results for orders placed or performed in visit on 07/19/21   Vitamin B12    Specimen: Blood   Result Value Ref Range    Vitamin B-12 416 211 - 946 pg/mL   Basic Metabolic Panel    Specimen: Blood   Result Value Ref Range    Glucose 96 65 - 99 mg/dL    BUN 9 8 - 23 mg/dL    Creatinine 0.66 0.57 - 1.00 mg/dL    Sodium 142 136 - 145 mmol/L    Potassium 3.3 (L) 3.5 - 5.2 mmol/L    Chloride 105 98 - 107 mmol/L    CO2 29.6 (H) 22.0 - 29.0 mmol/L    Calcium 9.0 8.6 - 10.5 mg/dL    eGFR Non African Amer 85 >60 mL/min/1.73    BUN/Creatinine Ratio 13.6 7.0 - 25.0    Anion Gap 7.4 5.0 - 15.0 mmol/L       Physical Exam  Vitals reviewed.   Constitutional:       Appearance: She is well-developed.   HENT:      Head: Normocephalic and atraumatic.   Cardiovascular:      Rate and Rhythm: Normal rate and regular rhythm.      Heart sounds: Normal heart sounds.   Pulmonary:      Effort: Pulmonary effort is normal.      Breath sounds: Normal breath sounds.   Abdominal:      Tenderness: There is abdominal tenderness.      Hernia: A hernia is present.   Genitourinary:     Comments: deferred  Skin:     General: Skin is warm and dry.   Neurological:      Mental Status: She is alert and oriented to person, place, and time.   Psychiatric:         Behavior: Behavior normal.         Assessment/Plan   Problems Addressed this Visit     None      Visit Diagnoses     Reactive depression    -  Primary    Relevant Orders    Ambulatory Referral to Psychology    Hernia of abdominal wall        Relevant Orders    Ambulatory Referral to General Surgery (Completed)      Diagnoses       Codes Comments     Reactive depression    -  Primary ICD-10-CM: F32.9  ICD-9-CM: 300.4     Hernia of abdominal wall     ICD-10-CM: K43.9  ICD-9-CM: 553.20       For her reactive depression we will can refer her to counseling at this time. Patient was encouraged to keep me informed of any acute changes, lack of improvement, or any new concerning symptoms.  If patient becomes concerned, or has any worsening symptoms, they are to report either here, urgent treatment, or emergency room. Plan of care discussed with pt. They verbalized understanding and agreement.     For hernia that is now not totally reducible and tender to touch we can refer her to surgery.     Were going to to recheck her potassium level today also.    Time spent on visit, including counseling, education, reviewing the chart, and any recent test results, was    15    Minutes    Return visit in  1 month    Counseling provided on Depression and hernia. .    Leandro Johnson, APRN   8/3/2021   10:29 EDT     Please note that portions of this document were completed with a voice recognition program.

## 2021-09-03 ENCOUNTER — OFFICE VISIT (OUTPATIENT)
Dept: FAMILY MEDICINE CLINIC | Facility: CLINIC | Age: 86
End: 2021-09-03

## 2021-09-03 VITALS
HEIGHT: 67 IN | BODY MASS INDEX: 21.82 KG/M2 | TEMPERATURE: 97.1 F | OXYGEN SATURATION: 97 % | SYSTOLIC BLOOD PRESSURE: 122 MMHG | WEIGHT: 139 LBS | DIASTOLIC BLOOD PRESSURE: 82 MMHG | HEART RATE: 84 BPM

## 2021-09-03 DIAGNOSIS — I10 ESSENTIAL HYPERTENSION: Primary | ICD-10-CM

## 2021-09-03 DIAGNOSIS — K43.9 HERNIA OF ABDOMINAL WALL: ICD-10-CM

## 2021-09-03 DIAGNOSIS — F32.9 REACTIVE DEPRESSION: ICD-10-CM

## 2021-09-03 PROCEDURE — 99214 OFFICE O/P EST MOD 30 MIN: CPT | Performed by: NURSE PRACTITIONER

## 2021-09-03 NOTE — PATIENT INSTRUCTIONS
Managing Your Hypertension  Hypertension, also called high blood pressure, is when the force of the blood pressing against the walls of the arteries is too strong. Arteries are blood vessels that carry blood from your heart throughout your body. Hypertension forces the heart to work harder to pump blood and may cause the arteries to become narrow or stiff.  Understanding blood pressure readings  Your personal target blood pressure may vary depending on your medical conditions, your age, and other factors. A blood pressure reading includes a higher number over a lower number. Ideally, your blood pressure should be below 120/80. You should know that:  · The first, or top, number is called the systolic pressure. It is a measure of the pressure in your arteries as your heart beats.  · The second, or bottom number, is called the diastolic pressure. It is a measure of the pressure in your arteries as the heart relaxes.  Blood pressure is classified into four stages. Based on your blood pressure reading, your health care provider may use the following stages to determine what type of treatment you need, if any. Systolic pressure and diastolic pressure are measured in a unit called mmHg.  Normal  · Systolic pressure: below 120.  · Diastolic pressure: below 80.  Elevated  · Systolic pressure: 120-129.  · Diastolic pressure: below 80.  Hypertension stage 1  · Systolic pressure: 130-139.  · Diastolic pressure: 80-89.  Hypertension stage 2  · Systolic pressure: 140 or above.  · Diastolic pressure: 90 or above.  How can this condition affect me?  Managing your hypertension is an important responsibility. Over time, hypertension can damage the arteries and decrease blood flow to important parts of the body, including the brain, heart, and kidneys. Having untreated or uncontrolled hypertension can lead to:  · A heart attack.  · A stroke.  · A weakened blood vessel (aneurysm).  · Heart failure.  · Kidney damage.  · Eye  damage.  · Metabolic syndrome.  · Memory and concentration problems.  · Vascular dementia.  What actions can I take to manage this condition?  Hypertension can be managed by making lifestyle changes and possibly by taking medicines. Your health care provider will help you make a plan to bring your blood pressure within a normal range.  Nutrition    · Eat a diet that is high in fiber and potassium, and low in salt (sodium), added sugar, and fat. An example eating plan is called the Dietary Approaches to Stop Hypertension (DASH) diet. To eat this way:  ? Eat plenty of fresh fruits and vegetables. Try to fill one-half of your plate at each meal with fruits and vegetables.  ? Eat whole grains, such as whole-wheat pasta, brown rice, or whole-grain bread. Fill about one-fourth of your plate with whole grains.  ? Eat low-fat dairy products.  ? Avoid fatty cuts of meat, processed or cured meats, and poultry with skin. Fill about one-fourth of your plate with lean proteins such as fish, chicken without skin, beans, eggs, and tofu.  ? Avoid pre-made and processed foods. These tend to be higher in sodium, added sugar, and fat.  · Reduce your daily sodium intake. Most people with hypertension should eat less than 1,500 mg of sodium a day.  Lifestyle    · Work with your health care provider to maintain a healthy body weight or to lose weight. Ask what an ideal weight is for you.  · Get at least 30 minutes of exercise that causes your heart to beat faster (aerobic exercise) most days of the week. Activities may include walking, swimming, or biking.  · Include exercise to strengthen your muscles (resistance exercise), such as weight lifting, as part of your weekly exercise routine. Try to do these types of exercises for 30 minutes at least 3 days a week.  · Do not use any products that contain nicotine or tobacco, such as cigarettes, e-cigarettes, and chewing tobacco. If you need help quitting, ask your health care  provider.  · Control any long-term (chronic) conditions you have, such as high cholesterol or diabetes.  · Identify your sources of stress and find ways to manage stress. This may include meditation, deep breathing, or making time for fun activities.  Alcohol use  · Do not drink alcohol if:  ? Your health care provider tells you not to drink.  ? You are pregnant, may be pregnant, or are planning to become pregnant.  · If you drink alcohol:  ? Limit how much you use to:  § 0-1 drink a day for women.  § 0-2 drinks a day for men.  ? Be aware of how much alcohol is in your drink. In the [place] one drink equals one 12 oz bottle of beer (355 mL), one 5 oz glass of wine (148 mL), or one 1½ oz glass of hard liquor (44 mL).  Medicines  Your health care provider may prescribe medicine if lifestyle changes are not enough to get your blood pressure under control and if:  · Your systolic blood pressure is 130 or higher.  · Your diastolic blood pressure is 80 or higher.  Take medicines only as told by your health care provider. Follow the directions carefully. Blood pressure medicines must be taken as told by your health care provider. The medicine does not work as well when you skip doses. Skipping doses also puts you at risk for problems.  Monitoring  Before you monitor your blood pressure:  · Do not smoke, drink caffeinated beverages, or exercise within 30 minutes before taking a measurement.  · Use the bathroom and empty your bladder (urinate).  · Sit quietly for at least 5 minutes before taking measurements.  Monitor your blood pressure at home as told by your health care provider. To do this:  · Sit with your back straight and supported.  · Place your feet flat on the floor. Do not cross your legs.  · Support your arm on a flat surface, such as a table. Make sure your upper arm is at heart level.  · Each time you measure, take two or three readings one minute apart and record the results.  You may also need to have your  blood pressure checked regularly by your health care provider.  General information  · Talk with your health care provider about your diet, exercise habits, and other lifestyle factors that may be contributing to hypertension.  · Review all the medicines you take with your health care provider because there may be side effects or interactions.  · Keep all visits as told by your health care provider. Your health care provider can help you create and adjust your plan for managing your high blood pressure.  Where to find more information  · National Heart, Lung, and Blood [place]: www.nhlbi.nih.gov  · American Heart Association: www.heart.org  Contact a health care provider if:  · You think you are having a reaction to medicines you have taken.  · You have repeated (recurrent) headaches.  · You feel dizzy.  · You have swelling in your ankles.  · You have trouble with your vision.  Get help right away if:  · You develop a severe headache or confusion.  · You have unusual weakness or numbness, or you feel faint.  · You have severe pain in your chest or abdomen.  · You vomit repeatedly.  · You have trouble breathing.  These symptoms may represent a serious problem that is an emergency. Do not wait to see if the symptoms will go away. Get medical help right away. Call your local emergency services (911 in the [place] Do not drive yourself to the hospital.  Summary  · Hypertension is when the force of blood pumping through your arteries is too strong. If this condition is not controlled, it may put you at risk for serious complications.  · Your personal target blood pressure may vary depending on your medical conditions, your age, and other factors. For most people, a normal blood pressure is less than 120/80.  · Hypertension is managed by lifestyle changes, medicines, or both.  · Lifestyle changes to help manage hypertension include losing weight, eating a healthy, low-sodium diet, exercising more, stopping smoking, and  "limiting alcohol.  This information is not intended to replace advice given to you by your health care provider. Make sure you discuss any questions you have with your health care provider.  Document Revised: 01/22/2021 Document Reviewed: 11/17/2020  Elsevier Patient Education © 2021 [employer]      https://www.nhlbi.nih.gov/files/docs/public/heart/dash_brief.pdf\">   DASH Eating Plan  DASH stands for Dietary Approaches to Stop Hypertension. The DASH eating plan is a healthy eating plan that has been shown to:  · Reduce high blood pressure (hypertension).  · Reduce your risk for type 2 diabetes, heart disease, and stroke.  · Help with weight loss.  What are tips for following this plan?  Reading food labels  · Check food labels for the amount of salt (sodium) per serving. Choose foods with less than 5 percent of the Daily Value of sodium. Generally, foods with less than 300 milligrams (mg) of sodium per serving fit into this eating plan.  · To find whole grains, look for the word \"whole\" as the first word in the ingredient list.  Shopping  · Buy products labeled as \"low-sodium\" or \"no salt added.\"  · Buy fresh foods. Avoid canned foods and pre-made or frozen meals.  Cooking  · Avoid adding salt when cooking. Use salt-free seasonings or herbs instead of table salt or sea salt. Check with your health care provider or pharmacist before using salt substitutes.  · Do not branham foods. Cook foods using healthy methods such as baking, boiling, grilling, roasting, and broiling instead.  · Cook with heart-healthy oils, such as olive, canola, avocado, soybean, or sunflower oil.  Meal planning    · Eat a balanced diet that includes:  ? 4 or more servings of fruits and 4 or more servings of vegetables each day. Try to fill one-half of your plate with fruits and vegetables.  ? 6-8 servings of whole grains each day.  ? Less than 6 oz (170 g) of lean meat, poultry, or fish each day. A 3-oz (85-g) serving of meat is about the same " size as a deck of cards. One egg equals 1 oz (28 g).  ? 2-3 servings of low-fat dairy each day. One serving is 1 cup (237 mL).  ? 1 serving of nuts, seeds, or beans 5 times each week.  ? 2-3 servings of heart-healthy fats. Healthy fats called omega-3 fatty acids are found in foods such as walnuts, flaxseeds, fortified milks, and eggs. These fats are also found in cold-water fish, such as sardines, salmon, and mackerel.  · Limit how much you eat of:  ? Canned or prepackaged foods.  ? Food that is high in trans fat, such as some fried foods.  ? Food that is high in saturated fat, such as fatty meat.  ? Desserts and other sweets, sugary drinks, and other foods with added sugar.  ? Full-fat dairy products.  · Do not salt foods before eating.  · Do not eat more than 4 egg yolks a week.  · Try to eat at least 2 vegetarian meals a week.  · Eat more home-cooked food and less restaurant, buffet, and fast food.  Lifestyle  · When eating at a restaurant, ask that your food be prepared with less salt or no salt, if possible.  · If you drink alcohol:  ? Limit how much you use to:  § 0-1 drink a day for women who are not pregnant.  § 0-2 drinks a day for men.  ? Be aware of how much alcohol is in your drink. In the [place] one drink equals one 12 oz bottle of beer (355 mL), one 5 oz glass of wine (148 mL), or one 1½ oz glass of hard liquor (44 mL).  General information  · Avoid eating more than 2,300 mg of salt a day. If you have hypertension, you may need to reduce your sodium intake to 1,500 mg a day.  · Work with your health care provider to maintain a healthy body weight or to lose weight. Ask what an ideal weight is for you.  · Get at least 30 minutes of exercise that causes your heart to beat faster (aerobic exercise) most days of the week. Activities may include walking, swimming, or biking.  · Work with your health care provider or dietitian to adjust your eating plan to your individual calorie needs.  What foods should I  eat?  Fruits  All fresh, dried, or frozen fruit. Canned fruit in natural juice (without added sugar).  Vegetables  Fresh or frozen vegetables (raw, steamed, roasted, or grilled). Low-sodium or reduced-sodium tomato and vegetable juice. Low-sodium or reduced-sodium tomato sauce and tomato paste. Low-sodium or reduced-sodium canned vegetables.  Grains  Whole-grain or whole-wheat bread. Whole-grain or whole-wheat pasta. Brown rice. Oatmeal. Quinoa. Bulgur. Whole-grain and low-sodium cereals. Meg bread. Low-fat, low-sodium crackers. Whole-wheat flour tortillas.  Meats and other proteins  Skinless chicken or turkey. Ground chicken or turkey. Pork with fat trimmed off. Fish and seafood. Egg whites. Dried beans, peas, or lentils. Unsalted nuts, nut butters, and seeds. Unsalted canned beans. Lean cuts of beef with fat trimmed off. Low-sodium, lean precooked or cured meat, such as sausages or meat loaves.  Dairy  Low-fat (1%) or fat-free (skim) milk. Reduced-fat, low-fat, or fat-free cheeses. Nonfat, low-sodium ricotta or cottage cheese. Low-fat or nonfat yogurt. Low-fat, low-sodium cheese.  Fats and oils  Soft margarine without trans fats. Vegetable oil. Reduced-fat, low-fat, or light mayonnaise and salad dressings (reduced-sodium). Canola, safflower, olive, avocado, soybean, and sunflower oils. Avocado.  Seasonings and condiments  Herbs. Spices. Seasoning mixes without salt.  Other foods  Unsalted popcorn and pretzels. Fat-free sweets.  The items listed above may not be a complete list of foods and beverages you can eat. Contact a dietitian for more information.  What foods should I avoid?  Fruits  Canned fruit in a light or heavy syrup. Fried fruit. Fruit in cream or butter sauce.  Vegetables  Creamed or fried vegetables. Vegetables in a cheese sauce. Regular canned vegetables (not low-sodium or reduced-sodium). Regular canned tomato sauce and paste (not low-sodium or reduced-sodium). Regular tomato and vegetable juice  (not low-sodium or reduced-sodium). Pickles. Olives.  Grains  Baked goods made with fat, such as croissants, muffins, or some breads. Dry pasta or rice meal packs.  Meats and other proteins  Fatty cuts of meat. Ribs. Fried meat. Ramsay. Bologna, salami, and other precooked or cured meats, such as sausages or meat loaves. Fat from the back of a pig (fatback). Bratwurst. Salted nuts and seeds. Canned beans with added salt. Canned or smoked fish. Whole eggs or egg yolks. Chicken or turkey with skin.  Dairy  Whole or 2% milk, cream, and half-and-half. Whole or full-fat cream cheese. Whole-fat or sweetened yogurt. Full-fat cheese. Nondairy creamers. Whipped toppings. Processed cheese and cheese spreads.  Fats and oils  Butter. Stick margarine. Lard. Shortening. Ghee. Ramsay fat. Tropical oils, such as coconut, palm kernel, or palm oil.  Seasonings and condiments  Onion salt, garlic salt, seasoned salt, table salt, and sea salt. Worcestershire sauce. Tartar sauce. Barbecue sauce. Teriyaki sauce. Soy sauce, including reduced-sodium. Steak sauce. Canned and packaged gravies. Fish sauce. Oyster sauce. Cocktail sauce. Store-bought horseradish. Ketchup. Mustard. Meat flavorings and tenderizers. Bouillon cubes. Hot sauces. Pre-made or packaged marinades. Pre-made or packaged taco seasonings. Relishes. Regular salad dressings.  Other foods  Salted popcorn and pretzels.  The items listed above may not be a complete list of foods and beverages you should avoid. Contact a dietitian for more information.  Where to find more information  · National Heart, Lung, and Blood [place]: www.nhlbi.nih.gov  · American Heart Association: www.heart.org  · Academy of Nutrition and Dietetics: www.eatright.org  · National Kidney Foundation: www.kidney.org  Summary  · The DASH eating plan is a healthy eating plan that has been shown to reduce high blood pressure (hypertension). It may also reduce your risk for type 2 diabetes, heart disease, and  stroke.  · When on the DASH eating plan, aim to eat more fresh fruits and vegetables, whole grains, lean proteins, low-fat dairy, and heart-healthy fats.  · With the DASH eating plan, you should limit salt (sodium) intake to 2,300 mg a day. If you have hypertension, you may need to reduce your sodium intake to 1,500 mg a day.  · Work with your health care provider or dietitian to adjust your eating plan to your individual calorie needs.  This information is not intended to replace advice given to you by your health care provider. Make sure you discuss any questions you have with your health care provider.  Document Revised: 11/20/2020 Document Reviewed: 11/20/2020  Elsevier Patient Education © 2021 [employer]

## 2021-09-03 NOTE — PROGRESS NOTES
Chief Complaint   Patient presents with   • Depression     She has upcoming psychiatry appointment end of the month, She reports that she has been feeling a little better emotionally.    • Hernia     She is still having some mild discomfort around her abdominal area. She states it is not painful yet but is aware it is there.       Subjective   Lashanda Qureshi is a 86 y.o. female    History of Present Illness  Patient today to follow-up on depression, hernia and high blood pressure.  For high blood pressure she has been taking hydrochlorothiazide 12.5 mg daily and metoprolol XL 25 mg daily.  Her blood pressures at home have been anywhere from 100/63 up to 135/66.  Most of the time though its been around 100-110/60-70.  She has been having some dizzy spells when her blood pressure is down like 108/72.  Her heart rate has been in the 60s to 70s most of the time.  She reports on depression that she has been feeling a little bit better and has an upcoming psychiatric appointment at the end of the month.  For hernia she does also have an appointment to see surgery for her hernia.    Allergies   Allergen Reactions   • Antihistamines, Loratadine-Type Other (See Comments)     Drowsiness     • Erythromycin      Past Medical History:   Diagnosis Date   • Abnormal CT scan, lung    • Mycobacterium gordonae infection       Past Surgical History:   Procedure Laterality Date   • BLADDER SURGERY     • BRONCHOSCOPY  01/27/2016   • CHOLECYSTECTOMY     • COLON SURGERY     • DILATION AND CURETTAGE, DIAGNOSTIC / THERAPEUTIC     • REFRACTIVE SURGERY  04/2019   • TOTAL HIP ARTHROPLASTY      Left hip     Social History     Socioeconomic History   • Marital status:      Spouse name: Not on file   • Number of children: Not on file   • Years of education: Not on file   • Highest education level: Not on file   Tobacco Use   • Smoking status: Former Smoker     Packs/day: 1.00     Types: Cigarettes     Quit date: 8/3/1969     Years since  quittin.1   • Smokeless tobacco: Never Used   • Tobacco comment: quit 52 years ago    Substance and Sexual Activity   • Alcohol use: No   • Drug use: No   • Sexual activity: Defer     Comment:         Current Outpatient Medications:   •  azelastine (ASTEPRO) 0.15 % solution nasal spray, azelastine 205.5 mcg (0.15 %) nasal spray, Disp: , Rfl:   •  budesonide-formoterol (Symbicort) 80-4.5 MCG/ACT inhaler, Inhale 2 puffs 2 (Two) Times a Day., Disp: 1 inhaler, Rfl: 12  •  Cholecalciferol (VITAMIN D3) 2000 UNITS tablet, Take 2,000 Units by mouth. Take 2 tablets daily.  , Disp: , Rfl:   •  conjugated estrogens (PREMARIN) 0.625 MG/GM vaginal cream, Insert  into the vagina As Needed., Disp: , Rfl:   •  Cyanocobalamin (VITAMIN B12 PO), Take 1 tablet by mouth Daily. Take 1 tablet daily as directed.  , Disp: , Rfl:   •  Elastic Bandages & Supports (FUTURO FIRM COMPRESSION HOSE) misc, 1 package Daily., Disp: 1 each, Rfl: 1  •  hydroCHLOROthiazide (HYDRODIURIL) 12.5 MG tablet, Take 1 tablet by mouth Daily., Disp: 30 tablet, Rfl: 5  •  ketoconazole (NIZORAL) 2 % cream, , Disp: , Rfl:   •  latanoprost (XALATAN) 0.005 % ophthalmic solution, Administer 1 drop to both eyes Every Night., Disp: , Rfl:   •  metoprolol succinate XL (TOPROL-XL) 25 MG 24 hr tablet, , Disp: , Rfl:   •  Multiple Vitamins-Minerals (CENTRUM SILVER PO), Take 1 tablet by mouth Daily., Disp: , Rfl:   •  Multiple Vitamins-Minerals (ICAPS AREDS 2 PO), Take 1 tablet by mouth Daily., Disp: , Rfl:   •  Oxyquinolone Sulfate (Trimo-Shea) 0.025 % gel, 1 g., Disp: , Rfl:   •  potassium chloride (K-DUR,KLOR-CON) 20 MEQ CR tablet, Take 1 tablet by mouth Daily., Disp: 30 tablet, Rfl: 2  •  terbinafine (lamISIL) 1 % cream, Apply  topically to the appropriate area as directed Every Night., Disp: 36 g, Rfl: 1  •  triamcinolone (KENALOG) 0.1 % ointment, Apply  topically to the appropriate area as directed 2 (Two) Times a Day., Disp: 15 g, Rfl: 3  •  Ventolin   (90 Base) MCG/ACT inhaler, Inhale 2 puffs Every 6 (Six) Hours As Needed for Wheezing or Shortness of Air., Disp: 18 g, Rfl: 11     Review of Systems   Constitutional: Negative for chills, fatigue, fever and unexpected weight change.   Respiratory: Negative for cough, chest tightness, shortness of breath and wheezing.    Cardiovascular: Positive for leg swelling. Negative for chest pain and palpitations.   Gastrointestinal: Negative for constipation, diarrhea, nausea and vomiting.   Genitourinary: Negative for difficulty urinating and dysuria.   Musculoskeletal: Positive for arthralgias.   Skin: Positive for color change. Negative for rash.   Neurological: Negative for dizziness, syncope, weakness and headaches.   Psychiatric/Behavioral: Negative for sleep disturbance.       Objective     Vitals:    09/03/21 1024   BP: 122/82   Pulse: 84   Temp: 97.1 °F (36.2 °C)   SpO2: 97%         09/03/21  1024   Weight: 63 kg (139 lb)     Body mass index is 21.76 kg/m².  Results for orders placed or performed in visit on 08/03/21   Basic Metabolic Panel    Specimen: Blood   Result Value Ref Range    Glucose 103 (H) 65 - 99 mg/dL    BUN 18 8 - 23 mg/dL    Creatinine 0.75 0.57 - 1.00 mg/dL    Sodium 136 136 - 145 mmol/L    Potassium 4.1 3.5 - 5.2 mmol/L    Chloride 101 98 - 107 mmol/L    CO2 26.3 22.0 - 29.0 mmol/L    Calcium 9.2 8.6 - 10.5 mg/dL    eGFR Non African Amer 73 >60 mL/min/1.73    BUN/Creatinine Ratio 24.0 7.0 - 25.0    Anion Gap 8.7 5.0 - 15.0 mmol/L       Physical Exam  Vitals reviewed.   Constitutional:       Appearance: She is well-developed.   HENT:      Head: Normocephalic and atraumatic.   Cardiovascular:      Rate and Rhythm: Normal rate and regular rhythm.      Heart sounds: Normal heart sounds.   Pulmonary:      Effort: Pulmonary effort is normal.      Breath sounds: Normal breath sounds.   Genitourinary:     Comments: deferred  Musculoskeletal:      Right lower leg: No edema.      Left lower leg: Edema  present.   Skin:     General: Skin is warm and dry.   Neurological:      Mental Status: She is alert and oriented to person, place, and time.   Psychiatric:         Behavior: Behavior normal.         Assessment/Plan   Problems Addressed this Visit        Cardiac and Vasculature    Hypertension - Primary      Other Visit Diagnoses     Reactive depression        Hernia of abdominal wall          Diagnoses       Codes Comments    Essential hypertension    -  Primary ICD-10-CM: I10  ICD-9-CM: 401.9     Reactive depression     ICD-10-CM: F32.9  ICD-9-CM: 300.4     Hernia of abdominal wall     ICD-10-CM: K43.9  ICD-9-CM: 553.20       For her high blood pressure I would like her to continue check her blood pressure and if it consistently runs very low in the low 100s systolically and she has symptomatic lightheadedness and I recommend she decrease her metoprolol XL to 12.5 mg daily. Patient instructed to check blood pressure daily, record, and bring to next visit. If the readings run 140/90 or greater, the patient is to call my office or return sooner for evaluation. Pt advised to eat a heart healthy diet and get regular aerobic exercise.    She reports her depression is improving but does have a follow-up appointment with psychiatry upcoming.    She does have appointment to have her hernia repaired this month.    Time spent on visit, including counseling, education, reviewing the chart, and any recent test results, was   28     Minutes    Return visit in Jan for wellness or PRN    Counseling provided on hypertension.    Leandro Johnson, APRN   9/3/2021   12:18 EDT     Please note that portions of this document were completed with a voice recognition program.

## 2021-10-08 ENCOUNTER — TELEPHONE (OUTPATIENT)
Dept: FAMILY MEDICINE CLINIC | Facility: CLINIC | Age: 86
End: 2021-10-08

## 2021-10-08 NOTE — TELEPHONE ENCOUNTER
PT CALLED STATED THAT SHE HAS A HERNIA IN STOMACH AREA WHICH HAS GOTTEN BIGGER, STATED THAT PCP REFERRED HER TO SEE ONE ABOUT HERNIA BUT SHE HAS NOT BEEN ABLE TO CONTACT THEM AND WOULD LIKE TO KNOW WHAT PCP SUGGEST FOR HER TO DO.    PLEASE ADVISE.  CALL BACK:[phone]

## 2021-10-11 ENCOUNTER — OFFICE VISIT (OUTPATIENT)
Dept: FAMILY MEDICINE CLINIC | Facility: CLINIC | Age: 86
End: 2021-10-11

## 2021-10-11 VITALS
HEIGHT: 67 IN | OXYGEN SATURATION: 95 % | BODY MASS INDEX: 21.69 KG/M2 | TEMPERATURE: 97.3 F | HEART RATE: 65 BPM | WEIGHT: 138.2 LBS | DIASTOLIC BLOOD PRESSURE: 90 MMHG | SYSTOLIC BLOOD PRESSURE: 145 MMHG

## 2021-10-11 DIAGNOSIS — K46.9 ABDOMINAL HERNIA WITHOUT OBSTRUCTION AND WITHOUT GANGRENE, RECURRENCE NOT SPECIFIED, UNSPECIFIED HERNIA TYPE: Primary | ICD-10-CM

## 2021-10-11 PROCEDURE — 99213 OFFICE O/P EST LOW 20 MIN: CPT | Performed by: PHYSICIAN ASSISTANT

## 2021-10-11 NOTE — PROGRESS NOTES
"    Chief Complaint   Patient presents with   • Hernia       HPI     Lashanda Qureshi is a pleasant 86 y.o. female who presents for evaluation of \"chief complaint.\"     Patient states her left lower abdominal hernia has been increasing in size over the past month.  She has also been having more pain and is not able to easily push it back in. Having regular bowel habit.  No vomiting.  She did see general surgery last month when repair was recommended but had to be delayed due to the pandemic.    Past Medical History:   Diagnosis Date   • Abnormal CT scan, lung    • Mycobacterium gordonae infection        Past Surgical History:   Procedure Laterality Date   • BLADDER SURGERY     • BRONCHOSCOPY  2016   • CHOLECYSTECTOMY     • COLON SURGERY     • DILATION AND CURETTAGE, DIAGNOSTIC / THERAPEUTIC     • REFRACTIVE SURGERY  2019   • TOTAL HIP ARTHROPLASTY      Left hip       Family History   Problem Relation Age of Onset   • COPD Mother    • Hyperlipidemia Mother    • Hypertension Mother    • Cancer Father    • Lung cancer Father    • Heart attack Brother    • Diabetes Brother    • Multiple sclerosis Daughter    • No Known Problems Maternal Grandmother    • No Known Problems Maternal Grandfather    • Diabetes Paternal Grandmother    • Diabetes Paternal Grandfather        Social History     Socioeconomic History   • Marital status:    Tobacco Use   • Smoking status: Former Smoker     Packs/day: 1.00     Types: Cigarettes     Quit date: 8/3/1969     Years since quittin.2   • Smokeless tobacco: Never Used   • Tobacco comment: quit 52 years ago    Substance and Sexual Activity   • Alcohol use: No   • Drug use: No   • Sexual activity: Defer     Comment:        Allergies   Allergen Reactions   • Antihistamines, Loratadine-Type Other (See Comments)     Drowsiness     • Erythromycin        ROS    Review of Systems   Gastrointestinal: Positive for abdominal pain. Negative for constipation and vomiting. "       Vitals:    10/11/21 1109   BP: 145/90   Pulse: 65   Temp: 97.3 °F (36.3 °C)   SpO2: 95%     Body mass index is 21.64 kg/m².      Current Outpatient Medications:   •  azelastine (ASTEPRO) 0.15 % solution nasal spray, azelastine 205.5 mcg (0.15 %) nasal spray, Disp: , Rfl:   •  budesonide-formoterol (Symbicort) 80-4.5 MCG/ACT inhaler, Inhale 2 puffs 2 (Two) Times a Day., Disp: 1 inhaler, Rfl: 12  •  Cholecalciferol (VITAMIN D3) 2000 UNITS tablet, Take 2,000 Units by mouth. Take 2 tablets daily.  , Disp: , Rfl:   •  conjugated estrogens (PREMARIN) 0.625 MG/GM vaginal cream, Insert  into the vagina As Needed., Disp: , Rfl:   •  Cyanocobalamin (VITAMIN B12 PO), Take 1 tablet by mouth Daily. Take 1 tablet daily as directed.  , Disp: , Rfl:   •  Elastic Bandages & Supports (FUTURO FIRM COMPRESSION HOSE) misc, 1 package Daily., Disp: 1 each, Rfl: 1  •  hydroCHLOROthiazide (HYDRODIURIL) 12.5 MG tablet, Take 1 tablet by mouth Daily., Disp: 30 tablet, Rfl: 5  •  ketoconazole (NIZORAL) 2 % cream, , Disp: , Rfl:   •  latanoprost (XALATAN) 0.005 % ophthalmic solution, Administer 1 drop to both eyes Every Night., Disp: , Rfl:   •  metoprolol succinate XL (TOPROL-XL) 25 MG 24 hr tablet, , Disp: , Rfl:   •  Multiple Vitamins-Minerals (CENTRUM SILVER PO), Take 1 tablet by mouth Daily., Disp: , Rfl:   •  Multiple Vitamins-Minerals (ICAPS AREDS 2 PO), Take 1 tablet by mouth Daily., Disp: , Rfl:   •  Oxyquinolone Sulfate (Trimo-Shea) 0.025 % gel, 1 g., Disp: , Rfl:   •  potassium chloride (K-DUR,KLOR-CON) 20 MEQ CR tablet, Take 1 tablet by mouth Daily., Disp: 30 tablet, Rfl: 2  •  terbinafine (lamISIL) 1 % cream, Apply  topically to the appropriate area as directed Every Night., Disp: 36 g, Rfl: 1  •  triamcinolone (KENALOG) 0.1 % ointment, Apply  topically to the appropriate area as directed 2 (Two) Times a Day., Disp: 15 g, Rfl: 3  •  Ventolin  (90 Base) MCG/ACT inhaler, Inhale 2 puffs Every 6 (Six) Hours As Needed for  Wheezing or Shortness of Air., Disp: 18 g, Rfl: 11    PE    Physical Exam  Vitals reviewed.   Constitutional:       General: She is not in acute distress.     Appearance: She is well-developed.   HENT:      Head: Normocephalic and atraumatic.   Eyes:      Conjunctiva/sclera: Conjunctivae normal.   Cardiovascular:      Rate and Rhythm: Normal rate and regular rhythm.      Heart sounds: Normal heart sounds. No murmur heard.      Pulmonary:      Effort: Pulmonary effort is normal.      Breath sounds: Normal breath sounds.   Abdominal:       Musculoskeletal:      Cervical back: Normal range of motion.   Skin:     General: Skin is warm and dry.   Neurological:      Mental Status: She is alert.      Gait: Gait normal.   Psychiatric:         Speech: Speech normal.         Behavior: Behavior normal.          A/P    Problem List Items Addressed This Visit     None      Visit Diagnoses     Abdominal hernia without obstruction and without gangrene, recurrence not specified, unspecified hernia type    -  Primary  -I called and spoke with [place] surgeons office and they report they are now up to 75% surgery capacity and are doing elective procedures at this time. I counseled the patient to contact their office to arrange repair for hernia. She was provided with contact information to do so. She is well aware of warning symptoms that should prompt ER assessment.          Plan of care was reviewed with patient at the conclusion of today's visit. Education was provided regarding diagnoses, management, prescribed or recommended OTC products, and the importance of compliance with follow-up appointments. The patient was counseled regarding the risks, benefits, and possible side-effects of treatment. I advised the patient to keep me informed of any acute changes in their status including new, worsening, or persistent symptoms. Patient expresses understanding and agreement with the management plan.        SRINIVASA Nuñez

## 2021-10-11 NOTE — TELEPHONE ENCOUNTER
Patient has been referred to [facility]. She has an appointment currently to discuss with provider

## 2021-11-09 ENCOUNTER — APPOINTMENT (OUTPATIENT)
Dept: PREADMISSION TESTING | Facility: HOSPITAL | Age: 86
End: 2021-11-09

## 2021-11-14 ENCOUNTER — APPOINTMENT (OUTPATIENT)
Dept: PREADMISSION TESTING | Facility: HOSPITAL | Age: 86
End: 2021-11-14

## 2021-11-19 ENCOUNTER — OFFICE VISIT (OUTPATIENT)
Dept: FAMILY MEDICINE CLINIC | Facility: CLINIC | Age: 86
End: 2021-11-19

## 2021-11-19 VITALS
OXYGEN SATURATION: 96 % | DIASTOLIC BLOOD PRESSURE: 90 MMHG | BODY MASS INDEX: 22.03 KG/M2 | HEIGHT: 67 IN | SYSTOLIC BLOOD PRESSURE: 150 MMHG | HEART RATE: 68 BPM | TEMPERATURE: 97.7 F | WEIGHT: 140.4 LBS

## 2021-11-19 DIAGNOSIS — Z01.818 PREOP EXAMINATION: Primary | ICD-10-CM

## 2021-11-19 DIAGNOSIS — B37.89 CANDIDIASIS OF BREAST: ICD-10-CM

## 2021-11-19 PROCEDURE — 99214 OFFICE O/P EST MOD 30 MIN: CPT | Performed by: NURSE PRACTITIONER

## 2021-11-19 RX ORDER — TERBINAFINE HYDROCHLORIDE 250 MG/1
250 TABLET ORAL DAILY
Qty: 14 TABLET | Refills: 0 | Status: SHIPPED | OUTPATIENT
Start: 2021-11-19 | End: 2021-12-08

## 2021-11-19 NOTE — PATIENT INSTRUCTIONS
Terbinafine tablets  What is this medicine?  TERBINAFINE (TER bin a feen) is an antifungal medicine. It is used to treat certain kinds of fungal or yeast infections.  This medicine may be used for other purposes; ask your health care provider or pharmacist if you have questions.  COMMON BRAND NAME(S): Lamisil, Terbinex  What should I tell my health care provider before I take this medicine?  They need to know if you have any of these conditions:  · drink alcoholic beverages  · kidney disease  · liver disease  · an unusual or allergic reaction to terbinafine, other medicines, foods, dyes, or preservatives  · pregnant or trying to get pregnant  · breast-feeding  How should I use this medicine?  Take this medicine by mouth with a full glass of water. Follow the directions on the prescription label. You can take this medicine with food or on an empty stomach. Take your medicine at regular intervals. Do not take your medicine more often than directed. Do not skip doses or stop your medicine early even if you feel better. Do not stop taking except on your doctor's advice.  A special MedGuide will be given to you by the pharmacist with each prescription and refill. Be sure to read this information carefully each time.  Talk to your pediatrician regarding the use of this medicine in children. Special care may be needed.  Overdosage: If you think you have taken too much of this medicine contact a poison control center or emergency room at once.  NOTE: This medicine is only for you. Do not share this medicine with others.  What if I miss a dose?  If you miss a dose, take it as soon as you can. If it is almost time for your next dose, take only that dose. Do not take double or extra doses.  What may interact with this medicine?  Do not take this medicine with any of the following medications:  · thioridazine  This medicine may also interact with the following  medications:  · beta-blockers  · caffeine  · cimetidine  · cyclosporine  · medicines for depression, anxiety, or psychotic disturbances  · medicines for fungal infections like fluconazole and ketoconazole  · medicines for irregular heartbeat like amiodarone, flecainide and propafenone  · rifampin  · warfarin  This list may not describe all possible interactions. Give your health care provider a list of all the medicines, herbs, non-prescription drugs, or dietary supplements you use. Also tell them if you smoke, drink alcohol, or use illegal drugs. Some items may interact with your medicine.  What should I watch for while using this medicine?  Visit your doctor or health care provider regularly. Tell your doctor right away if you have nausea or vomiting, loss of appetite, stomach pain on your right upper side, yellow skin, dark urine, light stools, or are over tired. Some fungal infections need many weeks or months of treatment to cure. If you are taking this medicine for a long time, you will need to have important blood work done.  This medicine may cause serious skin reactions. They can happen weeks to months after starting the medicine. Contact your health care provider right away if you notice fevers or flu-like symptoms with a rash. The rash may be red or purple and then turn into blisters or peeling of the skin. Or, you might notice a red rash with swelling of the face, lips or lymph nodes in your neck or under your arms.  What side effects may I notice from receiving this medicine?  Side effects that you should report to your doctor or health care professional as soon as possible:  · allergic reactions like skin rash or hives, swelling of the face, lips, or tongue  · changes in vision  · dark urine  · fever or infection  · general ill feeling or flu-like symptoms  · light-colored stools  · loss of appetite, nausea  · rash, fever, and swollen lymph nodes  · redness, blistering, peeling or loosening of the  skin, including inside the mouth  · right upper belly pain  · unusually weak or tired  · yellowing of the eyes or skin  Side effects that usually do not require medical attention (report to your doctor or health care professional if they continue or are bothersome):  · changes in taste  · diarrhea  · hair loss  · muscle or joint pain  · stomach gas  · stomach upset  This list may not describe all possible side effects. Call your doctor for medical advice about side effects. You may report side effects to FDA at 5-454-FOQ-6902.  Where should I keep my medicine?  Keep out of the reach of children.  Store at room temperature below 25 degrees C (77 degrees F). Protect from light. Throw away any unused medicine after the expiration date.  NOTE: This sheet is a summary. It may not cover all possible information. If you have questions about this medicine, talk to your doctor, pharmacist, or health care provider.  © 2021 [employer] (2020-03-27 15:37:07)

## 2021-11-19 NOTE — PROGRESS NOTES
Patient Care Team:  Leandro Johnson APRN as PCP - General (Family Medicine)  Crispin Wheeler MD as Consulting Physician (Orthopedic Surgery)  James Gonzales MD de Castro, Fernando R., MD as Consulting Physician (Dermatology)  Eliseo Burleson MD as Surgeon (General Surgery)     Chief complaint: Patient is in today for a Preop physical     Patient is a 86 y.o. female who presents for her preop physical exam.     HPI patient today for preoperative visit as well as rash under her breasts and the hip region.  The rash seems to come and go.  She will treat it and it will resolve and then it returns.  She is scheduled for hernia surgery on November 30. Dr VANEGAS.     Review of Systems   Constitutional: Positive for fatigue. Negative for appetite change, chills and fever.   HENT: Negative for congestion, ear pain, hearing loss, rhinorrhea, sinus pressure and sore throat.    Eyes: Negative for itching and visual disturbance.   Respiratory: Negative for cough, shortness of breath and wheezing.    Cardiovascular: Negative for chest pain, palpitations and leg swelling.   Gastrointestinal: Positive for abdominal pain (hernia). Negative for blood in stool, constipation, diarrhea, nausea and vomiting.   Endocrine: Negative for cold intolerance, heat intolerance, polydipsia, polyphagia and polyuria.   Genitourinary: Negative for difficulty urinating, dysuria and hematuria.   Musculoskeletal: Negative for arthralgias, back pain, joint swelling, myalgias and neck pain.   Skin: Positive for rash. Negative for wound.   Allergic/Immunologic: Negative for environmental allergies and food allergies.   Neurological: Negative for dizziness, light-headedness, numbness and headaches.   Hematological: Negative for adenopathy. Does not bruise/bleed easily.   Psychiatric/Behavioral: Negative for dysphoric mood and sleep disturbance. The patient is not nervous/anxious.       History  Past Medical History:   Diagnosis Date    • Abnormal CT scan, lung    • Mycobacterium gordonae infection       Past Surgical History:   Procedure Laterality Date   • BLADDER SURGERY     • BRONCHOSCOPY  2016   • CHOLECYSTECTOMY     • COLON SURGERY     • DILATION AND CURETTAGE, DIAGNOSTIC / THERAPEUTIC     • REFRACTIVE SURGERY  2019   • TOTAL HIP ARTHROPLASTY      Left hip      Allergies   Allergen Reactions   • Antihistamines, Loratadine-Type Other (See Comments)     Drowsiness     • Erythromycin       Family History   Problem Relation Age of Onset   • COPD Mother    • Hyperlipidemia Mother    • Hypertension Mother    • Cancer Father    • Lung cancer Father    • Heart attack Brother    • Diabetes Brother    • Multiple sclerosis Daughter    • No Known Problems Maternal Grandmother    • No Known Problems Maternal Grandfather    • Diabetes Paternal Grandmother    • Diabetes Paternal Grandfather      Social History     Socioeconomic History   • Marital status:    Tobacco Use   • Smoking status: Former Smoker     Packs/day: 1.00     Years: 2.00     Pack years: 2.00     Types: Cigarettes     Quit date: 8/3/1969     Years since quittin.3   • Smokeless tobacco: Never Used   • Tobacco comment: quit 52 years ago    Vaping Use   • Vaping Use: Never used   Substance and Sexual Activity   • Alcohol use: No   • Drug use: No   • Sexual activity: Defer     Comment:         Current Outpatient Medications:   •  azelastine (ASTEPRO) 0.15 % solution nasal spray, azelastine 205.5 mcg (0.15 %) nasal spray, Disp: , Rfl:   •  budesonide-formoterol (Symbicort) 80-4.5 MCG/ACT inhaler, Inhale 2 puffs 2 (Two) Times a Day., Disp: 1 inhaler, Rfl: 12  •  Cholecalciferol (VITAMIN D3) 2000 UNITS tablet, Take 2,000 Units by mouth. Take 2 tablets daily.  , Disp: , Rfl:   •  conjugated estrogens (PREMARIN) 0.625 MG/GM vaginal cream, Insert  into the vagina As Needed., Disp: , Rfl:   •  Cyanocobalamin (VITAMIN B12 PO), Take 1 tablet by mouth Daily. Take 1 tablet  daily as directed.  , Disp: , Rfl:   •  Elastic Bandages & Supports (FUTURO FIRM COMPRESSION HOSE) misc, 1 package Daily., Disp: 1 each, Rfl: 1  •  hydroCHLOROthiazide (HYDRODIURIL) 12.5 MG tablet, Take 1 tablet by mouth Daily., Disp: 30 tablet, Rfl: 5  •  ketoconazole (NIZORAL) 2 % cream, , Disp: , Rfl:   •  latanoprost (XALATAN) 0.005 % ophthalmic solution, Administer 1 drop to both eyes Every Night., Disp: , Rfl:   •  metoprolol succinate XL (TOPROL-XL) 25 MG 24 hr tablet, , Disp: , Rfl:   •  Multiple Vitamins-Minerals (CENTRUM SILVER PO), Take 1 tablet by mouth Daily., Disp: , Rfl:   •  Multiple Vitamins-Minerals (ICAPS AREDS 2 PO), Take 1 tablet by mouth Daily., Disp: , Rfl:   •  Oxyquinolone Sulfate (Trimo-Shea) 0.025 % gel, 1 g., Disp: , Rfl:   •  potassium chloride (K-DUR,KLOR-CON) 20 MEQ CR tablet, Take 1 tablet by mouth Daily., Disp: 30 tablet, Rfl: 2  •  terbinafine (lamISIL) 1 % cream, Apply  topically to the appropriate area as directed Every Night., Disp: 36 g, Rfl: 1  •  triamcinolone (KENALOG) 0.1 % ointment, Apply  topically to the appropriate area as directed 2 (Two) Times a Day., Disp: 15 g, Rfl: 3  •  Ventolin  (90 Base) MCG/ACT inhaler, Inhale 2 puffs Every 6 (Six) Hours As Needed for Wheezing or Shortness of Air., Disp: 18 g, Rfl: 11  •  terbinafine (lamiSIL) 250 MG tablet, Take 1 tablet by mouth Daily., Disp: 14 tablet, Rfl: 0    Immunization History   Administered Date(s) Administered   • COVID-19 (PFIZER) 02/15/2021, 03/08/2021, 10/05/2021   • FLUAD TRI 65YR+ 10/11/2017   • Fluad Quad 65+ 09/18/2020   • Fluzone High Dose =>65 Years ([employer] ONLY) 10/13/2016, 10/11/2017, 10/10/2018, 10/29/2019   • Fluzone High-Dose 65+yrs 10/05/2021   • Hepatitis A 10/10/2018   • Influenza, Unspecified 10/13/2016   • Pneumococcal Conjugate 13-Valent (PCV13) 01/01/2013   • Pneumococcal Polysaccharide (PPSV23) 05/01/2006   • Tdap 01/01/2014     Health Maintenance   Topic Date Due   • DXA SCAN  Never done  "  • ZOSTER VACCINE (2 of 2) 11/26/2015   • ANNUAL WELLNESS VISIT  01/20/2022   • TDAP/TD VACCINES (2 - Td or Tdap) 01/01/2024   • COVID-19 Vaccine  Completed   • INFLUENZA VACCINE  Completed   • Pneumococcal Vaccine 65+  Completed       No results found for: HGBA1C, MICROALBUR    Results for orders placed or performed in visit on 08/03/21   Basic Metabolic Panel    Specimen: Blood   Result Value Ref Range    Glucose 103 (H) 65 - 99 mg/dL    BUN 18 8 - 23 mg/dL    Creatinine 0.75 0.57 - 1.00 mg/dL    Sodium 136 136 - 145 mmol/L    Potassium 4.1 3.5 - 5.2 mmol/L    Chloride 101 98 - 107 mmol/L    CO2 26.3 22.0 - 29.0 mmol/L    Calcium 9.2 8.6 - 10.5 mg/dL    eGFR Non African Amer 73 >60 mL/min/1.73    BUN/Creatinine Ratio 24.0 7.0 - 25.0    Anion Gap 8.7 5.0 - 15.0 mmol/L            /90   Pulse 68   Temp 97.7 °F (36.5 °C)   Ht 170.2 cm (67.01\")   Wt 63.7 kg (140 lb 6.4 oz)   LMP  (LMP Unknown)   SpO2 96%   BMI 21.98 kg/m²       Physical Exam  Vitals reviewed.   Constitutional:       Appearance: She is well-developed.   HENT:      Head: Normocephalic and atraumatic.   Cardiovascular:      Rate and Rhythm: Normal rate and regular rhythm.      Heart sounds: Normal heart sounds.   Pulmonary:      Effort: Pulmonary effort is normal.      Breath sounds: Normal breath sounds.   Abdominal:      General: Abdomen is flat.      Palpations: Abdomen is soft.      Hernia: A hernia is present.   Genitourinary:     Comments: deferred  Skin:     General: Skin is warm and dry.      Findings: Rash (under breasts and panus) present.   Neurological:      Mental Status: She is alert and oriented to person, place, and time.   Psychiatric:         Behavior: Behavior normal.             Counseling provided on fungal rash.    Diagnoses and all orders for this visit:    Preop examination    Candidiasis of breast  -     terbinafine (lamiSIL) 250 MG tablet; Take 1 tablet by mouth Daily.     No observable contraindication for planned " surgery.     For the candidiasis of the breast and pannus I recommend Lamisil 200 mg daily. Patient was encouraged to keep me informed of any acute changes, lack of improvement, or any new concerning symptoms.  If patient becomes concerned, or has any worsening symptoms, they are to report either here, urgent treatment, or emergency room. Plan of care discussed with pt. They verbalized understanding and agreement.     Follow up- Post Op    Time- 30 min      Leandro Johnson, APRN   11/19/2021   11:29 EST          Please note that portions of this document were completed with a voice recognition program.

## 2021-11-22 ENCOUNTER — PRE-ADMISSION TESTING (OUTPATIENT)
Dept: PREADMISSION TESTING | Facility: HOSPITAL | Age: 86
End: 2021-11-22

## 2021-11-22 VITALS — HEIGHT: 67 IN | BODY MASS INDEX: 21.87 KG/M2 | WEIGHT: 139.33 LBS

## 2021-11-22 LAB
ALBUMIN SERPL-MCNC: 4.3 G/DL (ref 3.5–5.2)
ALBUMIN/GLOB SERPL: 1.5 G/DL
ALP SERPL-CCNC: 71 U/L (ref 39–117)
ALT SERPL W P-5'-P-CCNC: 12 U/L (ref 1–33)
ANION GAP SERPL CALCULATED.3IONS-SCNC: 12 MMOL/L (ref 5–15)
AST SERPL-CCNC: 22 U/L (ref 1–32)
BILIRUB SERPL-MCNC: 0.8 MG/DL (ref 0–1.2)
BUN SERPL-MCNC: 14 MG/DL (ref 8–23)
BUN/CREAT SERPL: 26.4 (ref 7–25)
CALCIUM SPEC-SCNC: 9.2 MG/DL (ref 8.6–10.5)
CHLORIDE SERPL-SCNC: 103 MMOL/L (ref 98–107)
CO2 SERPL-SCNC: 26 MMOL/L (ref 22–29)
CREAT SERPL-MCNC: 0.53 MG/DL (ref 0.57–1)
DEPRECATED RDW RBC AUTO: 47 FL (ref 37–54)
ERYTHROCYTE [DISTWIDTH] IN BLOOD BY AUTOMATED COUNT: 13.2 % (ref 12.3–15.4)
GFR SERPL CREATININE-BSD FRML MDRD: 109 ML/MIN/1.73
GLOBULIN UR ELPH-MCNC: 2.8 GM/DL
GLUCOSE SERPL-MCNC: 112 MG/DL (ref 65–99)
HBA1C MFR BLD: 5.6 % (ref 4.8–5.6)
HCT VFR BLD AUTO: 41.3 % (ref 34–46.6)
HGB BLD-MCNC: 14.3 G/DL (ref 12–15.9)
MCH RBC QN AUTO: 33.1 PG (ref 26.6–33)
MCHC RBC AUTO-ENTMCNC: 34.6 G/DL (ref 31.5–35.7)
MCV RBC AUTO: 95.6 FL (ref 79–97)
PLATELET # BLD AUTO: 244 10*3/MM3 (ref 140–450)
PMV BLD AUTO: 9.1 FL (ref 6–12)
POTASSIUM SERPL-SCNC: 3.8 MMOL/L (ref 3.5–5.2)
PROT SERPL-MCNC: 7.1 G/DL (ref 6–8.5)
QT INTERVAL: 428 MS
QTC INTERVAL: 465 MS
RBC # BLD AUTO: 4.32 10*6/MM3 (ref 3.77–5.28)
SODIUM SERPL-SCNC: 141 MMOL/L (ref 136–145)
WBC NRBC COR # BLD: 5.4 10*3/MM3 (ref 3.4–10.8)

## 2021-11-22 PROCEDURE — 85027 COMPLETE CBC AUTOMATED: CPT

## 2021-11-22 PROCEDURE — 83036 HEMOGLOBIN GLYCOSYLATED A1C: CPT

## 2021-11-22 PROCEDURE — 80053 COMPREHEN METABOLIC PANEL: CPT

## 2021-11-22 PROCEDURE — 93005 ELECTROCARDIOGRAM TRACING: CPT

## 2021-11-22 PROCEDURE — 93010 ELECTROCARDIOGRAM REPORT: CPT | Performed by: INTERNAL MEDICINE

## 2021-11-22 PROCEDURE — 36415 COLL VENOUS BLD VENIPUNCTURE: CPT

## 2021-11-22 NOTE — DISCHARGE INSTRUCTIONS

## 2021-11-22 NOTE — PAT
Patient viewed general PAT education video as instructed in their preoperative information received from their surgeon.  Patient stated the general PAT education video was viewed in its entirety and survey completed.  Copies of [facility] general education handouts (Incentive Spirometry, Meds to Beds Program, Patient Belongings, Pre-op skin preparation instructions, Blood Glucose testing, Visitor policy, Surgery FAQ, Code H) distributed to patient if not printed. Education related to the PAT pass and skin preparation for surgery (if applicable) completed in [facility] as a reinforcement to PAT education video. Patient instructed to return PAT pass provided today as well as completed skin preparation sheet (if applicable) on the day of procedure.     Additionally if patient had not viewed video yet but intended to view it at home or in our waiting area, then referred them to the handout with QR code/link provided during PAT visit.  Instructed patient to complete survey after viewing the video in its entirety.  Encouraged patient/family to read [facility] general education handouts thoroughly and notify PAT staff with any questions or concerns. Patient verbalized understanding of all information and priority content.    Patient to apply Chlorhexadine wipes  to surgical area (as instructed) the night before procedure and the AM of procedure. Wipes provided.    COVID TEST SCHEDULED FOR 11/28/2021

## 2021-11-28 ENCOUNTER — APPOINTMENT (OUTPATIENT)
Dept: PREADMISSION TESTING | Facility: HOSPITAL | Age: 86
End: 2021-11-28

## 2021-11-28 LAB — SARS-COV-2 RNA PNL SPEC NAA+PROBE: NOT DETECTED

## 2021-11-28 PROCEDURE — U0005 INFEC AGEN DETEC AMPLI PROBE: HCPCS

## 2021-11-28 PROCEDURE — U0004 COV-19 TEST NON-CDC HGH THRU: HCPCS

## 2021-11-28 PROCEDURE — C9803 HOPD COVID-19 SPEC COLLECT: HCPCS

## 2021-11-29 ENCOUNTER — ANESTHESIA EVENT (OUTPATIENT)
Dept: PERIOP | Facility: HOSPITAL | Age: 86
End: 2021-11-29

## 2021-11-29 RX ORDER — FAMOTIDINE 10 MG/ML
20 INJECTION, SOLUTION INTRAVENOUS ONCE
Status: CANCELLED | OUTPATIENT
Start: 2021-11-29 | End: 2021-11-29

## 2021-11-30 ENCOUNTER — HOSPITAL ENCOUNTER (OUTPATIENT)
Facility: HOSPITAL | Age: 86
Setting detail: HOSPITAL OUTPATIENT SURGERY
Discharge: HOME | End: 2021-11-30
Attending: SURGERY | Admitting: SURGERY

## 2021-11-30 ENCOUNTER — ANESTHESIA EVENT CONVERTED (OUTPATIENT)
Dept: ANESTHESIOLOGY | Facility: HOSPITAL | Age: 86
End: 2021-11-30

## 2021-11-30 ENCOUNTER — ANESTHESIA (OUTPATIENT)
Dept: PERIOP | Facility: HOSPITAL | Age: 86
End: 2021-11-30

## 2021-11-30 VITALS
DIASTOLIC BLOOD PRESSURE: 85 MMHG | HEART RATE: 54 BPM | OXYGEN SATURATION: 93 % | SYSTOLIC BLOOD PRESSURE: 145 MMHG | RESPIRATION RATE: 16 BRPM | BODY MASS INDEX: 21.82 KG/M2 | TEMPERATURE: 97.5 F | HEIGHT: 67 IN | WEIGHT: 139 LBS

## 2021-11-30 DIAGNOSIS — K43.2 INCISIONAL HERNIA, WITHOUT OBSTRUCTION OR GANGRENE: Primary | ICD-10-CM

## 2021-11-30 PROCEDURE — 25010000002 PROPOFOL 10 MG/ML EMULSION: Performed by: NURSE ANESTHETIST, CERTIFIED REGISTERED

## 2021-11-30 PROCEDURE — 25010000002 DEXAMETHASONE SODIUM PHOSPHATE 10 MG/ML SOLUTION: Performed by: ANESTHESIOLOGY

## 2021-11-30 PROCEDURE — 25010000002 FENTANYL CITRATE (PF) 50 MCG/ML SOLUTION: Performed by: NURSE ANESTHETIST, CERTIFIED REGISTERED

## 2021-11-30 PROCEDURE — 25010000002 ONDANSETRON PER 1 MG: Performed by: NURSE ANESTHETIST, CERTIFIED REGISTERED

## 2021-11-30 PROCEDURE — C1781 MESH (IMPLANTABLE): HCPCS | Performed by: SURGERY

## 2021-11-30 PROCEDURE — 0 CEFAZOLIN IN DEXTROSE 2-4 GM/100ML-% SOLUTION: Performed by: SURGERY

## 2021-11-30 PROCEDURE — 25010000002 DEXAMETHASONE PER 1 MG: Performed by: NURSE ANESTHETIST, CERTIFIED REGISTERED

## 2021-11-30 PROCEDURE — 25010000002 NEOSTIGMINE 10 MG/10ML SOLUTION: Performed by: NURSE ANESTHETIST, CERTIFIED REGISTERED

## 2021-11-30 DEVICE — VENTRALEX ST HERNIA PATCH
Type: IMPLANTABLE DEVICE | Site: ABDOMEN | Status: FUNCTIONAL
Brand: VENTRALEX ST HERNIA PATCH

## 2021-11-30 RX ORDER — DROPERIDOL 2.5 MG/ML
0.62 INJECTION, SOLUTION INTRAMUSCULAR; INTRAVENOUS AS NEEDED
Status: DISCONTINUED | OUTPATIENT
Start: 2021-11-30 | End: 2021-11-30 | Stop reason: HOSPADM

## 2021-11-30 RX ORDER — PROMETHAZINE HYDROCHLORIDE 25 MG/1
25 TABLET ORAL ONCE AS NEEDED
Status: DISCONTINUED | OUTPATIENT
Start: 2021-11-30 | End: 2021-11-30 | Stop reason: HOSPADM

## 2021-11-30 RX ORDER — LIDOCAINE HYDROCHLORIDE 10 MG/ML
0.5 INJECTION, SOLUTION EPIDURAL; INFILTRATION; INTRACAUDAL; PERINEURAL ONCE AS NEEDED
Status: COMPLETED | OUTPATIENT
Start: 2021-11-30 | End: 2021-11-30

## 2021-11-30 RX ORDER — MAGNESIUM HYDROXIDE 1200 MG/15ML
LIQUID ORAL AS NEEDED
Status: DISCONTINUED | OUTPATIENT
Start: 2021-11-30 | End: 2021-11-30 | Stop reason: HOSPADM

## 2021-11-30 RX ORDER — HYDROMORPHONE HYDROCHLORIDE 1 MG/ML
0.5 INJECTION, SOLUTION INTRAMUSCULAR; INTRAVENOUS; SUBCUTANEOUS
Status: DISCONTINUED | OUTPATIENT
Start: 2021-11-30 | End: 2021-11-30 | Stop reason: HOSPADM

## 2021-11-30 RX ORDER — SODIUM CHLORIDE 0.9 % (FLUSH) 0.9 %
3-10 SYRINGE (ML) INJECTION AS NEEDED
Status: DISCONTINUED | OUTPATIENT
Start: 2021-11-30 | End: 2021-11-30 | Stop reason: HOSPADM

## 2021-11-30 RX ORDER — HYDROCODONE BITARTRATE AND ACETAMINOPHEN 5; 325 MG/1; MG/1
1 TABLET ORAL EVERY 8 HOURS PRN
Qty: 7 TABLET | Refills: 0 | Status: SHIPPED | OUTPATIENT
Start: 2021-11-30 | End: 2021-12-04

## 2021-11-30 RX ORDER — SODIUM CHLORIDE, SODIUM LACTATE, POTASSIUM CHLORIDE, CALCIUM CHLORIDE 600; 310; 30; 20 MG/100ML; MG/100ML; MG/100ML; MG/100ML
9 INJECTION, SOLUTION INTRAVENOUS CONTINUOUS
Status: DISCONTINUED | OUTPATIENT
Start: 2021-11-30 | End: 2021-11-30 | Stop reason: HOSPADM

## 2021-11-30 RX ORDER — PROMETHAZINE HYDROCHLORIDE 25 MG/1
25 SUPPOSITORY RECTAL ONCE AS NEEDED
Status: DISCONTINUED | OUTPATIENT
Start: 2021-11-30 | End: 2021-11-30 | Stop reason: HOSPADM

## 2021-11-30 RX ORDER — DEXAMETHASONE SODIUM PHOSPHATE 4 MG/ML
INJECTION, SOLUTION INTRA-ARTICULAR; INTRALESIONAL; INTRAMUSCULAR; INTRAVENOUS; SOFT TISSUE AS NEEDED
Status: DISCONTINUED | OUTPATIENT
Start: 2021-11-30 | End: 2021-11-30 | Stop reason: SURG

## 2021-11-30 RX ORDER — NEOSTIGMINE METHYLSULFATE 1 MG/ML
INJECTION, SOLUTION INTRAVENOUS AS NEEDED
Status: DISCONTINUED | OUTPATIENT
Start: 2021-11-30 | End: 2021-11-30 | Stop reason: SURG

## 2021-11-30 RX ORDER — FAMOTIDINE 20 MG/1
20 TABLET, FILM COATED ORAL ONCE
Status: COMPLETED | OUTPATIENT
Start: 2021-11-30 | End: 2021-11-30

## 2021-11-30 RX ORDER — SODIUM CHLORIDE 0.9 % (FLUSH) 0.9 %
10 SYRINGE (ML) INJECTION AS NEEDED
Status: DISCONTINUED | OUTPATIENT
Start: 2021-11-30 | End: 2021-11-30 | Stop reason: HOSPADM

## 2021-11-30 RX ORDER — ROCURONIUM BROMIDE 10 MG/ML
INJECTION, SOLUTION INTRAVENOUS AS NEEDED
Status: DISCONTINUED | OUTPATIENT
Start: 2021-11-30 | End: 2021-11-30 | Stop reason: SURG

## 2021-11-30 RX ORDER — LABETALOL HYDROCHLORIDE 5 MG/ML
5 INJECTION, SOLUTION INTRAVENOUS
Status: DISCONTINUED | OUTPATIENT
Start: 2021-11-30 | End: 2021-11-30 | Stop reason: HOSPADM

## 2021-11-30 RX ORDER — PROPOFOL 10 MG/ML
VIAL (ML) INTRAVENOUS CONTINUOUS PRN
Status: DISCONTINUED | OUTPATIENT
Start: 2021-11-30 | End: 2021-11-30 | Stop reason: SURG

## 2021-11-30 RX ORDER — SODIUM CHLORIDE 0.9 % (FLUSH) 0.9 %
10 SYRINGE (ML) INJECTION EVERY 12 HOURS SCHEDULED
Status: DISCONTINUED | OUTPATIENT
Start: 2021-11-30 | End: 2021-11-30 | Stop reason: HOSPADM

## 2021-11-30 RX ORDER — IPRATROPIUM BROMIDE AND ALBUTEROL SULFATE 2.5; .5 MG/3ML; MG/3ML
3 SOLUTION RESPIRATORY (INHALATION) ONCE AS NEEDED
Status: DISCONTINUED | OUTPATIENT
Start: 2021-11-30 | End: 2021-11-30 | Stop reason: HOSPADM

## 2021-11-30 RX ORDER — CEFAZOLIN SODIUM 2 G/100ML
2 INJECTION, SOLUTION INTRAVENOUS ONCE
Status: COMPLETED | OUTPATIENT
Start: 2021-11-30 | End: 2021-11-30

## 2021-11-30 RX ORDER — ONDANSETRON 2 MG/ML
4 INJECTION INTRAMUSCULAR; INTRAVENOUS ONCE AS NEEDED
Status: DISCONTINUED | OUTPATIENT
Start: 2021-11-30 | End: 2021-11-30 | Stop reason: HOSPADM

## 2021-11-30 RX ORDER — SODIUM CHLORIDE 0.9 % (FLUSH) 0.9 %
3 SYRINGE (ML) INJECTION EVERY 12 HOURS SCHEDULED
Status: DISCONTINUED | OUTPATIENT
Start: 2021-11-30 | End: 2021-11-30 | Stop reason: HOSPADM

## 2021-11-30 RX ORDER — MIDAZOLAM HYDROCHLORIDE 1 MG/ML
0.5 INJECTION INTRAMUSCULAR; INTRAVENOUS
Status: DISCONTINUED | OUTPATIENT
Start: 2021-11-30 | End: 2021-11-30 | Stop reason: HOSPADM

## 2021-11-30 RX ORDER — NALOXONE HCL 0.4 MG/ML
0.4 VIAL (ML) INJECTION AS NEEDED
Status: DISCONTINUED | OUTPATIENT
Start: 2021-11-30 | End: 2021-11-30 | Stop reason: HOSPADM

## 2021-11-30 RX ORDER — BUPIVACAINE HYDROCHLORIDE 2.5 MG/ML
INJECTION, SOLUTION EPIDURAL; INFILTRATION; INTRACAUDAL
Status: COMPLETED | OUTPATIENT
Start: 2021-11-30 | End: 2021-11-30

## 2021-11-30 RX ORDER — HYDROCODONE BITARTRATE AND ACETAMINOPHEN 5; 325 MG/1; MG/1
1 TABLET ORAL ONCE AS NEEDED
Status: DISCONTINUED | OUTPATIENT
Start: 2021-11-30 | End: 2021-11-30 | Stop reason: HOSPADM

## 2021-11-30 RX ORDER — DEXAMETHASONE SODIUM PHOSPHATE 10 MG/ML
INJECTION, SOLUTION INTRAMUSCULAR; INTRAVENOUS
Status: COMPLETED | OUTPATIENT
Start: 2021-11-30 | End: 2021-11-30

## 2021-11-30 RX ORDER — LIDOCAINE HYDROCHLORIDE 10 MG/ML
INJECTION, SOLUTION EPIDURAL; INFILTRATION; INTRACAUDAL; PERINEURAL AS NEEDED
Status: DISCONTINUED | OUTPATIENT
Start: 2021-11-30 | End: 2021-11-30 | Stop reason: SURG

## 2021-11-30 RX ORDER — ONDANSETRON 2 MG/ML
INJECTION INTRAMUSCULAR; INTRAVENOUS AS NEEDED
Status: DISCONTINUED | OUTPATIENT
Start: 2021-11-30 | End: 2021-11-30 | Stop reason: SURG

## 2021-11-30 RX ORDER — DROPERIDOL 2.5 MG/ML
0.62 INJECTION, SOLUTION INTRAMUSCULAR; INTRAVENOUS ONCE AS NEEDED
Status: DISCONTINUED | OUTPATIENT
Start: 2021-11-30 | End: 2021-11-30 | Stop reason: HOSPADM

## 2021-11-30 RX ORDER — FENTANYL CITRATE 50 UG/ML
50 INJECTION, SOLUTION INTRAMUSCULAR; INTRAVENOUS
Status: DISCONTINUED | OUTPATIENT
Start: 2021-11-30 | End: 2021-11-30 | Stop reason: HOSPADM

## 2021-11-30 RX ORDER — FENTANYL CITRATE 50 UG/ML
INJECTION, SOLUTION INTRAMUSCULAR; INTRAVENOUS
Status: DISPENSED
Start: 2021-11-30 | End: 2021-12-01

## 2021-11-30 RX ORDER — HYDROCODONE BITARTRATE AND ACETAMINOPHEN 5; 325 MG/1; MG/1
TABLET ORAL
Status: COMPLETED
Start: 2021-11-30 | End: 2021-11-30

## 2021-11-30 RX ORDER — HYDRALAZINE HYDROCHLORIDE 20 MG/ML
5 INJECTION INTRAMUSCULAR; INTRAVENOUS
Status: DISCONTINUED | OUTPATIENT
Start: 2021-11-30 | End: 2021-11-30 | Stop reason: HOSPADM

## 2021-11-30 RX ORDER — EPHEDRINE SULFATE 50 MG/ML
INJECTION, SOLUTION INTRAVENOUS AS NEEDED
Status: DISCONTINUED | OUTPATIENT
Start: 2021-11-30 | End: 2021-11-30 | Stop reason: SURG

## 2021-11-30 RX ORDER — GLYCOPYRROLATE 0.2 MG/ML
INJECTION INTRAMUSCULAR; INTRAVENOUS AS NEEDED
Status: DISCONTINUED | OUTPATIENT
Start: 2021-11-30 | End: 2021-11-30 | Stop reason: SURG

## 2021-11-30 RX ADMIN — ROCURONIUM BROMIDE 30 MG: 10 INJECTION, SOLUTION INTRAVENOUS at 10:54

## 2021-11-30 RX ADMIN — SODIUM CHLORIDE, POTASSIUM CHLORIDE, SODIUM LACTATE AND CALCIUM CHLORIDE 9 ML/HR: 600; 310; 30; 20 INJECTION, SOLUTION INTRAVENOUS at 09:15

## 2021-11-30 RX ADMIN — DEXAMETHASONE SODIUM PHOSPHATE 6 MG: 4 INJECTION, SOLUTION INTRA-ARTICULAR; INTRALESIONAL; INTRAMUSCULAR; INTRAVENOUS; SOFT TISSUE at 11:33

## 2021-11-30 RX ADMIN — FENTANYL CITRATE 50 MCG: 50 INJECTION, SOLUTION INTRAMUSCULAR; INTRAVENOUS at 12:22

## 2021-11-30 RX ADMIN — LIDOCAINE HYDROCHLORIDE 50 MG: 10 INJECTION, SOLUTION EPIDURAL; INFILTRATION; INTRACAUDAL; PERINEURAL at 10:54

## 2021-11-30 RX ADMIN — HYDROCODONE BITARTRATE AND ACETAMINOPHEN 1 TABLET: 5; 325 TABLET ORAL at 13:41

## 2021-11-30 RX ADMIN — BUPIVACAINE HYDROCHLORIDE 60 ML: 2.5 INJECTION, SOLUTION EPIDURAL; INFILTRATION; INTRACAUDAL; PERINEURAL at 11:03

## 2021-11-30 RX ADMIN — CEFAZOLIN SODIUM 2 G: 2 INJECTION, SOLUTION INTRAVENOUS at 10:57

## 2021-11-30 RX ADMIN — PROPOFOL 120 MG: 10 INJECTION, EMULSION INTRAVENOUS at 10:54

## 2021-11-30 RX ADMIN — FAMOTIDINE 20 MG: 20 TABLET ORAL at 09:29

## 2021-11-30 RX ADMIN — GLYCOPYRROLATE 0.4 MG: 0.2 INJECTION INTRAMUSCULAR; INTRAVENOUS at 11:59

## 2021-11-30 RX ADMIN — PROPOFOL 25 MCG/KG/MIN: 10 INJECTION, EMULSION INTRAVENOUS at 11:27

## 2021-11-30 RX ADMIN — LIDOCAINE HYDROCHLORIDE 0.5 ML: 10 INJECTION, SOLUTION EPIDURAL; INFILTRATION; INTRACAUDAL; PERINEURAL at 09:15

## 2021-11-30 RX ADMIN — ONDANSETRON 4 MG: 2 INJECTION INTRAMUSCULAR; INTRAVENOUS at 11:33

## 2021-11-30 RX ADMIN — DEXAMETHASONE SODIUM PHOSPHATE 4 MG: 10 INJECTION, SOLUTION INTRAMUSCULAR; INTRAVENOUS at 11:03

## 2021-11-30 RX ADMIN — NEOSTIGMINE METHYLSULFATE 3 MG: 0.5 INJECTION INTRAVENOUS at 11:59

## 2021-11-30 RX ADMIN — EPHEDRINE SULFATE 10 MG: 50 INJECTION INTRAVENOUS at 11:13

## 2021-11-30 NOTE — ANESTHESIA POSTPROCEDURE EVALUATION
Patient: Lashanda Qureshi    Procedure Summary     Date: 11/30/21 Room / Location:  [facility]    Anesthesia Start: 1047 Anesthesia Stop: 1211    Procedure: OPEN INCISIONAL HERNIA REPAIR WITH MESH (N/A Abdomen) Diagnosis: Incisional hernia without mention of obstruction or gangrene    Surgeons: Polina Schuster MD Provider: Chapin Milner MD    Anesthesia Type: general with block ASA Status: 3          Anesthesia Type: general with block    Vitals  No vitals data found for the desired time range.          Post Anesthesia Care and Evaluation    Patient location during evaluation: PACU  Patient participation: complete - patient participated  Level of consciousness: awake and alert  Pain management: adequate  Airway patency: patent  Anesthetic complications: No anesthetic complications  PONV Status: none  Cardiovascular status: hemodynamically stable and acceptable  Respiratory status: nonlabored ventilation, acceptable and nasal cannula  Hydration status: acceptable

## 2021-11-30 NOTE — ANESTHESIA PREPROCEDURE EVALUATION
Anesthesia Evaluation     NPO Solid Status: > 8 hours  NPO Liquid Status: > 2 hours           Airway   Mallampati: I  TM distance: >3 FB  Neck ROM: full  No difficulty expected  Dental      Pulmonary     breath sounds clear to auscultation  (+) asthma,  Cardiovascular     ECG reviewed  Rhythm: regular  Rate: normal    (+) hypertension, dysrhythmias Atrial Fib,       Neuro/Psych  GI/Hepatic/Renal/Endo      Musculoskeletal     (+) neck pain,   Abdominal    Substance History      OB/GYN          Other   arthritis,      ROS/Med Hx Other: Normal sinus rhythm with sinus arrhythmia  Left axis deviation  Incomplete right bundle branch block  Nonspecific T wave abnormality  Abnormal ECG  When compared with ECG of 14-AUG-2020 12:27,  No significant change was found    · Left ventricular ejection fraction is normal. (Calculated EF = 65%).  · Myocardial perfusion imaging indicates a normal myocardial perfusion study with no evidence of ischemia.  · Impressions are consistent with a low risk study.    Hx ? Paroxysmal AF; no anticoagulants                      Anesthesia Plan    ASA 3     general with block     intravenous induction     Anesthetic plan, all risks, benefits, and alternatives have been provided, discussed and informed consent has been obtained with: patient.    Plan discussed with CRNA.

## 2021-11-30 NOTE — ANESTHESIA PROCEDURE NOTES
Airway  Urgency: elective    Date/Time: 11/30/2021 10:57 AM  Airway not difficult    General Information and Staff    Patient location during procedure: OR  CRNA: Vanessa Nevarez CRNA    Indications and Patient Condition  Indications for airway management: airway protection    Preoxygenated: yes  MILS not maintained throughout  Mask difficulty assessment: 1 - vent by mask    Final Airway Details  Final airway type: endotracheal airway      Successful airway: ETT  Cuffed: yes   Successful intubation technique: direct laryngoscopy  Endotracheal tube insertion site: oral  Blade: Venita  Blade size: 3  ETT size (mm): 7.0  Cormack-Lehane Classification: grade I - full view of glottis  Placement verified by: chest auscultation and capnometry   Measured from: lips  ETT/EBT  to lips (cm): 20  Number of attempts at approach: 1  Assessment: lips, teeth, and gum same as pre-op and atraumatic intubation    Additional Comments  Negative epigastric sounds, Breath sound equal bilaterally with symmetric chest rise and fall

## 2021-11-30 NOTE — ANESTHESIA PROCEDURE NOTES
Peripheral Block      Patient reassessed immediately prior to procedure    Patient location during procedure: OR  Start time: 11/30/2021 11:06 AM  Reason for block: at surgeon's request and post-op pain management  Performed by  Anesthesiologist: Chapin Milner MD  Preanesthetic Checklist  Completed: patient identified, site marked, risks and benefits discussed, surgical consent, monitors and equipment checked, pre-op evaluation and timeout performed  Prep:  Pt Position: supine  Sterile barriers:cap, gloves, sterile barriers and mask  Prep: ChloraPrep  Patient monitoring: blood pressure monitoring, continuous pulse oximetry and EKG  Procedure    Sedation: yes  Performed under: general  Guidance:ultrasound guided  Images:still images obtained, printed/placed on chart    Laterality:Bilateral  Block Type:TAP  Injection Technique:single-shot  Needle Type:short-bevel and echogenic  Needle Gauge:20 G  Resistance on Injection: none    Medications Used: bupivacaine PF (MARCAINE) 0.25 % injection, 60 mL  dexamethasone sodium phosphate injection, 4 mg      Medications  Comment:Block Injection:  LA dose divided between Right and Left block        Post Assessment  Injection Assessment: negative aspiration for heme, incremental injection and no paresthesia on injection  Patient Tolerance:comfortable throughout block  Complications:no  Additional Notes      Under Ultrasound guidance, a BBraun 4inch 360 degree needle was advanced with Normal Saline hydro dissection of tissue.  The Internal Oblique and Transversus Abdominus muscles where visualized.  At or before the aponeurosis of Internal Oblique, local anesthetic spread was visualized in the Transversus Abdominus Plane. Injection was made incrementally with aspiration every 5 mls.  There was no  intravascular injection,  injection pressure was normal, there was no neural injection, and the procedure was completed without difficulty.  Thank You.

## 2021-12-06 ENCOUNTER — TELEPHONE (OUTPATIENT)
Dept: FAMILY MEDICINE CLINIC | Facility: CLINIC | Age: 86
End: 2021-12-06

## 2021-12-06 NOTE — TELEPHONE ENCOUNTER
Called and spoke to pt. Informed of providers recommendation. Pt made appt for Wednesday at 1245 with Agosto. Had no further questions/concerns at this time.

## 2021-12-06 NOTE — TELEPHONE ENCOUNTER
"Pt called stated she had surgery for hernia on 11/30 Stated since then she has just noticed a lot of anxiety, a lot of delayed memory issues. States she\"just gets confused a lot and I have confusion now that I used to not have\" Pt states shes calling because of the anxiety and also her b/p was 124/75 took again about 30min ago and it was 166/94 pulse 59. Denies any chest pains or dizziness. Please advise, thanks  "

## 2021-12-08 ENCOUNTER — OFFICE VISIT (OUTPATIENT)
Dept: FAMILY MEDICINE CLINIC | Facility: CLINIC | Age: 86
End: 2021-12-08

## 2021-12-08 VITALS
WEIGHT: 138.2 LBS | OXYGEN SATURATION: 97 % | HEIGHT: 67 IN | DIASTOLIC BLOOD PRESSURE: 82 MMHG | HEART RATE: 50 BPM | SYSTOLIC BLOOD PRESSURE: 132 MMHG | BODY MASS INDEX: 21.69 KG/M2

## 2021-12-08 DIAGNOSIS — I10 ESSENTIAL HYPERTENSION: ICD-10-CM

## 2021-12-08 DIAGNOSIS — F41.9 ANXIETY: ICD-10-CM

## 2021-12-08 DIAGNOSIS — I10 PRIMARY HYPERTENSION: Primary | ICD-10-CM

## 2021-12-08 DIAGNOSIS — E87.6 HYPOKALEMIA: ICD-10-CM

## 2021-12-08 PROCEDURE — 99213 OFFICE O/P EST LOW 20 MIN: CPT | Performed by: INTERNAL MEDICINE

## 2021-12-08 RX ORDER — HYDROCHLOROTHIAZIDE 12.5 MG/1
12.5 TABLET ORAL DAILY
Qty: 30 TABLET | Refills: 5 | Status: SHIPPED | OUTPATIENT
Start: 2021-12-08 | End: 2022-01-24 | Stop reason: SDUPTHER

## 2021-12-08 RX ORDER — SERTRALINE HYDROCHLORIDE 25 MG/1
12.5 TABLET, FILM COATED ORAL DAILY
Qty: 30 TABLET | Refills: 6 | Status: SHIPPED | OUTPATIENT
Start: 2021-12-08 | End: 2022-01-24

## 2021-12-08 RX ORDER — POTASSIUM CHLORIDE 20 MEQ/1
20 TABLET, EXTENDED RELEASE ORAL DAILY
Qty: 30 TABLET | Refills: 2 | Status: SHIPPED | OUTPATIENT
Start: 2021-12-08 | End: 2022-01-24 | Stop reason: SDUPTHER

## 2021-12-08 NOTE — PROGRESS NOTES
"Lashanda Qureshi  1935  [id number]  Patient Care Team:  Leandro Johnson APRN as PCP - General (Family Medicine)  Crispin Wheeler MD as Consulting Physician (Orthopedic Surgery)  James Gonzales MD de Castro, Fernando R., MD as Consulting Physician (Dermatology)  Eliseo Burleson MD as Surgeon (General Surgery)    Lashanda Qureshi is a 86 y.o. female here today for follow up.     This patient is accompanied by their self who contributes to the history of their care.    Chief Complaint:    Chief Complaint   Patient presents with   • Anxiety     Still having issues         History of Present Illness:  I have reviewed and/or updated the patient's past medical, past surgical, family, social history, problem list and allergies as appropriate.       Has been experiencing worsening anxiety, wanting to isolate. Has not seen a counselors or psychiatry. No prior need for anxiety or depression meds. Sleeping poorly, awakens at 3 am with intrusive thoughts and worry. Has 2 sons in town. Becoming weepy. Does not look forward to going out. Afraid to leave her home. Looks forward to seeing her children. Has become forgetful- especially since surgery. Denies HI/SI. Resides in a senior living apartment-has avoided activities since covid.     Review of Systems   Constitutional: Positive for fatigue.   Respiratory: Negative.    Cardiovascular: Negative.    Psychiatric/Behavioral: Positive for sleep disturbance. The patient is nervous/anxious.        Vitals:    12/08/21 1247   BP: 132/82   Pulse: 50   SpO2: 97%   Weight: 62.7 kg (138 lb 3.2 oz)   Height: 170.2 cm (67.01\")     Body mass index is 21.64 kg/m².    Physical Exam  Vitals and nursing note reviewed.   Constitutional:       General: She is not in acute distress.     Appearance: She is well-developed. She is not diaphoretic.   HENT:      Head: Normocephalic and atraumatic.      Right Ear: Tympanic membrane and external ear normal.      Left Ear: Tympanic " membrane and external ear normal.      Mouth/Throat:      Pharynx: No oropharyngeal exudate.   Eyes:      General: No scleral icterus.        Right eye: No discharge.      Conjunctiva/sclera: Conjunctivae normal.   Neck:      Thyroid: No thyromegaly.      Vascular: No JVD.      Trachea: No tracheal deviation.   Cardiovascular:      Rate and Rhythm: Normal rate and regular rhythm.      Heart sounds: Normal heart sounds.      Comments: PMI nondisplaced  Pulmonary:      Effort: Pulmonary effort is normal.      Breath sounds: Normal breath sounds. No wheezing or rales.   Abdominal:      General: Bowel sounds are normal.      Palpations: Abdomen is soft.      Tenderness: There is no abdominal tenderness. There is no guarding or rebound.   Musculoskeletal:      Cervical back: Normal range of motion and neck supple.      Comments: Normal gait   Lymphadenopathy:      Cervical: No cervical adenopathy.   Skin:     General: Skin is warm and dry.      Capillary Refill: Capillary refill takes less than 2 seconds.      Coloration: Skin is not pale.      Findings: No rash.   Neurological:      Mental Status: She is alert and oriented to person, place, and time.      Motor: No abnormal muscle tone.      Coordination: Coordination normal.   Psychiatric:         Judgment: Judgment normal.         Procedures    Results Review:    None    Assessment/Plan:     Problem List Items Addressed This Visit        Cardiac and Vasculature    Hypertension - Primary    Current Assessment & Plan     Hypertension is unchanged.  Continue current treatment regimen.  Dietary sodium restriction.  Weight loss.  Continue current medications.  Blood pressure will be reassessed at the next regular appointment.         Relevant Medications    metoprolol succinate XL (TOPROL-XL) 25 MG 24 hr tablet    hydroCHLOROthiazide (HYDRODIURIL) 12.5 MG tablet       Mental Health    Anxiety    Current Assessment & Plan     egin zoloft 12.5 mg, daily RV in 4weeks            Other Visit Diagnoses     Essential hypertension        Relevant Medications    hydroCHLOROthiazide (HYDRODIURIL) 12.5 MG tablet    Hypokalemia        Relevant Medications    potassium chloride (K-DUR,KLOR-CON) 20 MEQ CR tablet          Plan of care reviewed with patient at the conclusion of today's visit. Education was provided regarding diagnosis and management.  Patient verbalizes understanding of and agreement with management plan.    Return in about 4 weeks (around 1/5/2022) for molly or jose for anxiety.    Mian Agosto MD      Please note than portions of this note were completed wt a Voice Recognition Program

## 2021-12-09 RX ORDER — METOPROLOL SUCCINATE 25 MG/1
25 TABLET, EXTENDED RELEASE ORAL DAILY
Qty: 30 TABLET | Refills: 0 | Status: SHIPPED | OUTPATIENT
Start: 2021-12-09 | End: 2022-01-24 | Stop reason: SDUPTHER

## 2021-12-09 NOTE — TELEPHONE ENCOUNTER
Rx Refill Note  Requested Prescriptions     Pending Prescriptions Disp Refills   • metoprolol succinate XL (TOPROL-XL) 25 MG 24 hr tablet       Sig: Take 1 tablet by mouth Daily.      Last office visit with prescribing clinician: 12/08/2021      Next office visit with prescribing clinician:  1/05/2022           Fatemeh Bowles MA  12/09/21, 14:34 EST

## 2022-01-04 RX ORDER — TERBINAFINE HYDROCHLORIDE 250 MG/1
TABLET ORAL
COMMUNITY
End: 2022-01-24

## 2022-01-05 ENCOUNTER — OFFICE VISIT (OUTPATIENT)
Dept: FAMILY MEDICINE CLINIC | Facility: CLINIC | Age: 87
End: 2022-01-05

## 2022-01-05 VITALS
OXYGEN SATURATION: 95 % | WEIGHT: 136.8 LBS | SYSTOLIC BLOOD PRESSURE: 136 MMHG | HEIGHT: 67 IN | BODY MASS INDEX: 21.47 KG/M2 | DIASTOLIC BLOOD PRESSURE: 92 MMHG | HEART RATE: 119 BPM

## 2022-01-05 DIAGNOSIS — I10 PRIMARY HYPERTENSION: ICD-10-CM

## 2022-01-05 DIAGNOSIS — F41.9 ANXIETY: Primary | ICD-10-CM

## 2022-01-05 PROCEDURE — 99213 OFFICE O/P EST LOW 20 MIN: CPT | Performed by: INTERNAL MEDICINE

## 2022-01-05 NOTE — PROGRESS NOTES
"Lashanda Qureshi  1935  [id number]  Patient Care Team:  Leandro Johnson APRN as PCP - General (Family Medicine)  Crispin Wheeler MD as Consulting Physician (Orthopedic Surgery)  James Gonzales MD de Castro, Fernando R., MD as Consulting Physician (Dermatology)  Eliseo Burleson MD as Surgeon (General Surgery)    Lashanda Qureshi is a 86 y.o. female here today for follow up.     This patient is accompanied by their self who contributes to the history of their care.    Chief Complaint:    Chief Complaint   Patient presents with   • Anxiety     4 wk f/u          History of Present Illness:  I have reviewed and/or updated the patient's past medical, past surgical, family, social history, problem list and allergies as appropriate.   Mood  And isolation tendencies are improved on Zoloft. She however would like to stop this       Review of Systems   Constitutional: Negative.  Negative for fatigue.   Cardiovascular: Negative for chest pain.   Psychiatric/Behavioral: Negative for sleep disturbance. The patient is not nervous/anxious.        Vitals:    01/05/22 1244   BP: 136/92   Pulse: 119   SpO2: 95%   Weight: 62.1 kg (136 lb 12.8 oz)   Height: 170.2 cm (67.01\")     Body mass index is 21.42 kg/m².    Physical Exam  Vitals and nursing note reviewed.   Constitutional:       General: She is not in acute distress.     Appearance: She is well-developed. She is not diaphoretic.   HENT:      Head: Normocephalic and atraumatic.      Right Ear: External ear normal.      Left Ear: External ear normal.      Mouth/Throat:      Pharynx: No oropharyngeal exudate.   Eyes:      General: No scleral icterus.        Right eye: No discharge.      Conjunctiva/sclera: Conjunctivae normal.   Neck:      Thyroid: No thyromegaly.      Vascular: No JVD.      Trachea: No tracheal deviation.   Cardiovascular:      Rate and Rhythm: Normal rate and regular rhythm.      Heart sounds: Normal heart sounds.      Comments: PMI " nondisplaced  Pulmonary:      Effort: Pulmonary effort is normal.      Breath sounds: Normal breath sounds. No wheezing or rales.   Abdominal:      General: Bowel sounds are normal.      Palpations: Abdomen is soft.      Tenderness: There is no abdominal tenderness. There is no guarding or rebound.   Musculoskeletal:      Cervical back: Normal range of motion and neck supple.      Comments: Normal gait   Lymphadenopathy:      Cervical: No cervical adenopathy.   Skin:     General: Skin is warm and dry.      Capillary Refill: Capillary refill takes less than 2 seconds.      Coloration: Skin is not pale.      Findings: No rash.   Neurological:      Mental Status: She is alert and oriented to person, place, and time.      Motor: No abnormal muscle tone.      Coordination: Coordination normal.   Psychiatric:         Judgment: Judgment normal.         Procedures    Results Review:    None    Assessment/Plan:  For most of her anxiety was situational however is markedly resolved.  She would like to come off Zoloft.  She will take 12.5 mg every other day x5 doses and then stop.  She will return in 2 or 3 weeks for Medicare wellness.  Problem List Items Addressed This Visit        Cardiac and Vasculature    Hypertension    Relevant Medications    hydroCHLOROthiazide (HYDRODIURIL) 12.5 MG tablet    metoprolol succinate XL (TOPROL-XL) 25 MG 24 hr tablet       Mental Health    Anxiety - Primary          Plan of care reviewed with patient at the conclusion of today's visit. Education was provided regarding diagnosis and management.  Patient verbalizes understanding of and agreement with management plan.    No follow-ups on file.    Mian Agosto MD      Please note than portions of this note were completed [facility] a Voice Recognition Program

## 2022-01-24 ENCOUNTER — OFFICE VISIT (OUTPATIENT)
Dept: FAMILY MEDICINE CLINIC | Facility: CLINIC | Age: 87
End: 2022-01-24

## 2022-01-24 VITALS
SYSTOLIC BLOOD PRESSURE: 124 MMHG | HEART RATE: 85 BPM | TEMPERATURE: 97.5 F | OXYGEN SATURATION: 97 % | WEIGHT: 136 LBS | DIASTOLIC BLOOD PRESSURE: 80 MMHG | HEIGHT: 67 IN | BODY MASS INDEX: 21.35 KG/M2

## 2022-01-24 DIAGNOSIS — E78.5 DYSLIPIDEMIA: ICD-10-CM

## 2022-01-24 DIAGNOSIS — I10 PRIMARY HYPERTENSION: ICD-10-CM

## 2022-01-24 DIAGNOSIS — Z00.00 ENCOUNTER FOR SUBSEQUENT ANNUAL WELLNESS VISIT (AWV) IN MEDICARE PATIENT: Primary | ICD-10-CM

## 2022-01-24 DIAGNOSIS — E87.6 HYPOKALEMIA: ICD-10-CM

## 2022-01-24 PROCEDURE — G0439 PPPS, SUBSEQ VISIT: HCPCS | Performed by: NURSE PRACTITIONER

## 2022-01-24 PROCEDURE — 1170F FXNL STATUS ASSESSED: CPT | Performed by: NURSE PRACTITIONER

## 2022-01-24 PROCEDURE — 1159F MED LIST DOCD IN RCRD: CPT | Performed by: NURSE PRACTITIONER

## 2022-01-24 RX ORDER — NYSTATIN 100000 U/G
OINTMENT TOPICAL
COMMUNITY
Start: 2022-01-19 | End: 2022-12-13 | Stop reason: HOSPADM

## 2022-01-24 RX ORDER — POTASSIUM CHLORIDE 20 MEQ/1
20 TABLET, EXTENDED RELEASE ORAL DAILY
Qty: 90 TABLET | Refills: 3 | Status: SHIPPED | OUTPATIENT
Start: 2022-01-24 | End: 2022-12-13 | Stop reason: HOSPADM

## 2022-01-24 RX ORDER — HYDROCHLOROTHIAZIDE 12.5 MG/1
12.5 TABLET ORAL DAILY
Qty: 90 TABLET | Refills: 3 | Status: SHIPPED | OUTPATIENT
Start: 2022-01-24 | End: 2022-12-13 | Stop reason: HOSPADM

## 2022-01-24 RX ORDER — METOPROLOL SUCCINATE 25 MG/1
25 TABLET, EXTENDED RELEASE ORAL DAILY
Qty: 90 TABLET | Refills: 3 | Status: SHIPPED | OUTPATIENT
Start: 2022-01-24

## 2022-01-24 NOTE — PROGRESS NOTES
The ABCs of the Annual Wellness Visit  Subsequent Medicare Wellness Visit    Chief Complaint   Patient presents with   • Medicare Wellness-subsequent   • Hypertension   • Hernia      Subjective    History of Present Illness:  Lashanda Qureshi is a 86 y.o. female who presents for a Subsequent Medicare Wellness Visit.  Patient day for routine yearly wellness exam.  She does have a history of high blood pressure and currently takes hydrochlorothiazide 12.5 mg daily, metoprolol XL 25 mg daily.  Her weight is down 3 pounds from November of last year.    The following portions of the patient's history were reviewed and   updated as appropriate: allergies, current medications, past family history, past medical history, past social history, past surgical history and problem list.    Compared to one year ago, the patient feels her physical   health is the same.    Compared to one year ago, the patient feels her mental   health is worse.    Recent Hospitalizations:  She was not admitted to the hospital during the last year.       Current Medical Providers:  Patient Care Team:  Leandro Johnson APRN as PCP - General (Family Medicine)  Crispin Wheeler MD as Consulting Physician (Orthopedic Surgery)  James Gonzales MD de Castro, Fernando R., MD as Consulting Physician (Dermatology)  Eliseo Burleson MD as Surgeon (General Surgery)    Outpatient Medications Prior to Visit   Medication Sig Dispense Refill   • budesonide-formoterol (Symbicort) 80-4.5 MCG/ACT inhaler Inhale 2 puffs 2 (Two) Times a Day. (Patient taking differently: Inhale 2 puffs Daily As Needed.) 1 inhaler 12   • Cholecalciferol (VITAMIN D3) 2000 UNITS tablet Take 2,000 Units by mouth. Take 2 tablets daily.       • conjugated estrogens (PREMARIN) 0.625 MG/GM vaginal cream Insert  into the vagina As Needed.     • Cyanocobalamin (VITAMIN B12 PO) Take 1 tablet by mouth Daily. Take 1 tablet daily as directed.       • Elastic Bandages & Supports  (FUTURO FIRM COMPRESSION HOSE) misc 1 package Daily. 1 each 1   • ketoconazole (NIZORAL) 2 % cream Apply 1 application topically to the appropriate area as directed Daily.     • latanoprost (XALATAN) 0.005 % ophthalmic solution Administer 1 drop to both eyes Every Night.     • Multiple Vitamins-Minerals (CENTRUM SILVER PO) Take 1 tablet by mouth Daily.     • nystatin (MYCOSTATIN) 179292 UNIT/GM ointment      • Oxyquinolone Sulfate (Trimo-Shea) 0.025 % gel 1 g.     • hydroCHLOROthiazide (HYDRODIURIL) 12.5 MG tablet Take 1 tablet by mouth Daily. 30 tablet 5   • metoprolol succinate XL (TOPROL-XL) 25 MG 24 hr tablet Take 1 tablet by mouth Daily. 30 tablet 0   • potassium chloride (K-DUR,KLOR-CON) 20 MEQ CR tablet Take 1 tablet by mouth Daily. 30 tablet 2   • sertraline (Zoloft) 25 MG tablet Take 0.5 tablets by mouth Daily. 30 tablet 6   • terbinafine (lamiSIL) 250 MG tablet terbinafine HCl 250 mg tablet       No facility-administered medications prior to visit.       No opioid medication identified on active medication list. I have reviewed chart for other potential  high risk medication/s and harmful drug interactions in the elderly.          Aspirin is not on active medication list.  Aspirin use is not indicated based on review of current medical condition/s. Risk of harm outweighs potential benefits.  .    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 difficile diarrhea   • Cervical pain   • Chronic midline low back pain without sciatica   • Thoracogenic scoliosis of thoracic region   • Insomnia   • Localized edema   • Malignant tumor of urinary bladder (HCC)   • Acute hypoxemic respiratory failure (HCC)   • Acute diastolic CHF (congestive heart failure) (HCC)   • NSVT (nonsustained ventricular tachycardia) (HCC)   • Dysfunction of both eustachian tubes   • Dyslipidemia   • Exudative age-related macular degeneration, left eye, with active choroidal neovascularization (HCC)   • Anginal equivalent (HCC)   • PVD (peripheral vascular disease) (HCC)   • Anxiety     Advance Care Planning  Advance Directive is not on file.  ACP discussion was held with the patient during this visit. Patient has an advance directive (not in EMR), copy requested.    Review of Systems   Constitutional: Negative for chills, fatigue and fever.   HENT: Positive for rhinorrhea. Negative for congestion, hearing loss, postnasal drip, sinus pressure, sore throat and trouble swallowing.    Eyes: Positive for visual disturbance (Macular Degeneration). Negative for pain.        Seeing opthalmology   Respiratory: Negative for cough, shortness of breath and wheezing.    Cardiovascular: Negative for chest pain, palpitations and leg swelling.   Gastrointestinal: Negative for abdominal pain, blood in stool, constipation, diarrhea (looser BMs as she has Hx of colon CA), nausea and vomiting.   Endocrine: Negative for polydipsia, polyphagia and polyuria.   Genitourinary: Positive for difficulty urinating (leakage). Negative for dysuria, frequency, hematuria and vaginal bleeding.   Musculoskeletal: Positive for arthralgias (at times). Negative for back pain (tmes), gait problem, neck pain and neck stiffness.   Skin: Positive for color  "change (legs). Negative for rash and wound.   Allergic/Immunologic: Positive for environmental allergies.   Neurological: Negative for dizziness, seizures, syncope, weakness and light-headedness.   Hematological: Does not bruise/bleed easily.   Psychiatric/Behavioral: Negative for dysphoric mood and sleep disturbance. The patient is not nervous/anxious.         Objective    Vitals:    01/24/22 1249 01/24/22 1340   BP: 140/70 124/80   Pulse: 85    Temp: 97.5 °F (36.4 °C)    SpO2: 97%    Weight: 61.7 kg (136 lb)    Height: 170.2 cm (67.01\")      BMI Readings from Last 1 Encounters:   01/24/22 21.30 kg/m²   BMI is within normal parameters. No follow-up required.    Does the patient have evidence of cognitive impairment? No    Physical Exam  Vitals reviewed.   Constitutional:       Appearance: She is well-developed.   HENT:      Head: Normocephalic and atraumatic.      Right Ear: External ear normal.      Left Ear: External ear normal.   Eyes:      Pupils: Pupils are equal, round, and reactive to light.   Neck:      Thyroid: No thyromegaly.      Vascular: No JVD.   Cardiovascular:      Rate and Rhythm: Normal rate and regular rhythm.      Heart sounds: Normal heart sounds.   Pulmonary:      Effort: Pulmonary effort is normal. No retractions.      Breath sounds: Normal breath sounds.   Chest:      Chest wall: No mass, lacerations, deformity, swelling, tenderness, crepitus or edema.   Breasts: Breasts are symmetrical.       Abdominal:      General: Bowel sounds are normal. There is no distension.      Palpations: Abdomen is soft.      Tenderness: There is no abdominal tenderness. There is no guarding.   Genitourinary:     Comments: deferred  Musculoskeletal:         General: Normal range of motion.      Cervical back: Normal range of motion and neck supple.   Lymphadenopathy:      Cervical: No cervical adenopathy.   Skin:     General: Skin is warm and dry.      Findings: No erythema or rash.   Neurological:      Mental " Status: She is alert and oriented to person, place, and time.   Psychiatric:         Behavior: Behavior normal.       Lab Results   Component Value Date    HGBA1C 5.60 2021            HEALTH RISK ASSESSMENT    Smoking Status:  Social History     Tobacco Use   Smoking Status Former Smoker   • Packs/day: 1.00   • Years: 2.00   • Pack years: 2.00   • Types: Cigarettes   • Quit date: 8/3/1969   • Years since quittin.5   Smokeless Tobacco Never Used   Tobacco Comment    quit 52 years ago      Alcohol Consumption:  Social History     Substance and Sexual Activity   Alcohol Use Yes    Comment: SOCIAL     Fall Risk Screen:    STEADI Fall Risk Assessment was completed, and patient is at LOW risk for falls.Assessment completed on:2022    Depression Screening:  PHQ-2/PHQ-9 Depression Screening 2022   Little interest or pleasure in doing things 1   Feeling down, depressed, or hopeless 0   Total Score 1       Health Habits and Functional and Cognitive Screening:  Functional & Cognitive Status 2022   Do you have difficulty preparing food and eating? No   Do you have difficulty bathing yourself, getting dressed or grooming yourself? No   Do you have difficulty using the toilet? No   Do you have difficulty moving around from place to place? No   Do you have trouble with steps or getting out of a bed or a chair? No   Current Diet Well Balanced Diet   Dental Exam Not up to date   Eye Exam Up to date   Exercise (times per week) 1 times per week   Current Exercises Include Walking   Current Exercise Activities Include -   Do you need help using the phone?  No   Are you deaf or do you have serious difficulty hearing?  No   Do you need help with transportation? No   Do you need help shopping? No   Do you need help preparing meals?  No   Do you need help with housework?  No   Do you need help with laundry? No   Do you need help taking your medications? No   Do you need help managing money? No   Do you ever drive  or ride in a car without wearing a seat belt? No   Have you felt unusual stress, anger or loneliness in the last month? Yes   Who do you live with? Alone   If you need help, do you have trouble finding someone available to you? No   Have you been bothered in the last four weeks by sexual problems? No   Do you have difficulty concentrating, remembering or making decisions? Yes       Age-appropriate Screening Schedule:  Refer to the list below for future screening recommendations based on patient's age, sex and/or medical conditions. Orders for these recommended tests are listed in the plan section. The patient has been provided with a written plan.    Health Maintenance   Topic Date Due   • DXA SCAN  Never done   • ZOSTER VACCINE (2 of 2) 11/26/2015   • TDAP/TD VACCINES (2 - Td or Tdap) 01/01/2024   • INFLUENZA VACCINE  Completed              Assessment/Plan   CMS Preventative Services Quick Reference  Risk Factors Identified During Encounter  Immunizations Discussed/Encouraged (specific Immunizations; Shingrix  The above risks/problems have been discussed with the patient.  Follow up actions/plans if indicated are seen below in the Assessment/Plan Section.  Pertinent information has been shared with the patient in the After Visit Summary.    Diagnoses and all orders for this visit:    1. Encounter for subsequent annual wellness visit (AWV) in Medicare patient (Primary)  -     Comprehensive Metabolic Panel; Future  -     Lipid Panel; Future    2. Primary hypertension  -     hydroCHLOROthiazide (HYDRODIURIL) 12.5 MG tablet; Take 1 tablet by mouth Daily.  Dispense: 90 tablet; Refill: 3  -     metoprolol succinate XL (TOPROL-XL) 25 MG 24 hr tablet; Take 1 tablet by mouth Daily.  Dispense: 90 tablet; Refill: 3  -     Comprehensive Metabolic Panel; Future    3. Dyslipidemia  -     Lipid Panel; Future    4. Hypokalemia  -     potassium chloride (K-DUR,KLOR-CON) 20 MEQ CR tablet; Take 1 tablet by mouth Daily.  Dispense: 90  tablet; Refill: 3  -     Comprehensive Metabolic Panel; Future    The preventative exam has been reviewed in detail.  The patient has been fully counseled on preventative guidelines for vaccines, cancer screenings, and other health maintenance needs. The patient was counseled on maintaining a lifestyle to promote good health and to minimize chronic diseases.  The patient has been assisted with scheduling healthcare procedures for the coming year and given a written document outlining these recommendations. Age-appropriate screening measures have been ordered for the patient today as indicated above.    Patient instructed to check blood pressure daily, record, and bring to next visit. If the readings run 140/90 or greater, the patient is to call my office or return sooner for evaluation. Pt advised to eat a heart healthy diet and get regular aerobic exercise.    Will obtain routine labs today and make further recommendations pending results.     Follow Up:   Return in about 6 months (around 7/24/2022), or if symptoms worsen or fail to improve, for Recheck, hypertention.     An After Visit Summary and PPPS were made available to the patient.

## 2022-01-24 NOTE — PATIENT INSTRUCTIONS
Managing Your Hypertension  Hypertension, also called high blood pressure, is when the force of the blood pressing against the walls of the arteries is too strong. Arteries are blood vessels that carry blood from your heart throughout your body. Hypertension forces the heart to work harder to pump blood and may cause the arteries to become narrow or stiff.  Understanding blood pressure readings  Your personal target blood pressure may vary depending on your medical conditions, your age, and other factors. A blood pressure reading includes a higher number over a lower number. Ideally, your blood pressure should be below 120/80. You should know that:  · The first, or top, number is called the systolic pressure. It is a measure of the pressure in your arteries as your heart beats.  · The second, or bottom number, is called the diastolic pressure. It is a measure of the pressure in your arteries as the heart relaxes.  Blood pressure is classified into four stages. Based on your blood pressure reading, your health care provider may use the following stages to determine what type of treatment you need, if any. Systolic pressure and diastolic pressure are measured in a unit called mmHg.  Normal  · Systolic pressure: below 120.  · Diastolic pressure: below 80.  Elevated  · Systolic pressure: 120-129.  · Diastolic pressure: below 80.  Hypertension stage 1  · Systolic pressure: 130-139.  · Diastolic pressure: 80-89.  Hypertension stage 2  · Systolic pressure: 140 or above.  · Diastolic pressure: 90 or above.  How can this condition affect me?  Managing your hypertension is an important responsibility. Over time, hypertension can damage the arteries and decrease blood flow to important parts of the body, including the brain, heart, and kidneys. Having untreated or uncontrolled hypertension can lead to:  · A heart attack.  · A stroke.  · A weakened blood vessel (aneurysm).  · Heart failure.  · Kidney damage.  · Eye  damage.  · Metabolic syndrome.  · Memory and concentration problems.  · Vascular dementia.  What actions can I take to manage this condition?  Hypertension can be managed by making lifestyle changes and possibly by taking medicines. Your health care provider will help you make a plan to bring your blood pressure within a normal range.  Nutrition    · Eat a diet that is high in fiber and potassium, and low in salt (sodium), added sugar, and fat. An example eating plan is called the Dietary Approaches to Stop Hypertension (DASH) diet. To eat this way:  ? Eat plenty of fresh fruits and vegetables. Try to fill one-half of your plate at each meal with fruits and vegetables.  ? Eat whole grains, such as whole-wheat pasta, brown rice, or whole-grain bread. Fill about one-fourth of your plate with whole grains.  ? Eat low-fat dairy products.  ? Avoid fatty cuts of meat, processed or cured meats, and poultry with skin. Fill about one-fourth of your plate with lean proteins such as fish, chicken without skin, beans, eggs, and tofu.  ? Avoid pre-made and processed foods. These tend to be higher in sodium, added sugar, and fat.  · Reduce your daily sodium intake. Most people with hypertension should eat less than 1,500 mg of sodium a day.    Lifestyle    · Work with your health care provider to maintain a healthy body weight or to lose weight. Ask what an ideal weight is for you.  · Get at least 30 minutes of exercise that causes your heart to beat faster (aerobic exercise) most days of the week. Activities may include walking, swimming, or biking.  · Include exercise to strengthen your muscles (resistance exercise), such as weight lifting, as part of your weekly exercise routine. Try to do these types of exercises for 30 minutes at least 3 days a week.  · Do not use any products that contain nicotine or tobacco, such as cigarettes, e-cigarettes, and chewing tobacco. If you need help quitting, ask your health care  provider.  · Control any long-term (chronic) conditions you have, such as high cholesterol or diabetes.  · Identify your sources of stress and find ways to manage stress. This may include meditation, deep breathing, or making time for fun activities.    Alcohol use  · Do not drink alcohol if:  ? Your health care provider tells you not to drink.  ? You are pregnant, may be pregnant, or are planning to become pregnant.  · If you drink alcohol:  ? Limit how much you use to:  § 0-1 drink a day for women.  § 0-2 drinks a day for men.  ? Be aware of how much alcohol is in your drink. In the [place] one drink equals one 12 oz bottle of beer (355 mL), one 5 oz glass of wine (148 mL), or one 1½ oz glass of hard liquor (44 mL).  Medicines  Your health care provider may prescribe medicine if lifestyle changes are not enough to get your blood pressure under control and if:  · Your systolic blood pressure is 130 or higher.  · Your diastolic blood pressure is 80 or higher.  Take medicines only as told by your health care provider. Follow the directions carefully. Blood pressure medicines must be taken as told by your health care provider. The medicine does not work as well when you skip doses. Skipping doses also puts you at risk for problems.  Monitoring  Before you monitor your blood pressure:  · Do not smoke, drink caffeinated beverages, or exercise within 30 minutes before taking a measurement.  · Use the bathroom and empty your bladder (urinate).  · Sit quietly for at least 5 minutes before taking measurements.  Monitor your blood pressure at home as told by your health care provider. To do this:  · Sit with your back straight and supported.  · Place your feet flat on the floor. Do not cross your legs.  · Support your arm on a flat surface, such as a table. Make sure your upper arm is at heart level.  · Each time you measure, take two or three readings one minute apart and record the results.  You may also need to have your  blood pressure checked regularly by your health care provider.  General information  · Talk with your health care provider about your diet, exercise habits, and other lifestyle factors that may be contributing to hypertension.  · Review all the medicines you take with your health care provider because there may be side effects or interactions.  · Keep all visits as told by your health care provider. Your health care provider can help you create and adjust your plan for managing your high blood pressure.  Where to find more information  · National Heart, Lung, and Blood [place]: www.nhlbi.nih.gov  · American Heart Association: www.heart.org  Contact a health care provider if:  · You think you are having a reaction to medicines you have taken.  · You have repeated (recurrent) headaches.  · You feel dizzy.  · You have swelling in your ankles.  · You have trouble with your vision.  Get help right away if:  · You develop a severe headache or confusion.  · You have unusual weakness or numbness, or you feel faint.  · You have severe pain in your chest or abdomen.  · You vomit repeatedly.  · You have trouble breathing.  These symptoms may represent a serious problem that is an emergency. Do not wait to see if the symptoms will go away. Get medical help right away. Call your local emergency services (911 in the [place] Do not drive yourself to the hospital.  Summary  · Hypertension is when the force of blood pumping through your arteries is too strong. If this condition is not controlled, it may put you at risk for serious complications.  · Your personal target blood pressure may vary depending on your medical conditions, your age, and other factors. For most people, a normal blood pressure is less than 120/80.  · Hypertension is managed by lifestyle changes, medicines, or both.  · Lifestyle changes to help manage hypertension include losing weight, eating a healthy, low-sodium diet, exercising more, stopping smoking, and  "limiting alcohol.  This information is not intended to replace advice given to you by your health care provider. Make sure you discuss any questions you have with your health care provider.  Document Revised: 01/22/2021 Document Reviewed: 11/17/2020  Elsevier Patient Education © 2021 [employer]      https://www.nhlbi.nih.gov/files/docs/public/heart/dash_brief.pdf\">   DASH Eating Plan  DASH stands for Dietary Approaches to Stop Hypertension. The DASH eating plan is a healthy eating plan that has been shown to:  · Reduce high blood pressure (hypertension).  · Reduce your risk for type 2 diabetes, heart disease, and stroke.  · Help with weight loss.  What are tips for following this plan?  Reading food labels  · Check food labels for the amount of salt (sodium) per serving. Choose foods with less than 5 percent of the Daily Value of sodium. Generally, foods with less than 300 milligrams (mg) of sodium per serving fit into this eating plan.  · To find whole grains, look for the word \"whole\" as the first word in the ingredient list.  Shopping  · Buy products labeled as \"low-sodium\" or \"no salt added.\"  · Buy fresh foods. Avoid canned foods and pre-made or frozen meals.  Cooking  · Avoid adding salt when cooking. Use salt-free seasonings or herbs instead of table salt or sea salt. Check with your health care provider or pharmacist before using salt substitutes.  · Do not branham foods. Cook foods using healthy methods such as baking, boiling, grilling, roasting, and broiling instead.  · Cook with heart-healthy oils, such as olive, canola, avocado, soybean, or sunflower oil.  Meal planning    · Eat a balanced diet that includes:  ? 4 or more servings of fruits and 4 or more servings of vegetables each day. Try to fill one-half of your plate with fruits and vegetables.  ? 6-8 servings of whole grains each day.  ? Less than 6 oz (170 g) of lean meat, poultry, or fish each day. A 3-oz (85-g) serving of meat is about the same " size as a deck of cards. One egg equals 1 oz (28 g).  ? 2-3 servings of low-fat dairy each day. One serving is 1 cup (237 mL).  ? 1 serving of nuts, seeds, or beans 5 times each week.  ? 2-3 servings of heart-healthy fats. Healthy fats called omega-3 fatty acids are found in foods such as walnuts, flaxseeds, fortified milks, and eggs. These fats are also found in cold-water fish, such as sardines, salmon, and mackerel.  · Limit how much you eat of:  ? Canned or prepackaged foods.  ? Food that is high in trans fat, such as some fried foods.  ? Food that is high in saturated fat, such as fatty meat.  ? Desserts and other sweets, sugary drinks, and other foods with added sugar.  ? Full-fat dairy products.  · Do not salt foods before eating.  · Do not eat more than 4 egg yolks a week.  · Try to eat at least 2 vegetarian meals a week.  · Eat more home-cooked food and less restaurant, buffet, and fast food.    Lifestyle  · When eating at a restaurant, ask that your food be prepared with less salt or no salt, if possible.  · If you drink alcohol:  ? Limit how much you use to:  § 0-1 drink a day for women who are not pregnant.  § 0-2 drinks a day for men.  ? Be aware of how much alcohol is in your drink. In the [place] one drink equals one 12 oz bottle of beer (355 mL), one 5 oz glass of wine (148 mL), or one 1½ oz glass of hard liquor (44 mL).  General information  · Avoid eating more than 2,300 mg of salt a day. If you have hypertension, you may need to reduce your sodium intake to 1,500 mg a day.  · Work with your health care provider to maintain a healthy body weight or to lose weight. Ask what an ideal weight is for you.  · Get at least 30 minutes of exercise that causes your heart to beat faster (aerobic exercise) most days of the week. Activities may include walking, swimming, or biking.  · Work with your health care provider or dietitian to adjust your eating plan to your individual calorie needs.  What foods should I  eat?  Fruits  All fresh, dried, or frozen fruit. Canned fruit in natural juice (without added sugar).  Vegetables  Fresh or frozen vegetables (raw, steamed, roasted, or grilled). Low-sodium or reduced-sodium tomato and vegetable juice. Low-sodium or reduced-sodium tomato sauce and tomato paste. Low-sodium or reduced-sodium canned vegetables.  Grains  Whole-grain or whole-wheat bread. Whole-grain or whole-wheat pasta. Brown rice. Oatmeal. Quinoa. Bulgur. Whole-grain and low-sodium cereals. Meg bread. Low-fat, low-sodium crackers. Whole-wheat flour tortillas.  Meats and other proteins  Skinless chicken or turkey. Ground chicken or turkey. Pork with fat trimmed off. Fish and seafood. Egg whites. Dried beans, peas, or lentils. Unsalted nuts, nut butters, and seeds. Unsalted canned beans. Lean cuts of beef with fat trimmed off. Low-sodium, lean precooked or cured meat, such as sausages or meat loaves.  Dairy  Low-fat (1%) or fat-free (skim) milk. Reduced-fat, low-fat, or fat-free cheeses. Nonfat, low-sodium ricotta or cottage cheese. Low-fat or nonfat yogurt. Low-fat, low-sodium cheese.  Fats and oils  Soft margarine without trans fats. Vegetable oil. Reduced-fat, low-fat, or light mayonnaise and salad dressings (reduced-sodium). Canola, safflower, olive, avocado, soybean, and sunflower oils. Avocado.  Seasonings and condiments  Herbs. Spices. Seasoning mixes without salt.  Other foods  Unsalted popcorn and pretzels. Fat-free sweets.  The items listed above may not be a complete list of foods and beverages you can eat. Contact a dietitian for more information.  What foods should I avoid?  Fruits  Canned fruit in a light or heavy syrup. Fried fruit. Fruit in cream or butter sauce.  Vegetables  Creamed or fried vegetables. Vegetables in a cheese sauce. Regular canned vegetables (not low-sodium or reduced-sodium). Regular canned tomato sauce and paste (not low-sodium or reduced-sodium). Regular tomato and vegetable juice  (not low-sodium or reduced-sodium). Pickles. Olives.  Grains  Baked goods made with fat, such as croissants, muffins, or some breads. Dry pasta or rice meal packs.  Meats and other proteins  Fatty cuts of meat. Ribs. Fried meat. Ramsay. Bologna, salami, and other precooked or cured meats, such as sausages or meat loaves. Fat from the back of a pig (fatback). Bratwurst. Salted nuts and seeds. Canned beans with added salt. Canned or smoked fish. Whole eggs or egg yolks. Chicken or turkey with skin.  Dairy  Whole or 2% milk, cream, and half-and-half. Whole or full-fat cream cheese. Whole-fat or sweetened yogurt. Full-fat cheese. Nondairy creamers. Whipped toppings. Processed cheese and cheese spreads.  Fats and oils  Butter. Stick margarine. Lard. Shortening. Ghee. Ramsay fat. Tropical oils, such as coconut, palm kernel, or palm oil.  Seasonings and condiments  Onion salt, garlic salt, seasoned salt, table salt, and sea salt. Worcestershire sauce. Tartar sauce. Barbecue sauce. Teriyaki sauce. Soy sauce, including reduced-sodium. Steak sauce. Canned and packaged gravies. Fish sauce. Oyster sauce. Cocktail sauce. Store-bought horseradish. Ketchup. Mustard. Meat flavorings and tenderizers. Bouillon cubes. Hot sauces. Pre-made or packaged marinades. Pre-made or packaged taco seasonings. Relishes. Regular salad dressings.  Other foods  Salted popcorn and pretzels.  The items listed above may not be a complete list of foods and beverages you should avoid. Contact a dietitian for more information.  Where to find more information  · National Heart, Lung, and Blood [place]: www.nhlbi.nih.gov  · American Heart Association: www.heart.org  · Academy of Nutrition and Dietetics: www.eatright.org  · National Kidney Foundation: www.kidney.org  Summary  · The DASH eating plan is a healthy eating plan that has been shown to reduce high blood pressure (hypertension). It may also reduce your risk for type 2 diabetes, heart disease, and  stroke.  · When on the DASH eating plan, aim to eat more fresh fruits and vegetables, whole grains, lean proteins, low-fat dairy, and heart-healthy fats.  · With the DASH eating plan, you should limit salt (sodium) intake to 2,300 mg a day. If you have hypertension, you may need to reduce your sodium intake to 1,500 mg a day.  · Work with your health care provider or dietitian to adjust your eating plan to your individual calorie needs.  This information is not intended to replace advice given to you by your health care provider. Make sure you discuss any questions you have with your health care provider.  Document Revised: 11/20/2020 Document Reviewed: 11/20/2020  Elsevier Patient Education © 2021 [employer]

## 2022-07-07 ENCOUNTER — TRANSCRIBE ORDERS (OUTPATIENT)
Dept: ADMINISTRATIVE | Facility: HOSPITAL | Age: 87
End: 2022-07-07

## 2022-07-07 DIAGNOSIS — M81.0 AGE RELATED OSTEOPOROSIS, UNSPECIFIED PATHOLOGICAL FRACTURE PRESENCE: Primary | ICD-10-CM

## 2022-09-06 ENCOUNTER — APPOINTMENT (OUTPATIENT)
Dept: GENERAL RADIOLOGY | Facility: HOSPITAL | Age: 87
End: 2022-09-06

## 2022-09-06 ENCOUNTER — HOSPITAL ENCOUNTER (EMERGENCY)
Facility: HOSPITAL | Age: 87
Discharge: HOME | End: 2022-09-06
Attending: EMERGENCY MEDICINE | Admitting: EMERGENCY MEDICINE

## 2022-09-06 VITALS
BODY MASS INDEX: 19.62 KG/M2 | OXYGEN SATURATION: 95 % | DIASTOLIC BLOOD PRESSURE: 90 MMHG | WEIGHT: 125 LBS | HEART RATE: 76 BPM | TEMPERATURE: 97.9 F | RESPIRATION RATE: 16 BRPM | SYSTOLIC BLOOD PRESSURE: 169 MMHG | HEIGHT: 67 IN

## 2022-09-06 DIAGNOSIS — E87.6 HYPOKALEMIA: ICD-10-CM

## 2022-09-06 DIAGNOSIS — N39.0 ACUTE UTI: ICD-10-CM

## 2022-09-06 DIAGNOSIS — R42 DIZZINESS: Primary | ICD-10-CM

## 2022-09-06 DIAGNOSIS — E83.42 HYPOMAGNESEMIA: ICD-10-CM

## 2022-09-06 LAB
ALBUMIN SERPL-MCNC: 3.8 G/DL (ref 3.5–5.2)
ALBUMIN/GLOB SERPL: 1.2 G/DL
ALP SERPL-CCNC: 62 U/L (ref 39–117)
ALT SERPL W P-5'-P-CCNC: 16 U/L (ref 1–33)
ANION GAP SERPL CALCULATED.3IONS-SCNC: 11 MMOL/L (ref 5–15)
AST SERPL-CCNC: 21 U/L (ref 1–32)
BACTERIA UR QL AUTO: ABNORMAL /HPF
BASOPHILS # BLD AUTO: 0.02 10*3/MM3 (ref 0–0.2)
BASOPHILS NFR BLD AUTO: 0.4 % (ref 0–1.5)
BILIRUB SERPL-MCNC: 0.5 MG/DL (ref 0–1.2)
BILIRUB UR QL STRIP: NEGATIVE
BUN SERPL-MCNC: 14 MG/DL (ref 8–23)
BUN/CREAT SERPL: 17.5 (ref 7–25)
CALCIUM SPEC-SCNC: 8.8 MG/DL (ref 8.6–10.5)
CHLORIDE SERPL-SCNC: 100 MMOL/L (ref 98–107)
CLARITY UR: ABNORMAL
CO2 SERPL-SCNC: 32 MMOL/L (ref 22–29)
COLOR UR: YELLOW
CREAT SERPL-MCNC: 0.8 MG/DL (ref 0.57–1)
DEPRECATED RDW RBC AUTO: 45 FL (ref 37–54)
EGFRCR SERPLBLD CKD-EPI 2021: 71.4 ML/MIN/1.73
EOSINOPHIL # BLD AUTO: 0.1 10*3/MM3 (ref 0–0.4)
EOSINOPHIL NFR BLD AUTO: 2.2 % (ref 0.3–6.2)
ERYTHROCYTE [DISTWIDTH] IN BLOOD BY AUTOMATED COUNT: 13.2 % (ref 12.3–15.4)
GLOBULIN UR ELPH-MCNC: 3.3 GM/DL
GLUCOSE BLDC GLUCOMTR-MCNC: 114 MG/DL (ref 70–130)
GLUCOSE SERPL-MCNC: 121 MG/DL (ref 65–99)
GLUCOSE UR STRIP-MCNC: NEGATIVE MG/DL
HCT VFR BLD AUTO: 37.1 % (ref 34–46.6)
HGB BLD-MCNC: 12.5 G/DL (ref 12–15.9)
HGB UR QL STRIP.AUTO: ABNORMAL
HOLD SPECIMEN: NORMAL
HYALINE CASTS UR QL AUTO: ABNORMAL /LPF
IMM GRANULOCYTES # BLD AUTO: 0.01 10*3/MM3 (ref 0–0.05)
IMM GRANULOCYTES NFR BLD AUTO: 0.2 % (ref 0–0.5)
KETONES UR QL STRIP: ABNORMAL
LEUKOCYTE ESTERASE UR QL STRIP.AUTO: ABNORMAL
LYMPHOCYTES # BLD AUTO: 1.26 10*3/MM3 (ref 0.7–3.1)
LYMPHOCYTES NFR BLD AUTO: 28.1 % (ref 19.6–45.3)
MAGNESIUM SERPL-MCNC: 1.4 MG/DL (ref 1.6–2.4)
MCH RBC QN AUTO: 31.3 PG (ref 26.6–33)
MCHC RBC AUTO-ENTMCNC: 33.7 G/DL (ref 31.5–35.7)
MCV RBC AUTO: 93 FL (ref 79–97)
MONOCYTES # BLD AUTO: 0.4 10*3/MM3 (ref 0.1–0.9)
MONOCYTES NFR BLD AUTO: 8.9 % (ref 5–12)
NEUTROPHILS NFR BLD AUTO: 2.7 10*3/MM3 (ref 1.7–7)
NEUTROPHILS NFR BLD AUTO: 60.2 % (ref 42.7–76)
NITRITE UR QL STRIP: NEGATIVE
NRBC BLD AUTO-RTO: 0 /100 WBC (ref 0–0.2)
PH UR STRIP.AUTO: 6.5 [PH] (ref 5–8)
PLATELET # BLD AUTO: 293 10*3/MM3 (ref 140–450)
PMV BLD AUTO: 8.6 FL (ref 6–12)
POTASSIUM SERPL-SCNC: 2.7 MMOL/L (ref 3.5–5.2)
PROT SERPL-MCNC: 7.1 G/DL (ref 6–8.5)
PROT UR QL STRIP: ABNORMAL
RBC # BLD AUTO: 3.99 10*6/MM3 (ref 3.77–5.28)
RBC # UR STRIP: ABNORMAL /HPF
REF LAB TEST METHOD: ABNORMAL
SODIUM SERPL-SCNC: 143 MMOL/L (ref 136–145)
SP GR UR STRIP: 1.02 (ref 1–1.03)
SQUAMOUS #/AREA URNS HPF: ABNORMAL /HPF
TROPONIN T SERPL-MCNC: <0.01 NG/ML (ref 0–0.03)
UROBILINOGEN UR QL STRIP: ABNORMAL
WBC # UR STRIP: ABNORMAL /HPF
WBC NRBC COR # BLD: 4.49 10*3/MM3 (ref 3.4–10.8)
WHOLE BLOOD HOLD COAG: NORMAL
WHOLE BLOOD HOLD SPECIMEN: NORMAL

## 2022-09-06 PROCEDURE — 87040 BLOOD CULTURE FOR BACTERIA: CPT | Performed by: EMERGENCY MEDICINE

## 2022-09-06 PROCEDURE — 36415 COLL VENOUS BLD VENIPUNCTURE: CPT

## 2022-09-06 PROCEDURE — 25010000002 CEFTRIAXONE PER 250 MG: Performed by: EMERGENCY MEDICINE

## 2022-09-06 PROCEDURE — 96365 THER/PROPH/DIAG IV INF INIT: CPT

## 2022-09-06 PROCEDURE — 93005 ELECTROCARDIOGRAM TRACING: CPT | Performed by: EMERGENCY MEDICINE

## 2022-09-06 PROCEDURE — 82962 GLUCOSE BLOOD TEST: CPT

## 2022-09-06 PROCEDURE — 71045 X-RAY EXAM CHEST 1 VIEW: CPT

## 2022-09-06 PROCEDURE — 85025 COMPLETE CBC W/AUTO DIFF WBC: CPT

## 2022-09-06 PROCEDURE — 25010000002 MAGNESIUM SULFATE PER 500 MG OF MAGNESIUM: Performed by: EMERGENCY MEDICINE

## 2022-09-06 PROCEDURE — 84484 ASSAY OF TROPONIN QUANT: CPT

## 2022-09-06 PROCEDURE — 81001 URINALYSIS AUTO W/SCOPE: CPT

## 2022-09-06 PROCEDURE — 99284 EMERGENCY DEPT VISIT MOD MDM: CPT

## 2022-09-06 PROCEDURE — 25010000002 POTASSIUM CHLORIDE PER 2 MEQ OF POTASSIUM: Performed by: EMERGENCY MEDICINE

## 2022-09-06 PROCEDURE — 80053 COMPREHEN METABOLIC PANEL: CPT | Performed by: EMERGENCY MEDICINE

## 2022-09-06 PROCEDURE — 96367 TX/PROPH/DG ADDL SEQ IV INF: CPT

## 2022-09-06 PROCEDURE — 93005 ELECTROCARDIOGRAM TRACING: CPT

## 2022-09-06 PROCEDURE — 83735 ASSAY OF MAGNESIUM: CPT | Performed by: EMERGENCY MEDICINE

## 2022-09-06 RX ORDER — POTASSIUM CHLORIDE 750 MG/1
20 CAPSULE, EXTENDED RELEASE ORAL ONCE
Status: COMPLETED | OUTPATIENT
Start: 2022-09-06 | End: 2022-09-06

## 2022-09-06 RX ORDER — ALBUTEROL SULFATE 90 UG/1
2 AEROSOL, METERED RESPIRATORY (INHALATION) ONCE
Status: DISCONTINUED | OUTPATIENT
Start: 2022-09-06 | End: 2022-09-06

## 2022-09-06 RX ORDER — POTASSIUM CHLORIDE 20 MEQ/1
20 TABLET, EXTENDED RELEASE ORAL 2 TIMES DAILY
Qty: 10 TABLET | Refills: 0 | Status: SHIPPED | OUTPATIENT
Start: 2022-09-06

## 2022-09-06 RX ORDER — SACCHAROMYCES BOULARDII 250 MG
250 CAPSULE ORAL 2 TIMES DAILY
Qty: 20 CAPSULE | Refills: 0 | Status: SHIPPED | OUTPATIENT
Start: 2022-09-06 | End: 2022-09-16

## 2022-09-06 RX ORDER — SODIUM CHLORIDE 0.9 % (FLUSH) 0.9 %
10 SYRINGE (ML) INJECTION AS NEEDED
Status: DISCONTINUED | OUTPATIENT
Start: 2022-09-06 | End: 2022-09-06 | Stop reason: HOSPADM

## 2022-09-06 RX ORDER — CEFDINIR 300 MG/1
300 CAPSULE ORAL 2 TIMES DAILY
Qty: 14 CAPSULE | Refills: 0 | Status: SHIPPED | OUTPATIENT
Start: 2022-09-06 | End: 2022-09-13

## 2022-09-06 RX ORDER — MAGNESIUM 30 MG
30 TABLET ORAL DAILY
Qty: 14 TABLET | Refills: 0 | Status: SHIPPED | OUTPATIENT
Start: 2022-09-06 | End: 2022-09-07

## 2022-09-06 RX ADMIN — POTASSIUM CHLORIDE 1000 ML/HR: 2 INJECTION, SOLUTION, CONCENTRATE INTRAVENOUS at 14:19

## 2022-09-06 RX ADMIN — SODIUM CHLORIDE 1 G: 900 INJECTION INTRAVENOUS at 16:17

## 2022-09-06 RX ADMIN — POTASSIUM CHLORIDE 20 MEQ: 750 CAPSULE, EXTENDED RELEASE ORAL at 14:48

## 2022-09-06 NOTE — ED PROVIDER NOTES
"Subjective     Fatigue  Location:  Generalized  Quality:  Unable to describe  Severity:  Mild  Onset quality:  Sudden  Timing:  Constant  Progression:  Improving  Chronicity:  Recurrent  Associated symptoms: fatigue    Associated symptoms: no abdominal pain, no chest pain, no diarrhea, no headaches, no shortness of breath and no vomiting        Patient is a pleasant 87-year-old female who presents with \"dizziness and confusion.\"  She states that this morning she was talking to her son on the phone when she became acutely dizzy.  She states it was not vertigo sensation like the room spinning around her but also was not lightheadedness.  She cannot describe it in detail.  She at baseline has some mild concentration and some mild cognition deficits where she has.  Mind on what the plans are for the day and things like that that are typical of someone her age.  She has a procedure scheduled for for a ablation of the veins on her left lower extremity later this week, on Friday.  As she called the son and was nervous asking him what she needed to do in preparation for that procedure.  I while she was talking to him on the phone he states that she became acutely dizzy.  This dizzy sensation she had lasted several minutes.  It is currently resolved, but she still says that she feels \"off\".  Denies any coordination deficits.  Denies any unilateral sensory or motor deficits.  She admits to both cognition and do the episodes in the past very similar to this but today's dizzy episodes more pronounced and is prompting her visit to the emergency department.  No increased difficulty walking or fall noted this morning.    Denies fever, chills, chest pain, shortness of breath, abdominal pain, vomiting, diarrhea, or other acute complaints.      Fatigue  Location:  Generalized  Quality:  Unable to describe  Severity:  Mild  Onset quality:  Sudden  Timing:  Constant  Progression:  Improving  Chronicity:  Recurrent  Associated symptoms: " fatigue    Associated symptoms: no abdominal pain, no chest pain, no diarrhea, no headaches, no shortness of breath and no vomiting        Review of Systems   Constitutional: Positive for fatigue.   Respiratory: Negative for shortness of breath.    Cardiovascular: Negative for chest pain.   Gastrointestinal: Negative for abdominal pain, diarrhea and vomiting.   Neurological: Negative for headaches.   All other systems reviewed and are negative.      Past Medical History:   Diagnosis Date   • Abnormal CT scan, lung    • Anxiety    • Arthritis    • Asthma    • Atrial fibrillation (HCC)    • Bladder cancer (HCC)    • C. difficile colitis    • Cancer (HCC)    • Colon cancer (HCC)    • GERD (gastroesophageal reflux disease)    • Hypertension    • Mycobacterium gordonae infection    • Wears glasses        Allergies   Allergen Reactions   • Antihistamines, Loratadine-Type Other (See Comments)     Drowsiness     • Erythromycin Rash       Past Surgical History:   Procedure Laterality Date   • BLADDER SURGERY     • BRONCHOSCOPY  01/27/2016   • CHOLECYSTECTOMY     • COLON SURGERY     • COLONOSCOPY     • DILATION AND CURETTAGE, DIAGNOSTIC / THERAPEUTIC     • REFRACTIVE SURGERY  04/2019   • TOTAL HIP ARTHROPLASTY      Left hip   • VENTRAL/INCISIONAL HERNIA REPAIR N/A 11/30/2021    Procedure: OPEN INCISIONAL HERNIA REPAIR WITH MESH;  Surgeon: Polina Schuster MD;  Location: [facility];  Service: General;  Laterality: N/A;   • WISDOM TOOTH EXTRACTION         Family History   Problem Relation Age of Onset   • COPD Mother    • Hyperlipidemia Mother    • Hypertension Mother    • Cancer Father    • Lung cancer Father    • Heart attack Brother    • Diabetes Brother    • Multiple sclerosis Daughter    • No Known Problems Maternal Grandmother    • No Known Problems Maternal Grandfather    • Diabetes Paternal Grandmother    • Diabetes Paternal Grandfather        Social History     Socioeconomic History   • Marital status:     Tobacco Use   • Smoking status: Former Smoker     Packs/day: 1.00     Years: 2.00     Pack years: 2.00     Types: Cigarettes     Quit date: 8/3/1969     Years since quittin.1   • Smokeless tobacco: Never Used   • Tobacco comment: quit 52 years ago    Vaping Use   • Vaping Use: Never used   Substance and Sexual Activity   • Alcohol use: Yes     Comment: SOCIAL   • Drug use: No   • Sexual activity: Defer     Comment:            Objective   Physical Exam  Vitals and nursing note reviewed.   Constitutional:       General: She is not in acute distress.  HENT:      Head: Normocephalic and atraumatic.   Eyes:      Extraocular Movements: Extraocular movements intact.      Right eye: Normal extraocular motion and no nystagmus.      Left eye: Normal extraocular motion and no nystagmus.      Conjunctiva/sclera: Conjunctivae normal.      Pupils: Pupils are equal, round, and reactive to light.   Neck:      Thyroid: No thyromegaly.   Cardiovascular:      Rate and Rhythm: Normal rate and regular rhythm.      Heart sounds: Normal heart sounds. No murmur heard.    No friction rub. No gallop.   Pulmonary:      Effort: Pulmonary effort is normal. No respiratory distress.      Breath sounds: Normal breath sounds.   Abdominal:      General: Bowel sounds are normal.      Palpations: Abdomen is soft.      Tenderness: There is no abdominal tenderness.   Musculoskeletal:         General: Normal range of motion.      Cervical back: Normal range of motion and neck supple.   Lymphadenopathy:      Cervical: No cervical adenopathy.   Skin:     General: Skin is warm and dry.      Capillary Refill: Capillary refill takes less than 2 seconds.   Neurological:      Mental Status: She is alert and oriented to person, place, and time.      GCS: GCS eye subscore is 4. GCS verbal subscore is 5. GCS motor subscore is 6.      Sensory: No sensory deficit.      Motor: No weakness.      Coordination: Coordination normal.      Gait: Gait normal.       Comments: Patient walked back and forth from the bathroom multiple times without difficulty.  Finger-nose and heel-to-shin testing is normal.     Psychiatric:         Speech: Speech normal.         Behavior: Behavior normal.         Procedures           ED Course      Recent Results (from the past 24 hour(s))   ECG 12 Lead    Collection Time: 09/06/22 12:18 PM   Result Value Ref Range    QT Interval 406 ms    QTC Interval 468 ms   Comprehensive Metabolic Panel    Collection Time: 09/06/22 12:26 PM    Specimen: Blood   Result Value Ref Range    Glucose 121 (H) 65 - 99 mg/dL    BUN 14 8 - 23 mg/dL    Creatinine 0.80 0.57 - 1.00 mg/dL    Sodium 143 136 - 145 mmol/L    Potassium 2.7 (L) 3.5 - 5.2 mmol/L    Chloride 100 98 - 107 mmol/L    CO2 32.0 (H) 22.0 - 29.0 mmol/L    Calcium 8.8 8.6 - 10.5 mg/dL    Total Protein 7.1 6.0 - 8.5 g/dL    Albumin 3.80 3.50 - 5.20 g/dL    ALT (SGPT) 16 1 - 33 U/L    AST (SGOT) 21 1 - 32 U/L    Alkaline Phosphatase 62 39 - 117 U/L    Total Bilirubin 0.5 0.0 - 1.2 mg/dL    Globulin 3.3 gm/dL    A/G Ratio 1.2 g/dL    BUN/Creatinine Ratio 17.5 7.0 - 25.0    Anion Gap 11.0 5.0 - 15.0 mmol/L    eGFR 71.4 >60.0 mL/min/1.73   Troponin    Collection Time: 09/06/22 12:26 PM    Specimen: Blood   Result Value Ref Range    Troponin T <0.010 0.000 - 0.030 ng/mL   Magnesium    Collection Time: 09/06/22 12:26 PM    Specimen: Blood   Result Value Ref Range    Magnesium 1.4 (L) 1.6 - 2.4 mg/dL   Green Top (Gel)    Collection Time: 09/06/22 12:26 PM   Result Value Ref Range    Extra Tube Hold for add-ons.    Lavender Top    Collection Time: 09/06/22 12:26 PM   Result Value Ref Range    Extra Tube hold for add-on    Gold Top - SST    Collection Time: 09/06/22 12:26 PM   Result Value Ref Range    Extra Tube Hold for add-ons.    Gray Top    Collection Time: 09/06/22 12:26 PM   Result Value Ref Range    Extra Tube Hold for add-ons.    Light Blue Top    Collection Time: 09/06/22 12:26 PM   Result Value Ref  Range    Extra Tube Hold for add-ons.    CBC Auto Differential    Collection Time: 09/06/22 12:26 PM    Specimen: Blood   Result Value Ref Range    WBC 4.49 3.40 - 10.80 10*3/mm3    RBC 3.99 3.77 - 5.28 10*6/mm3    Hemoglobin 12.5 12.0 - 15.9 g/dL    Hematocrit 37.1 34.0 - 46.6 %    MCV 93.0 79.0 - 97.0 fL    MCH 31.3 26.6 - 33.0 pg    MCHC 33.7 31.5 - 35.7 g/dL    RDW 13.2 12.3 - 15.4 %    RDW-SD 45.0 37.0 - 54.0 fl    MPV 8.6 6.0 - 12.0 fL    Platelets 293 140 - 450 10*3/mm3    Neutrophil % 60.2 42.7 - 76.0 %    Lymphocyte % 28.1 19.6 - 45.3 %    Monocyte % 8.9 5.0 - 12.0 %    Eosinophil % 2.2 0.3 - 6.2 %    Basophil % 0.4 0.0 - 1.5 %    Immature Grans % 0.2 0.0 - 0.5 %    Neutrophils, Absolute 2.70 1.70 - 7.00 10*3/mm3    Lymphocytes, Absolute 1.26 0.70 - 3.10 10*3/mm3    Monocytes, Absolute 0.40 0.10 - 0.90 10*3/mm3    Eosinophils, Absolute 0.10 0.00 - 0.40 10*3/mm3    Basophils, Absolute 0.02 0.00 - 0.20 10*3/mm3    Immature Grans, Absolute 0.01 0.00 - 0.05 10*3/mm3    nRBC 0.0 0.0 - 0.2 /100 WBC   POC Glucose Once    Collection Time: 09/06/22 12:27 PM    Specimen: Blood   Result Value Ref Range    Glucose 114 70 - 130 mg/dL   Urinalysis With Microscopic If Indicated (No Culture) - Urine, Clean Catch    Collection Time: 09/06/22 12:38 PM    Specimen: Urine, Clean Catch   Result Value Ref Range    Color, UA Yellow Yellow, Straw    Appearance, UA Turbid (A) Clear    pH, UA 6.5 5.0 - 8.0    Specific Gravity, UA 1.018 1.001 - 1.030    Glucose, UA Negative Negative    Ketones, UA Trace (A) Negative    Bilirubin, UA Negative Negative    Blood, UA Moderate (2+) (A) Negative    Protein, UA 30 mg/dL (1+) (A) Negative    Leuk Esterase, UA Large (3+) (A) Negative    Nitrite, UA Negative Negative    Urobilinogen, UA 1.0 E.U./dL 0.2 - 1.0 E.U./dL   Urinalysis, Microscopic Only - Urine, Clean Catch    Collection Time: 09/06/22 12:38 PM    Specimen: Urine, Clean Catch   Result Value Ref Range    RBC, UA 21-30 (A) None Seen,  "0-2 /HPF    WBC, UA Too Numerous to Count (A) None Seen, 0-2 /HPF    Bacteria, UA 2+ (A) None Seen, Trace /HPF    Squamous Epithelial Cells, UA 7-12 (A) None Seen, 0-2 /HPF    Hyaline Casts, UA 0-6 0 - 6 /LPF    Methodology Automated Microscopy      Note: In addition to lab results from this visit, the labs listed above may include labs taken at another facility or during a different encounter within the last 24 hours. Please correlate lab times with ED admission and discharge times for further clarification of the services performed during this visit.    XR Chest 1 View   Final Result       1. Mild to moderate pulmonary venous hypertension.   2. Chronic appearing interstitial lung changes elsewhere.       This report was finalized on 9/6/2022 1:09 PM by Dr. Harish Espino MD.            Vitals:    09/06/22 1206 09/06/22 1300 09/06/22 1415   BP: 140/92 149/79 169/90   BP Location: Right arm     Patient Position: Sitting     Pulse: 81 69 76   Resp: 16     Temp: 97.9 °F (36.6 °C)     TempSrc: Oral     SpO2: 94% 97% 95%   Weight: 56.7 kg (125 lb)     Height: 168.9 cm (66.5\")       Medications   potassium chloride 20 mEq, magnesium sulfate 2 g in sodium chloride 0.9 % 1,000 mL infusion (0 mL/hr Intravenous Stopped 9/6/22 1619)   potassium chloride (MICRO-K) CR capsule 20 mEq (20 mEq Oral Given 9/6/22 1448)   cefTRIAXone (ROCEPHIN) 1 g/100 mL 0.9% NS (MBP) (0 g Intravenous Stopped 9/6/22 1646)     ECG/EMG Results (last 24 hours)     ** No results found for the last 24 hours. **        ECG 12 Lead   Final Result   Test Reason : Weak/Dizzy/AMS protocol   Blood Pressure :   */*   mmHG   Vent. Rate :  80 BPM     Atrial Rate :  73 BPM      P-R Int :   * ms          QRS Dur :  80 ms       QT Int : 406 ms       P-R-T Axes :   * -38  70 degrees      QTc Int : 468 ms      Atrial fibrillation   Left axis deviation   Minimal voltage criteria for LVH, may be normal variant   Nonspecific ST and T wave abnormality   Abnormal ECG   When " compared with ECG of 22-NOV-2021 09:45,   Atrial fibrillation has replaced Sinus rhythm   Incomplete right bundle branch block is no longer present   Confirmed by MD RIVERA CORY ([id number]) on 9/7/2022 3:14:43 AM      Referred By: EDMD           Confirmed By: HEATH RIVERA MD              MDM  Work-up is reassuring.  Patient did have fairly significant electrolyte abnormalities including hypokalemia and hypomagnesemia.  Have given her IV supplementation here in the emergency department and prescription is for discharge.  She will take these medications as prescribed.  She will follow-up with her primary care provider later this week for a blood drawl and a reevaluation of electrolyte levels along with reexamination.  She and the son understand that they need to keep a close eye on her symptoms and should her symptoms persist, worsen, or other concerns arise, return to the emergency department immediately for reevaluation.  They are both comfortable with discharge at this time.      Final diagnoses:   Dizziness   Acute UTI   Hypomagnesemia   Hypokalemia       ED Disposition  ED Disposition     ED Disposition   Discharge    Condition   Stable    Comment   --           DISCHARGE    Patient discharged in stable condition.    Reviewed implications of results, diagnosis, meds, responsibility to follow up, warning signs and symptoms of possible worsening, potential complications and reasons to return to ER.    Patient/Family voiced understanding of above instructions.    Discussed plan for discharge, as there is no emergent indication for admission.  Pt/family is agreeable and understands need for follow up and possible repeat testing.  Pt/family is aware that discharge does not mean that nothing is wrong but that it indicates no emergency is currently present that requires admission and they must continue care with follow-up as given below or with a physician of their choice.     FOLLOW-UP  Gisella Evans DO  [address]  [facility] [address]  [place] [address]  [phone]    Schedule an appointment as soon as possible for a visit       [facility] Emergency Department  [address] [address] [phone]    If symptoms worsen         Medication List      New Prescriptions    cefdinir 300 MG capsule  Commonly known as: OMNICEF  Take 1 capsule by mouth 2 (Two) Times a Day for 7 days.     magnesium 30 MG tablet  Take 1 tablet by mouth Daily for 14 days.     saccharomyces boulardii 250 MG capsule  Commonly known as: FLORASTOR  Take 1 capsule by mouth 2 (Two) Times a Day for 10 days.        Changed    budesonide-formoterol 80-4.5 MCG/ACT inhaler  Commonly known as: Symbicort  Inhale 2 puffs 2 (Two) Times a Day.  What changed:   · when to take this  · reasons to take this     * potassium chloride 20 MEQ CR tablet  Commonly known as: K-DUR,KLOR-CON  Take 1 tablet by mouth Daily.  What changed: Another medication with the same name was added. Make sure you understand how and when to take each.     * potassium chloride 20 MEQ CR tablet  Commonly known as: K-DUR,KLOR-CON  Take 1 tablet by mouth 2 (Two) Times a Day.  What changed: You were already taking a medication with the same name, and this prescription was added. Make sure you understand how and when to take each.         * This list has 2 medication(s) that are the same as other medications prescribed for you. Read the directions carefully, and ask your doctor or other care provider to review them with you.               Where to Get Your Medications      These medications were sent to [facility] [address] AT [facility] [phone] [facility] [phone] [facility] [id number]    Phone: [phone]   · cefdinir 300 MG capsule  · magnesium 30 MG tablet  · potassium chloride 20 MEQ CR tablet  · saccharomyces boulardii 250 MG capsule            Myron Nicholas DO  09/07/22 0438

## 2022-09-07 LAB
QT INTERVAL: 406 MS
QTC INTERVAL: 468 MS

## 2022-09-11 LAB
BACTERIA SPEC AEROBE CULT: NORMAL
BACTERIA SPEC AEROBE CULT: NORMAL

## 2022-09-12 ENCOUNTER — TRANSCRIBE ORDERS (OUTPATIENT)
Dept: ADMINISTRATIVE | Facility: HOSPITAL | Age: 87
End: 2022-09-12

## 2022-09-12 DIAGNOSIS — R41.0 CONFUSION: Primary | ICD-10-CM

## 2022-09-19 ENCOUNTER — HOSPITAL ENCOUNTER (OUTPATIENT)
Dept: CT IMAGING | Facility: HOSPITAL | Age: 87
Discharge: HOME | End: 2022-09-19
Admitting: FAMILY MEDICINE

## 2022-09-19 DIAGNOSIS — R41.0 CONFUSION: ICD-10-CM

## 2022-09-19 PROCEDURE — 70450 CT HEAD/BRAIN W/O DYE: CPT
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
## 2022-12-09 ENCOUNTER — APPOINTMENT (OUTPATIENT)
Dept: MRI IMAGING | Facility: HOSPITAL | Age: 87
DRG: 544 | End: 2022-12-09
Payer: MEDICARE

## 2022-12-09 ENCOUNTER — APPOINTMENT (OUTPATIENT)
Dept: CT IMAGING | Facility: HOSPITAL | Age: 87
DRG: 544 | End: 2022-12-09
Payer: MEDICARE

## 2022-12-09 ENCOUNTER — APPOINTMENT (OUTPATIENT)
Dept: GENERAL RADIOLOGY | Facility: HOSPITAL | Age: 87
DRG: 544 | End: 2022-12-09
Payer: MEDICARE

## 2022-12-09 ENCOUNTER — HOSPITAL ENCOUNTER (INPATIENT)
Facility: HOSPITAL | Age: 87
LOS: 1 days | Discharge: NURSING, REHAB OR LONG-TERM CARE | DRG: 544 | End: 2022-12-13
Attending: EMERGENCY MEDICINE | Admitting: PEDIATRICS
Payer: MEDICARE

## 2022-12-09 DIAGNOSIS — S32.592A INFERIOR PUBIC RAMUS FRACTURE, LEFT, CLOSED, INITIAL ENCOUNTER: Primary | ICD-10-CM

## 2022-12-09 DIAGNOSIS — J45.20 MILD INTERMITTENT ASTHMA WITHOUT COMPLICATION: ICD-10-CM

## 2022-12-09 DIAGNOSIS — S32.10XA CLOSED FRACTURE OF SACRUM, UNSPECIFIED PORTION OF SACRUM, INITIAL ENCOUNTER: ICD-10-CM

## 2022-12-09 PROBLEM — E87.6 HYPOKALEMIA: Status: ACTIVE | Noted: 2022-12-09

## 2022-12-09 PROBLEM — M84.48XA SACRAL INSUFFICIENCY FRACTURE: Status: ACTIVE | Noted: 2022-12-09

## 2022-12-09 PROBLEM — R31.9 HEMATURIA: Status: ACTIVE | Noted: 2022-12-09

## 2022-12-09 PROBLEM — I48.20 CHRONIC ATRIAL FIBRILLATION (HCC): Status: ACTIVE | Noted: 2022-12-09

## 2022-12-09 PROBLEM — I48.91 ATRIAL FIBRILLATION (HCC): Status: ACTIVE | Noted: 2022-12-09

## 2022-12-09 LAB
ALBUMIN SERPL-MCNC: 4.3 G/DL (ref 3.5–5.2)
ALBUMIN/GLOB SERPL: 1 G/DL
ALP SERPL-CCNC: 163 U/L (ref 39–117)
ALT SERPL W P-5'-P-CCNC: 11 U/L (ref 1–33)
ANION GAP SERPL CALCULATED.3IONS-SCNC: 11 MMOL/L (ref 5–15)
AST SERPL-CCNC: 22 U/L (ref 1–32)
BACTERIA UR QL AUTO: ABNORMAL /HPF
BASOPHILS # BLD AUTO: 0.04 10*3/MM3 (ref 0–0.2)
BASOPHILS NFR BLD AUTO: 0.6 % (ref 0–1.5)
BILIRUB SERPL-MCNC: 0.5 MG/DL (ref 0–1.2)
BILIRUB UR QL STRIP: ABNORMAL
BUN SERPL-MCNC: 10 MG/DL (ref 8–23)
BUN/CREAT SERPL: 16.9 (ref 7–25)
CALCIUM SPEC-SCNC: 9.5 MG/DL (ref 8.6–10.5)
CHLORIDE SERPL-SCNC: 100 MMOL/L (ref 98–107)
CLARITY UR: ABNORMAL
CO2 SERPL-SCNC: 31 MMOL/L (ref 22–29)
COLOR UR: ABNORMAL
CREAT SERPL-MCNC: 0.59 MG/DL (ref 0.57–1)
D-LACTATE SERPL-SCNC: 1.4 MMOL/L (ref 0.5–2)
DEPRECATED RDW RBC AUTO: 46.8 FL (ref 37–54)
EGFRCR SERPLBLD CKD-EPI 2021: 87.4 ML/MIN/1.73
EOSINOPHIL # BLD AUTO: 0.13 10*3/MM3 (ref 0–0.4)
EOSINOPHIL NFR BLD AUTO: 1.9 % (ref 0.3–6.2)
ERYTHROCYTE [DISTWIDTH] IN BLOOD BY AUTOMATED COUNT: 13.3 % (ref 12.3–15.4)
GLOBULIN UR ELPH-MCNC: 4.1 GM/DL
GLUCOSE SERPL-MCNC: 102 MG/DL (ref 65–99)
GLUCOSE UR STRIP-MCNC: NEGATIVE MG/DL
HCT VFR BLD AUTO: 42.3 % (ref 34–46.6)
HGB BLD-MCNC: 13.6 G/DL (ref 12–15.9)
HGB UR QL STRIP.AUTO: ABNORMAL
HOLD SPECIMEN: NORMAL
HYALINE CASTS UR QL AUTO: ABNORMAL /LPF
IMM GRANULOCYTES # BLD AUTO: 0.01 10*3/MM3 (ref 0–0.05)
IMM GRANULOCYTES NFR BLD AUTO: 0.1 % (ref 0–0.5)
KETONES UR QL STRIP: NEGATIVE
LEUKOCYTE ESTERASE UR QL STRIP.AUTO: ABNORMAL
LIPASE SERPL-CCNC: 45 U/L (ref 13–60)
LYMPHOCYTES # BLD AUTO: 1.59 10*3/MM3 (ref 0.7–3.1)
LYMPHOCYTES NFR BLD AUTO: 23.6 % (ref 19.6–45.3)
MAGNESIUM SERPL-MCNC: 1.7 MG/DL (ref 1.6–2.4)
MCH RBC QN AUTO: 30.8 PG (ref 26.6–33)
MCHC RBC AUTO-ENTMCNC: 32.2 G/DL (ref 31.5–35.7)
MCV RBC AUTO: 95.7 FL (ref 79–97)
MONOCYTES # BLD AUTO: 0.6 10*3/MM3 (ref 0.1–0.9)
MONOCYTES NFR BLD AUTO: 8.9 % (ref 5–12)
NEUTROPHILS NFR BLD AUTO: 4.36 10*3/MM3 (ref 1.7–7)
NEUTROPHILS NFR BLD AUTO: 64.9 % (ref 42.7–76)
NITRITE UR QL STRIP: POSITIVE
NRBC BLD AUTO-RTO: 0 /100 WBC (ref 0–0.2)
PH UR STRIP.AUTO: <=5 [PH] (ref 5–8)
PLATELET # BLD AUTO: 317 10*3/MM3 (ref 140–450)
PMV BLD AUTO: 8.3 FL (ref 6–12)
POTASSIUM SERPL-SCNC: 2.6 MMOL/L (ref 3.5–5.2)
POTASSIUM SERPL-SCNC: 2.7 MMOL/L (ref 3.5–5.2)
PROT SERPL-MCNC: 8.4 G/DL (ref 6–8.5)
PROT UR QL STRIP: ABNORMAL
RBC # BLD AUTO: 4.42 10*6/MM3 (ref 3.77–5.28)
RBC # UR STRIP: ABNORMAL /HPF
REF LAB TEST METHOD: ABNORMAL
SODIUM SERPL-SCNC: 142 MMOL/L (ref 136–145)
SP GR UR STRIP: 1.01 (ref 1–1.03)
SQUAMOUS #/AREA URNS HPF: ABNORMAL /HPF
UROBILINOGEN UR QL STRIP: ABNORMAL
WBC # UR STRIP: ABNORMAL /HPF
WBC NRBC COR # BLD: 6.73 10*3/MM3 (ref 3.4–10.8)
WHOLE BLOOD HOLD COAG: NORMAL
WHOLE BLOOD HOLD SPECIMEN: NORMAL

## 2022-12-09 PROCEDURE — 74176 CT ABD & PELVIS W/O CONTRAST: CPT

## 2022-12-09 PROCEDURE — P9612 CATHETERIZE FOR URINE SPEC: HCPCS

## 2022-12-09 PROCEDURE — 73721 MRI JNT OF LWR EXTRE W/O DYE: CPT

## 2022-12-09 PROCEDURE — 84132 ASSAY OF SERUM POTASSIUM: CPT | Performed by: PHYSICIAN ASSISTANT

## 2022-12-09 PROCEDURE — 81001 URINALYSIS AUTO W/SCOPE: CPT | Performed by: NURSE PRACTITIONER

## 2022-12-09 PROCEDURE — 25010000002 ENOXAPARIN PER 10 MG: Performed by: NURSE PRACTITIONER

## 2022-12-09 PROCEDURE — 83605 ASSAY OF LACTIC ACID: CPT | Performed by: EMERGENCY MEDICINE

## 2022-12-09 PROCEDURE — 83735 ASSAY OF MAGNESIUM: CPT | Performed by: NURSE PRACTITIONER

## 2022-12-09 PROCEDURE — G0378 HOSPITAL OBSERVATION PER HR: HCPCS

## 2022-12-09 PROCEDURE — 93005 ELECTROCARDIOGRAM TRACING: CPT | Performed by: NURSE PRACTITIONER

## 2022-12-09 PROCEDURE — 99222 1ST HOSP IP/OBS MODERATE 55: CPT | Performed by: INTERNAL MEDICINE

## 2022-12-09 PROCEDURE — 36415 COLL VENOUS BLD VENIPUNCTURE: CPT

## 2022-12-09 PROCEDURE — 80053 COMPREHEN METABOLIC PANEL: CPT | Performed by: EMERGENCY MEDICINE

## 2022-12-09 PROCEDURE — 87086 URINE CULTURE/COLONY COUNT: CPT | Performed by: NURSE PRACTITIONER

## 2022-12-09 PROCEDURE — 85025 COMPLETE CBC W/AUTO DIFF WBC: CPT | Performed by: EMERGENCY MEDICINE

## 2022-12-09 PROCEDURE — 83690 ASSAY OF LIPASE: CPT | Performed by: EMERGENCY MEDICINE

## 2022-12-09 PROCEDURE — 73502 X-RAY EXAM HIP UNI 2-3 VIEWS: CPT

## 2022-12-09 PROCEDURE — 99284 EMERGENCY DEPT VISIT MOD MDM: CPT

## 2022-12-09 RX ORDER — POTASSIUM CHLORIDE 750 MG/1
40 CAPSULE, EXTENDED RELEASE ORAL AS NEEDED
Status: DISCONTINUED | OUTPATIENT
Start: 2022-12-09 | End: 2022-12-13 | Stop reason: HOSPADM

## 2022-12-09 RX ORDER — SODIUM CHLORIDE 9 MG/ML
10 INJECTION INTRAVENOUS AS NEEDED
Status: DISCONTINUED | OUTPATIENT
Start: 2022-12-09 | End: 2022-12-13 | Stop reason: HOSPADM

## 2022-12-09 RX ORDER — POTASSIUM CHLORIDE 7.45 MG/ML
10 INJECTION INTRAVENOUS
Status: DISCONTINUED | OUTPATIENT
Start: 2022-12-09 | End: 2022-12-13 | Stop reason: HOSPADM

## 2022-12-09 RX ORDER — SODIUM CHLORIDE 0.9 % (FLUSH) 0.9 %
10 SYRINGE (ML) INJECTION EVERY 12 HOURS SCHEDULED
Status: DISCONTINUED | OUTPATIENT
Start: 2022-12-09 | End: 2022-12-13 | Stop reason: HOSPADM

## 2022-12-09 RX ORDER — LATANOPROST 50 UG/ML
1 SOLUTION/ DROPS OPHTHALMIC NIGHTLY
Status: DISCONTINUED | OUTPATIENT
Start: 2022-12-09 | End: 2022-12-13 | Stop reason: HOSPADM

## 2022-12-09 RX ORDER — ENOXAPARIN SODIUM 100 MG/ML
40 INJECTION SUBCUTANEOUS NIGHTLY
Status: DISCONTINUED | OUTPATIENT
Start: 2022-12-09 | End: 2022-12-13 | Stop reason: HOSPADM

## 2022-12-09 RX ORDER — POTASSIUM CHLORIDE 1.5 G/1.77G
40 POWDER, FOR SOLUTION ORAL AS NEEDED
Status: DISCONTINUED | OUTPATIENT
Start: 2022-12-09 | End: 2022-12-13 | Stop reason: HOSPADM

## 2022-12-09 RX ORDER — SODIUM CHLORIDE 9 MG/ML
40 INJECTION, SOLUTION INTRAVENOUS AS NEEDED
Status: DISCONTINUED | OUTPATIENT
Start: 2022-12-09 | End: 2022-12-13 | Stop reason: HOSPADM

## 2022-12-09 RX ORDER — SODIUM CHLORIDE 0.9 % (FLUSH) 0.9 %
10 SYRINGE (ML) INJECTION AS NEEDED
Status: DISCONTINUED | OUTPATIENT
Start: 2022-12-09 | End: 2022-12-13 | Stop reason: HOSPADM

## 2022-12-09 RX ORDER — ENOXAPARIN SODIUM 100 MG/ML
40 INJECTION SUBCUTANEOUS DAILY
Status: DISCONTINUED | OUTPATIENT
Start: 2022-12-09 | End: 2022-12-09

## 2022-12-09 RX ORDER — METOPROLOL SUCCINATE 25 MG/1
25 TABLET, EXTENDED RELEASE ORAL DAILY
Status: DISCONTINUED | OUTPATIENT
Start: 2022-12-10 | End: 2022-12-13 | Stop reason: HOSPADM

## 2022-12-09 RX ORDER — ACETAMINOPHEN 325 MG/1
650 TABLET ORAL 3 TIMES DAILY
Status: COMPLETED | OUTPATIENT
Start: 2022-12-09 | End: 2022-12-11

## 2022-12-09 RX ORDER — TRAMADOL HYDROCHLORIDE 50 MG/1
25 TABLET ORAL EVERY 6 HOURS PRN
Status: DISCONTINUED | OUTPATIENT
Start: 2022-12-09 | End: 2022-12-13 | Stop reason: HOSPADM

## 2022-12-09 RX ADMIN — ENOXAPARIN SODIUM 40 MG: 40 INJECTION SUBCUTANEOUS at 21:06

## 2022-12-09 RX ADMIN — ACETAMINOPHEN 650 MG: 325 TABLET, FILM COATED ORAL at 21:06

## 2022-12-09 RX ADMIN — POTASSIUM CHLORIDE 40 MEQ: 750 CAPSULE, EXTENDED RELEASE ORAL at 21:43

## 2022-12-09 RX ADMIN — Medication 10 ML: at 21:06

## 2022-12-09 RX ADMIN — LATANOPROST 1 DROP: 50 SOLUTION OPHTHALMIC at 21:07

## 2022-12-09 NOTE — PLAN OF CARE
Goal Outcome Evaluation:        Problem: Fall Injury Risk  Goal: Absence of Fall and Fall-Related Injury  12/9/2022 1839 by Crispin Segovia, RN  Outcome: Ongoing, Progressing  12/9/2022 1838 by Crispin Segovia, RN  Outcome: Ongoing, Progressing  Intervention: Promote Injury-Free Environment  Recent Flowsheet Documentation  Taken 12/9/2022 1834 by Crispin Segovia, RN  Safety Promotion/Fall Prevention:   activity supervised   assistive device/personal items within reach   fall prevention program maintained   gait belt   lighting adjusted   muscle strengthening facilitated   nonskid shoes/slippers when out of bed   clutter free environment maintained   elopement precautions   mobility aid in reach   room organization consistent   safety round/check completed     Problem: Heart Failure Comorbidity  Goal: Maintenance of Heart Failure Symptom Control  12/9/2022 1839 by Crispin Segovia, RN  Outcome: Ongoing, Progressing  12/9/2022 1838 by Crispin Segovia, PIA  Outcome: Ongoing, Progressing     Problem: Hypertension Comorbidity  Goal: Blood Pressure in Desired Range  12/9/2022 1839 by Crispin Segovia, PIA  Outcome: Ongoing, Progressing  12/9/2022 1838 by Crispin Segovia RN  Outcome: Ongoing, Progressing     Problem: Skin Injury Risk Increased  Goal: Skin Health and Integrity  12/9/2022 1839 by Crispin Segovia, PIA  Outcome: Ongoing, Progressing  12/9/2022 1838 by Crispin Segovia RN  Outcome: Ongoing, Progressing  Intervention: Optimize Skin Protection  Recent Flowsheet Documentation  Taken 12/9/2022 1834 by Crispin Segovia, RN  Head of Bed (HOB) Positioning: HOB at 20-30 degrees         Pt arrived to floor very recently. AxO and very pleasant. BP elevated. NP at bedside assessing pt currently. Awaiting orders.

## 2022-12-10 PROBLEM — I49.1 PAC (PREMATURE ATRIAL CONTRACTION): Status: ACTIVE | Noted: 2022-12-10

## 2022-12-10 PROBLEM — S32.592A INFERIOR PUBIC RAMUS FRACTURE, LEFT, CLOSED, INITIAL ENCOUNTER (HCC): Status: ACTIVE | Noted: 2022-12-10

## 2022-12-10 PROBLEM — N39.0 ACUTE UTI (URINARY TRACT INFECTION): Status: ACTIVE | Noted: 2022-12-10

## 2022-12-10 LAB
ANION GAP SERPL CALCULATED.3IONS-SCNC: 7 MMOL/L (ref 5–15)
BACTERIA SPEC AEROBE CULT: NO GROWTH
BASOPHILS # BLD AUTO: 0.04 10*3/MM3 (ref 0–0.2)
BASOPHILS NFR BLD AUTO: 0.8 % (ref 0–1.5)
BUN SERPL-MCNC: 9 MG/DL (ref 8–23)
BUN/CREAT SERPL: 20 (ref 7–25)
CALCIUM SPEC-SCNC: 8.9 MG/DL (ref 8.6–10.5)
CHLORIDE SERPL-SCNC: 104 MMOL/L (ref 98–107)
CO2 SERPL-SCNC: 33 MMOL/L (ref 22–29)
CREAT SERPL-MCNC: 0.45 MG/DL (ref 0.57–1)
CRP SERPL-MCNC: 1.14 MG/DL (ref 0–0.5)
DEPRECATED RDW RBC AUTO: 44.4 FL (ref 37–54)
EGFRCR SERPLBLD CKD-EPI 2021: 93.2 ML/MIN/1.73
EOSINOPHIL # BLD AUTO: 0.24 10*3/MM3 (ref 0–0.4)
EOSINOPHIL NFR BLD AUTO: 4.9 % (ref 0.3–6.2)
ERYTHROCYTE [DISTWIDTH] IN BLOOD BY AUTOMATED COUNT: 13.1 % (ref 12.3–15.4)
ERYTHROCYTE [SEDIMENTATION RATE] IN BLOOD: 31 MM/HR (ref 0–30)
GLUCOSE BLDC GLUCOMTR-MCNC: 114 MG/DL (ref 70–130)
GLUCOSE SERPL-MCNC: 96 MG/DL (ref 65–99)
HCT VFR BLD AUTO: 36.4 % (ref 34–46.6)
HGB BLD-MCNC: 11.8 G/DL (ref 12–15.9)
IMM GRANULOCYTES # BLD AUTO: 0.01 10*3/MM3 (ref 0–0.05)
IMM GRANULOCYTES NFR BLD AUTO: 0.2 % (ref 0–0.5)
LYMPHOCYTES # BLD AUTO: 1.51 10*3/MM3 (ref 0.7–3.1)
LYMPHOCYTES NFR BLD AUTO: 30.6 % (ref 19.6–45.3)
MCH RBC QN AUTO: 30.3 PG (ref 26.6–33)
MCHC RBC AUTO-ENTMCNC: 32.4 G/DL (ref 31.5–35.7)
MCV RBC AUTO: 93.6 FL (ref 79–97)
MONOCYTES # BLD AUTO: 0.46 10*3/MM3 (ref 0.1–0.9)
MONOCYTES NFR BLD AUTO: 9.3 % (ref 5–12)
NEUTROPHILS NFR BLD AUTO: 2.67 10*3/MM3 (ref 1.7–7)
NEUTROPHILS NFR BLD AUTO: 54.2 % (ref 42.7–76)
NRBC BLD AUTO-RTO: 0 /100 WBC (ref 0–0.2)
PLATELET # BLD AUTO: 270 10*3/MM3 (ref 140–450)
PMV BLD AUTO: 8.5 FL (ref 6–12)
POTASSIUM SERPL-SCNC: 3.5 MMOL/L (ref 3.5–5.2)
POTASSIUM SERPL-SCNC: 4.4 MMOL/L (ref 3.5–5.2)
RBC # BLD AUTO: 3.89 10*6/MM3 (ref 3.77–5.28)
SODIUM SERPL-SCNC: 144 MMOL/L (ref 136–145)
TROPONIN T SERPL-MCNC: <0.01 NG/ML (ref 0–0.03)
WBC NRBC COR # BLD: 4.93 10*3/MM3 (ref 3.4–10.8)

## 2022-12-10 PROCEDURE — 86140 C-REACTIVE PROTEIN: CPT | Performed by: ORTHOPAEDIC SURGERY

## 2022-12-10 PROCEDURE — 93005 ELECTROCARDIOGRAM TRACING: CPT | Performed by: HOSPITALIST

## 2022-12-10 PROCEDURE — G0378 HOSPITAL OBSERVATION PER HR: HCPCS

## 2022-12-10 PROCEDURE — 80048 BASIC METABOLIC PNL TOTAL CA: CPT | Performed by: NURSE PRACTITIONER

## 2022-12-10 PROCEDURE — 84484 ASSAY OF TROPONIN QUANT: CPT | Performed by: HOSPITALIST

## 2022-12-10 PROCEDURE — 93010 ELECTROCARDIOGRAM REPORT: CPT | Performed by: INTERNAL MEDICINE

## 2022-12-10 PROCEDURE — 99233 SBSQ HOSP IP/OBS HIGH 50: CPT | Performed by: HOSPITALIST

## 2022-12-10 PROCEDURE — 25010000002 CEFTRIAXONE PER 250 MG: Performed by: HOSPITALIST

## 2022-12-10 PROCEDURE — 99221 1ST HOSP IP/OBS SF/LOW 40: CPT | Performed by: ORTHOPAEDIC SURGERY

## 2022-12-10 PROCEDURE — 84132 ASSAY OF SERUM POTASSIUM: CPT | Performed by: HOSPITALIST

## 2022-12-10 PROCEDURE — 85652 RBC SED RATE AUTOMATED: CPT | Performed by: ORTHOPAEDIC SURGERY

## 2022-12-10 PROCEDURE — 82962 GLUCOSE BLOOD TEST: CPT

## 2022-12-10 PROCEDURE — 85025 COMPLETE CBC W/AUTO DIFF WBC: CPT | Performed by: NURSE PRACTITIONER

## 2022-12-10 PROCEDURE — 25010000002 ENOXAPARIN PER 10 MG: Performed by: NURSE PRACTITIONER

## 2022-12-10 RX ORDER — NITROGLYCERIN 0.4 MG/1
TABLET SUBLINGUAL
Status: DISPENSED
Start: 2022-12-10 | End: 2022-12-11

## 2022-12-10 RX ORDER — POTASSIUM CHLORIDE 750 MG/1
20 CAPSULE, EXTENDED RELEASE ORAL 2 TIMES DAILY WITH MEALS
Status: DISCONTINUED | OUTPATIENT
Start: 2022-12-10 | End: 2022-12-13 | Stop reason: HOSPADM

## 2022-12-10 RX ADMIN — POTASSIUM CHLORIDE 20 MEQ: 750 CAPSULE, EXTENDED RELEASE ORAL at 17:47

## 2022-12-10 RX ADMIN — POTASSIUM CHLORIDE 40 MEQ: 750 CAPSULE, EXTENDED RELEASE ORAL at 06:21

## 2022-12-10 RX ADMIN — POTASSIUM CHLORIDE 40 MEQ: 750 CAPSULE, EXTENDED RELEASE ORAL at 14:20

## 2022-12-10 RX ADMIN — Medication 10 ML: at 09:04

## 2022-12-10 RX ADMIN — POTASSIUM CHLORIDE 40 MEQ: 750 CAPSULE, EXTENDED RELEASE ORAL at 09:01

## 2022-12-10 RX ADMIN — TRAMADOL HYDROCHLORIDE 25 MG: 50 TABLET, COATED ORAL at 02:06

## 2022-12-10 RX ADMIN — MAGNESIUM OXIDE 400 MG (241.3 MG MAGNESIUM) TABLET 400 MG: TABLET at 14:20

## 2022-12-10 RX ADMIN — SODIUM CHLORIDE 1 G: 900 INJECTION INTRAVENOUS at 09:01

## 2022-12-10 RX ADMIN — ENOXAPARIN SODIUM 40 MG: 40 INJECTION SUBCUTANEOUS at 19:25

## 2022-12-10 RX ADMIN — LATANOPROST 1 DROP: 50 SOLUTION OPHTHALMIC at 19:26

## 2022-12-10 RX ADMIN — POTASSIUM CHLORIDE 40 MEQ: 750 CAPSULE, EXTENDED RELEASE ORAL at 02:01

## 2022-12-10 RX ADMIN — LIDOCAINE HYDROCHLORIDE: 20 SOLUTION OROPHARYNGEAL at 15:13

## 2022-12-10 RX ADMIN — Medication 10 ML: at 19:26

## 2022-12-10 RX ADMIN — ACETAMINOPHEN 650 MG: 325 TABLET, FILM COATED ORAL at 09:01

## 2022-12-10 RX ADMIN — ACETAMINOPHEN 650 MG: 325 TABLET, FILM COATED ORAL at 19:25

## 2022-12-10 RX ADMIN — METOPROLOL SUCCINATE 25 MG: 25 TABLET, EXTENDED RELEASE ORAL at 09:01

## 2022-12-10 RX ADMIN — ACETAMINOPHEN 650 MG: 325 TABLET, FILM COATED ORAL at 17:47

## 2022-12-10 NOTE — PLAN OF CARE
Goal Outcome Evaluation:A&OX4 with some forgetfulness. VSS. SR with PAC on tele. Ambulating with walker with increased pain in left hip radiating  from left hip to left leg.Pain tolerable with po medication.Voiding without difficulty. Potassium 2.6 overnight and potassium replaced.Awaiting ortho to assess pt.

## 2022-12-10 NOTE — PROGRESS NOTES
[facility] Medicine Services  PROGRESS NOTE    Patient Name: Lashanda Qureshi  : 1935  MRN: [id number]    Date of Admission: 2022  Primary Care Physician: Gisella Evans DO    Subjective   Subjective     CC:  F/U left hip pain    HPI:  Patient seen this morning, pain okay at the moment, but much worse with movement. She is worried because she lives alone.     ROS:  Gen-no fevers, no chills  CV-no chest pain, no palpitations  Resp-no cough, no dyspnea  GI-no N/V/D, no abd pain    All other systems reviewed and negative except any additional pertinent positives and negatives as discussed in HPI.      Objective   Objective     Vital Signs:   Temp:  [97.6 °F (36.4 °C)-98.9 °F (37.2 °C)] 97.6 °F (36.4 °C)  Heart Rate:  [66-94] 66  Resp:  [16] 16  BP: (137-190)/() 141/80     Physical Exam:  Gen-no acute distress  HENT-NCAT, mucous membranes moist  CV-RRR, S1 S2 normal, no m/r/g  Resp-CTAB, no wheezes or rales  Abd-soft, NT, ND, +BS  Ext-no edema  Neuro-A&Ox3, no focal deficits  Skin-no rashes  Psych-appropriate mood      Results Reviewed:  LAB RESULTS:      Lab 12/10/22  0444 22  1503   WBC 4.93 6.73   HEMOGLOBIN 11.8* 13.6   HEMATOCRIT 36.4 42.3   PLATELETS 270 317   NEUTROS ABS 2.67 4.36   IMMATURE GRANS (ABS) 0.01 0.01   LYMPHS ABS 1.51 1.59   MONOS ABS 0.46 0.60   EOS ABS 0.24 0.13   MCV 93.6 95.7   LACTATE  --  1.4         Lab 22  1503   SODIUM  --  142   POTASSIUM 2.6* 2.7*   CHLORIDE  --  100   CO2  --  31.0*   ANION GAP  --  11.0   BUN  --  10   CREATININE  --  0.59   EGFR  --  87.4   GLUCOSE  --  102*   CALCIUM  --  9.5   MAGNESIUM  --  1.7         Lab 22  1503   TOTAL PROTEIN 8.4   ALBUMIN 4.30   GLOBULIN 4.1   ALT (SGPT) 11   AST (SGOT) 22   BILIRUBIN 0.5   ALK PHOS 163*   LIPASE 45                     Brief Urine Lab Results  (Last result in the past 365 days)      Color   Clarity   Blood   Leuk Est   Nitrite   Protein   CREAT   Urine  HCG        12/09/22 1508 Red   Cloudy   Large (3+)   Moderate (2+)   Positive   >=300 mg/dL (3+)                 Microbiology Results Abnormal     None          CT Abdomen Pelvis Without Contrast    Result Date: 12/9/2022   DATE OF EXAM: 12/9/2022 2:22 PM  PROCEDURE: CT ABDOMEN PELVIS WO CONTRAST-  INDICATIONS: painless hematuria; as well as ACUTE left hip pain; unable to tolerate weight bearing.  COMPARISON: X-ray hip December 9, 2022  TECHNIQUE: Routine transaxial slices were obtained through the abdomen and pelvis without the administration of intravenous contrast. Reconstructed coronal and sagittal images were also obtained. Automated exposure control and iterative construction methods were used.   FINDINGS: There is streak artifact from bilateral hip arthroplasty limiting assessment of the pelvis to some degree. It looks like there is a pessary present. A suture line is noted in the rectal area.  There is no definite abnormality of the liver, spleen, pancreas or kidneys. Assessment of the bowel is limited by the lack of oral contrast.  There is a pectus excavatum deformity. It looks like there some chronic changes in the lower lungs.  There is a scoliosis of the lumbar spine with multilevel degenerative change with convexity to the right. There is slight heterogeneity to the mineralization of the sacrum. This might relate to underlying insufficiency fracture.      Impression: 1.  An acute intra-abdominal/pelvic process is not apparent on this noncontrasted exam. 2.  Dextroscoliosis with multilevel degenerative change. 3.  Chronic appearing changes lower lungs. 4.  Changes from bilateral hip arthroplasty. 5.  Heterogeneity to the mineralization of the sacrum that might be reflective of underlying insufficiency fracture.  This report was finalized on 12/9/2022 3:08 PM by Freedom Beck MD.      XR Hip With or Without Pelvis 2 - 3 View Left    Result Date: 12/9/2022  XR HIP W OR WO PELVIS 2-3 VIEW LEFT-  Date of  Exam: 12/9/2022 11:19 AM  Indication: PAIN.  Comparison: None available.  Technique: 3 views of the hip were obtained.  FINDINGS:  There is no acute fracture or dislocation.  Patient status post bilateral total hip arthroplasty.  Prosthetic components are in anatomic position.  No hardware complication identified.  There are surgical clips scattered throughout the lower abdomen and pelvis.  Soft tissues are otherwise unremarkable.         Impression: Status post total bilateral hip arthroplasty.  No hardware complication or acute bony abnormality.  This report was finalized on 12/9/2022 12:16 PM by Roland Miranda MD.        Results for orders placed during the hospital encounter of 08/05/20    Adult Transthoracic Echo Complete W/ Cont if Necessary Per Protocol    Interpretation Summary  · Calculated EF = 54.0%  · Left ventricular systolic function is normal.  · Left ventricular diastolic dysfunction.  · Mild tricuspid valve regurgitation is present.  · There is calcification of the aortic valve.      I have reviewed the medications:  Scheduled Meds:acetaminophen, 650 mg, Oral, TID  enoxaparin, 40 mg, Subcutaneous, Nightly  latanoprost, 1 drop, Both Eyes, Nightly  metoprolol succinate XL, 25 mg, Oral, Daily  sodium chloride, 10 mL, Intravenous, Q12H      Continuous Infusions:   PRN Meds:.•  potassium chloride **OR** potassium chloride **OR** potassium chloride  •  Sodium Chloride (PF)  •  sodium chloride  •  sodium chloride  •  traMADol    Assessment & Plan   Assessment & Plan     Active Hospital Problems    Diagnosis  POA   • **Sacral insufficiency fracture [M84.48XA]  Yes   • PAC's (premature atrial contraction) [I49.1]  Yes   • Inferior pubic ramus fracture, left, closed, initial encounter ([facility]) [S32.592A]  Yes   • Acute UTI (urinary tract infection) [N39.0]  Yes   • Hypokalemia [E87.6]  Yes   • Hematuria [R31.9]  Yes   • Moderate persistent asthma [J45.40]  Yes   • Hypertension [I10]  Yes      Resolved Hospital  Problems   No resolved problems to display.        Brief Hospital Course to date:  Lashanda Qureshi is a 87 y.o. female with hx of HTN, Afib, colon cancer, bladder cancer, hx of organizing pneumonitis, C.diff, asthma, GERD, and anxiety presents to the ER due to worsening left hip and leg pain x 5 days. Denies any injuries/falls.    *All problems are new to me today.    Left hip pain  Sacral insufficiency fractures  Left superior pubic ramus fracture  --X-ray unremarkable, CT A/P with possible sacral insufficiency fracture.  --MRI reviewed, showing edema throughout the sacrum concerning for insufficiency fractures, nondisplaced left superior pubic ramus with associated left adductor muscle strain/edema with partial tearing of the inserting tendons.  --Ortho consulted for further evaluation. WBAT LLE with a walker for now.   --Pain control.  --PT/OT.    Hypokalemia  --K 2.7 on admission.  --Supposed to take K and Mg supplements at home.  --Replace per protocol.  --Resume KCl 20 mEq twice daily.    UTI, POA  --UA with positive nitrites, large blood, moderate leukocytes, 21-30 WBC, 1+ bacteria, 31-50 squamous cells. Will F/U urine culture and treat with Rocephin x 3 days. Has hx of C.diff so prefer short course of ATBx.     Hematuria  Hx of bladder cancer  --Has seen Dr. Robertson/Urology in the past.  --Currently H&H stable.  --F/U as outpatient if no other acute issues during hospital admission.    HTN  --Continue Metoprolol, hold HCTZ due to hypokalemia.     PAC's  --Has been seen by Cardiology (Dr. Clifford) as outpatient (last seen 2/3/21) and had heart monitor done showing 16.4% PAC burden, no evidence of Afib. Was placed on Diltiazem due to concern over allergy to Metoprolol, however appears to be back on Metoprolol currently per outpatient PCP notes, so will continue here.    Asthma  Hx of organizing pneumonitis  --Has been seen by Pulmonary, continue on home inhaled corticosteroid.    Chest pain, RRT 12/10/22  --RRT this  afternoon for chest pain, substernal radiating to the back, heavy and burning. Rated 5/10. EKG no acute findings. Troponin sent. Will trial GI cocktail. Continue to monitor and replace electrolytes. Reviewed Echo and stress test from November 2020, was negative.     Expected Discharge Location and Transportation: possible rehab  Expected Discharge Date: 12/12/22    DVT prophylaxis:  Medical DVT prophylaxis orders are present.          CODE STATUS:   Code Status and Medical Interventions:   Ordered at: 12/09/22 1912     Level Of Support Discussed With:    Patient     Code Status (Patient has no pulse and is not breathing):    CPR (Attempt to Resuscitate)     Medical Interventions (Patient has pulse or is breathing):    Full Support       Yanique Patricia MD  12/10/22

## 2022-12-10 NOTE — CONSULTS
[facility] [place] Orthopaedic Surgery Consultation Note    Subjective     Patient Care Team:  Patient Care Team:  Gisella Evans DO as PCP - General (Family Medicine)  Crispin Wheeler MD as Consulting Physician (Orthopedic Surgery)  James Gonzales MD de Castro, Fernando R., MD as Consulting Physician (Dermatology)  Eliseo Burleson MD as Surgeon (General Surgery)    Referring Provider: Dr. Mercado  Reason for Consultation: Left hip pain    Chief Complaint   Patient presents with   • Hip Pain   • Blood in Urine        HPI    Lashanda Qureshi is a 87 y.o. female status post left total hip arthroplasty several years ago by Dr. Chakraborty, around 2003 according to the patient (no records available).  She started to have pain in the hip/groin region 4 to 5 days ago following no history of trauma, with no numbness or tingling and no pain in her low back region.  She can walk with a walker, but the pain became severe enough to present to the emergency room yesterday.  Radiographs, CT scan, and MRI were performed, with edema noted in the sacrum concerning for insufficiency fractures.  No fevers, chills or night sweats.  I was asked to see the patient for evaluation of her left hip.      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 difficile diarrhea   • Cervical pain   • Chronic midline low back pain without sciatica   • Thoracogenic scoliosis of thoracic region   • Insomnia   • Localized edema   • Malignant tumor of urinary bladder (HCC)   • Acute hypoxemic respiratory failure (HCC)   • Acute diastolic CHF (congestive heart failure) (HCC)   • NSVT (nonsustained ventricular tachycardia)   • Dysfunction of both eustachian tubes   • Dyslipidemia   • Exudative age-related macular degeneration, left eye, with active choroidal neovascularization (HCC)   • Anginal equivalent (HCC)   • PVD (peripheral vascular disease) (HCC)   • Anxiety   • Sacral insufficiency fracture   • Hypokalemia   • Hematuria   • PAC's (premature atrial contraction)     Past Medical History:   Diagnosis Date   • Abnormal CT scan, lung    • Anxiety    • Arthritis    • Asthma    • Atrial fibrillation (HCC)    • Bladder cancer (HCC)    • C. difficile colitis    • Colon cancer (HCC)    • GERD (gastroesophageal reflux disease)    • Hypertension    • Mycobacterium gordonae infection    • Wears glasses       Past Surgical History:   Procedure Laterality Date   • BLADDER SURGERY     • BRONCHOSCOPY  01/27/2016   • CHOLECYSTECTOMY     • COLON SURGERY     • COLONOSCOPY     • DILATION AND CURETTAGE, DIAGNOSTIC / THERAPEUTIC     • REFRACTIVE SURGERY  04/2019   • TOTAL HIP ARTHROPLASTY Bilateral    • VENTRAL/INCISIONAL HERNIA REPAIR N/A 11/30/2021    Procedure: OPEN INCISIONAL HERNIA REPAIR WITH MESH;  Surgeon: Polina Schuster MD;  Location: [facility];  Service: General;  Laterality: N/A;   • WISDOM TOOTH EXTRACTION        Family History   Problem Relation Age of Onset   • COPD Mother    • Hyperlipidemia Mother    • Hypertension Mother    • Cancer Father    • Lung cancer Father    • Heart attack Brother    • Diabetes Brother    • Multiple sclerosis Daughter    • No Known Problems Maternal Grandmother    • No Known Problems  Maternal Grandfather    • Diabetes Paternal Grandmother    • Diabetes Paternal Grandfather      Social History     Socioeconomic History   • Marital status:    Tobacco Use   • Smoking status: Former     Packs/day: 1.00     Years: 2.00     Pack years: 2.00     Types: Cigarettes     Quit date: 8/3/1969     Years since quittin.3   • Smokeless tobacco: Never   • Tobacco comments:     quit 52 years ago    Vaping Use   • Vaping Use: Never used   Substance and Sexual Activity   • Alcohol use: Yes     Comment: SOCIAL   • Drug use: No   • Sexual activity: Defer     Comment:       Medications Prior to Admission   Medication Sig Dispense Refill Last Dose   • budesonide-formoterol (Symbicort) 80-4.5 MCG/ACT inhaler Inhale 2 puffs 2 (Two) Times a Day. (Patient taking differently: Inhale 2 puffs Daily As Needed.) 1 inhaler 12    • Cholecalciferol (VITAMIN D3) 2000 UNITS tablet Take 2,000 Units by mouth. Take 2 tablets daily.        • conjugated estrogens (PREMARIN) 0.625 MG/GM vaginal cream Insert  into the vagina As Needed.      • Cyanocobalamin (VITAMIN B12 PO) Take 1 tablet by mouth Daily. Take 1 tablet daily as directed.        • Elastic Bandages & Supports (FUTURO FIRM COMPRESSION HOSE) misc 1 package Daily. 1 each 1    • hydroCHLOROthiazide (HYDRODIURIL) 12.5 MG tablet Take 1 tablet by mouth Daily. 90 tablet 3    • ketoconazole (NIZORAL) 2 % cream Apply 1 application topically to the appropriate area as directed Daily.      • latanoprost (XALATAN) 0.005 % ophthalmic solution Administer 1 drop to both eyes Every Night.      • Magnesium Oxide 140 MG capsule Take 1 tablet by mouth Daily. 14 each 0    • metoprolol succinate XL (TOPROL-XL) 25 MG 24 hr tablet Take 1 tablet by mouth Daily. 90 tablet 3    • Multiple Vitamins-Minerals (CENTRUM SILVER PO) Take 1 tablet by mouth Daily.      • nystatin (MYCOSTATIN) 334115 UNIT/GM ointment       • Oxyquinolone Sulfate (Trimo-Shea) 0.025 % gel 1 g.      • potassium  chloride (K-DUR,KLOR-CON) 20 MEQ CR tablet Take 1 tablet by mouth Daily. 90 tablet 3    • potassium chloride (K-DUR,KLOR-CON) 20 MEQ CR tablet Take 1 tablet by mouth 2 (Two) Times a Day. 10 tablet 0      Allergies   Allergen Reactions   • Antihistamines, Loratadine-Type Other (See Comments)     Drowsiness     • Erythromycin Rash        Review of Systems     Constitutional: Positive for activity change. Negative for appetite change and fever.   HENT: Negative for congestion, hearing loss and trouble swallowing.    Eyes: Negative for visual disturbance.   Respiratory: Negative for cough and shortness of breath.    Cardiovascular: Negative for chest pain and palpitations.   Gastrointestinal: Negative for abdominal pain, diarrhea, nausea and vomiting.   Genitourinary: Positive for hematuria. Negative for difficulty urinating, dysuria and urgency.   Musculoskeletal: Positive for arthralgias, back pain and gait problem.   Skin: Negative for rash and wound.   Neurological: Negative for dizziness and weakness.   Psychiatric/Behavioral: Negative for behavioral problems and confusion.     Objective      Physical Exam  /81 (BP Location: Left arm, Patient Position: Lying)   Pulse 55   Temp 98.1 °F (36.7 °C) (Oral)   Resp 17   Ht 167.6 cm (66\")   Wt 54.4 kg (120 lb)   LMP  (LMP Unknown)   SpO2 94%   BMI 19.37 kg/m²     Body mass index is 19.37 kg/m².    General:   Mental Status:  Alert   Appearance: Cooperative, in no acute distress   Build and Nutrition: Thin, deconditioned female   Orientation: Alert and oriented to person, place and time   Posture: Laying in a hospital bed    Integument:   Left hip: Well-healed posterolateral incision    Neurologic:   Sensation:    Left foot: Intact to light touch on the dorsal and plantar aspect   Motor:  Left lower extremity: 4/5 quadriceps, hamstrings, ankle dorsiflexors, and ankle plantar flexors  Vascular:   Left lower extremity: 2+ dorsalis pedis pulse, prompt capillary  refill    Lower Extremity:   Left Hip:    Tenderness:  None at the hip and no posterior sacral pain    Swelling:  None    Crepitus:  None    Atrophy:  None    Range of motion:  External Rotation: 30°, with pain       Internal Rotation: 30°, with pain   Instability:  None  Deformities:  None  Functional testing: Positive Stinchfield    No leg length discrepancy      Imaging/Studies  Imaging Results (Last 24 Hours)     Procedure Component Value Units Date/Time    MRI Hip Left Without Contrast [[id number]] Collected: 12/10/22 0812     Updated: 12/10/22 0852    Narrative:      DATE OF EXAM: 12/9/2022 11:40 PM     PROCEDURE: MRI HIP LEFT WO CONTRAST-     INDICATIONS: acute pain     COMPARISON: CT abdomen pelvis 12/9/2022     TECHNIQUE: Multiplanar/multisequence images of the left hip were  performed according to routine MRI protocol.     FINDINGS:   Bones and joints: Postoperative changes of bilateral hip arthroplasty.  Limited evaluation of the hips due to susceptibility artifact. Diffuse  edema throughout the sacrum. Nondisplaced fracture of the left superior  pubic ramus. Fluid along the bilateral hips posterolaterally likely from  associated capsular defect from hip arthroplasties.     Soft tissues: Edema throughout the left adductor muscles and likely  partial tears of the inserting tendons (coronal STIR image 9). Small  amount of edema within the right adductor muscles. Edema and  irregularity of the aponeurotic attachment of the left rectus abdominis  muscle to the pubis. Partial tears of the right gluteus medius and  minimus tendons. Strain of the left gluteus minimus and medius muscles.   Rectus femoris is intact. Hamstrings are intact. Iliopsoas appears  grossly intact but is difficult to visualize due to susceptibly  artifact. Postoperative changes within the pelvis with pessary in place.  Irregular-appearing filling defect within the bladder.       Impression:      Postoperative changes of bilateral hip  arthroplasties with associated  susceptibility artifact which limits evaluation of the adjacent  structures.     Edema throughout the sacrum concerning for insufficiency fractures.     Nondisplaced fracture of the left superior pubic ramus. There is  adjacent associated edema/strain of the left adductor muscles and likely  partial tear of the inserting tendons.     Edema and irregularity of the aponeurotic attachment of the left rectus  abdominis muscle to the pubis which may reflect low-grade injury.     Muscular strain of the left gluteus minimus and medius muscles.     Partial tears of the right gluteus medius and minimus tendons     Irregular-appearing filling defect within the bladder. Consider urology  consultation.     This report was finalized on 12/10/2022 8:49 AM by Dewey Bolanos.       CT Abdomen Pelvis Without Contrast [[id number]] Collected: 12/09/22 1503     Updated: 12/09/22 1511    Narrative:         DATE OF EXAM:  12/9/2022 2:22 PM     PROCEDURE:  CT ABDOMEN PELVIS WO CONTRAST-     INDICATIONS:  painless hematuria; as well as ACUTE left hip pain; unable to tolerate  weight bearing.     COMPARISON:  X-ray hip December 9, 2022     TECHNIQUE:  Routine transaxial slices were obtained through the abdomen and pelvis  without the administration of intravenous contrast. Reconstructed  coronal and sagittal images were also obtained. Automated exposure  control and iterative construction methods were used.       FINDINGS:  There is streak artifact from bilateral hip arthroplasty limiting  assessment of the pelvis to some degree. It looks like there is a  pessary present. A suture line is noted in the rectal area.     There is no definite abnormality of the liver, spleen, pancreas or  kidneys. Assessment of the bowel is limited by the lack of oral  contrast.     There is a pectus excavatum deformity. It looks like there some chronic  changes in the lower lungs.     There is a scoliosis of the lumbar spine  with multilevel degenerative  change with convexity to the right. There is slight heterogeneity to the  mineralization of the sacrum. This might relate to underlying  insufficiency fracture.       Impression:      1.  An acute intra-abdominal/pelvic process is not apparent on this  noncontrasted exam.  2.  Dextroscoliosis with multilevel degenerative change.  3.  Chronic appearing changes lower lungs.  4.  Changes from bilateral hip arthroplasty.  5.  Heterogeneity to the mineralization of the sacrum that might be  reflective of underlying insufficiency fracture.     This report was finalized on 12/9/2022 3:08 PM by Freedom Beck MD.       XR Hip With or Without Pelvis 2 - 3 View Left [[id number]] Collected: 12/09/22 1215     Updated: 12/09/22 1219    Narrative:      XR HIP W OR WO PELVIS 2-3 VIEW LEFT-     Date of Exam: 12/9/2022 11:19 AM     Indication: PAIN.     Comparison: None available.     Technique: 3 views of the hip were obtained.     FINDINGS:  There is no acute fracture or dislocation.  Patient status  post bilateral total hip arthroplasty.  Prosthetic components are in  anatomic position.  No hardware complication identified.  There are  surgical clips scattered throughout the lower abdomen and pelvis.  Soft  tissues are otherwise unremarkable.               Impression:      Status post total bilateral hip arthroplasty.  No hardware complication  or acute bony abnormality.     This report was finalized on 12/9/2022 12:16 PM by Roland Miranda MD.             Results from last 7 days   Lab Units 12/10/22  0444 12/09/22  1503   WBC 10*3/mm3 4.93 6.73   HEMOGLOBIN g/dL 11.8* 13.6   HEMATOCRIT % 36.4 42.3   MCV fL 93.6 95.7   PLATELETS 10*3/mm3 270 317         Results Review:   I reviewed the patient's new clinical lab test results. and I reviewed the patient's new imaging test results.    Assessment and Plan     Assessment:    Left hip pain, remote history of total hip arthroplasty left hip (  [place])    Sacral insufficiency fracture    Sacral insufficiency fracture    Hypertension    Moderate persistent asthma    Hypokalemia    Hematuria    PAC's (premature atrial contraction)      Plan:    Given the location of her pain, I think it is low likelihood that the findings in her sacrum on the MRI are causing the pain she is currently experiencing.  Implants appear to be in good position.  No history of trauma.  Low likelihood of infection, but I will check inflammatory markers.  CT scan does show either lysis or cystic formation in the acetabulum anteriorly.  Sequential radiographs are not available for comparison.  Patient may be weightbearing as tolerated with a walker on the left lower extremity for now.  Further recommendations to follow.    I discussed the patients findings and my recommendations with patient    Medical Decision Making  Data/Risk: independent visualization of imaging, lab tests, or EMG/NCV      Yousif Haskins MD  12/10/22  11:57 EST

## 2022-12-10 NOTE — PLAN OF CARE
Goal Outcome Evaluation:  Plan of Care Reviewed With: patient        Progress: no change  Outcome Evaluation: Potassium replacement completed. Maintenance dose started today. Remains sinus with PAC;'s on the monitor. RRT called today for complaints of chest pain. See EKG results. GI cocktail administered as ordered. Will monitor.

## 2022-12-10 NOTE — H&P
[facility] Medicine Services  HISTORY AND PHYSICAL    Patient Name: Lashanda Qureshi  : 1935  MRN: [id number]  Primary Care Physician: Gisella Evans DO  Date of admission: 2022    Subjective   Subjective     Chief Complaint:  Left hip pain    HPI:  Lashanda Qureshi is a 87 y.o. female PMH of HTN, afib, colon cancer and bladder cancer who presented to the ED with worsening left hip/LLE pain over that last 5d.  No known injury.  No fall.  Unable to bear weight due to significant pain.  Imaging reports possible sacral fracture. Pt has pain with any movement of LLE and unable to raise leg off bed.     Review of Systems   Constitutional: Positive for activity change. Negative for appetite change and fever.   HENT: Negative for congestion, hearing loss and trouble swallowing.    Eyes: Negative for visual disturbance.   Respiratory: Negative for cough and shortness of breath.    Cardiovascular: Negative for chest pain and palpitations.   Gastrointestinal: Negative for abdominal pain, diarrhea, nausea and vomiting.   Genitourinary: Positive for hematuria. Negative for difficulty urinating, dysuria and urgency.   Musculoskeletal: Positive for arthralgias, back pain and gait problem.   Skin: Negative for rash and wound.   Neurological: Negative for dizziness and weakness.   Psychiatric/Behavioral: Negative for behavioral problems and confusion.        All other systems reviewed and are negative.     Personal History     Past Medical History:   Diagnosis Date   • Abnormal CT scan, lung    • Anxiety    • Arthritis    • Asthma    • Atrial fibrillation (HCC)    • Bladder cancer (HCC)    • C. difficile colitis    • Colon cancer (HCC)    • GERD (gastroesophageal reflux disease)    • Hypertension    • Mycobacterium gordonae infection    • Wears glasses      Oncology Problem List:  Malignant tumor of urinary bladder (HCC) (2020; Status: Active)  Malignant neoplasm of colon (HCC) (2016;  Status: Resolved)       Past Surgical History:   Procedure Laterality Date   • BLADDER SURGERY     • BRONCHOSCOPY  01/27/2016   • CHOLECYSTECTOMY     • COLON SURGERY     • COLONOSCOPY     • DILATION AND CURETTAGE, DIAGNOSTIC / THERAPEUTIC     • REFRACTIVE SURGERY  04/2019   • TOTAL HIP ARTHROPLASTY Bilateral    • VENTRAL/INCISIONAL HERNIA REPAIR N/A 11/30/2021    Procedure: OPEN INCISIONAL HERNIA REPAIR WITH MESH;  Surgeon: Polina Schuster MD;  Location: [facility] [place];  Service: General;  Laterality: N/A;   • WISDOM TOOTH EXTRACTION         Family History: family history includes COPD in her mother; Cancer in her father; Diabetes in her brother, paternal grandfather, and paternal grandmother; Heart attack in her brother; Hyperlipidemia in her mother; Hypertension in her mother; Lung cancer in her father; Multiple sclerosis in her daughter; No Known Problems in her maternal grandfather and maternal grandmother. Otherwise pertinent FHx was reviewed and unremarkable.     Social History:  reports that she quit smoking about 53 years ago. Her smoking use included cigarettes. She has a 2.00 pack-year smoking history. She has never used smokeless tobacco. She reports current alcohol use. She reports that she does not use drugs.  Social History     Social History Narrative    Lives alone in [place]    Typically very active       Medications:  Cyanocobalamin, Futuro Firm Compression Hose, Magnesium Oxide, Oxyquinolone Sulfate, Vitamin D3, budesonide-formoterol, conjugated estrogens, hydroCHLOROthiazide, ketoconazole, latanoprost, metoprolol succinate XL, multivitamin with minerals, nystatin, and potassium chloride    Allergies   Allergen Reactions   • Antihistamines, Loratadine-Type Other (See Comments)     Drowsiness     • Erythromycin Rash       Objective   Objective     Vital Signs:   Temp:  [97.8 °F (36.6 °C)-98.9 °F (37.2 °C)] 97.8 °F (36.6 °C)  Heart Rate:  [76-81] 81  Resp:  [16] 16  BP: (137-190)/()  168/89    Physical Exam   Constitutional: Awake, alert  Eyes: PERRLA, sclerae anicteric, no conjunctival injection  HENT: NCAT, mucous membranes moist  Neck: Supple, no thyromegaly, no lymphadenopathy, trachea midline  Respiratory: Clear to auscultation bilaterally, nonlabored respirations   Cardiovascular: irreg, no murmurs, rubs, or gallops, palpable pedal pulses bilaterally  Gastrointestinal: Positive bowel sounds, soft, nontender, nondistended  Musculoskeletal: Trace bilateral ankle edema (L>R), no clubbing or cyanosis to extremities.  Chronic discoloration BLE.  No shortening or external rotation of LLE  Psychiatric: Appropriate affect, cooperative  Neurologic: Oriented x 3, REYNOSO, painful to move LLE, speech clear  Skin: No rashes noted    Result Review:  I have personally reviewed the results from the time of this admission to 12/9/2022 19:13 EST and agree with these findings:  [x]  Laboratory list / accordion  []  Microbiology  [x]  Radiology  []  EKG/Telemetry   []  Cardiology/Vascular   []  Pathology  [x]  Old records  []  Other:  Most notable findings include:     LAB RESULTS:      Lab 12/09/22  1503   WBC 6.73   HEMOGLOBIN 13.6   HEMATOCRIT 42.3   PLATELETS 317   NEUTROS ABS 4.36   IMMATURE GRANS (ABS) 0.01   LYMPHS ABS 1.59   MONOS ABS 0.60   EOS ABS 0.13   MCV 95.7   LACTATE 1.4         Lab 12/09/22  1503   SODIUM 142   POTASSIUM 2.7*   CHLORIDE 100   CO2 31.0*   ANION GAP 11.0   BUN 10   CREATININE 0.59   EGFR 87.4   GLUCOSE 102*   CALCIUM 9.5         Lab 12/09/22  1503   TOTAL PROTEIN 8.4   ALBUMIN 4.30   GLOBULIN 4.1   ALT (SGPT) 11   AST (SGOT) 22   BILIRUBIN 0.5   ALK PHOS 163*   LIPASE 45         Brief Urine Lab Results  (Last result in the past 365 days)      Color   Clarity   Blood   Leuk Est   Nitrite   Protein   CREAT   Urine HCG        12/09/22 1508 Red   Cloudy   Large (3+)   Moderate (2+)   Positive   >=300 mg/dL (3+)               Microbiology Results (last 10 days)     ** No results found  for the last 240 hours. **          CT Abdomen Pelvis Without Contrast    Result Date: 12/9/2022   DATE OF EXAM: 12/9/2022 2:22 PM  PROCEDURE: CT ABDOMEN PELVIS WO CONTRAST-  INDICATIONS: painless hematuria; as well as ACUTE left hip pain; unable to tolerate weight bearing.  COMPARISON: X-ray hip December 9, 2022  TECHNIQUE: Routine transaxial slices were obtained through the abdomen and pelvis without the administration of intravenous contrast. Reconstructed coronal and sagittal images were also obtained. Automated exposure control and iterative construction methods were used.   FINDINGS: There is streak artifact from bilateral hip arthroplasty limiting assessment of the pelvis to some degree. It looks like there is a pessary present. A suture line is noted in the rectal area.  There is no definite abnormality of the liver, spleen, pancreas or kidneys. Assessment of the bowel is limited by the lack of oral contrast.  There is a pectus excavatum deformity. It looks like there some chronic changes in the lower lungs.  There is a scoliosis of the lumbar spine with multilevel degenerative change with convexity to the right. There is slight heterogeneity to the mineralization of the sacrum. This might relate to underlying insufficiency fracture.      Impression: 1.  An acute intra-abdominal/pelvic process is not apparent on this noncontrasted exam. 2.  Dextroscoliosis with multilevel degenerative change. 3.  Chronic appearing changes lower lungs. 4.  Changes from bilateral hip arthroplasty. 5.  Heterogeneity to the mineralization of the sacrum that might be reflective of underlying insufficiency fracture.  This report was finalized on 12/9/2022 3:08 PM by Freedom Beck MD.      XR Hip With or Without Pelvis 2 - 3 View Left    Result Date: 12/9/2022  XR HIP W OR WO PELVIS 2-3 VIEW LEFT-  Date of Exam: 12/9/2022 11:19 AM  Indication: PAIN.  Comparison: None available.  Technique: 3 views of the hip were obtained.   FINDINGS:  There is no acute fracture or dislocation.  Patient status post bilateral total hip arthroplasty.  Prosthetic components are in anatomic position.  No hardware complication identified.  There are surgical clips scattered throughout the lower abdomen and pelvis.  Soft tissues are otherwise unremarkable.         Impression: Status post total bilateral hip arthroplasty.  No hardware complication or acute bony abnormality.  This report was finalized on 12/9/2022 12:16 PM by Roland Miranda MD.        Results for orders placed during the hospital encounter of 08/05/20    Adult Transthoracic Echo Complete W/ Cont if Necessary Per Protocol    Interpretation Summary  · Calculated EF = 54.0%  · Left ventricular systolic function is normal.  · Left ventricular diastolic dysfunction.  · Mild tricuspid valve regurgitation is present.  · There is calcification of the aortic valve.      Assessment & Plan   Assessment & Plan       Sacral insufficiency fracture    Hypokalemia    Hematuria    Atrial fibrillation (HCC)    Left hip pain  Possible sacral fracture  --bedrest for now  --ortho consult in am  --MRI left hip  --PT/OT after MRI    Hypokalemia  --occ takes K+ and Mag supplements at home  --replace per protocol    Hematuria  History of bladder cancer  --has seen Marcelo in past  --H/H stable  --needs follow up at dc vs inpatient consult    HTN  Afib  --noncompliant with prescribed meds (HCTZ/BB)  --restart metoprolol    History of colon CA    DVT prophylaxis:  Lovenox  CODE STATUS:    Level Of Support Discussed With: Patient  Code Status (Patient has no pulse and is not breathing): CPR (Attempt to Resuscitate)  Medical Interventions (Patient has pulse or is breathing): Full Support      This note has been completed as part of a split-shared workflow.       Attending   Admission Attestation       I have performed an independent face-to-face diagnostic evaluation including performing an independent physical examination  as documented here.  The documented plan of care above was reviewed and developed with the advanced practice clinician (APC).      Brief Summary Statement:   Lashanda Qureshi is a 87 y.o. female w/ HTN, Hx colon and bladder Ca, GERD, Afib, anxiety who presented for eval of left hip/thigh pain making ambulation more difficult. Syx started ~5 days ago w/o recent injury. She states she can get up and walk with a walker but has significant pain with it. She currently lives alone.    Remainder of detailed HPI is as noted by APC and has been reviewed and/or edited by me for completeness.    Attending Physical Exam:  Temp:  [97.8 °F (36.6 °C)-98.9 °F (37.2 °C)] 97.8 °F (36.6 °C)  Heart Rate:  [76-94] 94  Resp:  [16] 16  BP: (137-190)/() 157/87    Constitutional: Awake, alert, laying in bed in NAD  Eyes: PERRL, sclerae anicteric, no conjunctival injection  HENT: NCAT, mucous membranes moist  Neck: Supple, trachea midline  Respiratory: Clear to auscultation bilaterally, nonlabored respirations   Cardiovascular: IRIR w/ RR, no murmurs, rubs, or gallops, palpable radial pulses bilaterally  Gastrointestinal: Positive bowel sounds, soft, nontender, nondistended  Musculoskeletal: No bilateral ankle edema, no clubbing or cyanosis to extremities  Psychiatric: Appropriate affect, cooperative  Neurologic: Oriented x 3, speech clear and fluent    Brief Assessment/Plan :  See detailed assessment and plan developed with APC which I have reviewed and/or edited for completeness.      Lucius Mercado, DO  12/10/22

## 2022-12-10 NOTE — NURSING NOTE
Pt complains of substernal chest pain. She rates pain 5-6 on a scale of 1-10. RRT called. Dr. Patricia notified. Orders received for state Troponin and EKG. Glucose level 114. EKG at 1420 was Normal sinus rhythm with first degree AV block. Dr. Patricia notified of findings at 1424. Dr. Patricia here at bedside at 1432.

## 2022-12-11 LAB
ANION GAP SERPL CALCULATED.3IONS-SCNC: 6 MMOL/L (ref 5–15)
BUN SERPL-MCNC: 13 MG/DL (ref 8–23)
BUN/CREAT SERPL: 27.7 (ref 7–25)
CALCIUM SPEC-SCNC: 9.1 MG/DL (ref 8.6–10.5)
CHLORIDE SERPL-SCNC: 106 MMOL/L (ref 98–107)
CO2 SERPL-SCNC: 27 MMOL/L (ref 22–29)
CREAT SERPL-MCNC: 0.47 MG/DL (ref 0.57–1)
DEPRECATED RDW RBC AUTO: 47.2 FL (ref 37–54)
EGFRCR SERPLBLD CKD-EPI 2021: 92.3 ML/MIN/1.73
ERYTHROCYTE [DISTWIDTH] IN BLOOD BY AUTOMATED COUNT: 13.2 % (ref 12.3–15.4)
GLUCOSE SERPL-MCNC: 102 MG/DL (ref 65–99)
HCT VFR BLD AUTO: 36.6 % (ref 34–46.6)
HGB BLD-MCNC: 11.3 G/DL (ref 12–15.9)
MAGNESIUM SERPL-MCNC: 1.7 MG/DL (ref 1.6–2.4)
MCH RBC QN AUTO: 30.1 PG (ref 26.6–33)
MCHC RBC AUTO-ENTMCNC: 30.9 G/DL (ref 31.5–35.7)
MCV RBC AUTO: 97.3 FL (ref 79–97)
PLATELET # BLD AUTO: 241 10*3/MM3 (ref 140–450)
PMV BLD AUTO: 8.4 FL (ref 6–12)
POTASSIUM SERPL-SCNC: 4.8 MMOL/L (ref 3.5–5.2)
QT INTERVAL: 414 MS
QTC INTERVAL: 437 MS
RBC # BLD AUTO: 3.76 10*6/MM3 (ref 3.77–5.28)
SODIUM SERPL-SCNC: 139 MMOL/L (ref 136–145)
WBC NRBC COR # BLD: 5.25 10*3/MM3 (ref 3.4–10.8)

## 2022-12-11 PROCEDURE — 25010000002 CEFTRIAXONE PER 250 MG: Performed by: HOSPITALIST

## 2022-12-11 PROCEDURE — 97162 PT EVAL MOD COMPLEX 30 MIN: CPT

## 2022-12-11 PROCEDURE — 99231 SBSQ HOSP IP/OBS SF/LOW 25: CPT | Performed by: ORTHOPAEDIC SURGERY

## 2022-12-11 PROCEDURE — G0378 HOSPITAL OBSERVATION PER HR: HCPCS

## 2022-12-11 PROCEDURE — 0 MAGNESIUM SULFATE 4 GM/100ML SOLUTION: Performed by: HOSPITALIST

## 2022-12-11 PROCEDURE — 25010000002 ENOXAPARIN PER 10 MG: Performed by: NURSE PRACTITIONER

## 2022-12-11 PROCEDURE — 97166 OT EVAL MOD COMPLEX 45 MIN: CPT

## 2022-12-11 PROCEDURE — 83735 ASSAY OF MAGNESIUM: CPT | Performed by: HOSPITALIST

## 2022-12-11 PROCEDURE — 85027 COMPLETE CBC AUTOMATED: CPT | Performed by: HOSPITALIST

## 2022-12-11 PROCEDURE — 99232 SBSQ HOSP IP/OBS MODERATE 35: CPT | Performed by: HOSPITALIST

## 2022-12-11 PROCEDURE — 80048 BASIC METABOLIC PNL TOTAL CA: CPT | Performed by: HOSPITALIST

## 2022-12-11 RX ORDER — CHOLECALCIFEROL (VITAMIN D3) 125 MCG
5 CAPSULE ORAL NIGHTLY PRN
Status: DISCONTINUED | OUTPATIENT
Start: 2022-12-11 | End: 2022-12-13 | Stop reason: HOSPADM

## 2022-12-11 RX ORDER — MAGNESIUM SULFATE HEPTAHYDRATE 40 MG/ML
2 INJECTION, SOLUTION INTRAVENOUS AS NEEDED
Status: DISCONTINUED | OUTPATIENT
Start: 2022-12-11 | End: 2022-12-13 | Stop reason: HOSPADM

## 2022-12-11 RX ORDER — MAGNESIUM SULFATE HEPTAHYDRATE 40 MG/ML
4 INJECTION, SOLUTION INTRAVENOUS AS NEEDED
Status: DISCONTINUED | OUTPATIENT
Start: 2022-12-11 | End: 2022-12-13 | Stop reason: HOSPADM

## 2022-12-11 RX ORDER — HYDRALAZINE HYDROCHLORIDE 20 MG/ML
10 INJECTION INTRAMUSCULAR; INTRAVENOUS ONCE
Status: DISCONTINUED | OUTPATIENT
Start: 2022-12-11 | End: 2022-12-13 | Stop reason: HOSPADM

## 2022-12-11 RX ADMIN — MAGNESIUM SULFATE HEPTAHYDRATE 4 G: 40 INJECTION, SOLUTION INTRAVENOUS at 10:12

## 2022-12-11 RX ADMIN — SODIUM CHLORIDE 1 G: 900 INJECTION INTRAVENOUS at 09:02

## 2022-12-11 RX ADMIN — LATANOPROST 1 DROP: 50 SOLUTION OPHTHALMIC at 20:56

## 2022-12-11 RX ADMIN — ACETAMINOPHEN 650 MG: 325 TABLET, FILM COATED ORAL at 09:03

## 2022-12-11 RX ADMIN — POTASSIUM CHLORIDE 20 MEQ: 750 CAPSULE, EXTENDED RELEASE ORAL at 09:02

## 2022-12-11 RX ADMIN — TRAMADOL HYDROCHLORIDE 25 MG: 50 TABLET, COATED ORAL at 00:16

## 2022-12-11 RX ADMIN — METOPROLOL SUCCINATE 25 MG: 25 TABLET, EXTENDED RELEASE ORAL at 09:03

## 2022-12-11 RX ADMIN — ENOXAPARIN SODIUM 40 MG: 40 INJECTION SUBCUTANEOUS at 20:56

## 2022-12-11 RX ADMIN — Medication 10 ML: at 22:06

## 2022-12-11 RX ADMIN — POTASSIUM CHLORIDE 20 MEQ: 750 CAPSULE, EXTENDED RELEASE ORAL at 17:29

## 2022-12-11 RX ADMIN — Medication 5 MG: at 02:42

## 2022-12-11 RX ADMIN — MAGNESIUM OXIDE 400 MG (241.3 MG MAGNESIUM) TABLET 400 MG: TABLET at 09:03

## 2022-12-11 RX ADMIN — ACETAMINOPHEN 650 MG: 325 TABLET, FILM COATED ORAL at 15:54

## 2022-12-11 RX ADMIN — Medication 5 MG: at 20:57

## 2022-12-11 NOTE — THERAPY EVALUATION
Patient Name: Lashanda Qureshi  : 1935    MRN: [id number]                              Today's Date: 2022       Admit Date: 2022    Visit Dx: No diagnosis found.  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 difficile diarrhea   • Cervical pain   • Chronic midline low back pain without sciatica   • Thoracogenic scoliosis of thoracic region   • Insomnia   • Localized edema   • Malignant tumor of urinary bladder ([facility])   • Acute hypoxemic respiratory failure ([facility])   • Acute diastolic CHF (congestive heart failure) ([facility])   • NSVT (nonsustained ventricular tachycardia)   • Dysfunction of both eustachian tubes   • Dyslipidemia   • Exudative age-related macular degeneration, left eye, with active choroidal neovascularization ([facility])   • Anginal equivalent ([facility])   • PVD (peripheral vascular disease) ([facility])   • Anxiety   • Sacral insufficiency fracture   • Hypokalemia   • Hematuria   • PAC's (premature atrial contraction)   • Inferior pubic ramus fracture, left, closed, initial encounter ([facility])   • Acute UTI (urinary tract infection)     Past Medical History:   Diagnosis Date   • Abnormal CT scan, lung    • Anxiety    • Arthritis    • Asthma    • Atrial fibrillation ([facility])     • Bladder cancer (HCC)    • C. difficile colitis    • Colon cancer (HCC)    • GERD (gastroesophageal reflux disease)    • Hypertension    • Mycobacterium gordonae infection    • Wears glasses      Past Surgical History:   Procedure Laterality Date   • BLADDER SURGERY     • BRONCHOSCOPY  01/27/2016   • CHOLECYSTECTOMY     • COLON SURGERY     • COLONOSCOPY     • DILATION AND CURETTAGE, DIAGNOSTIC / THERAPEUTIC     • REFRACTIVE SURGERY  04/2019   • TOTAL HIP ARTHROPLASTY Bilateral    • VENTRAL/INCISIONAL HERNIA REPAIR N/A 11/30/2021    Procedure: OPEN INCISIONAL HERNIA REPAIR WITH MESH;  Surgeon: Polina Schuster MD;  Location: [facility];  Service: General;  Laterality: N/A;   • WISDOM TOOTH EXTRACTION        General Information     Row Name 12/11/22 1137          Physical Therapy Time and Intention    Document Type evaluation  -AE     Mode of Treatment physical therapy  -AE     Row Name 12/11/22 1137          General Information    Patient Profile Reviewed yes  -AE     Prior Level of Function independent:;all household mobility;community mobility;gait;transfer;bed mobility;ADL's;driving  Pt used a rollator for mobility. Pt reports one recent fall one month ago leading to this hospitalization.  -AE     Existing Precautions/Restrictions fall;other (see comments)  WBAT LLE with RW  -AE     Barriers to Rehab medically complex  -AE     Row Name 12/11/22 1137          Living Environment    People in Home facility resident  [facility]  -AE     Row Name 12/11/22 1137          Home Main Entrance    Number of Stairs, Main Entrance none  -AE     Stair Railings, Main Entrance none  -AE     Row Name 12/11/22 1137          Stairs Within Home, Primary    Stairs, Within Home, Primary Uses elevator to access room and dining room. Pt reports long distances between elevator and apartment room.  -AE     Number of Stairs, Within Home, Primary other (see comments)  elevator  -AE     Row Name 12/11/22 1137          Cognition     Orientation Status (Cognition) oriented x 4  -AE     Row Name 12/11/22 1137          Safety Issues, Functional Mobility    Safety Issues Affecting Function (Mobility) awareness of need for assistance;insight into deficits/self-awareness;safety precaution awareness;safety precautions follow-through/compliance;sequencing abilities  -AE     Impairments Affecting Function (Mobility) balance;coordination;endurance/activity tolerance;pain;range of motion (ROM);postural/trunk control;strength  -AE           User Key  (r) = Recorded By, (t) = Taken By, (c) = Cosigned By    Initials Name Provider Type    AE Maico Conteh, PT Physical Therapist               Mobility     Row Name 12/11/22 1145          Bed Mobility    Bed Mobility supine-sit  -AE     Supine-Sit [place] (Bed Mobility) minimum assist (75% patient effort);verbal cues;nonverbal cues (demo/gesture)  -AE     Assistive Device (Bed Mobility) bed rails;head of bed elevated  -AE     Comment, (Bed Mobility) Pt required assistance with advancement of LLE to EOB.  -AE     Row Name 12/11/22 1145          Transfers    Comment, (Transfers) VCs for hand placement and sequencing. Pt demo no LOB with STS and no c/o dizziness.  -AE     Row Name 12/11/22 1145          Sit-Stand Transfer    Sit-Stand [place] (Transfers) contact guard;verbal cues  -AE     Assistive Device (Sit-Stand Transfers) walker, front-wheeled  -AE     Row Name 12/11/22 1145          Gait/Stairs (Locomotion)    [place] Level (Gait) contact guard;verbal cues;1 person to manage equipment  -AE     Assistive Device (Gait) walker, front-wheeled  -AE     Distance in Feet (Gait) 120  -AE     Deviations/Abnormal Patterns (Gait) bilateral deviations;base of support, narrow;davin decreased;gait speed decreased;stride length decreased;antalgic;weight shifting decreased  -AE     Bilateral Gait Deviations forward flexed posture;heel strike decreased  -AE     Left Sided Gait Deviations weight shift  ability decreased;heel strike decreased;hip hiking  -AE     Comment, (Gait/Stairs) Pt demo step to gait pattern with slowed davin, decreased gait speed, and antalgic gait pattern of LLE. Pt initially ambulating with vaulting of RLE and hip hiking of LLE to advance LLE during swing phase of gait. Pt educated and cued to reduce vaulting with improvement in knee flexion and heel strike of LLE. Pt demo slow gait speed with difficulty sequencing despite VCs.  -AE           User Key  (r) = Recorded By, (t) = Taken By, (c) = Cosigned By    Initials Name Provider Type    AE Maico Conteh, PT Physical Therapist               Obj/Interventions     Row Name 12/11/22 1152          Range of Motion Comprehensive    General Range of Motion bilateral lower extremity ROM WFL  -AE     Row Name 12/11/22 1152          Strength Comprehensive (MMT)    General Manual Muscle Testing (MMT) Assessment lower extremity strength deficits identified  -AE     Comment, General Manual Muscle Testing (MMT) Assessment BLE grossly 4-/5  -AE     Row Name 12/11/22 1152          Balance    Balance Assessment sitting static balance;sitting dynamic balance;sit to stand dynamic balance;standing static balance;standing dynamic balance  -AE     Static Sitting Balance standby assist  -AE     Dynamic Sitting Balance standby assist  -AE     Position, Sitting Balance unsupported;sitting edge of bed  -AE     Sit to Stand Dynamic Balance contact guard  -AE     Static Standing Balance contact guard  -AE     Dynamic Standing Balance contact guard  -AE     Position/Device Used, Standing Balance supported;walker, front-wheeled  -AE     Row Name 12/11/22 1152          Sensory Assessment (Somatosensory)    Sensory Assessment (Somatosensory) LE sensation intact  -AE           User Key  (r) = Recorded By, (t) = Taken By, (c) = Cosigned By    Initials Name Provider Type    AE Maico Conteh PT Physical Therapist               Goals/Plan     Row Name 12/11/22 1158           Bed Mobility Goal 1 (PT)    Activity/Assistive Device (Bed Mobility Goal 1, PT) sit to supine/supine to sit  -AE     [place] Level/Cues Needed (Bed Mobility Goal 1, PT) contact guard required  -AE     Time Frame (Bed Mobility Goal 1, PT) long term goal (LTG);10 days  -AE     Progress/Outcomes (Bed Mobility Goal 1, PT) goal ongoing  -AE     Row Name 12/11/22 1157          Transfer Goal 1 (PT)    Activity/Assistive Device (Transfer Goal 1, PT) sit-to-stand/stand-to-sit;bed-to-chair/chair-to-bed  -AE     [place] Level/Cues Needed (Transfer Goal 1, PT) standby assist  -AE     Time Frame (Transfer Goal 1, PT) long term goal (LTG);10 days  -AE     Progress/Outcome (Transfer Goal 1, PT) goal ongoing  -AE     Row Name 12/11/22 1157          Gait Training Goal 1 (PT)    Activity/Assistive Device (Gait Training Goal 1, PT) gait (walking locomotion);assistive device use  -AE     [place] Level (Gait Training Goal 1, PT) contact guard required  -AE     Distance (Gait Training Goal 1, PT) 200ft  -AE     Time Frame (Gait Training Goal 1, PT) long term goal (LTG);10 days  -AE     Progress/Outcome (Gait Training Goal 1, PT) goal ongoing  -AE     Row Name 12/11/22 1157          Therapy Assessment/Plan (PT)    Planned Therapy Interventions (PT) balance training;bed mobility training;gait training;home exercise program;patient/family education;postural re-education;ROM (range of motion);strengthening;transfer training  -AE           User Key  (r) = Recorded By, (t) = Taken By, (c) = Cosigned By    Initials Name Provider Type    AE Maico Conteh, PT Physical Therapist               Clinical Impression     Row Name 12/11/22 1154          Pain    Pain Intervention(s) Repositioned;Ambulation/increased activity  -AE     Row Name 12/11/22 1156          Pain Scale: FACES Pre/Post-Treatment    Pain: FACES Scale, Pretreatment 0-->no hurt  -AE     Posttreatment Pain Rating 2-->hurts little bit  -AE     Pain Location -  Side/Orientation Left  -AE     Pain Location generalized  -AE     Pain Location - extremity  -AE     Pre/Posttreatment Pain Comment increased pain with mobility, relieved with rest  -AE     Row Name 12/11/22 1153          Plan of Care Review    Plan of Care Reviewed With patient  -AE     Progress no change  -AE     Outcome Evaluation PT eval completed. Pt presents with generalized weakness, decreased functional mobility, and increased pain of LLE during ambulation. Pt completed STS and ambulated 120ft with CGA and RW, pt demo short steps with decreased gait speed throughout. Pt c/o increased pain with mobility. Recommend continued skilled IP PT interventions. Recommend D/C to IRF for best functional outcome.  -AE     Row Name 12/11/22 1153          Therapy Assessment/Plan (PT)    Rehab Potential (PT) good, to achieve stated therapy goals  -AE     Criteria for Skilled Interventions Met (PT) yes  -AE     Therapy Frequency (PT) daily  -AE     Row Name 12/11/22 1153          Vital Signs    Pre Systolic BP Rehab 144  -AE     Pre Treatment Diastolic BP 73  -AE     Pretreatment Heart Rate (beats/min) 54  -AE     Posttreatment Heart Rate (beats/min) 58  -AE     O2 Delivery Pre Treatment room air  -AE     O2 Delivery Intra Treatment room air  -AE     Post SpO2 (%) 92  -AE     O2 Delivery Post Treatment room air  -AE     Pre Patient Position Supine  -AE     Intra Patient Position Standing  -AE     Post Patient Position Sitting  -AE     Row Name 12/11/22 1153          Positioning and Restraints    Pre-Treatment Position in bed  -AE     Post Treatment Position chair  -AE     In Chair notified nsg;reclined;call light within reach;encouraged to call for assist;exit alarm on;with nsg;waffle cushion;legs elevated;on mechanical lift sling  -AE           User Key  (r) = Recorded By, (t) = Taken By, (c) = Cosigned By    Initials Name Provider Type    AE Maico Conteh, PT Physical Therapist               Outcome Measures     [facility]  Name 12/11/22 1158          How much help from another person do you currently need...    Turning from your back to your side while in flat bed without using bedrails? 3  -AE     Moving from lying on back to sitting on the side of a flat bed without bedrails? 3  -AE     Moving to and from a bed to a chair (including a wheelchair)? 3  -AE     Standing up from a chair using your arms (e.g., wheelchair, bedside chair)? 3  -AE     Climbing 3-5 steps with a railing? 2  -AE     To walk in hospital room? 3  -AE     AM-PAC 6 Clicks Score (PT) 17  -AE     Highest level of mobility 5 --> Static standing  -AE     Row Name 12/11/22 1158 12/11/22 1057       Functional Assessment    Outcome Measure Options AM-PAC 6 Clicks Basic Mobility (PT)  -AE AM-PAC 6 Clicks Daily Activity (OT)  -MR          User Key  (r) = Recorded By, (t) = Taken By, (c) = Cosigned By    Initials Name Provider Type    AE Maico Conteh, PT Physical Therapist    MR Marielle Womack, OT Occupational Therapist                             Physical Therapy Education     Title: PT OT SLP Therapies (Done)     Topic: Physical Therapy (Done)     Point: Mobility training (Done)     Learning Progress Summary           Patient Acceptance, E, VU by AE at 12/11/2022 0949                   Point: Home exercise program (Done)     Learning Progress Summary           Patient Acceptance, E, VU by AE at 12/11/2022 0949                   Point: Body mechanics (Done)     Learning Progress Summary           Patient Acceptance, E, VU by AE at 12/11/2022 0949                   Point: Precautions (Done)     Learning Progress Summary           Patient Acceptance, E, VU by AE at 12/11/2022 0949                               User Key     Initials Effective Dates Name Provider Type Discipline    AE 09/21/21 -  Maico Conteh, PT Physical Therapist PT              PT Recommendation and Plan  Planned Therapy Interventions (PT): balance training, bed mobility training, gait training,  home exercise program, patient/family education, postural re-education, ROM (range of motion), strengthening, transfer training  Plan of Care Reviewed With: patient  Progress: no change  Outcome Evaluation: PT eval completed. Pt presents with generalized weakness, decreased functional mobility, and increased pain of LLE during ambulation. Pt completed STS and ambulated 120ft with CGA and RW, pt demo short steps with decreased gait speed throughout. Pt c/o increased pain with mobility. Recommend continued skilled IP PT interventions. Recommend D/C to IRF for best functional outcome.     Time Calculation:    PT Charges     Row Name 12/11/22 1159             Time Calculation    Start Time 0949  -AE      PT Received On 12/11/22  -AE      PT Goal Re-Cert Due Date 12/21/22  -AE         Untimed Charges    PT Eval/Re-eval Minutes 50  -AE         Total Minutes    Untimed Charges Total Minutes 50  -AE       Total Minutes 50  -AE            User Key  (r) = Recorded By, (t) = Taken By, (c) = Cosigned By    Initials Name Provider Type    AE Maico Conteh PT Physical Therapist              Therapy Charges for Today     Code Description Service Date Service Provider Modifiers Qty    76461316198 HC PT EVAL MOD COMPLEXITY 4 12/11/2022 Maico Conteh PT GP 1          PT G-Codes  Outcome Measure Options: AM-PAC 6 Clicks Basic Mobility (PT)  AM-PAC 6 Clicks Score (PT): 17  AM-PAC 6 Clicks Score (OT): 16  PT Discharge Summary  Anticipated Discharge Disposition (PT): inpatient rehabilitation facility    Maico Conteh PT  12/11/2022

## 2022-12-11 NOTE — PLAN OF CARE
Goal Outcome Evaluation:  Plan of Care Reviewed With: patient        Progress: improving  Outcome Evaluation: OT initial eval completed. Pt presenting w/ generalized weakness/deconditioning, impaired mobility/transfers/balance and increased need for assist w/ ADLs. Pt required Edd for bed mobility assistance to advance the LLE to EOB d/t pain. SUA for grooming tasks, CGA for UB dressing and MaxA for LB dressing. CGA for STS w/ v/c for hand placement and advancement of the LLE for comfort during transitional phases of transfers. Pt might benefit from AE in future sessions, will montior progress closely. IP OT warranted to address deficits. Recommend inpatient rehab at d/c for best functional outcome.

## 2022-12-11 NOTE — PROGRESS NOTES
[facility] [place] [facility] Progress Note    Subjective      LOS: 0 days   Patient Care Team:  Gisella Evans DO as PCP - General (Family Medicine)  Crispin Wheeler MD as Consulting Physician (Orthopedic Surgery)  James Gonzales MD de Castro, Fernando R., MD as Consulting Physician (Dermatology)  Eliseo Burleson MD as Surgeon (General Surgery)    CC: Left hip pain    Interval History:   No change in her hip pain from yesterday.  Pain with weightbearing and range of motion.  She has been able to go to the chair into the bathroom with assistance.    Objective      Vital Signs  Temp (24hrs), Av °F (36.7 °C), Min:97.6 °F (36.4 °C), Max:98.4 °F (36.9 °C)      /95 (BP Location: Left arm, Patient Position: Lying)   Pulse 61   Temp 97.6 °F (36.4 °C) (Oral)   Resp 18   Ht 167.6 cm (66\")   Wt 54.4 kg (120 lb)   LMP  (LMP Unknown)   SpO2 94%   BMI 19.37 kg/m²     Examination:   Integument:              Left hip: Well-healed posterolateral incision     Neurologic:              Sensation:                          Left foot: Intact to light touch on the dorsal and plantar aspect              Motor:  Left lower extremity: 4/5 quadriceps, hamstrings, ankle dorsiflexors, and ankle plantar flexors     Lower Extremity:              Left Hip:                          Tenderness:    None at the hip and no posterior sacral pain                          Swelling:          None                          Crepitus:          None                          Atrophy:           None                          Range of motion:        External Rotation:       30°, with pain                                                              Internal Rotation:        30°, with pain    Instability:        None  Deformities:     None  Functional testing: Positive Stinchfield                          No leg length discrepancy    Labs:  Results from last 7 days   Lab Units 22  0353 12/10/22  0444 22  1503   WBC  10*3/mm3 5.25 4.93 6.73   HEMOGLOBIN g/dL 11.3* 11.8* 13.6   HEMATOCRIT % 36.6 36.4 42.3   MCV fL 97.3* 93.6 95.7   PLATELETS 10*3/mm3 241 270 317     Lab Results   Component Value Date    SEDRATE 31 (H) 12/10/2022    WBC 5.25 12/11/2022    CRP 1.14 (H) 12/10/2022     Component      Latest Ref Rng & Units 8/6/2018 8/13/2018 8/20/2018 1/8/2019   C-Reactive Protein      0.00 - 0.50 mg/dL 0.22 0.11 0.09 0.61     Component      Latest Ref Rng & Units 12/10/2022   C-Reactive Protein      0.00 - 0.50 mg/dL 1.14 (H)       Component      Latest Ref Rng & Units 8/6/2018 8/13/2018 8/20/2018 1/8/2019   Sed Rate      0 - 30 mm/hr 26 30 30 33 (H)     Component      Latest Ref Rng & Units 8/14/2020 12/10/2022   Sed Rate      0 - 30 mm/hr 23 31 (H)       Radiology:  Imaging Results (Last 24 Hours)     ** No results found for the last 24 hours. **          PT:  Physical Therapy - Plan of Care Review - Outcome Summary:   ]       Results Review:     I reviewed the patient's new clinical results.    Assessment and Plan     Assessment:   Left hip pain    Although the sacral insufficiency fractures could be the cause of her pain, this would not be typical based on the location of her pain.  She does have a slight elevation of both her CRP at 1.14,  but the CRP is elevated compared to the previous comparisons.  Sed rate is 31.  I will consult with Dr. Chakraborty's partner Dr. Montilla for a second opinion before proceeding with any further work-up/intervention.  Patient may continue to be weightbearing as tolerated with a walker.  Further recommendations to follow.      Sacral insufficiency fracture    Hypertension    Moderate persistent asthma    Hypokalemia    Hematuria    PAC's (premature atrial contraction)    Inferior pubic ramus fracture, left, closed, initial encounter ([facility])    Acute UTI (urinary tract infection)      Plan for disposition: To be determined.      No future appointments.        Yousif Haskins MD  12/11/22  09:03  EST

## 2022-12-11 NOTE — PLAN OF CARE
Goal Outcome Evaluation:  Plan of Care Reviewed With: patient        Progress: no change  Outcome Evaluation: PT eval completed. Pt presents with generalized weakness, decreased functional mobility, and increased pain of LLE during ambulation. Pt completed STS and ambulated 120ft with CGA and RW, pt demo short steps with decreased gait speed throughout. Pt c/o increased pain with mobility. Recommend continued skilled IP PT interventions. Recommend D/C to IRF for best functional outcome.

## 2022-12-11 NOTE — THERAPY EVALUATION
Patient Name: Lashanda Qureshi  : 1935    MRN: [id number]                              Today's Date: 2022       Admit Date: 2022    Visit Dx: No diagnosis found.  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 difficile diarrhea   • Cervical pain   • Chronic midline low back pain without sciatica   • Thoracogenic scoliosis of thoracic region   • Insomnia   • Localized edema   • Malignant tumor of urinary bladder ([facility])   • Acute hypoxemic respiratory failure ([facility])   • Acute diastolic CHF (congestive heart failure) ([facility])   • NSVT (nonsustained ventricular tachycardia)   • Dysfunction of both eustachian tubes   • Dyslipidemia   • Exudative age-related macular degeneration, left eye, with active choroidal neovascularization ([facility])   • Anginal equivalent ([facility])   • PVD (peripheral vascular disease) ([facility])   • Anxiety   • Sacral insufficiency fracture   • Hypokalemia   • Hematuria   • PAC's (premature atrial contraction)   • Inferior pubic ramus fracture, left, closed, initial encounter ([facility])   • Acute UTI (urinary tract infection)     Past Medical History:   Diagnosis Date   • Abnormal CT scan, lung    • Anxiety    • Arthritis    • Asthma    • Atrial fibrillation ([facility])     • Bladder cancer (HCC)    • C. difficile colitis    • Colon cancer (HCC)    • GERD (gastroesophageal reflux disease)    • Hypertension    • Mycobacterium gordonae infection    • Wears glasses      Past Surgical History:   Procedure Laterality Date   • BLADDER SURGERY     • BRONCHOSCOPY  01/27/2016   • CHOLECYSTECTOMY     • COLON SURGERY     • COLONOSCOPY     • DILATION AND CURETTAGE, DIAGNOSTIC / THERAPEUTIC     • REFRACTIVE SURGERY  04/2019   • TOTAL HIP ARTHROPLASTY Bilateral    • VENTRAL/INCISIONAL HERNIA REPAIR N/A 11/30/2021    Procedure: OPEN INCISIONAL HERNIA REPAIR WITH MESH;  Surgeon: Polina Schuster MD;  Location: [facility];  Service: General;  Laterality: N/A;   • WISDOM TOOTH EXTRACTION        General Information     Row Name 12/11/22 1041          OT Time and Intention    Document Type evaluation  -MR     Mode of Treatment occupational therapy  -MR     Row Name 12/11/22 1041          General Information    Patient Profile Reviewed yes  -MR     Prior Level of Function independent:;all household mobility;community mobility;gait;transfer;bed mobility;ADL's;driving  PTA pt was I w/ ADLs, utilized a rollator for mobility. Still driving. Pt endorses at least 1 recent fall approx 1 month ago leading to this hospitalization. Has a walk in shower w/ seat  -MR     Existing Precautions/Restrictions fall;other (see comments)  WBAT w/ RW on LLE;  -MR     Barriers to Rehab medically complex  -MR     Row Name 12/11/22 1041          Living Environment    People in Home facility resident;other (see comments)  Lives at [facility]  -MR     Row Name 12/11/22 1041          Home Main Entrance    Number of Stairs, Main Entrance none  -MR     Row Name 12/11/22 1041          Stairs Within Home, Primary    Stairs, Within Home, Primary Uses a elevator to access room and dining room. Pt reporting her room is pretty far from the elevator and recently walking that distances has been difficult.  -MR     Number of Stairs,  Within Home, Primary other (see comments)  See Above  -MR     Row Name 12/11/22 1041          Cognition    Orientation Status (Cognition) oriented x 4  -MR     Row Name 12/11/22 1041          Safety Issues, Functional Mobility    Safety Issues Affecting Function (Mobility) awareness of need for assistance;insight into deficits/self-awareness;safety precaution awareness;safety precautions follow-through/compliance;sequencing abilities  -MR     Impairments Affecting Function (Mobility) balance;coordination;endurance/activity tolerance;pain;range of motion (ROM)  -MR           User Key  (r) = Recorded By, (t) = Taken By, (c) = Cosigned By    Initials Name Provider Type    MR VuMarielle, OT Occupational Therapist                 Mobility/ADL's     Row Name 12/11/22 1046          Bed Mobility    Bed Mobility supine-sit  -MR     Supine-Sit [place] (Bed Mobility) minimum assist (75% patient effort);verbal cues;nonverbal cues (demo/gesture)  -MR     Bed Mobility, Safety Issues decreased use of legs for bridging/pushing  -MR     Assistive Device (Bed Mobility) bed rails;head of bed elevated  -MR     Comment, (Bed Mobility) Pt requiring assistance to advance the LLE forward.  -MR     Row Name 12/11/22 1046          Transfers    Transfers sit-stand transfer  -MR     Row Name 12/11/22 1046          Sit-Stand Transfer    Sit-Stand [place] (Transfers) contact guard;verbal cues  -MR     Assistive Device (Sit-Stand Transfers) walker, front-wheeled  -MR     Comment, (Sit-Stand Transfer) v/c for hand placement, sequencing and advancement of the LLE forward for comfort during transfers.  -MR     Row Name 12/11/22 1046          Functional Mobility    Functional Mobility- Ind. Level contact guard assist;verbal cues required  -MR     Functional Mobility- Device walker, front-wheeled  -MR     Functional Mobility-Distance (Feet) --  household distances w/in pt's room  -MR     Row Name 12/11/22 1046          Activities of  Daily Living    BADL Assessment/Intervention upper body dressing;lower body dressing;grooming  -MR     Row Name 12/11/22 1046          Upper Body Dressing Assessment/Training    [place] Level (Upper Body Dressing) don;other (see comments);contact guard assist  hospital gown  -MR     Position (Upper Body Dressing) edge of bed sitting  -MR     Row Name 12/11/22 1046          Lower Body Dressing Assessment/Training    [place] Level (Lower Body Dressing) don;socks;dependent (less than 25% patient effort)  -MR     Position (Lower Body Dressing) supine  -MR     Comment, (Lower Body Dressing) Pt might benefit from AE in future sessions and will monitor progress.  -MR     Row Name 12/11/22 1046          Grooming Assessment/Training    [place] Level (Grooming) hair care, combing/brushing;wash face, hands;set up  -MR     Position (Grooming) edge of bed sitting  -MR           User Key  (r) = Recorded By, (t) = Taken By, (c) = Cosigned By    Initials Name Provider Type    MR Marielle Womack OT Occupational Therapist               Obj/Interventions     Row Name 12/11/22 1050          Sensory Assessment (Somatosensory)    Sensory Assessment (Somatosensory) UE sensation intact  -MR     Row Name 12/11/22 1050          Vision Assessment/Intervention    Visual Impairment/Limitations WFL;corrective lenses for reading  -MR     Row Name 12/11/22 1050          Range of Motion Comprehensive    General Range of Motion bilateral upper extremity ROM WFL  -MR     Row Name 12/11/22 1050          Strength Comprehensive (MMT)    Comment, General Manual Muscle Testing (MMT) Assessment BUE grossly 4-/5 in all functional planes  -MR     Row Name 12/11/22 1050          Balance    Balance Assessment sitting static balance;sitting dynamic balance;standing static balance;standing dynamic balance  -MR     Static Sitting Balance standby assist  -MR     Dynamic Sitting Balance standby assist  -MR     Position, Sitting Balance  unsupported;sitting edge of bed  -MR     Static Standing Balance contact guard  -MR     Dynamic Standing Balance contact guard  -MR     Position/Device Used, Standing Balance supported;walker, rolling  -MR     Balance Interventions sitting;standing;sit to stand;supported;static;dynamic;occupation based/functional task  -MR     Comment, Balance No overt LOB w/ transfers, mobility or self-care tasks  -MR           User Key  (r) = Recorded By, (t) = Taken By, (c) = Cosigned By    Initials Name Provider Type    MR Marielle Womack, OT Occupational Therapist               Goals/Plan     Row Name 12/11/22 1056          Bed Mobility Goal 1 (OT)    Activity/Assistive Device (Bed Mobility Goal 1, OT) sit to supine;leg   -MR     [place] Level/Cues Needed (Bed Mobility Goal 1, OT) supervision required  -MR     Time Frame (Bed Mobility Goal 1, OT) long term goal (LTG);by discharge  -MR     Progress/Outcomes (Bed Mobility Goal 1, OT) new goal  -MR     Row Name 12/11/22 1056          Transfer Goal 1 (OT)    Activity/Assistive Device (Transfer Goal 1, OT) sit-to-stand/stand-to-sit;toilet  -MR     [place] Level/Cues Needed (Transfer Goal 1, OT) contact guard required  -MR     Time Frame (Transfer Goal 1, OT) long term goal (LTG);by discharge  -MR     Progress/Outcome (Transfer Goal 1, OT) new goal  -MR     Row Name 12/11/22 1056          Dressing Goal 1 (OT)    Activity/Device (Dressing Goal 1, OT) lower body dressing;long-handled shoe horn;reacher;sock-aid;other (see comments)  AE PRN  -MR     [place]/Cues Needed (Dressing Goal 1, OT) contact guard required  -MR     Time Frame (Dressing Goal 1, OT) long term goal (LTG);by discharge  -MR     Progress/Outcome (Dressing Goal 1, OT) new goal  -MR     Row Name 12/11/22 1056          Therapy Assessment/Plan (OT)    Planned Therapy Interventions (OT) activity tolerance training;adaptive equipment training;BADL retraining;functional balance retraining;IADL  retraining;occupation/activity based interventions;patient/caregiver education/training;transfer/mobility retraining;strengthening exercise;ROM/therapeutic exercise  -MR           User Key  (r) = Recorded By, (t) = Taken By, (c) = Cosigned By    Initials Name Provider Type    Marielle Palma, OT Occupational Therapist               Clinical Impression     Row Name 12/11/22 1051          Pain Assessment    Additional Documentation Pain Scale: FACES Pre/Post-Treatment (Group)  -     Row Name 12/11/22 1051          Pain Scale: FACES Pre/Post-Treatment    Pain: FACES Scale, Pretreatment 0-->no hurt  -MR     Posttreatment Pain Rating 2-->hurts little bit  -MR     Pain Location - Side/Orientation Left  -MR     Pain Location generalized  -MR     Pain Location - extremity  -MR     Pre/Posttreatment Pain Comment Denied pain at rest, slight increase w/ pain once upright at RW.  -     Row Name 12/11/22 1051          Plan of Care Review    Plan of Care Reviewed With patient  -MR     Progress improving  -MR     Outcome Evaluation OT initial eval completed. Pt presenting w/ generalized weakness/deconditioning, impaired mobility/transfers/balance and increased need for assist w/ ADLs. Pt required Edd for bed mobility assistance to advance the LLE to EOB d/t pain. SUA for grooming tasks, CGA for UB dressing and MaxA for LB dressing. CGA for STS w/ v/c for hand placement and advancement of the LLE for comfort during transitional phases of transfers. Pt might benefit from AE in future sessions, will montior progress closely. IP OT warranted to address deficits. Recommend inpatient rehab at d/c for best functional outcome.  -     Row Name 12/11/22 1051          Therapy Assessment/Plan (OT)    Patient/Family Therapy Goal Statement (OT) Return to PLOF  -MR     Rehab Potential (OT) good, to achieve stated therapy goals  -MR     Criteria for Skilled Therapeutic Interventions Met (OT) yes;meets criteria;skilled treatment is  necessary  -MR     Therapy Frequency (OT) daily  -MR     Row Name 12/11/22 1051          Therapy Plan Review/Discharge Plan (OT)    Anticipated Discharge Disposition (OT) inpatient rehabilitation facility  -MR     Row Name 12/11/22 1051          Vital Signs    Pre Systolic BP Rehab 144  -MR     Pre Treatment Diastolic BP 73  -MR     Intra Systolic BP Rehab 147  -MR     Intra Treatment Diastolic BP 89  -MR     Post Systolic BP Rehab 152  -MR     Post Treatment Diastolic   -MR     Pretreatment Heart Rate (beats/min) 57  -MR     Posttreatment Heart Rate (beats/min) 61  -MR     Pre SpO2 (%) 90  -MR     O2 Delivery Pre Treatment room air  -MR     O2 Delivery Intra Treatment room air  -MR     Post SpO2 (%) 91  -MR     O2 Delivery Post Treatment room air  -MR     Pre Patient Position Supine  -MR     Intra Patient Position Standing  -MR     Post Patient Position Sitting  -MR     Row Name 12/11/22 1051          Positioning and Restraints    Pre-Treatment Position in bed  -MR     Post Treatment Position other  -MR     Other Position with PT  -MR           User Key  (r) = Recorded By, (t) = Taken By, (c) = Cosigned By    Initials Name Provider Type    MR Marielle Womack, OT Occupational Therapist               Outcome Measures     Row Name 12/11/22 1057          How much help from another is currently needed...    Putting on and taking off regular lower body clothing? 1  -MR     Bathing (including washing, rinsing, and drying) 2  -MR     Toileting (which includes using toilet bed pan or urinal) 2  -MR     Putting on and taking off regular upper body clothing 3  -MR     Taking care of personal grooming (such as brushing teeth) 4  -MR     Eating meals 4  -MR     AM-PAC 6 Clicks Score (OT) 16  -MR     Row Name 12/11/22 1057          Functional Assessment    Outcome Measure Options AM-PAC 6 Clicks Daily Activity (OT)  -MR           User Key  (r) = Recorded By, (t) = Taken By, (c) = Cosigned By    Initials Name Provider Type     MR Marielle Womack, OT Occupational Therapist                Occupational Therapy Education     Title: PT OT SLP Therapies (In Progress)     Topic: Occupational Therapy (Done)     Point: ADL training (Done)     Description:   Instruct learner(s) on proper safety adaptation and remediation techniques during self care or transfers.   Instruct in proper use of assistive devices.              Learning Progress Summary           Patient Acceptance, E, VU by MR at 12/11/2022 1058                   Point: Home exercise program (Done)     Description:   Instruct learner(s) on appropriate technique for monitoring, assisting and/or progressing therapeutic exercises/activities.              Learning Progress Summary           Patient Acceptance, E, VU by MR at 12/11/2022 1058                   Point: Precautions (Done)     Description:   Instruct learner(s) on prescribed precautions during self-care and functional transfers.              Learning Progress Summary           Patient Acceptance, E, VU by MR at 12/11/2022 1058                   Point: Body mechanics (Done)     Description:   Instruct learner(s) on proper positioning and spine alignment during self-care, functional mobility activities and/or exercises.              Learning Progress Summary           Patient Acceptance, E, VU by MR at 12/11/2022 1058                               User Key     Initials Effective Dates Name Provider Type Discipline    MR 09/22/22 -  Marielle Womack OT Occupational Therapist OT              OT Recommendation and Plan  Planned Therapy Interventions (OT): activity tolerance training, adaptive equipment training, BADL retraining, functional balance retraining, IADL retraining, occupation/activity based interventions, patient/caregiver education/training, transfer/mobility retraining, strengthening exercise, ROM/therapeutic exercise  Therapy Frequency (OT): daily  Plan of Care Review  Plan of Care Reviewed With: patient  Progress:  improving  Outcome Evaluation: OT initial eval completed. Pt presenting w/ generalized weakness/deconditioning, impaired mobility/transfers/balance and increased need for assist w/ ADLs. Pt required Edd for bed mobility assistance to advance the LLE to EOB d/t pain. SUA for grooming tasks, CGA for UB dressing and MaxA for LB dressing. CGA for STS w/ v/c for hand placement and advancement of the LLE for comfort during transitional phases of transfers. Pt might benefit from AE in future sessions, will montior progress closely. IP OT warranted to address deficits. Recommend inpatient rehab at d/c for best functional outcome.     Time Calculation:    Time Calculation- OT     Row Name 12/11/22 1059             Time Calculation- OT    OT Start Time 0944  -MR      OT Received On 12/11/22  -MR      OT Goal Re-Cert Due Date 12/21/22  -MR         Untimed Charges    OT Eval/Re-eval Minutes 46  -MR         Total Minutes    Untimed Charges Total Minutes 46  -MR       Total Minutes 46  -MR            User Key  (r) = Recorded By, (t) = Taken By, (c) = Cosigned By    Initials Name Provider Type    Marielle Palma OT Occupational Therapist              Therapy Charges for Today     Code Description Service Date Service Provider Modifiers Qty    [id number]  OT EVAL MOD COMPLEXITY 4 12/11/2022 Marielle Womack OT GO 1               Marielle Womack OT  12/11/2022

## 2022-12-11 NOTE — PLAN OF CARE
General Surgery Progress Note    Patient: Mirta Rollins Date: 2/3/2017   : 1954 Attending: Josi Lipscomb MD   62 year old female      Subjective: Rash throughout abdomen. Edema improving. Walking halls ad ever. Tolerating diet. +flatus/BM.  CT done.     Intake/Output:  Last Stool Occurrence: 1 (17 0216)  I/O last 3 completed shifts:  In: 3154 [P.O.:780; I.V.:2229; IV Piggyback:145]  Out: 3300 [Urine:3300]    Vitals:   Visit Vitals   • /68 [place] [facility] [place] Semi-Jacobson[facility]   • Pulse 89   • Temp 98.8 °F (37.1 °C) (Oral)   • Resp 18   • Ht 5' 5\" (1.651 m)   • Wt 108.4 kg   • SpO2 92%   • BMI 39.77 kg/m2     Physical Exam:   General Appearance: alert, appears stated age, cooperative and mild distress  Lungs: RA, non labored.   Abdomen: rash throughout abdomen and back. Minimal tenderness with staple removaI. Incision intact - no drainage noted.   Extremities: NAYELI's, +edema, moves on command.   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   - IR consulted for drain placement - Dr. Gomez discussed with IR MD.    - Pain; controlled with oral regimen   - Diet; tolerating regular, continue supplements.    - Activity; continue daily walks.   - Leukocytosis; afebrile, + cultures, on abx's, ID managing. Abx's adjusted for  Goal Outcome Evaluation:  Plan of Care Reviewed With: patient        Progress: no change  Outcome Evaluation: Up to chair and tolerates well. Sinus on the monitor with PAC's . Magnesium replacement completed today will recheck level in the morning. Worked with PT. See PT notes. BM noted today.   rash reaction.     ANABELL Wilson  1:29 PM  2/3/2017.  [phone]    Staff:  As above.  Repeat CT shows 'in-between' result, but I think there's a drainable fluid collection along RLQ, closely associated with bowel. Last cultures from aspiration were NOT compatible with fistula/gGI sounce, so doubt fistula.  Discussed with IR staff/MD-->they plan attempt to PLACE DRAIN tomorrow; I think drain is griffith to getting her past this point.  Luke Gomez MD FACS

## 2022-12-11 NOTE — PLAN OF CARE
A&Ox4 with forgetfulness/impulsiveness. Frequent instruction/reorientation for call bell use required.   Patient c/o left hip pain radiating to LLE on activities/movement. Bilateral shins severely discolored. SBP significantly elevated at this time. A fib with PACs on cardiac mx. Rate controlled. Will cont to mx. Frequent rounding in place.

## 2022-12-11 NOTE — PROGRESS NOTES
[facility] Medicine Services  PROGRESS NOTE    Patient Name: Lashanda Qureshi  : 1935  MRN: [id number]    Date of Admission: 2022  Primary Care Physician: Gisella Evans DO    Subjective   Subjective     CC:  F/U left hip pain    HPI:  Patient seen this morning, pain improved in the left hip. No further chest pain since yesterday. She now remembers that she did fall about 1 month ago at home, hit her head on a door and fell onto her bottom. She was able to get up and walk however at that time, and had no significant pain until more recently.     ROS:  Gen-no fevers, no chills  CV-no chest pain, no palpitations  Resp-no cough, no dyspnea  GI-no N/V/D, no abd pain    All other systems reviewed and negative except any additional pertinent positives and negatives as discussed in HPI.      Objective   Objective     Vital Signs:   Temp:  [97.6 °F (36.4 °C)-98.4 °F (36.9 °C)] 97.6 °F (36.4 °C)  Heart Rate:  [46-77] 62  Resp:  [16-18] 18  BP: (139-177)/(67-98) 144/73  Flow (L/min):  [2] 2     Physical Exam:  Gen-no acute distress  HENT-NCAT, mucous membranes moist  CV-RRR, S1 S2 normal, no m/r/g  Resp-CTAB, no wheezes or rales  Abd-soft, NT, ND, +BS  Ext-no edema  Neuro-A&Ox3, no focal deficits  Skin-no rashes  Psych-appropriate mood  No change from yesterday      Results Reviewed:  LAB RESULTS:      Lab 22  0353 12/10/22  0444 22  1503   WBC 5.25 4.93 6.73   HEMOGLOBIN 11.3* 11.8* 13.6   HEMATOCRIT 36.6 36.4 42.3   PLATELETS 241 270 317   NEUTROS ABS  --  2.67 4.36   IMMATURE GRANS (ABS)  --  0.01 0.01   LYMPHS ABS  --  1.51 1.59   MONOS ABS  --  0.46 0.60   EOS ABS  --  0.24 0.13   MCV 97.3* 93.6 95.7   SED RATE  --  31*  --    CRP  --  1.14*  --    LACTATE  --   --  1.4         Lab 22  0353 12/10/22  1823 12/10/22  0444 22  1503   SODIUM 139  --  144  --  142   POTASSIUM 4.8 4.4 3.5 2.6* 2.7*   CHLORIDE 106  --  104  --  100   CO2 27.0  --   33.0*  --  31.0*   ANION GAP 6.0  --  7.0  --  11.0   BUN 13  --  9  --  10   CREATININE 0.47*  --  0.45*  --  0.59   EGFR 92.3  --  93.2  --  87.4   GLUCOSE 102*  --  96  --  102*   CALCIUM 9.1  --  8.9  --  9.5   MAGNESIUM 1.7  --   --   --  1.7         Lab 12/09/22  1503   TOTAL PROTEIN 8.4   ALBUMIN 4.30   GLOBULIN 4.1   ALT (SGPT) 11   AST (SGOT) 22   BILIRUBIN 0.5   ALK PHOS 163*   LIPASE 45         Lab 12/10/22  1422   TROPONIN T <0.010                 Brief Urine Lab Results  (Last result in the past 365 days)      Color   Clarity   Blood   Leuk Est   Nitrite   Protein   CREAT   Urine HCG        12/09/22 1508 Red   Cloudy   Large (3+)   Moderate (2+)   Positive   >=300 mg/dL (3+)                 Microbiology Results Abnormal     Procedure Component Value - Date/Time    Urine Culture - Urine, Straight Cath [[id number]]  (Normal) Collected: 12/09/22 1508    Lab Status: Final result Specimen: Urine from Straight Cath Updated: 12/10/22 2047     Urine Culture No growth          CT Abdomen Pelvis Without Contrast    Result Date: 12/9/2022   DATE OF EXAM: 12/9/2022 2:22 PM  PROCEDURE: CT ABDOMEN PELVIS WO CONTRAST-  INDICATIONS: painless hematuria; as well as ACUTE left hip pain; unable to tolerate weight bearing.  COMPARISON: X-ray hip December 9, 2022  TECHNIQUE: Routine transaxial slices were obtained through the abdomen and pelvis without the administration of intravenous contrast. Reconstructed coronal and sagittal images were also obtained. Automated exposure control and iterative construction methods were used.   FINDINGS: There is streak artifact from bilateral hip arthroplasty limiting assessment of the pelvis to some degree. It looks like there is a pessary present. A suture line is noted in the rectal area.  There is no definite abnormality of the liver, spleen, pancreas or kidneys. Assessment of the bowel is limited by the lack of oral contrast.  There is a pectus excavatum deformity. It looks like there  some chronic changes in the lower lungs.  There is a scoliosis of the lumbar spine with multilevel degenerative change with convexity to the right. There is slight heterogeneity to the mineralization of the sacrum. This might relate to underlying insufficiency fracture.      Impression: 1.  An acute intra-abdominal/pelvic process is not apparent on this noncontrasted exam. 2.  Dextroscoliosis with multilevel degenerative change. 3.  Chronic appearing changes lower lungs. 4.  Changes from bilateral hip arthroplasty. 5.  Heterogeneity to the mineralization of the sacrum that might be reflective of underlying insufficiency fracture.  This report was finalized on 12/9/2022 3:08 PM by Freedom Beck MD.      MRI Hip Left Without Contrast    Result Date: 12/10/2022  DATE OF EXAM: 12/9/2022 11:40 PM  PROCEDURE: MRI HIP LEFT WO CONTRAST-  INDICATIONS: acute pain  COMPARISON: CT abdomen pelvis 12/9/2022  TECHNIQUE: Multiplanar/multisequence images of the left hip were performed according to routine MRI protocol.  FINDINGS: Bones and joints: Postoperative changes of bilateral hip arthroplasty. Limited evaluation of the hips due to susceptibility artifact. Diffuse edema throughout the sacrum. Nondisplaced fracture of the left superior pubic ramus. Fluid along the bilateral hips posterolaterally likely from associated capsular defect from hip arthroplasties.  Soft tissues: Edema throughout the left adductor muscles and likely partial tears of the inserting tendons (coronal STIR image 9). Small amount of edema within the right adductor muscles. Edema and irregularity of the aponeurotic attachment of the left rectus abdominis muscle to the pubis. Partial tears of the right gluteus medius and minimus tendons. Strain of the left gluteus minimus and medius muscles. Rectus femoris is intact. Hamstrings are intact. Iliopsoas appears grossly intact but is difficult to visualize due to susceptibly artifact. Postoperative changes within  the pelvis with pessary in place. Irregular-appearing filling defect within the bladder.      Impression: Postoperative changes of bilateral hip arthroplasties with associated susceptibility artifact which limits evaluation of the adjacent structures.  Edema throughout the sacrum concerning for insufficiency fractures.  Nondisplaced fracture of the left superior pubic ramus. There is adjacent associated edema/strain of the left adductor muscles and likely partial tear of the inserting tendons.  Edema and irregularity of the aponeurotic attachment of the left rectus abdominis muscle to the pubis which may reflect low-grade injury.  Muscular strain of the left gluteus minimus and medius muscles.  Partial tears of the right gluteus medius and minimus tendons  Irregular-appearing filling defect within the bladder. Consider urology consultation.  This report was finalized on 12/10/2022 8:49 AM by Dewey Bolanos.      XR Hip With or Without Pelvis 2 - 3 View Left    Result Date: 12/9/2022  XR HIP W OR WO PELVIS 2-3 VIEW LEFT-  Date of Exam: 12/9/2022 11:19 AM  Indication: PAIN.  Comparison: None available.  Technique: 3 views of the hip were obtained.  FINDINGS:  There is no acute fracture or dislocation.  Patient status post bilateral total hip arthroplasty.  Prosthetic components are in anatomic position.  No hardware complication identified.  There are surgical clips scattered throughout the lower abdomen and pelvis.  Soft tissues are otherwise unremarkable.         Impression: Status post total bilateral hip arthroplasty.  No hardware complication or acute bony abnormality.  This report was finalized on 12/9/2022 12:16 PM by Roland Miranda MD.        Results for orders placed during the hospital encounter of 08/05/20    Adult Transthoracic Echo Complete W/ Cont if Necessary Per Protocol    Interpretation Summary  · Calculated EF = 54.0%  · Left ventricular systolic function is normal.  · Left ventricular diastolic  dysfunction.  · Mild tricuspid valve regurgitation is present.  · There is calcification of the aortic valve.      I have reviewed the medications:  Scheduled Meds:acetaminophen, 650 mg, Oral, TID  cefTRIAXone, 1 g, Intravenous, Q24H  enoxaparin, 40 mg, Subcutaneous, Nightly  hydrALAZINE, 10 mg, Intravenous, Once  latanoprost, 1 drop, Both Eyes, Nightly  magnesium oxide, 400 mg, Oral, Daily  metoprolol succinate XL, 25 mg, Oral, Daily  potassium chloride, 20 mEq, Oral, BID With Meals  sodium chloride, 10 mL, Intravenous, Q12H      Continuous Infusions:   PRN Meds:.•  magnesium sulfate **OR** magnesium sulfate **OR** magnesium sulfate  •  melatonin  •  potassium chloride **OR** potassium chloride **OR** potassium chloride  •  Sodium Chloride (PF)  •  sodium chloride  •  sodium chloride  •  traMADol    Assessment & Plan   Assessment & Plan     Active Hospital Problems    Diagnosis  POA   • **Sacral insufficiency fracture [M84.48XA]  Yes   • PAC's (premature atrial contraction) [I49.1]  Yes   • Inferior pubic ramus fracture, left, closed, initial encounter ([facility]) [S32.592A]  Yes   • Acute UTI (urinary tract infection) [N39.0]  Yes   • Hypokalemia [E87.6]  Yes   • Hematuria [R31.9]  Yes   • Moderate persistent asthma [J45.40]  Yes   • Hypertension [I10]  Yes      Resolved Hospital Problems   No resolved problems to display.        Brief Hospital Course to date:  Lashanda Qureshi is a 87 y.o. female with hx of HTN, Afib, colon cancer, bladder cancer, hx of organizing pneumonitis, C.diff, asthma, GERD, and anxiety presents to the ER due to worsening left hip and leg pain x 5 days. She reports a fall about 1 month prior to admission. Found to have sacral insufficiency fracture and left superior pubic ramus fracture.     Left hip pain  Sacral insufficiency fractures  Left superior pubic ramus fracture  Recent fall  --X-ray unremarkable, CT A/P with possible sacral insufficiency fracture.  --MRI reviewed, showing edema  throughout the sacrum concerning for insufficiency fractures, nondisplaced left superior pubic ramus with associated left adductor muscle strain/edema with partial tearing of the inserting tendons.  --Ortho consulted for further evaluation. WBAT LLE with a walker for now.   --Pain control.  --PT/OT.    Hypokalemia  --K 2.7 on admission.  --Supposed to take K and Mg supplements at home.  --Replace per protocol as needed.  --Resumed home KCl 20 mEq twice daily, may need to decrease as K is 4.8 this morning. Repeat BMP in AM.    Hypomagnesemia  --Resumed mag ox.  --Replace per protocol as needed.    UTI, POA  --UA with positive nitrites, large blood, moderate leukocytes, 21-30 WBC, 1+ bacteria, 31-50 squamous cells. Treat with Rocephin x 3 days. Has hx of C.diff so prefer short course of ATBx.  --Urine culture returned negative.     Hematuria  Hx of bladder cancer  --Has seen Dr. Robertson/Urology in the past.  --Currently H&H stable.  --F/U as outpatient if no other acute issues during hospital admission.    HTN  --Continue Metoprolol, hold HCTZ due to hypokalemia.     PAC's  --Has been seen by Cardiology (Dr. Clifford) as outpatient (last seen 2/3/21) and had heart monitor done showing 16.4% PAC burden, no evidence of Afib. Was placed on Diltiazem due to concern over allergy to Metoprolol, however appears to be back on Metoprolol currently per outpatient PCP notes, so will continue here.    Asthma  Hx of organizing pneumonitis  --Has been seen by Pulmonary, continue on home inhaled corticosteroid.    Chest pain, RRT 12/10/22  --Reported as substernal radiating to the back, heavy and burning. Rated 5/10. EKG no acute findings. Troponin negative. Improved following GI cocktail. Continue to monitor and replace electrolytes. Reviewed Echo and stress test from November 2020, were negative. Holter monitor showed PAC's.  --If recurs, consider consulting Cardiology for further evaluation.    Expected Discharge Location and  Transportation: possible rehab  Expected Discharge Date: 12/13/22, TBD    DVT prophylaxis:  Medical DVT prophylaxis orders are present.     AM-PAC 6 Clicks Score (PT): 16 (12/10/22 0810)    CODE STATUS:   Code Status and Medical Interventions:   Ordered at: 12/09/22 1912     Level Of Support Discussed With:    Patient     Code Status (Patient has no pulse and is not breathing):    CPR (Attempt to Resuscitate)     Medical Interventions (Patient has pulse or is breathing):    Full Support       Yanique Patricia MD  12/11/22

## 2022-12-12 LAB
ANION GAP SERPL CALCULATED.3IONS-SCNC: 9 MMOL/L (ref 5–15)
BUN SERPL-MCNC: 13 MG/DL (ref 8–23)
BUN/CREAT SERPL: 22.8 (ref 7–25)
CALCIUM SPEC-SCNC: 8.7 MG/DL (ref 8.6–10.5)
CHLORIDE SERPL-SCNC: 106 MMOL/L (ref 98–107)
CO2 SERPL-SCNC: 26 MMOL/L (ref 22–29)
CREAT SERPL-MCNC: 0.57 MG/DL (ref 0.57–1)
EGFRCR SERPLBLD CKD-EPI 2021: 88.1 ML/MIN/1.73
GLUCOSE SERPL-MCNC: 108 MG/DL (ref 65–99)
MAGNESIUM SERPL-MCNC: 2.3 MG/DL (ref 1.6–2.4)
POTASSIUM SERPL-SCNC: 4.9 MMOL/L (ref 3.5–5.2)
SODIUM SERPL-SCNC: 141 MMOL/L (ref 136–145)

## 2022-12-12 PROCEDURE — 99232 SBSQ HOSP IP/OBS MODERATE 35: CPT | Performed by: PEDIATRICS

## 2022-12-12 PROCEDURE — 25010000002 CEFTRIAXONE PER 250 MG: Performed by: HOSPITALIST

## 2022-12-12 PROCEDURE — 25010000002 ENOXAPARIN PER 10 MG: Performed by: NURSE PRACTITIONER

## 2022-12-12 PROCEDURE — 97116 GAIT TRAINING THERAPY: CPT

## 2022-12-12 PROCEDURE — 97110 THERAPEUTIC EXERCISES: CPT

## 2022-12-12 PROCEDURE — 83735 ASSAY OF MAGNESIUM: CPT | Performed by: HOSPITALIST

## 2022-12-12 PROCEDURE — 80048 BASIC METABOLIC PNL TOTAL CA: CPT | Performed by: HOSPITALIST

## 2022-12-12 PROCEDURE — 99231 SBSQ HOSP IP/OBS SF/LOW 25: CPT | Performed by: ORTHOPAEDIC SURGERY

## 2022-12-12 RX ADMIN — TRAMADOL HYDROCHLORIDE 25 MG: 50 TABLET, COATED ORAL at 06:50

## 2022-12-12 RX ADMIN — Medication 10 ML: at 20:12

## 2022-12-12 RX ADMIN — Medication 10 ML: at 08:40

## 2022-12-12 RX ADMIN — METOPROLOL SUCCINATE 25 MG: 25 TABLET, EXTENDED RELEASE ORAL at 11:35

## 2022-12-12 RX ADMIN — POTASSIUM CHLORIDE 20 MEQ: 750 CAPSULE, EXTENDED RELEASE ORAL at 08:28

## 2022-12-12 RX ADMIN — TRAMADOL HYDROCHLORIDE 25 MG: 50 TABLET, COATED ORAL at 12:41

## 2022-12-12 RX ADMIN — SODIUM CHLORIDE 1 G: 900 INJECTION INTRAVENOUS at 08:28

## 2022-12-12 RX ADMIN — MAGNESIUM OXIDE 400 MG (241.3 MG MAGNESIUM) TABLET 400 MG: TABLET at 08:28

## 2022-12-12 RX ADMIN — LATANOPROST 1 DROP: 50 SOLUTION OPHTHALMIC at 20:11

## 2022-12-12 RX ADMIN — ENOXAPARIN SODIUM 40 MG: 40 INJECTION SUBCUTANEOUS at 20:11

## 2022-12-12 RX ADMIN — POTASSIUM CHLORIDE 20 MEQ: 750 CAPSULE, EXTENDED RELEASE ORAL at 17:24

## 2022-12-12 NOTE — CASE MANAGEMENT/SOCIAL WORK
Discharge Planning Assessment  [facility]     Patient Name: Lashanda Qureshi  MRN: [id number]  Today's Date: 12/12/2022    Admit Date: 12/9/2022    Plan: Home   Discharge Needs Assessment     Row Name 12/12/22 1205       Living Environment    People in Home facility resident    Current Living Arrangements independent living facility    Primary Care Provided by self    Provides Primary Care For no one    Family Caregiver if Needed child(ajay), adult    Able to Return to Prior Arrangements yes       Transition Planning    Patient/Family Anticipates Transition to home    Transportation Anticipated family or friend will provide       Discharge Needs Assessment    Readmission Within the Last 30 Days no previous admission in last 30 days    Equipment Currently Used at Home rollator    Current Discharge Risk lives alone;physical impairment               Discharge Plan     Row Name 12/12/22 1205       Plan    Plan Home    Patient/Family in Agreement with Plan yes    Plan Comments I met with Ms. Qureshi at the bedside. She lives alone in [facility]. She lives at [facility]. She is independent with mobility (uses a rollator when leaving the home) and activities of daily living. She drives herself when leaving the home and is not current with any home or outpatient services. She denies any difficulty in obtaining or affording her medications. She anticipates going home at the time of discharge and states that her son will transport her. Therapy has recommended rehab at discharge and Ms. Qureshi is trying to decide if she is in agreement with this plan. She will talk this over with her daughter and son and will let me know either later today or tomorrow if she is willing to go to rehab or home with home health services. She will not need an insurance precert for rehab. Case management will continue to follow.    15:50 EST Ms. Qureshi would like a referral sent to [facility]. I have sent this referral to  April, admissions liaison with [facility], and will await her answer.       Final Discharge Disposition Code 01 - home or self-care              Continued Care and Services - Admitted Since 12/9/2022    Coordination has not been started for this encounter.       Expected Discharge Date and Time     Expected Discharge Date Expected Discharge Time    Dec 13, 2022          Demographic Summary     Row Name 12/12/22 1204       General Information    General Information Comments Confirmed PCP to be Gisella Evans and Medicare and [employer] to be insurers.               Functional Status     Row Name 12/12/22 1204       Functional Status, IADL    Medications independent    Meal Preparation independent    Housekeeping independent    Laundry independent    Shopping independent       Employment/    Employment Status retired               Psychosocial    No documentation.                Abuse/Neglect    No documentation.                Legal    No documentation.                Substance Abuse    No documentation.                Patient Forms    No documentation.                   Presley Rios RN

## 2022-12-12 NOTE — PLAN OF CARE
Goal Outcome Evaluation:  Plan of Care Reviewed With: patient        Progress: no change  Outcome Evaluation: Up to chair and tolerates well. Remains sinus to sinus keshawn on the monitor. Boost supplements started today. Waiting rehab placement and dishcarge plan.

## 2022-12-12 NOTE — PROGRESS NOTES
Clinical Nutrition     Patient Name: Lashanda Qureshi  YOB: 1935  MRN: [id number]  Date of Encounter: 12/12/22 13:36 EST  Admission date: 12/9/2022    Reason for Visit   \"Unsure\" unintentional weight loss    EMR Reviewed: Yes     Diet Nutrition Related History:      Patient reports her appetite is \"pretty good.\" She reports losing weight over the course of the last year but doesn't know why. Patient stated everyone keeps wanting her to eat more than she does but she thinks her portions are reasonable for her. Patient is consuming about 50% of her meal trays and states the portions are too large to eat it all. A measured weight has been requested but patient states she weighs around 125 lbs. The stated weight on admission was 120 lbs. If these stated weights are correct, the patient has lost between 11 lbs and 16 lbs since January of 2022. (8% and 12% of body weight since 1/5/22 when she weighed 136 lbs at an MD visit.) Patient denies any N/V/D and denies any issues with chew/swallow. Patient's last recorded BM was today, 12/12/22. Patient is agreeable to Boost+, BID.    Anthropometrics   Height: Height: 167.6 cm (66\")  Admission Weight: 120 lbs stated  Flowsheet Rows    Flowsheet Row First Filed Value   Admission Height 167.6 cm (66\") Documented at 12/09/2022 1058   Admission Weight 54.4 kg (120 lb) Documented at 12/09/2022 1058        Last Filed Weight:Weight: 54.4 kg (120 lb) (12/09/22 1058)  Weight Method: Stated  BMI: BMI (Calculated): 19.4  BMI classification: Normal: 18.5-24.9kg/m2   IBW: Ideal Body Weight (IBW) (kg): 59.58    UBW: Between January of 2021 and January of 2022, patient weighed between 136 lbs and 143 lbs. Current measured weight requested.  Weight change:   Waiting on a current measured weight.    % change:    Time frame:      12/9/2022 54.432 kg 120 lb Stated -   9/6/2022 56.7 kg 125 lb Stated -   1/24/2022 61.689 kg 136 lb - Report   1/5/2022 62.052 kg 136 lb  12.8 oz - Report   2021 62.687 kg 138 lb 3.2 oz - Report   2021 63.05 kg 139 lb Standing scale -   2021 63.2 kg 139 lb 5.3 oz Standing scale -   2021 63.685 kg 140 lb 6.4 oz - Report   10/11/2021 62.687 kg 138 lb 3.2 oz - Report   9/3/2021 63.05 kg 139 lb - Report   8/3/2021 62.596 kg 138 lb - Report   2021 63.141 kg 139 lb 3.2 oz - Report   2021 63.05 kg 139 lb - Report   2/3/2021 63.866 kg 140 lb 12.8 oz - Report   2021 64.864 kg 143 lb - Report        Current Nutrition Prescription     Diet: Regular/House Diet; Texture: Regular Texture (IDDSI 7); Fluid Consistency: Thin (IDDSI 0)    No active supplement orders      Average Intake from [employer]% x 7 meals      Intake History:     Intake Category  N/A (Normal appetite/intake per patient)    Actions   Appropriateness for MSA:  Criteria for further malnutrition exam not met at this time. Follow per protocol.     Interventions:   Follow treatment progress, Care plan reviewed, Interview for preferences, Menu provided, Supplement provided    Nicole Dixon,   Time Spent: 35 minutes

## 2022-12-12 NOTE — THERAPY TREATMENT NOTE
Patient Name: Lashanda Qureshi  : 1935    MRN: [id number]                              Today's Date: 2022       Admit Date: 2022    Visit Dx: No diagnosis found.  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 difficile diarrhea   • Cervical pain   • Chronic midline low back pain without sciatica   • Thoracogenic scoliosis of thoracic region   • Insomnia   • Localized edema   • Malignant tumor of urinary bladder ([facility])   • Acute hypoxemic respiratory failure ([facility])   • Acute diastolic CHF (congestive heart failure) ([facility])   • NSVT (nonsustained ventricular tachycardia)   • Dysfunction of both eustachian tubes   • Dyslipidemia   • Exudative age-related macular degeneration, left eye, with active choroidal neovascularization ([facility])   • Anginal equivalent ([facility])   • PVD (peripheral vascular disease) ([facility])   • Anxiety   • Sacral insufficiency fracture   • Hypokalemia   • Hematuria   • PAC's (premature atrial contraction)   • Inferior pubic ramus fracture, left, closed, initial encounter ([facility])   • Acute UTI (urinary tract infection)     Past Medical History:   Diagnosis Date   • Abnormal CT scan, lung    • Anxiety    • Arthritis    • Asthma    • Atrial fibrillation ([facility])     • Bladder cancer (HCC)    • C. difficile colitis    • Colon cancer (HCC)    • GERD (gastroesophageal reflux disease)    • Hypertension    • Mycobacterium gordonae infection    • Wears glasses      Past Surgical History:   Procedure Laterality Date   • BLADDER SURGERY     • BRONCHOSCOPY  01/27/2016   • CHOLECYSTECTOMY     • COLON SURGERY     • COLONOSCOPY     • DILATION AND CURETTAGE, DIAGNOSTIC / THERAPEUTIC     • REFRACTIVE SURGERY  04/2019   • TOTAL HIP ARTHROPLASTY Bilateral    • VENTRAL/INCISIONAL HERNIA REPAIR N/A 11/30/2021    Procedure: OPEN INCISIONAL HERNIA REPAIR WITH MESH;  Surgeon: Polina Schuster MD;  Location: [facility];  Service: General;  Laterality: N/A;   • WISDOM TOOTH EXTRACTION        General Information     Row Name 12/12/22 0953          Physical Therapy Time and Intention    Document Type therapy note (daily note)  -     Mode of Treatment physical therapy  -     Row Name 12/12/22 0953          General Information    Patient Profile Reviewed yes  -     Existing Precautions/Restrictions fall;other (see comments)  WBAT LLE with RW  -     Row Name 12/12/22 0953          Cognition    Orientation Status (Cognition) oriented x 4  -HP     Row Name 12/12/22 0953          Safety Issues, Functional Mobility    Impairments Affecting Function (Mobility) balance;coordination;endurance/activity tolerance;pain;range of motion (ROM);postural/trunk control;strength  -           User Key  (r) = Recorded By, (t) = Taken By, (c) = Cosigned By    Initials Name Provider Type     Jana Porter PT Physical Therapist               Mobility     Row Name 12/12/22 0953          Bed Mobility    Bed Mobility supine-sit  -     Supine-Sit [place] (Bed Mobility) minimum assist (75% patient effort);verbal cues;nonverbal cues (demo/gesture)  -     Assistive Device (Bed Mobility) bed rails;head of bed elevated  -     Comment, (Bed Mobility) assist to advance LLE to EOB  -     Row Name 12/12/22  0953          Transfers    Comment, (Transfers) VC for hand placement  -     Row Name 12/12/22 0953          Sit-Stand Transfer    Sit-Stand [place] (Transfers) contact guard;verbal cues  -     Assistive Device (Sit-Stand Transfers) walker, front-wheeled  -HP     Row Name 12/12/22 0953          Gait/Stairs (Locomotion)    [place] Level (Gait) contact guard;verbal cues;1 person to manage equipment  -     Assistive Device (Gait) walker, front-wheeled  -     Distance in Feet (Gait) 130  -HP     Deviations/Abnormal Patterns (Gait) bilateral deviations;base of support, narrow;davin decreased;gait speed decreased;stride length decreased;antalgic;weight shifting decreased  -     Left Sided Gait Deviations weight shift ability decreased;heel strike decreased;hip hiking  -     Comment, (Gait/Stairs) Pt amb 130' with FWW and CGA. Pt demonstrated minimal weight acceptance on LLE with step-to gait pattern and heavy reliance on BUE for support. WB limited secondary to elevated pain. No knee buckling or LOB observed. Vaulting on RLE noted. Increased time to complete task. Activity limited by elevated pain and fatigue.  -           User Key  (r) = Recorded By, (t) = Taken By, (c) = Cosigned By    Initials Name Provider Type     Jana Porter PT Physical Therapist               Obj/Interventions     Row Name 12/12/22 0956          Motor Skills    Therapeutic Exercise hip;knee;ankle  -     Row Name 12/12/22 0956          Hip (Therapeutic Exercise)    Hip (Therapeutic Exercise) AAROM (active assistive range of motion);strengthening exercise  -     Hip AAROM (Therapeutic Exercise) aBduction;aDduction;flexion;10 repetitions  -     Hip Strengthening (Therapeutic Exercise) right;aBduction;aDduction;heel slides;10 repetitions  -     Row Name 12/12/22 0956          Knee (Therapeutic Exercise)    Knee (Therapeutic Exercise) strengthening exercise;isometric exercises  -     Knee Isometrics (Therapeutic  Exercise) bilateral;quad sets;10 repetitions  -     Knee Strengthening (Therapeutic Exercise) bilateral;LAQ (long arc quad);10 repetitions  Pt unable to perform B SLR secondary to elevated pain  -[facility] Name 12/12/22 0956          Ankle (Therapeutic Exercise)    Ankle (Therapeutic Exercise) AROM (active range of motion)  -     Ankle AROM (Therapeutic Exercise) bilateral;dorsiflexion;plantarflexion;10 repetitions  -     Row Name 12/12/22 0956          Balance    Balance Assessment sitting static balance;sitting dynamic balance;sit to stand dynamic balance;standing static balance;standing dynamic balance  -     Static Sitting Balance standby assist  -     Dynamic Sitting Balance standby assist  -     Position, Sitting Balance sitting edge of bed  -     Static Standing Balance contact guard  -     Dynamic Standing Balance contact guard  -     Position/Device Used, Standing Balance supported;walker, rolling  -     Balance Interventions sitting;standing;sit to stand;occupation based/functional task  -           User Key  (r) = Recorded By, (t) = Taken By, (c) = Cosigned By    Initials Name Provider Type     Jana Porter, PT Physical Therapist               Goals/Plan     [facility] Name 12/12/22 0959          Bed Mobility Goal 1 (PT)    Activity/Assistive Device (Bed Mobility Goal 1, PT) sit to supine/supine to sit  -HP     [place] Level/Cues Needed (Bed Mobility Goal 1, PT) contact guard required  -HP     Time Frame (Bed Mobility Goal 1, PT) long term goal (LTG);10 days  -HP     Progress/Outcomes (Bed Mobility Goal 1, PT) goal ongoing  -     Row Name 12/12/22 0959          Transfer Goal 1 (PT)    Activity/Assistive Device (Transfer Goal 1, PT) sit-to-stand/stand-to-sit;bed-to-chair/chair-to-bed  -HP     [place] Level/Cues Needed (Transfer Goal 1, PT) standby assist  -     Time Frame (Transfer Goal 1, PT) long term goal (LTG);10 days  -HP     Progress/Outcome (Transfer Goal 1, PT)  goal ongoing  -HP     Row Name 12/12/22 0959          Gait Training Goal 1 (PT)    Activity/Assistive Device (Gait Training Goal 1, PT) gait (walking locomotion);assistive device use  -HP     [place] Level (Gait Training Goal 1, PT) contact guard required  -HP     Distance (Gait Training Goal 1, PT) 200ft  -HP     Time Frame (Gait Training Goal 1, PT) long term goal (LTG);10 days  -HP     Progress/Outcome (Gait Training Goal 1, PT) good progress toward goal  -HP           User Key  (r) = Recorded By, (t) = Taken By, (c) = Cosigned By    Initials Name Provider Type    HP Jana Porter, ABDIFATAH Physical Therapist               Clinical Impression     Row Name 12/12/22 0957          Pain    Pretreatment Pain Rating 0/10 - no pain  -HP     Posttreatment Pain Rating 4/10  -HP     Pain Location - Side/Orientation Left  -     Pain Location generalized  -     Pain Location - hip  -     Pre/Posttreatment Pain Comment Minimal pain at rest, significant pain with WB and movement  -     Pain Intervention(s) Repositioned;Rest;Elevated  -     Row Name 12/12/22 0957          Plan of Care Review    Plan of Care Reviewed With patient  -HP     Progress improving  -HP     Outcome Evaluation Pt performed bed mobility with Min A for LLE managemetn. Pt performed STS and amb 130' with FWW and CGA. Minimal WB on LLE secondary to pain. Activity limited by fatigue and pain. Pt tolerated ther-ex well with good participation. Recommend d/c to IRF for best functional outcome.  -     Row Name 12/12/22 0957          Vital Signs    Pre Systolic BP Rehab --  VSS  -HP     Pre Patient Position Supine  -HP     Intra Patient Position Standing  -HP     Post Patient Position Sitting  -     Row Name 12/12/22 0957          Positioning and Restraints    Pre-Treatment Position in bed  -HP     Post Treatment Position chair  -HP     In Chair notified nsg;reclined;sitting;call light within reach;encouraged to call for assist;exit alarm on;with  family/caregiver;legs elevated;waffle cushion;on mechanical lift sling  -           User Key  (r) = Recorded By, (t) = Taken By, (c) = Cosigned By    Initials Name Provider Type     Jana Porter PT Physical Therapist               Outcome Measures     Row Name 12/12/22 0959          How much help from another person do you currently need...    Turning from your back to your side while in flat bed without using bedrails? 3  -HP     Moving from lying on back to sitting on the side of a flat bed without bedrails? 3  -HP     Moving to and from a bed to a chair (including a wheelchair)? 3  -HP     Standing up from a chair using your arms (e.g., wheelchair, bedside chair)? 3  -HP     Climbing 3-5 steps with a railing? 2  -HP     To walk in hospital room? 2  -HP     AM-PAC 6 Clicks Score (PT) 16  -HP     Highest level of mobility 5 --> Static standing  -     Row Name 12/12/22 0959          Functional Assessment    Outcome Measure Options AM-PAC 6 Clicks Basic Mobility (PT)  -           User Key  (r) = Recorded By, (t) = Taken By, (c) = Cosigned By    Initials Name Provider Type    Jana Cadena PT Physical Therapist                             Physical Therapy Education     Title: PT OT SLP Therapies (Done)     Topic: Physical Therapy (Done)     Point: Mobility training (Done)     Learning Progress Summary           Patient Acceptance, E,D, VU,DU by  at 12/12/2022 1000    Acceptance, E, VU by AE at 12/11/2022 0949                   Point: Home exercise program (Done)     Learning Progress Summary           Patient Acceptance, E,D, VU,DU by  at 12/12/2022 1000    Acceptance, E, VU by AE at 12/11/2022 0949                   Point: Body mechanics (Done)     Learning Progress Summary           Patient Acceptance, E,D, VU,DU by HP at 12/12/2022 1000    Acceptance, E, VU by AE at 12/11/2022 0949                   Point: Precautions (Done)     Learning Progress Summary           Patient Acceptance, E,D, VU,DU  by  at 12/12/2022 1000    Acceptance, E, VU by AE at 12/11/2022 0949                               User Key     Initials Effective Dates Name Provider Type Discipline     06/01/21 -  Jana Porter PT Physical Therapist PT    AE 09/21/21 -  Maico Conteh PT Physical Therapist PT              PT Recommendation and Plan     Plan of Care Reviewed With: patient  Progress: improving  Outcome Evaluation: Pt performed bed mobility with Min A for LLE managemetn. Pt performed STS and amb 130' with FWW and CGA. Minimal WB on LLE secondary to pain. Activity limited by fatigue and pain. Pt tolerated ther-ex well with good participation. Recommend d/c to IRF for best functional outcome.     Time Calculation:    PT Charges     Row Name 12/12/22 0921             Time Calculation    Start Time 0921  -HP      PT Received On 12/12/22  -         Timed Charges    63146 - PT Therapeutic Exercise Minutes 10  -HP      35080 - Gait Training Minutes  17  -HP         Total Minutes    Timed Charges Total Minutes 27  -HP       Total Minutes 27  -HP            User Key  (r) = Recorded By, (t) = Taken By, (c) = Cosigned By    Initials Name Provider Type     Jana Porter PT Physical Therapist              Therapy Charges for Today     Code Description Service Date Service Provider Modifiers Qty    36089108183 HC PT THER PROC EA 15 MIN 12/12/2022 Jana Porter, PT GP 1    47264441275 HC GAIT TRAINING EA 15 MIN 12/12/2022 Jana Porter, PT GP 1          PT G-Codes  Outcome Measure Options: AM-PAC 6 Clicks Basic Mobility (PT)  AM-PAC 6 Clicks Score (PT): 16  AM-PAC 6 Clicks Score (OT): 16       Jana Porter PT  12/12/2022

## 2022-12-12 NOTE — PLAN OF CARE
Goal Outcome Evaluation:  Plan of Care Reviewed With: patient        Progress: improving  Outcome Evaluation: Pt performed bed mobility with Min A for LLE managemetn. Pt performed STS and amb 130' with FWW and CGA. Minimal WB on LLE secondary to pain. Activity limited by fatigue and pain. Pt tolerated ther-ex well with good participation. Recommend d/c to IRF for best functional outcome.

## 2022-12-12 NOTE — PROGRESS NOTES
[facility] [place] [facility]edic Surgery Progress Note    Subjective      LOS: 0 days   Patient Care Team:  Gisella Evans DO as PCP - General (Family Medicine)  Crispin Wheeler MD as Consulting Physician (Orthopedic Surgery)  James Gonzales MD de Castro, Fernando R., MD as Consulting Physician (Dermatology)  Eliseo Burleson MD as Surgeon (General Surgery)    CC: Left hip pain    Interval History:   Left hip pain persists, no change from yesterday.  She did recall fall more than a month ago, but was able to walk without pain following that incident.  No new complaints.    Objective      Vital Signs  Temp (24hrs), Av.8 °F (36.6 °C), Min:97.4 °F (36.3 °C), Max:98.1 °F (36.7 °C)      /69 (BP Location: Left arm, Patient Position: Lying)   Pulse 73   Temp 97.9 °F (36.6 °C) (Oral)   Resp 17   Ht 167.6 cm (66\")   Wt 54.4 kg (120 lb)   LMP  (LMP Unknown)   SpO2 98%   BMI 19.37 kg/m²     Examination:   Integument:              Left hip: Well-healed posterolateral incision     Neurologic:              Sensation:                          Left foot: Intact to light touch on the dorsal and plantar aspect              Motor:  Left lower extremity: 4/5 quadriceps, hamstrings, ankle dorsiflexors, and ankle plantar flexors     Lower Extremity:              Left Hip:                          Tenderness:    Anterior pelvic tenderness                          Swelling:          None                          Crepitus:          None                          Atrophy:           None                          Range of motion:        External Rotation:       30°, with pain                                                              Internal Rotation:        30°, with pain    Instability:        None  Deformities:     None  Functional testing: Positive Stinchfield                          No leg length discrepancy    Labs:  Results from last 7 days   Lab Units 22  0353 12/10/22  0444 22  1503   WBC  10*3/mm3 5.25 4.93 6.73   HEMOGLOBIN g/dL 11.3* 11.8* 13.6   HEMATOCRIT % 36.6 36.4 42.3   MCV fL 97.3* 93.6 95.7   PLATELETS 10*3/mm3 241 270 317     Lab Results   Component Value Date    SEDRATE 31 (H) 12/10/2022    WBC 5.25 12/11/2022    CRP 1.14 (H) 12/10/2022     Component      Latest Ref Rng & Units 8/6/2018 8/13/2018 8/20/2018 1/8/2019   C-Reactive Protein      0.00 - 0.50 mg/dL 0.22 0.11 0.09 0.61     Component      Latest Ref Rng & Units 12/10/2022   C-Reactive Protein      0.00 - 0.50 mg/dL 1.14 (H)       Component      Latest Ref Rng & Units 8/6/2018 8/13/2018 8/20/2018 1/8/2019   Sed Rate      0 - 30 mm/hr 26 30 30 33 (H)     Component      Latest Ref Rng & Units 8/14/2020 12/10/2022   Sed Rate      0 - 30 mm/hr 23 31 (H)       Radiology:  Imaging Results (Last 24 Hours)     ** No results found for the last 24 hours. **          PT:  Physical Therapy - Plan of Care Review - Outcome Summary:  Outcome Evaluation: PT eval completed. Pt presents with generalized weakness, decreased functional mobility, and increased pain of LLE during ambulation. Pt completed STS and ambulated 120ft with CGA and RW, pt demo short steps with decreased gait speed throughout. Pt c/o increased pain with mobility. Recommend continued skilled IP PT interventions. Recommend D/C to IRF for best functional outcome. (12/11/22 2913)]       Results Review:     I reviewed the patient's new clinical results.    Assessment and Plan     Assessment:   Left hip pain    I discussed her situation with Dr. Montilla, and he plans to see the patient this afternoon.  After his review of her CT scan and MRI, it appears that she has an insufficiency fracture of the pubic ramus near the symphysis, which could certainly account for her hip/groin pain.  Conservative treatment would be recommended, with weightbearing as tolerated with a walker.      Sacral insufficiency fracture    Hypertension    Moderate persistent asthma    Hypokalemia    Hematuria     PAC's (premature atrial contraction)    Inferior pubic ramus fracture, left, closed, initial encounter ([facility])    Acute UTI (urinary tract infection)      Plan for disposition: Patient could be discharged from the orthopedic standpoint, with follow-up as an outpatient.    No future appointments.        Yousif Haskins MD  12/12/22  07:48 EST

## 2022-12-12 NOTE — PLAN OF CARE
Goal Outcome Evaluation:  Plan of Care Reviewed With: patient        Progress: improving  Outcome Evaluation: VSS, alert and oriented x4 but can be forgetful at times, no problems overnight.

## 2022-12-12 NOTE — PROGRESS NOTES
[facility] Medicine Services  PROGRESS NOTE    Patient Name: Lashanda Qureshi  : 1935  MRN: [id number]    Date of Admission: 2022  Primary Care Physician: Gisella Evans DO    Subjective   Subjective     CC:  F/U left hip pain    HPI:  Patient seen this morning, pain improved in the left hip as long as she is not moving it.  She cannot remember if the tramadol was helping her or not.    ROS:  Gen-no fevers, no chills  CV-no chest pain, no palpitations  Resp-no cough, no dyspnea  GI-no N/V/D, no abd pain    All other systems reviewed and negative except any additional pertinent positives and negatives as discussed in HPI.      Objective   Objective     Vital Signs:   Temp:  [97.4 °F (36.3 °C)-98.1 °F (36.7 °C)] 98 °F (36.7 °C)  Heart Rate:  [54-73] 62  Resp:  [17-18] 17  BP: (104-166)/(61-86) 166/86  Flow (L/min):  [2] 2     Physical Exam:  Gen-no acute distress, sitting in a chair eating lunch  HENT-NCAT, mucous membranes moist  CV-RRR, S1 S2 normal, no m/r/g  Resp-CTAB, no wheezes or rales  Abd-soft, NT, ND, +BS  Ext-no edema  Neuro-A&Ox3, no focal deficits  Skin-no rashes  Psych-appropriate mood, cooperative        Results Reviewed:  LAB RESULTS:      Lab 22  0353 12/10/22  0444 22  1503   WBC 5.25 4.93 6.73   HEMOGLOBIN 11.3* 11.8* 13.6   HEMATOCRIT 36.6 36.4 42.3   PLATELETS 241 270 317   NEUTROS ABS  --  2.67 4.36   IMMATURE GRANS (ABS)  --  0.01 0.01   LYMPHS ABS  --  1.51 1.59   MONOS ABS  --  0.46 0.60   EOS ABS  --  0.24 0.13   MCV 97.3* 93.6 95.7   SED RATE  --  31*  --    CRP  --  1.14*  --    LACTATE  --   --  1.4         Lab 22  0343 22  0353 12/10/22  1823 12/10/22  0444 22  1503   SODIUM 141 139  --  144  --  142   POTASSIUM 4.9 4.8 4.4 3.5 2.6* 2.7*   CHLORIDE 106 106  --  104  --  100   CO2 26.0 27.0  --  33.0*  --  31.0*   ANION GAP 9.0 6.0  --  7.0  --  11.0   BUN 13 13  --  9  --  10   CREATININE 0.57 0.47*  --   0.45*  --  0.59   EGFR 88.1 92.3  --  93.2  --  87.4   GLUCOSE 108* 102*  --  96  --  102*   CALCIUM 8.7 9.1  --  8.9  --  9.5   MAGNESIUM 2.3 1.7  --   --   --  1.7         Lab 12/09/22  1503   TOTAL PROTEIN 8.4   ALBUMIN 4.30   GLOBULIN 4.1   ALT (SGPT) 11   AST (SGOT) 22   BILIRUBIN 0.5   ALK PHOS 163*   LIPASE 45         Lab 12/10/22  1422   TROPONIN T <0.010                 Brief Urine Lab Results  (Last result in the past 365 days)      Color   Clarity   Blood   Leuk Est   Nitrite   Protein   CREAT   Urine HCG        12/09/22 1508 Red   Cloudy   Large (3+)   Moderate (2+)   Positive   >=300 mg/dL (3+)                 Microbiology Results Abnormal     Procedure Component Value - Date/Time    Urine Culture - Urine, Straight Cath [[id number]]  (Normal) Collected: 12/09/22 1508    Lab Status: Final result Specimen: Urine from Straight Cath Updated: 12/10/22 2047     Urine Culture No growth          No radiology results from the last 24 hrs    Results for orders placed during the hospital encounter of 08/05/20    Adult Transthoracic Echo Complete W/ Cont if Necessary Per Protocol    Interpretation Summary  · Calculated EF = 54.0%  · Left ventricular systolic function is normal.  · Left ventricular diastolic dysfunction.  · Mild tricuspid valve regurgitation is present.  · There is calcification of the aortic valve.      I have reviewed the medications:  Scheduled Meds:enoxaparin, 40 mg, Subcutaneous, Nightly  hydrALAZINE, 10 mg, Intravenous, Once  latanoprost, 1 drop, Both Eyes, Nightly  magnesium oxide, 400 mg, Oral, Daily  metoprolol succinate XL, 25 mg, Oral, Daily  potassium chloride, 20 mEq, Oral, BID With Meals  sodium chloride, 10 mL, Intravenous, Q12H      Continuous Infusions:   PRN Meds:.•  magnesium sulfate **OR** magnesium sulfate **OR** magnesium sulfate  •  melatonin  •  potassium chloride **OR** potassium chloride **OR** potassium chloride  •  Sodium Chloride (PF)  •  sodium chloride  •  sodium  chloride  •  traMADol    Assessment & Plan   Assessment & Plan     Active Hospital Problems    Diagnosis  POA   • **Sacral insufficiency fracture [M84.48XA]  Yes   • PAC's (premature atrial contraction) [I49.1]  Yes   • Inferior pubic ramus fracture, left, closed, initial encounter ([facility]) [S32.781X]  Yes   • Acute UTI (urinary tract infection) [N39.0]  Yes   • Hypokalemia [E87.6]  Yes   • Hematuria [R31.9]  Yes   • Moderate persistent asthma [J45.40]  Yes   • Hypertension [I10]  Yes      Resolved Hospital Problems   No resolved problems to display.        Brief Hospital Course to date:  Lashanda Qureshi is a 87 y.o. female with hx of HTN, Afib, colon cancer, bladder cancer, hx of organizing pneumonitis, C.diff, asthma, GERD, and anxiety presents to the ER due to worsening left hip and leg pain x 5 days. Found to have sacral insufficiency fracture and left superior pubic ramus fracture.     Left hip pain  Sacral insufficiency fractures  Left superior pubic ramus fracture  Recent fall  --X-ray unremarkable, CT A/P with possible sacral insufficiency fracture.  --MRI reviewed, showing edema throughout the sacrum concerning for insufficiency fractures, nondisplaced left superior pubic ramus with associated left adductor muscle strain/edema with partial tearing of the inserting tendons.  --Ortho consulted for further evaluation. No surgical intervention, recommend conservative management. WBAT LLE with a walker for now.   --Pain control--we will try a low-dose tramadol before therapy as patient is not very interested in pain medications..  --PT/OT.    Hypokalemia  --Supposed to take K and Mg supplements at home.  --Replace per protocol as needed.  --Resumed home KCl 20 mEq twice daily.     Hypomagnesemia  --Resumed mag ox.  --Replace per protocol as needed.    UTI, POA--resolved  --UA positive, but culture negative    --has completed 3 days of rocephin.      Hematuria  Hx of bladder cancer  --Has seen Dr. Robertson/Urology in  the past.  --Currently H&H stable.  --F/U as outpatient if no other acute issues during hospital admission.    HTN  --Continue Metoprolol, hold HCTZ due to hypokalemia.     PAC's  --Has been seen by Cardiology (Dr. Clifford) as outpatient (last seen 2/3/21) and had heart monitor done showing 16.4% PAC burden, no evidence of Afib. Was placed on Diltiazem due to concern over allergy to Metoprolol, however appears to be back on Metoprolol currently per outpatient PCP notes, so will continue here.    Asthma  Hx of organizing pneumonitis  --Has been seen by Pulmonary, continue on home inhaled corticosteroid.    Chest pain, RRT 12/10/22  --Troponin negative. Improved following GI cocktail. Continue to monitor and replace electrolytes. Reviewed Echo and stress test from November 2020, were negative. Holter monitor showed PAC's.  --If recurs, consider consulting Cardiology for further evaluation.    Expected Discharge Location and Transportation:  rehab  Expected Discharge Date: 12/13/22    DVT prophylaxis:  Medical DVT prophylaxis orders are present.     AM-PAC 6 Clicks Score (PT): 16 (12/12/22 9367)    CODE STATUS:   Code Status and Medical Interventions:   Ordered at: 12/09/22 1912     Level Of Support Discussed With:    Patient     Code Status (Patient has no pulse and is not breathing):    CPR (Attempt to Resuscitate)     Medical Interventions (Patient has pulse or is breathing):    Full Support       Bree Gomez MD  12/12/22

## 2022-12-13 VITALS
RESPIRATION RATE: 18 BRPM | SYSTOLIC BLOOD PRESSURE: 146 MMHG | DIASTOLIC BLOOD PRESSURE: 73 MMHG | HEART RATE: 64 BPM | BODY MASS INDEX: 19.29 KG/M2 | TEMPERATURE: 98.1 F | WEIGHT: 120 LBS | OXYGEN SATURATION: 95 % | HEIGHT: 66 IN

## 2022-12-13 PROBLEM — R60.0 LOCALIZED EDEMA: Status: RESOLVED | Noted: 2019-07-08 | Resolved: 2022-12-13

## 2022-12-13 PROBLEM — R31.9 HEMATURIA: Status: RESOLVED | Noted: 2022-12-09 | Resolved: 2022-12-13

## 2022-12-13 PROBLEM — N39.0 ACUTE UTI (URINARY TRACT INFECTION): Status: RESOLVED | Noted: 2022-12-10 | Resolved: 2022-12-13

## 2022-12-13 PROBLEM — J96.01 ACUTE HYPOXEMIC RESPIRATORY FAILURE (HCC): Status: RESOLVED | Noted: 2020-08-14 | Resolved: 2022-12-13

## 2022-12-13 PROBLEM — A04.72 C. DIFFICILE DIARRHEA: Status: RESOLVED | Noted: 2018-07-29 | Resolved: 2022-12-13

## 2022-12-13 PROBLEM — E87.6 HYPOKALEMIA: Status: RESOLVED | Noted: 2022-12-09 | Resolved: 2022-12-13

## 2022-12-13 PROCEDURE — 99231 SBSQ HOSP IP/OBS SF/LOW 25: CPT | Performed by: ORTHOPAEDIC SURGERY

## 2022-12-13 PROCEDURE — 99239 HOSP IP/OBS DSCHRG MGMT >30: CPT | Performed by: PEDIATRICS

## 2022-12-13 PROCEDURE — 97116 GAIT TRAINING THERAPY: CPT

## 2022-12-13 PROCEDURE — 97110 THERAPEUTIC EXERCISES: CPT

## 2022-12-13 RX ORDER — TRAMADOL HYDROCHLORIDE 50 MG/1
25 TABLET ORAL EVERY 6 HOURS PRN
Qty: 12 TABLET | Refills: 0 | Status: SHIPPED | OUTPATIENT
Start: 2022-12-13 | End: 2022-12-14 | Stop reason: SDUPTHER

## 2022-12-13 RX ORDER — CHOLECALCIFEROL (VITAMIN D3) 125 MCG
5 CAPSULE ORAL NIGHTLY PRN
Qty: 30 TABLET | Refills: 0 | Status: SHIPPED | OUTPATIENT
Start: 2022-12-13 | End: 2022-12-14 | Stop reason: SDUPTHER

## 2022-12-13 RX ORDER — BUDESONIDE AND FORMOTEROL FUMARATE DIHYDRATE 80; 4.5 UG/1; UG/1
2 AEROSOL RESPIRATORY (INHALATION) DAILY PRN
Qty: 1 EACH | Refills: 0 | Status: SHIPPED | OUTPATIENT
Start: 2022-12-13

## 2022-12-13 RX ADMIN — MAGNESIUM OXIDE 400 MG (241.3 MG MAGNESIUM) TABLET 400 MG: TABLET at 08:45

## 2022-12-13 RX ADMIN — Medication 5 MG: at 00:57

## 2022-12-13 RX ADMIN — POTASSIUM CHLORIDE 20 MEQ: 750 CAPSULE, EXTENDED RELEASE ORAL at 08:45

## 2022-12-13 RX ADMIN — Medication 10 ML: at 08:46

## 2022-12-13 NOTE — DISCHARGE SUMMARY
[facility] Medicine Services  DISCHARGE SUMMARY    Patient Name: Lashanda Qureshi  : 1935  MRN: [id number]    Date of Admission: 2022  2:21 PM  Date of Discharge:  2022    Primary Care Physician: Gisella Evans DO    Consults     Date and Time Order Name Status Description    2022  7:12 PM Inpatient Orthopedic Surgery Consult Completed           Hospital Course     Presenting Problem:   Sacral insufficiency fracture [M84.48XA]    Active Hospital Problems    Diagnosis  POA   • **Sacral insufficiency fracture [M84.48XA]  Yes   • PAC's (premature atrial contraction) [I49.1]  Yes   • Inferior pubic ramus fracture, left, closed, initial encounter ([facility]) [S32.836Y]  Yes   • Moderate persistent asthma [J45.40]  Yes   • Hypertension [I10]  Yes      Resolved Hospital Problems    Diagnosis Date Resolved POA   • Acute UTI (urinary tract infection) [N39.0] 2022 Yes   • Hypokalemia [E87.6] 2022 Yes   • Hematuria [R31.9] 2022 Yes          Hospital Course:  Lashanda Qureshi is a 87 y.o. female with hx of HTN, Afib, colon cancer, bladder cancer, hx of organizing pneumonitis, C.diff, asthma, GERD, and anxiety presents to the ER due to worsening left hip and leg pain x 5 days. Found to have sacral insufficiency fracture and left superior pubic ramus fracture.       Left hip pain  Sacral insufficiency fractures  Left superior pubic ramus fracture  Recent fall  --Ortho consulted for further evaluation. No surgical intervention, recommend conservative management. WBAT LLE with a walker for now.   --Pain control--low-dose tramadol before therapy as needed.    --PT/OT.     Hypokalemia--resolved  -Resumed home KCl 20 mEq twice daily.      UTI, POA--resolved  --UA positive, but culture negative    --has completed 3 days of rocephin.        Hematuria--resolved  Hx of bladder cancer  --Has seen Dr. Robertson/Urology in the past.  --Currently H&H stable with resolution of the  hematuria on discharge.  --F/U as outpatient recommended to daughter to set up after discharge.     HTN  --Continue Metoprolol, with blood pressure remaining stable did not restart her hydrochlorothiazide during this admission.    PAC's  --Has been seen by Cardiology (Dr. Clifford) as outpatient (last seen 2/3/21) and had heart monitor done showing 16.4% PAC burden, no evidence of Afib. On Metoprolol     Asthma  Hx of organizing pneumonitis  --Has been seen by Pulmonary, continue on home inhaled corticosteroid.  --Discussed with patient need to continue to use incentive spirometry     Chest pain, RRT 12/10/22  --Troponin negative. Improved following GI cocktail. Continue to monitor and replace electrolytes. Reviewed Echo and stress test from November 2020, were negative. Holter monitor showed PAC's.      Discharge Follow Up Recommendations for outpatient labs/diagnostics:   Follow up with Dr. Haskins and make follow up appointment with Dr. Robertson if hematuria reoccurs    Day of Discharge     HPI:   Doing well.  Pain controlled.  Ate some breakfast.  Slept well.    Review of Systems  Gen- No fevers, chills  CV- No chest pain, palpitations  Resp- No cough, dyspnea  GI- No N/V/D, abd pain        Vital Signs:   Temp:  [97.7 °F (36.5 °C)-98.8 °F (37.1 °C)] 98.1 °F (36.7 °C)  Heart Rate:  [64] 64  Resp:  [16-18] 18  BP: (115-163)/(57-88) 146/73  Flow (L/min):  [3] 3      Physical Exam:  Gen-no acute distress, in bed comfortable  HENT-NCAT, mucous membranes moist  CV-RRR, S1 S2 normal, no m/r/g  Resp-scattered rhonchi with some transmitted upper airway noise, no wheezes or rales  Abd-soft, NT, ND, +BS  Ext-no edema  Neuro-A&Ox3, no focal deficits  Skin-no rashes  Psych-appropriate mood, cooperative    Pertinent  and/or Most Recent Results     LAB RESULTS:      Lab 12/11/22  0353 12/10/22  0444 12/09/22  1503   WBC 5.25 4.93 6.73   HEMOGLOBIN 11.3* 11.8* 13.6   HEMATOCRIT 36.6 36.4 42.3   PLATELETS 241 270 317   NEUTROS ABS  --   2.67 4.36   IMMATURE GRANS (ABS)  --  0.01 0.01   LYMPHS ABS  --  1.51 1.59   MONOS ABS  --  0.46 0.60   EOS ABS  --  0.24 0.13   MCV 97.3* 93.6 95.7   SED RATE  --  31*  --    CRP  --  1.14*  --    LACTATE  --   --  1.4         Lab 12/12/22  0343 12/11/22  0353 12/10/22  1823 12/10/22  0444 12/09/22 2005 12/09/22  1503   SODIUM 141 139  --  144  --  142   POTASSIUM 4.9 4.8 4.4 3.5 2.6* 2.7*   CHLORIDE 106 106  --  104  --  100   CO2 26.0 27.0  --  33.0*  --  31.0*   ANION GAP 9.0 6.0  --  7.0  --  11.0   BUN 13 13  --  9  --  10   CREATININE 0.57 0.47*  --  0.45*  --  0.59   EGFR 88.1 92.3  --  93.2  --  87.4   GLUCOSE 108* 102*  --  96  --  102*   CALCIUM 8.7 9.1  --  8.9  --  9.5   MAGNESIUM 2.3 1.7  --   --   --  1.7         Lab 12/09/22  1503   TOTAL PROTEIN 8.4   ALBUMIN 4.30   GLOBULIN 4.1   ALT (SGPT) 11   AST (SGOT) 22   BILIRUBIN 0.5   ALK PHOS 163*   LIPASE 45         Lab 12/10/22  1422   TROPONIN T <0.010                 Brief Urine Lab Results  (Last result in the past 365 days)      Color   Clarity   Blood   Leuk Est   Nitrite   Protein   CREAT   Urine HCG        12/09/22 1508 Red   Cloudy   Large (3+)   Moderate (2+)   Positive   >=300 mg/dL (3+)               Microbiology Results (last 10 days)     Procedure Component Value - Date/Time    Urine Culture - Urine, Straight Cath [[id number]]  (Normal) Collected: 12/09/22 1508    Lab Status: Final result Specimen: Urine from Straight Cath Updated: 12/10/22 2047     Urine Culture No growth          CT Abdomen Pelvis Without Contrast    Result Date: 12/9/2022   DATE OF EXAM: 12/9/2022 2:22 PM  PROCEDURE: CT ABDOMEN PELVIS WO CONTRAST-  INDICATIONS: painless hematuria; as well as ACUTE left hip pain; unable to tolerate weight bearing.  COMPARISON: X-ray hip December 9, 2022  TECHNIQUE: Routine transaxial slices were obtained through the abdomen and pelvis without the administration of intravenous contrast. Reconstructed coronal and sagittal images were also  obtained. Automated exposure control and iterative construction methods were used.   FINDINGS: There is streak artifact from bilateral hip arthroplasty limiting assessment of the pelvis to some degree. It looks like there is a pessary present. A suture line is noted in the rectal area.  There is no definite abnormality of the liver, spleen, pancreas or kidneys. Assessment of the bowel is limited by the lack of oral contrast.  There is a pectus excavatum deformity. It looks like there some chronic changes in the lower lungs.  There is a scoliosis of the lumbar spine with multilevel degenerative change with convexity to the right. There is slight heterogeneity to the mineralization of the sacrum. This might relate to underlying insufficiency fracture.      1.  An acute intra-abdominal/pelvic process is not apparent on this noncontrasted exam. 2.  Dextroscoliosis with multilevel degenerative change. 3.  Chronic appearing changes lower lungs. 4.  Changes from bilateral hip arthroplasty. 5.  Heterogeneity to the mineralization of the sacrum that might be reflective of underlying insufficiency fracture.  This report was finalized on 12/9/2022 3:08 PM by Freedom Beck MD.      MRI Hip Left Without Contrast    Result Date: 12/10/2022  DATE OF EXAM: 12/9/2022 11:40 PM  PROCEDURE: MRI HIP LEFT WO CONTRAST-  INDICATIONS: acute pain  COMPARISON: CT abdomen pelvis 12/9/2022  TECHNIQUE: Multiplanar/multisequence images of the left hip were performed according to routine MRI protocol.  FINDINGS: Bones and joints: Postoperative changes of bilateral hip arthroplasty. Limited evaluation of the hips due to susceptibility artifact. Diffuse edema throughout the sacrum. Nondisplaced fracture of the left superior pubic ramus. Fluid along the bilateral hips posterolaterally likely from associated capsular defect from hip arthroplasties.  Soft tissues: Edema throughout the left adductor muscles and likely partial tears of the inserting  tendons (coronal STIR image 9). Small amount of edema within the right adductor muscles. Edema and irregularity of the aponeurotic attachment of the left rectus abdominis muscle to the pubis. Partial tears of the right gluteus medius and minimus tendons. Strain of the left gluteus minimus and medius muscles. Rectus femoris is intact. Hamstrings are intact. Iliopsoas appears grossly intact but is difficult to visualize due to susceptibly artifact. Postoperative changes within the pelvis with pessary in place. Irregular-appearing filling defect within the bladder.      Postoperative changes of bilateral hip arthroplasties with associated susceptibility artifact which limits evaluation of the adjacent structures.  Edema throughout the sacrum concerning for insufficiency fractures.  Nondisplaced fracture of the left superior pubic ramus. There is adjacent associated edema/strain of the left adductor muscles and likely partial tear of the inserting tendons.  Edema and irregularity of the aponeurotic attachment of the left rectus abdominis muscle to the pubis which may reflect low-grade injury.  Muscular strain of the left gluteus minimus and medius muscles.  Partial tears of the right gluteus medius and minimus tendons  Irregular-appearing filling defect within the bladder. Consider urology consultation.  This report was finalized on 12/10/2022 8:49 AM by Dewey Bolanos.      XR Hip With or Without Pelvis 2 - 3 View Left    Result Date: 12/9/2022  XR HIP W OR WO PELVIS 2-3 VIEW LEFT-  Date of Exam: 12/9/2022 11:19 AM  Indication: PAIN.  Comparison: None available.  Technique: 3 views of the hip were obtained.  FINDINGS:  There is no acute fracture or dislocation.  Patient status post bilateral total hip arthroplasty.  Prosthetic components are in anatomic position.  No hardware complication identified.  There are surgical clips scattered throughout the lower abdomen and pelvis.  Soft tissues are otherwise unremarkable.          Status post total bilateral hip arthroplasty.  No hardware complication or acute bony abnormality.  This report was finalized on 12/9/2022 12:16 PM by Roland Miranda MD.        Results for orders placed during the hospital encounter of 10/18/18    Duplex Venous Lower Extremity - Left CAR    Interpretation Summary  · Normal left lower extremity venous duplex scan.      Results for orders placed during the hospital encounter of 10/18/18    Duplex Venous Lower Extremity - Left CAR    Interpretation Summary  · Normal left lower extremity venous duplex scan.      Results for orders placed during the hospital encounter of 08/05/20    Adult Transthoracic Echo Complete W/ Cont if Necessary Per Protocol    Interpretation Summary  · Calculated EF = 54.0%  · Left ventricular systolic function is normal.  · Left ventricular diastolic dysfunction.  · Mild tricuspid valve regurgitation is present.  · There is calcification of the aortic valve.      Plan for Follow-up of Pending Labs/Results: none    Discharge Details        Discharge Medications      New Medications      Instructions Start Date   melatonin 5 MG tablet tablet   5 mg, Oral, Nightly PRN      traMADol 50 MG tablet  Commonly known as: ULTRAM   25 mg, Oral, Every 6 Hours PRN         Changes to Medications      Instructions Start Date   budesonide-formoterol 80-4.5 MCG/ACT inhaler  Commonly known as: Symbicort  What changed:   · when to take this  · reasons to take this   2 puffs, Inhalation, Daily PRN      potassium chloride 20 MEQ CR tablet  Commonly known as: K-DUR,KLOR-CON  What changed: Another medication with the same name was removed. Continue taking this medication, and follow the directions you see here.   20 mEq, Oral, 2 Times Daily         Continue These Medications      Instructions Start Date   conjugated estrogens 0.625 MG/GM vaginal cream  Commonly known as: PREMARIN   Vaginal, As Needed      Futuro Firm Compression Hose misc   1 package, Does not  apply, Daily      latanoprost 0.005 % ophthalmic solution  Commonly known as: XALATAN   1 drop, Both Eyes, Nightly      Magnesium Oxide 140 MG capsule   1 tablet, Oral, Daily      metoprolol succinate XL 25 MG 24 hr tablet  Commonly known as: TOPROL-XL   25 mg, Oral, Daily      multivitamin with minerals tablet tablet   1 tablet, Oral, Daily      VITAMIN B12 PO   1 tablet, Oral, Daily, Take 1 tablet daily as directed.      Vitamin D3 50 MCG (2000 UT) tablet   2,000 Units, Oral, Take 2 tablets daily.         Stop These Medications    hydroCHLOROthiazide 12.5 MG tablet  Commonly known as: HYDRODIURIL     ketoconazole 2 % cream  Commonly known as: NIZORAL     nystatin 935507 UNIT/GM ointment  Commonly known as: MYCOSTATIN     Trimo-Shea 0.025 % gel  Generic drug: Oxyquinolone Sulfate            Allergies   Allergen Reactions   • Antihistamines, Loratadine-Type Other (See Comments)     Drowsiness     • Erythromycin Rash         Discharge Disposition:  Rehab Facility or Unit (DC - External)    Diet:  Hospital:  Diet Order   Procedures   • Diet: Regular/House Diet; Texture: Regular Texture (IDDSI 7); Fluid Consistency: Thin (IDDSI 0)       Activity:      Restrictions or Other Recommendations:  WBAT       CODE STATUS:    Code Status and Medical Interventions:   Ordered at: 12/09/22 1912     Level Of Support Discussed With:    Patient     Code Status (Patient has no pulse and is not breathing):    CPR (Attempt to Resuscitate)     Medical Interventions (Patient has pulse or is breathing):    Full Support       Future Appointments   Date Time Provider Department Center   1/11/2023  8:40 AM Yousif Haskins MD [facility]       Additional Instructions for the Follow-ups that You Need to Schedule     Discharge Follow-up with Specialty: Dr. Jozef Alvares; 1 Month   As directed      Specialty: Dr. Jozef Alvares    Follow Up: 1 Month         Discharge Follow-up with Specified Provider: 1 Month   As directed      Follow Up: 1 Month     Follow Up Details: Call [phone] for appointment.                     Bree Gomez MD  12/13/22      Time Spent on Discharge:  I spent  32  minutes on this discharge activity which included: face-to-face encounter with the patient, reviewing the data in the system, coordination of the care with the nursing staff as well as consultants, documentation, and entering orders.

## 2022-12-13 NOTE — PLAN OF CARE
Goal Outcome Evaluation:              Outcome Evaluation: Pt continues with decreased activity tolerance, functional strength deficits, impaired balance, and pain limiting mobility. Pt performed bed mobility min A, STS CGA, and ambulated 116' CGA w/ a RW demonstrating an antalgic gait pattern with decreased stance time on the left. Continue PT POC.

## 2022-12-13 NOTE — PLAN OF CARE
A&Ox4 with forgetfulness. Patient c/o intermittent left hip pain radiating to LLE on activities/movement. Bilateral shins severely discolored. SBP remains elevated. S Richard with PACs/PVCs/first degree AV block. Currently resting in bed without sx/si of pain/acute distress. Will cont to mx. Call light in reach.

## 2022-12-13 NOTE — PLAN OF CARE
Goal Outcome Evaluation:  Plan of Care Reviewed With: patient, daughter        Progress: improving

## 2022-12-13 NOTE — THERAPY TREATMENT NOTE
Patient Name: Lashanda Qureshi  : 1935    MRN: [id number]                              Today's Date: 2022       Admit Date: 2022    Visit Dx:     ICD-10-CM ICD-9-CM   1. Inferior pubic ramus fracture, left, closed, initial encounter ([facility])  S32.592A 808.2   2. Mild intermittent asthma without complication  J45.20 493.90     Patient Active Problem List   Diagnosis   • Abnormal computed tomography scan   • Gastroesophageal reflux disease   • Pneumonitis   • Vitamin D deficiency   • Rosacea   • Peripheral neuropathy   • Osteoarthritis   • Macular degeneration   • Hypertension   • Cystocele with uterine prolapse   • Asthma   • Allergic rhinitis   • Bruises easily   • Hirsutism   • Moderate persistent asthma   • Overactive bladder   • Hemorrhoids   • Prolapse of vaginal vault after hysterectomy   • Third degree uterine prolapse   • Vaginal enterocele   • Vaginospasm   • History of colon cancer   • Venous stasis dermatitis of both lower extremities   • Cervical pain   • Chronic midline low back pain without sciatica   • Thoracogenic scoliosis of thoracic region   • Insomnia   • Malignant tumor of urinary bladder ([facility])   • Acute diastolic CHF (congestive heart failure) ([facility])   • NSVT (nonsustained ventricular tachycardia)   • Dysfunction of both eustachian tubes   • Dyslipidemia   • Exudative age-related macular degeneration, left eye, with active choroidal neovascularization ([facility])   • Anginal equivalent ([facility])   • PVD (peripheral vascular disease) ([facility])   • Anxiety   • Sacral insufficiency fracture   • PAC's (premature atrial contraction)   • Inferior pubic ramus fracture, left, closed, initial encounter ([facility])     Past Medical History:   Diagnosis Date   • Abnormal CT scan, lung    • Anxiety    • Arthritis    • Asthma    • Atrial fibrillation ([facility])    • Bladder cancer ([facility])    • C. difficile colitis    • Colon cancer ([facility])    • GERD (gastroesophageal reflux disease)    • Hypertension    • Mycobacterium  gordonae infection    • Wears glasses      Past Surgical History:   Procedure Laterality Date   • BLADDER SURGERY     • BRONCHOSCOPY  01/27/2016   • CHOLECYSTECTOMY     • COLON SURGERY     • COLONOSCOPY     • DILATION AND CURETTAGE, DIAGNOSTIC / THERAPEUTIC     • REFRACTIVE SURGERY  04/2019   • TOTAL HIP ARTHROPLASTY Bilateral    • VENTRAL/INCISIONAL HERNIA REPAIR N/A 11/30/2021    Procedure: OPEN INCISIONAL HERNIA REPAIR WITH MESH;  Surgeon: Polina Schuster MD;  Location: [facility];  Service: General;  Laterality: N/A;   • WISDOM TOOTH EXTRACTION        General Information     Row Name 12/13/22 1146          Physical Therapy Time and Intention    Document Type therapy note (daily note)  -FW     Mode of Treatment physical therapy  -FW     Row Name 12/13/22 1146          General Information    Patient Profile Reviewed yes  -FW     Existing Precautions/Restrictions fall;other (see comments)  WBAT LLE with RW  -FW     Row Name 12/13/22 1146          Cognition    Orientation Status (Cognition) oriented x 4  -     Row Name 12/13/22 1146          Safety Issues, Functional Mobility    Impairments Affecting Function (Mobility) balance;coordination;endurance/activity tolerance;pain;range of motion (ROM);postural/trunk control;strength  -           User Key  (r) = Recorded By, (t) = Taken By, (c) = Cosigned By    Initials Name Provider Type    FW Brenden Isabel PT Physical Therapist               Mobility     Row Name 12/13/22 1146          Bed Mobility    Bed Mobility supine-sit  -FW     Supine-Sit [place] (Bed Mobility) minimum assist (75% patient effort);verbal cues;nonverbal cues (demo/gesture)  -     Assistive Device (Bed Mobility) bed rails;head of bed elevated  -     Row Name 12/13/22 1146          Sit-Stand Transfer    Sit-Stand [place] (Transfers) contact guard;verbal cues  -     Assistive Device (Sit-Stand Transfers) walker, front-wheeled  -FW     Comment, (Sit-Stand Transfer) vc for  sequencing  -FW     Row Name 12/13/22 1146          Gait/Stairs (Locomotion)    [place] Level (Gait) contact guard;verbal cues  -FW     Assistive Device (Gait) walker, front-wheeled  -FW     Distance in Feet (Gait) 116  -FW     Deviations/Abnormal Patterns (Gait) bilateral deviations;base of support, narrow;davin decreased;gait speed decreased;stride length decreased;antalgic;weight shifting decreased  -FW     Bilateral Gait Deviations forward flexed posture;heel strike decreased  -FW     Left Sided Gait Deviations weight shift ability decreased;heel strike decreased  -FW     Comment, (Gait/Stairs) decreased weight shift to the L, no LOB or buckling throughout  -FW           User Key  (r) = Recorded By, (t) = Taken By, (c) = Cosigned By    Initials Name Provider Type    Brenden Dwyer PT Physical Therapist               Obj/Interventions     Row Name 12/13/22 1147          Motor Skills    Therapeutic Exercise hip;knee;ankle  Therex included 10 reps of: hip abd/add, heel slides, LAQ, quad sets, ankle DF/PF  -FW     Row Name 12/13/22 1147          Balance    Balance Assessment sitting static balance;sitting dynamic balance;standing static balance;standing dynamic balance  -FW     Static Sitting Balance supervision  -FW     Dynamic Sitting Balance standby assist  -FW     Position, Sitting Balance sitting in chair;sitting edge of bed  -FW     Static Standing Balance contact guard  -FW     Dynamic Standing Balance contact guard  -FW     Position/Device Used, Standing Balance walker, front-wheeled  -FW           User Key  (r) = Recorded By, (t) = Taken By, (c) = Cosigned By    Initials Name Provider Type    Brenden Dwyer PT Physical Therapist               Goals/Plan    No documentation.                Clinical Impression     Row Name 12/13/22 1149          Pain    Pretreatment Pain Rating 0/10 - no pain  -FW     Posttreatment Pain Rating 5/10  -FW     Pain Location - Side/Orientation Left  -FW      Pain Location - groin  -FW     Pain Intervention(s) Repositioned;Ambulation/increased activity  -FW     Row Name 12/13/22 1149          Plan of Care Review    Outcome Evaluation Pt continues with decreased activity tolerance, functional strength deficits, impaired balance, and pain limiting mobility. Pt performed bed mobility min A, STS CGA, and ambulated 116' CGA w/ a RW demonstrating an antalgic gait pattern with decreased stance time on the left. Continue PT POC.  -FW     Row Name 12/13/22 1149          Vital Signs    Pre Systolic BP Rehab --  vss  -FW     O2 Delivery Pre Treatment room air  -FW     O2 Delivery Intra Treatment room air  -FW     O2 Delivery Post Treatment room air  -FW     Intra Patient Position Standing  -FW     Post Patient Position Sitting  -FW     Row Name 12/13/22 1149          Positioning and Restraints    Pre-Treatment Position in bed  -FW     Post Treatment Position chair  -FW     In Chair reclined;sitting;notified nsg;call light within reach;encouraged to call for assist;exit alarm on;with family/caregiver;legs elevated;waffle cushion  -FW           User Key  (r) = Recorded By, (t) = Taken By, (c) = Cosigned By    Initials Name Provider Type    FW Brenden Isabel, PT Physical Therapist               Outcome Measures     Row Name 12/13/22 1152          How much help from another person do you currently need...    Turning from your back to your side while in flat bed without using bedrails? 3  -FW     Moving from lying on back to sitting on the side of a flat bed without bedrails? 3  -FW     Moving to and from a bed to a chair (including a wheelchair)? 3  -FW     Standing up from a chair using your arms (e.g., wheelchair, bedside chair)? 3  -FW     Climbing 3-5 steps with a railing? 2  -FW     To walk in hospital room? 3  -FW     AM-PAC 6 Clicks Score (PT) 17  -FW     Highest level of mobility 5 --> Static standing  -FW     Row Name 12/13/22 6618          Functional Assessment    Outcome  Measure Options AM-PAC 6 Clicks Basic Mobility (PT)  -           User Key  (r) = Recorded By, (t) = Taken By, (c) = Cosigned By    Initials Name Provider Type    FW Brenden Isabel, PT Physical Therapist                             Physical Therapy Education     Title: PT OT SLP Therapies (Done)     Topic: Physical Therapy (Done)     Point: Mobility training (Done)     Learning Progress Summary           Patient Acceptance, E, VU by  at 12/13/2022 1152    Acceptance, E,D, VU,DU by  at 12/12/2022 1000    Acceptance, E, VU by AE at 12/11/2022 0949                   Point: Home exercise program (Done)     Learning Progress Summary           Patient Acceptance, E, VU by  at 12/13/2022 1152    Acceptance, E,D, VU,DU by  at 12/12/2022 1000    Acceptance, E, VU by AE at 12/11/2022 0949                   Point: Body mechanics (Done)     Learning Progress Summary           Patient Acceptance, E, VU by  at 12/13/2022 1152    Acceptance, E,D, VU,DU by  at 12/12/2022 1000    Acceptance, E, VU by AE at 12/11/2022 0949                   Point: Precautions (Done)     Learning Progress Summary           Patient Acceptance, E, VU by  at 12/13/2022 1152    Acceptance, E,D, VU,DU by  at 12/12/2022 1000    Acceptance, E, VU by AE at 12/11/2022 0949                               User Key     Initials Effective Dates Name Provider Type Discipline     05/05/22 -  Brenden Isabel, PT Physical Therapist PT     06/01/21 -  Jana Porter, ABDIFATAH Physical Therapist PT    AE 09/21/21 -  Maico Conteh, PT Physical Therapist PT              PT Recommendation and Plan     Outcome Evaluation: Pt continues with decreased activity tolerance, functional strength deficits, impaired balance, and pain limiting mobility. Pt performed bed mobility min A, STS CGA, and ambulated 116' CGA w/ a RW demonstrating an antalgic gait pattern with decreased stance time on the left. Continue PT POC.     Time Calculation:    PT Charges      Row Name 12/13/22 1153             Time Calculation    Start Time 1112  -FW      PT Received On 12/13/22  -FW      PT Goal Re-Cert Due Date 12/21/22  -FW         Timed Charges    88037 - PT Therapeutic Exercise Minutes 10  -FW      81074 - Gait Training Minutes  20  -FW      01512 - PT Therapeutic Activity Minutes 3  -FW         Total Minutes    Timed Charges Total Minutes 33  -FW       Total Minutes 33  -FW            User Key  (r) = Recorded By, (t) = Taken By, (c) = Cosigned By    Initials Name Provider Type    FW Brenden Isabel PT Physical Therapist              Therapy Charges for Today     Code Description Service Date Service Provider Modifiers Qty    91140350883 HC PT THER PROC EA 15 MIN 12/13/2022 Brenden Isabel, PT GP 1    68624160360 HC GAIT TRAINING EA 15 MIN 12/13/2022 Brenden Isabel, PT GP 1          PT G-Codes  Outcome Measure Options: AM-PAC 6 Clicks Basic Mobility (PT)  AM-PAC 6 Clicks Score (PT): 17  AM-PAC 6 Clicks Score (OT): 16  PT Discharge Summary  Anticipated Discharge Disposition (PT): inpatient rehabilitation facility    Brenden Isabel PT  12/13/2022

## 2022-12-13 NOTE — CASE MANAGEMENT/SOCIAL WORK
Case Management Discharge Note      Final Note: Ms. Qureshi is being discharged to [facility] today. She will be transported by her daughter via private vehicle. I spoke with Ms. Qureshi and her daughter at the bedside and they are in agreement with this plan. No COVID needed. [facility] will pull the discharge summary from [facility]. Nurse to call report to [phone].         Selected Continued Care - Admitted Since 12/9/2022     Destination Coordination complete.    Service Provider Selected Services Address Phone Fax Patient Preferred    [facility] [address] [address] [phone] --          Durable Medical Equipment    No services have been selected for the patient.              Dialysis/Infusion    No services have been selected for the patient.              Home Medical Care    No services have been selected for the patient.              Therapy    No services have been selected for the patient.              Community Resources    No services have been selected for the patient.              Community & DME    No services have been selected for the patient.                  Transportation Services  Private: Car    Final Discharge Disposition Code: 62 - inpatient rehab facility

## 2022-12-13 NOTE — PROGRESS NOTES
[facility] [place] [facility] Progress Note    Subjective      LOS: 1 day   Patient Care Team:  Gisella Evans DO as PCP - General (Family Medicine)  Crispin Wheeler MD as Consulting Physician (Orthopedic Surgery)  James Gonzales MD de Castro, Fernando R., MD as Consulting Physician (Dermatology)  Eliseo Burleson MD as Surgeon (General Surgery)    CC: Left hip pain    Interval History:   No new complaints this morning.  Pain unchanged.    Objective      Vital Signs  Temp (24hrs), Av.2 °F (36.8 °C), Min:97.7 °F (36.5 °C), Max:98.8 °F (37.1 °C)      /61 (BP Location: Right arm, Patient Position: Lying)   Pulse 64   Temp 97.7 °F (36.5 °C) (Axillary)   Resp 17   Ht 167.6 cm (66\")   Wt 54.4 kg (120 lb)   LMP  (LMP Unknown)   SpO2 95%   BMI 19.37 kg/m²     Examination:  Neurologic:              Sensation:                          Left foot: Intact to light touch on the dorsal and plantar aspect              Motor:  Left lower extremity:  Able to ankle plantar flex and ankle dorsiflex     Lower Extremity:              Left Hip:                          Range of motion:        External Rotation:       30°, with pain                                                              Internal Rotation:        30°, with pain      Labs:  Results from last 7 days   Lab Units 22  0353 12/10/22  0444 22  1503   WBC 10*3/mm3 5.25 4.93 6.73   HEMOGLOBIN g/dL 11.3* 11.8* 13.6   HEMATOCRIT % 36.6 36.4 42.3   MCV fL 97.3* 93.6 95.7   PLATELETS 10*3/mm3 241 270 317     Lab Results   Component Value Date    SEDRATE 31 (H) 12/10/2022    WBC 5.25 2022    CRP 1.14 (H) 12/10/2022     Component      Latest Ref Rng & Units 2018   C-Reactive Protein      0.00 - 0.50 mg/dL 0.22 0.11 0.09 0.61     Component      Latest Ref Rng & Units 12/10/2022   C-Reactive Protein      0.00 - 0.50 mg/dL 1.14 (H)       Component      Latest Ref Rng & Units 2018  8/20/2018 1/8/2019   Sed Rate      0 - 30 mm/hr 26 30 30 33 (H)     Component      Latest Ref Rng & Units 8/14/2020 12/10/2022   Sed Rate      0 - 30 mm/hr 23 31 (H)       Radiology:  Imaging Results (Last 24 Hours)     ** No results found for the last 24 hours. **          PT:  Physical Therapy - Plan of Care Review - Outcome Summary:  Outcome Evaluation: Pt performed bed mobility with Min A for LLE managemetn. Pt performed STS and amb 130' with FWW and CGA. Minimal WB on LLE secondary to pain. Activity limited by fatigue and pain. Pt tolerated ther-ex well with good participation. Recommend d/c to IRF for best functional outcome. (12/12/22 3863)]       Results Review:     I reviewed the patient's new clinical results.    Assessment and Plan     Assessment:   Left hip pain    Dr. Montilla evaluated the patient yesterday afternoon.  Agrees with conservative treatment.  Continue weightbearing as tolerated with a walker.      Sacral insufficiency fracture    Hypertension    Moderate persistent asthma    Hypokalemia    Hematuria    PAC's (premature atrial contraction)    Inferior pubic ramus fracture, left, closed, initial encounter ([facility])    Acute UTI (urinary tract infection)      Plan for disposition: Patient could be discharged from the orthopedic standpoint, with follow-up as an outpatient in 1 month in my office.    No future appointments.        Yousif Haskins MD  12/13/22  10:12 EST

## 2022-12-14 RX ORDER — CHOLECALCIFEROL (VITAMIN D3) 125 MCG
5 CAPSULE ORAL NIGHTLY PRN
Qty: 30 TABLET | Refills: 0 | Status: SHIPPED | OUTPATIENT
Start: 2022-12-14

## 2022-12-14 RX ORDER — TRAMADOL HYDROCHLORIDE 50 MG/1
25 TABLET ORAL EVERY 6 HOURS PRN
Qty: 12 TABLET | Refills: 0 | Status: SHIPPED | OUTPATIENT
Start: 2022-12-14

## 2022-12-20 LAB
QT INTERVAL: 408 MS
QTC INTERVAL: 479 MS

## 2022-12-25 NOTE — ED PROVIDER NOTES
Subjective   History of Present Illness    Review of Systems    Past Medical History:   Diagnosis Date   • Abnormal CT scan, lung    • Anxiety    • Arthritis    • Asthma    • Atrial fibrillation ([facility])    • Bladder cancer (H[facility])    • C. difficile colitis    • Colon cancer ([facility])    • GERD (gastroesophageal reflux disease)    • Hypertension    • Mycobacterium gordonae infection    • Wears glasses        Allergies   Allergen Reactions   • Antihistamines, Loratadine-Type Other (See Comments)     Drowsiness     • Erythromycin Rash       Past Surgical History:   Procedure Laterality Date   • BLADDER SURGERY     • BRONCHOSCOPY  2016   • CHOLECYSTECTOMY     • COLON SURGERY     • COLONOSCOPY     • DILATION AND CURETTAGE, DIAGNOSTIC / THERAPEUTIC     • REFRACTIVE SURGERY  2019   • TOTAL HIP ARTHROPLASTY Bilateral    • VENTRAL/INCISIONAL HERNIA REPAIR N/A 2021    Procedure: OPEN INCISIONAL HERNIA REPAIR WITH MESH;  Surgeon: Polina Schuster MD;  Location: [facility];  Service: General;  Laterality: N/A;   • WISDOM TOOTH EXTRACTION         Family History   Problem Relation Age of Onset   • COPD Mother    • Hyperlipidemia Mother    • Hypertension Mother    • Cancer Father    • Lung cancer Father    • Heart attack Brother    • Diabetes Brother    • Multiple sclerosis Daughter    • No Known Problems Maternal Grandmother    • No Known Problems Maternal Grandfather    • Diabetes Paternal Grandmother    • Diabetes Paternal Grandfather        Social History     Socioeconomic History   • Marital status:    Tobacco Use   • Smoking status: Former     Packs/day: 1.00     Years: 2.00     Pack years: 2.00     Types: Cigarettes     Quit date: 8/3/1969     Years since quittin.4   • Smokeless tobacco: Never   • Tobacco comments:     quit 52 years ago    Vaping Use   • Vaping Use: Never used   Substance and Sexual Activity   • Alcohol use: Yes     Comment: SOCIAL   • Drug use: No   • Sexual activity: Defer      Comment:            Objective   Physical Exam    Procedures           ED Course  ED Course as of 12/24/22 2207   Fri Dec 09, 2022   1555 Nitrite, UA(!): Positive [MS]   1555 Leukocytes, UA(!): Moderate (2+) [MS]   1555 Protein, UA(!): >=300 mg/dL (3+) [MS]   1555 Blood, UA(!): Large (3+) [MS]   1555 Bilirubin, UA(!): Moderate (2+) [MS]   1555 Appearance, UA(!): Cloudy [MS]   1702 Patient's work-up is most remarkable for urinary tract infection.  She has a normal white count and normal lactic acid.  She has no hypotension or tachycardia consistent with sepsis.  She is afebrile.  In the meantime, addressing the possibility that her suspected s sacral fractures may be related to her inability to bear weight, will pursue possible etiology [MS]      ED Course User Index  [MS] IsisAmanda chaudhari, CHARLES                                           MDM    Final diagnoses:   Mild intermittent asthma without complication   Inferior pubic ramus fracture, left, closed, initial encounter (HCC)       ED Disposition  ED Disposition     ED Disposition   Decision to Admit    Condition   --    Comment   Level of Care: Med/Surg [1]   Diagnosis: Sacral insufficiency fracture [738548]               Yousif Haskins MD  [address]  [address]  [place] [address]  [phone]      Call [phone] for appointment.  1 Month    Gisella Evans, DO  [address] [facility] [address]  [place] [address]  [phone]          [facility]  [address] [place] [id number] [phone]             Medication List      New Prescriptions    melatonin 5 MG tablet tablet  Take 1 tablet by mouth At Night As Needed (insomnia).     traMADol 50 MG tablet  Commonly known as: ULTRAM  Take 0.5 tablets by mouth Every 6 (Six) Hours As Needed for Moderate Pain.        Changed    budesonide-formoterol 80-4.5 MCG/ACT inhaler  Commonly known as: Symbicort  Inhale 2 puffs Daily As Needed (SOB,  wheezing).  What changed:   · when to take this  · reasons to take this     potassium chloride 20 MEQ CR tablet  Commonly known as: K-DUR,KLOR-CON  Take 1 tablet by mouth 2 (Two) Times a Day.  What changed: Another medication with the same name was removed. Continue taking this medication, and follow the directions you see here.        Stop    hydroCHLOROthiazide 12.5 MG tablet  Commonly known as: HYDRODIURIL     ketoconazole 2 % cream  Commonly known as: NIZORAL     nystatin 400794 UNIT/GM ointment  Commonly known as: MYCOSTATIN     Trimo-Shea 0.025 % gel  Generic drug: Oxyquinolone Sulfate           Where to Get Your Medications      Information about where to get these medications is not yet available    Ask your nurse or doctor about these medications  · budesonide-formoterol 80-4.5 MCG/ACT inhaler  · melatonin 5 MG tablet tablet  · traMADol 50 MG tablet

## 2022-12-30 NOTE — ED PROVIDER NOTES
EMERGENCY DEPARTMENT ENCOUNTER    Pt Name: Lashanda Qureshi  MRN: [id number]  Pt :   1935  Room Number:  S360/1  Date of encounter:  2022  PCP: Gisella Evans DO  ED Provider: CHARLES Cervantes    Historian:  Patient and family      HPI:  Chief Complaint: acute pain left hip        Context: Lashanda Qureshi is a 87 y.o. female who presents to the ED c/o acute pain to the left hip that radiates down her leg without history of recent injury.  Patient has a history of bilateral hip replacement.  Patient recently has had a urinary tract infection    Review of systems is negative for fever chills or recent illness.  Negative for fall.  Cardiovascular, GI systems are negative.  : History of chronic recurring hematuria and urinary tract infection.  Positive for generalized weakness without orthostatic dizziness or syncope.  No neurosensory complaint or focal weakness      PAST MEDICAL HISTORY  Past Medical History:   Diagnosis Date   • Abnormal CT scan, lung    • Anxiety    • Arthritis    • Asthma    • Atrial fibrillation (HCC)    • Bladder cancer (HCC)    • C. difficile colitis    • Colon cancer (HCC)    • GERD (gastroesophageal reflux disease)    • Hypertension    • Mycobacterium gordonae infection    • Wears glasses          PAST SURGICAL HISTORY  Past Surgical History:   Procedure Laterality Date   • BLADDER SURGERY     • BRONCHOSCOPY  2016   • CHOLECYSTECTOMY     • COLON SURGERY     • COLONOSCOPY     • DILATION AND CURETTAGE, DIAGNOSTIC / THERAPEUTIC     • REFRACTIVE SURGERY  2019   • TOTAL HIP ARTHROPLASTY Bilateral    • VENTRAL/INCISIONAL HERNIA REPAIR N/A 2021    Procedure: OPEN INCISIONAL HERNIA REPAIR WITH MESH;  Surgeon: Polina Schuster MD;  Location: [facility];  Service: General;  Laterality: N/A;   • WISDOM TOOTH EXTRACTION           FAMILY HISTORY  Family History   Problem Relation Age of Onset   • COPD Mother    • Hyperlipidemia Mother    • Hypertension Mother    • Cancer  Father    • Lung cancer Father    • Heart attack Brother    • Diabetes Brother    • Multiple sclerosis Daughter    • No Known Problems Maternal Grandmother    • No Known Problems Maternal Grandfather    • Diabetes Paternal Grandmother    • Diabetes Paternal Grandfather          SOCIAL HISTORY  Social History     Socioeconomic History   • Marital status:    Tobacco Use   • Smoking status: Former     Packs/day: 1.00     Years: 2.00     Pack years: 2.00     Types: Cigarettes     Quit date: 8/3/1969     Years since quittin.4   • Smokeless tobacco: Never   • Tobacco comments:     quit 52 years ago    Vaping Use   • Vaping Use: Never used   Substance and Sexual Activity   • Alcohol use: Yes     Comment: SOCIAL   • Drug use: No   • Sexual activity: Defer     Comment:          ALLERGIES  Antihistamines, loratadine-type and Erythromycin        REVIEW OF SYSTEMS  Review of Systems     All systems reviewed and negative except for those discussed in HPI.       PHYSICAL EXAM    I have reviewed the triage vital signs and nursing notes.    ED Triage Vitals [22 1058]   Temp Heart Rate Resp BP SpO2   97.9 °F (36.6 °C) 81 16 (!) 137/104 93 %      Temp src Heart Rate Source Patient Position BP Location FiO2 (%)   Oral Monitor Sitting Left arm --       Physical Exam  GENERAL:   Appears in no acute distress.  In no acute distress.  Her vital signs are normal.  HENT: Nares patent.  EYES: No scleral icterus.  CV: Regular rhythm, regular rate.  RESPIRATORY: Normal effort.  No audible wheezes, rales or rhonchi.  ABDOMEN: Soft, nontender  MUSCULOSKELETAL: Pelvic rock elicits pain response.  Patient has limited range of motion secondary to pain to the left hip.  She has no rotation consistent with femoral neck fracture.  Right lower extremity has active and passive range of motion at baseline.  NEURO: Alert, moves all extremities, follows commands.  SKIN: Warm, dry, no rash visualized.      LAB RESULTS  No results  found for this or any previous visit (from the past 24 hour(s)).    If labs were ordered, I independently reviewed the results and considered them in treating the patient.        RADIOLOGY  No Radiology Exams Resulted Within Past 24 Hours        PROCEDURES    Procedures    ECG 12 Lead Chest Pain   Final Result   Test Reason : Chest Pain   Blood Pressure :   */*   mmHG   Vent. Rate :  67 BPM     Atrial Rate :  67 BPM      P-R Int : 194 ms          QRS Dur :  78 ms       QT Int : 414 ms       P-R-T Axes :  73 -43  11 degrees      QTc Int : 437 ms      Normal sinus rhythm   Left axis deviation   Moderate voltage criteria for LVH, may be normal variant   Nonspecific ST abnormality   Abnormal ECG   When compared with ECG of 09-DEC-2022 16:12, (Unconfirmed)   Significant changes have occurred   Confirmed by OSMIN GONZALEZ MD ([id number]) on 12/11/2022 1:39:48 PM      Referred By: KAVYA           Confirmed By: OSMIN GONZALEZ MD      ECG 12 Lead Other; multipfocal pvcs   Final Result   Test Reason : Other~   Blood Pressure :   */*   mmHG   Vent. Rate :  83 BPM     Atrial Rate :  83 BPM      P-R Int : 192 ms          QRS Dur :  96 ms       QT Int : 408 ms       P-R-T Axes :  83 -39  49 degrees      QTc Int : 479 ms      ** Poor data quality, interpretation may be adversely affected   Sinus rhythm with premature supraventricular complexes and with occasional    premature ventricular complexes   Left axis deviation   Incomplete right bundle branch block   Minimal voltage criteria for LVH, may be normal variant   Nonspecific T wave abnormality   Prolonged QT   Abnormal ECG   When compared with ECG of 06-SEP-2022 12:18,   Sinus rhythm has replaced Atrial fibrillation   Nonspecific T wave abnormality no longer evident in Inferior leads   Inverted T waves have replaced nonspecific T wave abnormality in Anterior    leads   Confirmed by CUAUHTEMOC RICE ([id number]) on 12/20/2022 12:36:42 AM      Referred By: MICHAEL           Confirmed By: CUAUHTEMOC  RICE          MEDICATIONS GIVEN IN ER    Medications   nitroglycerin (NITROSTAT) 0.4 MG SL tablet  - ADS Override Pull (  Return to Cabinet 12/10/22 1427)   acetaminophen (TYLENOL) tablet 650 mg (650 mg Oral Given 12/11/22 1554)   cefTRIAXone (ROCEPHIN) 1 g/100 mL 0.9% NS (MBP) (0 g Intravenous Stopped 12/12/22 0900)   aluminum-magnesium hydroxide-simethicone (MAALOX MAX) 400-400-40 MG/5ML 30 mL, Lidocaine Viscous HCl (XYLOCAINE) 2 % 15 mL suspension ( Oral Given 12/10/22 1513)         MEDICAL DECISION MAKING, PROGRESS, and CONSULTS    All labs have been independently reviewed by me.  All radiology studies have been reviewed by me and the radiologist dictating the report.  All EKG's have been independently viewed and interpreted by me.        Orders placed during this visit:  Orders Placed This Encounter   Procedures   • Urine Culture - Urine, Straight Cath   • XR Hip With or Without Pelvis 2 - 3 View Left   • CT Abdomen Pelvis Without Contrast   • MRI Hip Left Without Contrast   • Henderson Draw   • Comprehensive Metabolic Panel   • Lipase   • Lactic Acid, Plasma   • CBC Auto Differential   • Urinalysis With Microscopic If Indicated (No Culture) - Urine, Catheter   • Urinalysis, Microscopic Only - Urine, Clean Catch   • Basic Metabolic Panel   • Magnesium   • Potassium   • CBC Auto Differential   • Sedimentation Rate   • C-reactive Protein   • Basic Metabolic Panel   • Magnesium   • CBC (No Diff)   • Troponin   • Potassium   • Basic Metabolic Panel   • Magnesium   • Undress & Gown   • She has consented to cath urine  Nursing Communication   • Discharge Follow-up with Specified Provider: 1 Month   • Discharge Follow-up with Specialty: Dr. Jozef Alvares; 1 Month   • Inpatient Case Management  Consult   • Inpatient Spiritual Care Consult   • Inpatient Orthopedic Surgery Consult   • OT Plan of Care Cert / Re-Cert   • PT Plan of Care Cert / Re-Cert   • POC Glucose Once   • ECG 12 Lead Other; multipfocal pvcs    • ECG 12 Lead Chest Pain   • Initiate Observation Status   • Inpatient Admission   • ED Bed Request   • Discharge patient   • CBC & Differential   • Green Top (Gel)   • Lavender Top   • Gold Top - SST   • Gray Top   • Light Blue Top   • CBC & Differential         Additional orders considered but not ordered:    ED Course:    Consultants:      ED Course as of 12/29/22 2150   Fri Dec 09, 2022   1555 Nitrite, UA(!): Positive [MS]   1555 Leukocytes, UA(!): Moderate (2+) [MS]   1555 Protein, UA(!): >=300 mg/dL (3+) [MS]   1555 Blood, UA(!): Large (3+) [MS]   1555 Bilirubin, UA(!): Moderate (2+) [MS]   1555 Appearance, UA(!): Cloudy [MS]   1702 Patient's work-up is most remarkable for urinary tract infection.  She has a normal white count and normal lactic acid.  She has no hypotension or tachycardia consistent with sepsis.  She is afebrile.  In the meantime, addressing the possibility that her suspected s sacral fractures may be related to her inability to bear weight, will pursue possible etiology [MS]      ED Course User Index  [MS] Amanda Marinelli APRN                  AS OF 21:50 EST VITALS:    BP - 146/73  HR - 64  TEMP - 98.1 °F (36.7 °C) (Oral)  O2 SATS - 95%                  DIAGNOSIS  Final diagnoses:   Closed fracture of sacrum, unspecified portion of sacrum, initial encounter ([facility])         DISPOSITION    Admitted         Please note that portions of this document were completed with voice recognition software.      Amanda Marinelli APRN  12/29/22 2150

## 2023-01-17 NOTE — PATIENT INSTRUCTIONS
Asthma, Adult  Asthma is a recurring condition in which the airways tighten and narrow. Asthma can make it difficult to breathe. It can cause coughing, wheezing, and shortness of breath. Asthma episodes, also called asthma attacks, range from minor to life-threatening. Asthma cannot be cured, but medicines and lifestyle changes can help control it.  What are the causes?  Asthma is believed to be caused by inherited (genetic) and environmental factors, but its exact cause is unknown. Asthma may be triggered by allergens, lung infections, or irritants in the air. Asthma triggers are different for each person. Common triggers include:  · Animal dander.  · Dust mites.  · Cockroaches.  · Pollen from trees or grass.  · Mold.  · Smoke.  · Air pollutants such as dust, household , hair sprays, aerosol sprays, paint fumes, strong chemicals, or strong odors.  · Cold air, weather changes, and winds (which increase molds and pollens in the air).  · Strong emotional expressions such as crying or laughing hard.  · Stress.  · Certain medicines (such as aspirin) or types of drugs (such as beta-blockers).  · Sulfites in foods and drinks. Foods and drinks that may contain sulfites include dried fruit, potato chips, and sparkling grape juice.  · Infections or inflammatory conditions such as the flu, a cold, or an inflammation of the nasal membranes (rhinitis).  · Gastroesophageal reflux disease (GERD).  · Exercise or strenuous activity.  What are the signs or symptoms?  Symptoms may occur immediately after asthma is triggered or many hours later. Symptoms include:  · Wheezing.  · Excessive nighttime or early morning coughing.  · Frequent or severe coughing with a common cold.  · Chest tightness.  · Shortness of breath.  How is this diagnosed?  The diagnosis of asthma is made by a review of your medical history and a physical exam. Tests may also be performed. These may include:  · Lung function studies. These tests show how  Spoke with patient. Patient reports aching all over especially back, coughing white phlegm, and chills. Symptoms started yesterday. Denies shortness of breath and fever. Patient requesting Dr. Dennis Villeda recommendations. much air you breathe in and out.  · Allergy tests.  · Imaging tests such as X-rays.  How is this treated?  Asthma cannot be cured, but it can usually be controlled. Treatment involves identifying and avoiding your asthma triggers. It also involves medicines. There are 2 classes of medicine used for asthma treatment:  · Controller medicines. These prevent asthma symptoms from occurring. They are usually taken every day.  · Reliever or rescue medicines. These quickly relieve asthma symptoms. They are used as needed and provide short-term relief.  Your health care provider will help you create an asthma action plan. An asthma action plan is a written plan for managing and treating your asthma attacks. It includes a list of your asthma triggers and how they may be avoided. It also includes information on when medicines should be taken and when their dosage should be changed. An action plan may also involve the use of a device called a peak flow meter. A peak flow meter measures how well the lungs are working. It helps you monitor your condition.  Follow these instructions at home:  · Take medicines only as directed by your health care provider. Speak with your health care provider if you have questions about how or when to take the medicines.  · Use a peak flow meter as directed by your health care provider. Record and keep track of readings.  · Understand and use the action plan to help minimize or stop an asthma attack without needing to seek medical care.  · Control your home environment in the following ways to help prevent asthma attacks:  ¨ Do not smoke. Avoid being exposed to secondhand smoke.  ¨ Change your heating and air conditioning filter regularly.  ¨ Limit your use of fireplaces and wood stoves.  ¨ Get rid of pests (such as roaches and mice) and their droppings.  ¨ Throw away plants if you see mold on them.  ¨ Clean your floors and dust regularly. Use unscented cleaning products.  ¨ Try to have someone  else vacuum for you regularly. Stay out of rooms while they are being vacuumed and for a short while afterward. If you vacuum, use a dust mask from a hardware store, a double-layered or microfilter vacuum  bag, or a vacuum  with a HEPA filter.  ¨ Replace carpet with wood, tile, or vinyl diane. Carpet can trap dander and dust.  ¨ Use allergy-proof pillows, mattress covers, and box spring covers.  ¨ Wash bed sheets and blankets every week in hot water and dry them in a dryer.  ¨ Use blankets that are made of polyester or cotton.  ¨ Clean bathrooms and loretta with bleach. If possible, have someone repaint the walls in these rooms with mold-resistant paint. Keep out of the rooms that are being cleaned and painted.  ¨ Wash hands frequently.  Contact a health care provider if:  · You have wheezing, shortness of breath, or a cough even if taking medicine to prevent attacks.  · The colored mucus you cough up (sputum) is thicker than usual.  · Your sputum changes from clear or white to yellow, green, gray, or bloody.  · You have any problems that may be related to the medicines you are taking (such as a rash, itching, swelling, or trouble breathing).  · You are using a reliever medicine more than 2-3 times per week.  · Your peak flow is still at 50-79% of your personal best after following your action plan for 1 hour.  · You have a fever.  Get help right away if:  · You seem to be getting worse and are unresponsive to treatment during an asthma attack.  · You are short of breath even at rest.  · You get short of breath when doing very little physical activity.  · You have difficulty eating, drinking, or talking due to asthma symptoms.  · You develop chest pain.  · You develop a fast heartbeat.  · You have a bluish color to your lips or fingernails.  · You are light-headed, dizzy, or faint.  · Your peak flow is less than 50% of your personal best.  This information is not intended to replace advice given to  you by your health care provider. Make sure you discuss any questions you have with your health care provider.  Document Released: 12/18/2006 Document Revised: 05/31/2017 Document Reviewed: 07/17/2014  Elsevier Interactive Patient Education © 2017 [employer]

## 2023-05-03 NOTE — PROGRESS NOTES
Chief Complaint   Patient presents with   • Asthma     She has been coughing and experiencing shortness of breath recently. She attributes it to allergies. She takes her allergy medicine as needed and it has been manageable   • Edema     She states that she has experienced swelling in her feet for a while now, she has been eating alot of salty foods and noticed that the swelling has increased lately. She would like to discuss with Leandro Abdalla   Lashanda Qureshi is a 83 y.o. female    History of Present Illness  Asthma- Pt does have dyspnea and has this quickly with activity. Also with dyspnea when excited or upset.  Not much wheezing but does do a lot of coughing.  Albuterol inhaler does help.  She is using azelastine nasal spray for her allergies currently.      Edema- swelling in her foot is not good.  She has been having more sodium in her diet recently. Left foot is worse and has vascular changes in the skin.         Allergies   Allergen Reactions   • Antihistamines, Loratadine-Type Other (See Comments)     Drowsiness     • Erythromycin      Past Medical History:   Diagnosis Date   • Abnormal CT scan, lung    • Mycobacterium gordonae infection       Past Surgical History:   Procedure Laterality Date   • BLADDER SURGERY     • BRONCHOSCOPY  01/27/2016   • CHOLECYSTECTOMY     • COLON SURGERY     • DILATION AND CURETTAGE, DIAGNOSTIC / THERAPEUTIC     • REFRACTIVE SURGERY  04/2019   • TOTAL HIP ARTHROPLASTY      Left hip     Social History     Socioeconomic History   • Marital status:      Spouse name: Not on file   • Number of children: Not on file   • Years of education: Not on file   • Highest education level: Not on file   Tobacco Use   • Smoking status: Former Smoker   • Smokeless tobacco: Never Used   • Tobacco comment: Quit 41 years ago   Substance and Sexual Activity   • Alcohol use: No   • Drug use: No   • Sexual activity: Defer     Comment:         Current Outpatient Medications:   •   azelastine (ASTEPRO) 0.15 % solution nasal spray, 2 sprays into the nostril(s) as directed by provider 2 (Two) Times a Day As Needed for Allergies., Disp: 1 each, Rfl: 11  •  Cholecalciferol (VITAMIN D3) 2000 UNITS tablet, Take  by mouth. Take 2 tablets daily., Disp: , Rfl:   •  Cyanocobalamin (VITAMIN B12 PO), Take  by mouth. Take 1 tablet daily as directed., Disp: , Rfl:   •  Elastic Bandages & Supports (FUTURO FIRM COMPRESSION HOSE) misc, 1 package Daily., Disp: 1 each, Rfl: 1  •  fluticasone (FLONASE) 50 MCG/ACT nasal spray, 2 sprays into the nostril(s) as directed by provider Daily. Administer 2 sprays in each nostril for each dose., Disp: 1 bottle, Rfl: 11  •  latanoprost (XALATAN) 0.005 % ophthalmic solution, Apply  to eye. Instill 1 drop into affected eye(s) once daily as directed., Disp: , Rfl:   •  magnesium oxide (MAGOX) 400 (241.3 MG) MG tablet tablet, Take 400 mg by mouth daily., Disp: , Rfl:   •  montelukast (SINGULAIR) 10 MG tablet, Take 1 tablet by mouth Every Night., Disp: 30 tablet, Rfl: 11  •  VENTOLIN  (90 Base) MCG/ACT inhaler, Inhale 2 puffs Every 6 (Six) Hours As Needed., Disp: , Rfl: 1  •  ESTROGENS CONJ SYNTHETIC B PO,         Compound Bi-Est 20-20 Topical  (Estriol 80% + Estradiol 20%) apply a  pea sized amt to the vulva 2-3 nights per week, Disp: , Rfl:   •  Fluticasone Furoate-Vilanterol (BREO ELLIPTA) 100-25 MCG/INH inhaler, Inhale 1 puff Daily., Disp: 1 each, Rfl: 11     Review of Systems   Constitutional: Negative for chills, fatigue and unexpected weight change.   HENT: Positive for congestion ( not bad) and voice change.    Respiratory: Positive for cough and shortness of breath. Negative for chest tightness and wheezing.    Cardiovascular: Positive for leg swelling. Negative for chest pain and palpitations.   Gastrointestinal: Negative for constipation, diarrhea, nausea and vomiting.   Genitourinary: Negative for difficulty urinating and dysuria.   Skin: Positive for color  change. Negative for rash.   Allergic/Immunologic: Positive for environmental allergies.   Neurological: Negative for dizziness, syncope, weakness and headaches.   Psychiatric/Behavioral: Negative for sleep disturbance.       Objective     Vitals:    07/08/19 1102   BP: 140/80   Pulse: 80   Temp: 97.4 °F (36.3 °C)   SpO2: 96%       Results for orders placed or performed in visit on 01/08/19   Comprehensive Metabolic Panel   Result Value Ref Range    Glucose 101 (H) 70 - 100 mg/dL    BUN 10 9 - 23 mg/dL    Creatinine 0.63 0.60 - 1.30 mg/dL    Sodium 141 132 - 146 mmol/L    Potassium 3.5 3.5 - 5.5 mmol/L    Chloride 107 99 - 109 mmol/L    CO2 25.0 20.0 - 31.0 mmol/L    Calcium 9.3 8.7 - 10.4 mg/dL    Total Protein 6.5 5.7 - 8.2 g/dL    Albumin 4.38 3.20 - 4.80 g/dL    ALT (SGPT) 18 7 - 40 U/L    AST (SGOT) 24 0 - 33 U/L    Alkaline Phosphatase 69 25 - 100 U/L    Total Bilirubin 0.7 0.3 - 1.2 mg/dL    eGFR Non African Amer 90 >60 mL/min/1.73    Globulin 2.1 gm/dL    A/G Ratio 2.1 1.5 - 2.5 g/dL    BUN/Creatinine Ratio 15.9 7.0 - 25.0    Anion Gap 9.0 3.0 - 11.0 mmol/L   Lipid Panel   Result Value Ref Range    Total Cholesterol 181 0 - 200 mg/dL    Triglycerides 66 0 - 150 mg/dL    HDL Cholesterol 110 (H) 40 - 60 mg/dL    LDL Cholesterol  61 0 - 130 mg/dL   Rheumatoid Factor   Result Value Ref Range    Rheumatoid Factor Qualitative Negative Negative   C-reactive Protein   Result Value Ref Range    C-Reactive Protein 0.61 0.00 - 1.00 mg/dL   Sedimentation Rate   Result Value Ref Range    Sed Rate 33 (H) 0 - 30 mm/hr       Physical Exam   Constitutional: She is oriented to person, place, and time. She appears well-developed and well-nourished.   HENT:   Head: Normocephalic and atraumatic.   Cardiovascular: Normal rate, regular rhythm and normal heart sounds.   Pulmonary/Chest: Effort normal and breath sounds normal.   Musculoskeletal: She exhibits no edema.   Neurological: She is alert and oriented to person, place, and  time.   Skin: Skin is warm and dry.   Psychiatric: She has a normal mood and affect. Her behavior is normal.   Vitals reviewed.      Assessment/Plan   Problem List Items Addressed This Visit        Respiratory    Chronic cough    Relevant Medications    Fluticasone Furoate-Vilanterol (BREO ELLIPTA) 100-25 MCG/INH inhaler    Asthma - Primary    Relevant Medications    Fluticasone Furoate-Vilanterol (BREO ELLIPTA) 100-25 MCG/INH inhaler    Allergic rhinitis       Musculoskeletal and Integument    Osteoarthritis    Relevant Orders    Ambulatory Referral to Physical Therapy    Venous stasis dermatitis of both lower extremities       Other    Localized edema      For her asthma and cough will give her a trial of a Brio inhaler.  I have given her a 2-week sample.  Patient was encouraged to keep me informed of any acute changes, lack of improvement, or any new concerning symptoms.  If patient becomes concerned, or has any worsening symptoms, they are to report either here, urgent treatment, or emergency room.  Plan of care discussed with pt. They verbalized understanding and agreement.     For her allergic rhinitis I recommend she continue her azelastine nasal spray as well as her Flonase nasal spray. Patient has been given a handout on allergic rhinitis and recommended to follow these guidelines and take recommended medications as directed.     For swelling and venous stasis I strongly recommend she continue to keep her legs elevated as much as possible really really watch the salt in her diet.  Care plan initiated for 2 g of sodium per day or less.    For osteoarthritis will refer to physical therapy person she knows we also consider going to see the rheumatology.  She would like to wait at this time.    RV- 6 mo    Counseling provided on asthma and Osteoarthritis.    Leandro Johnson, CHARLES   7/8/2019   11:55 AM         Rinvoq Counseling: I discussed with the patient the risks of Rinvoq therapy including but not limited to upper respiratory tract infections, shingles, cold sores, bronchitis, nausea, cough, fever, acne, and headache. Live vaccines should be avoided.  This medication has been linked to serious infections; higher rate of mortality; malignancy and lymphoproliferative disorders; major adverse cardiovascular events; thrombosis; thrombocytopenia, anemia, and neutropenia; lipid elevations; liver enzyme elevations; and gastrointestinal perforations.

## 2023-05-12 NOTE — PROGRESS NOTES
Subjective   Lashanda Qureshi is a 81 y.o. female.     History of Present Illness     The patient has been struggling with osteoarthritis in the right hip for quite some time.  She has recently seen Dr. Chakraborty for evaluation and it has been determined by bolus that she needs a total right hip replacement.  She has no problems with anesthesia and she has no family members who have had trouble with anesthesia.  She is asthmatic but has had no unstable cardiopulmonary symptoms.    The following portions of the patient's history were reviewed and updated as appropriate: allergies, current medications, past family history, past medical history, past social history, past surgical history and problem list.    Review of Systems    Objective   Physical Exam    Assessment/Plan   {Assess/PlanSmartLinks:72010}            ECG 12 Lead  Date/Time: 7/17/2017 12:49 PM  Performed by: MIGUEL RAMOS  Authorized by: MIGUEL RAMOS   Comparison: compared with previous ECG   Similar to previous ECG  Rhythm: sinus rhythm  Rate: normal  Conduction: conduction normal  ST Segments: ST segments normal  T Waves: T waves normal  QRS axis: normal  Other: no other findings  Clinical impression: normal ECG           Not applicable

## (undated) DEVICE — DRSNG SURESITE WNDW 4X4.5

## (undated) DEVICE — SUT SILK 3/0 TIES 18IN A184H

## (undated) DEVICE — ANTIBACTERIAL UNDYED BRAIDED (POLYGLACTIN 910), SYNTHETIC ABSORBABLE SUTURE: Brand: COATED VICRYL

## (undated) DEVICE — SUT MNCRYL PLS ANTIB UD 4/0 PS2 18IN

## (undated) DEVICE — 3M™ STERI-STRIP™ REINFORCED ADHESIVE SKIN CLOSURES, R1547, 1/2 IN X 4 IN (12 MM X 100 MM), 6 STRIPS/ENVELOPE: Brand: 3M™ STERI-STRIP™

## (undated) DEVICE — LEX GENERAL ABDOMINAL SPLIT: Brand: MEDLINE INDUSTRIES, INC.

## (undated) DEVICE — BNDR ABD PREMIUM/UNIV 10IN 27TO48IN

## (undated) DEVICE — PENCL ROCKRSWCH MEGADYNE W/HOLSTR 10FT SS

## (undated) DEVICE — SUT SILK 3/0 SH CR8 18IN C013D

## (undated) DEVICE — GLV SURG SENSICARE PI ORTHO SZ7.5 LF STRL

## (undated) DEVICE — ELECTRD BLD EZ CLN MOD 4IN

## (undated) DEVICE — APPL CHLORAPREP W/TINT 26ML BLU